# Patient Record
Sex: FEMALE | Race: WHITE | Employment: PART TIME | URBAN - METROPOLITAN AREA
[De-identification: names, ages, dates, MRNs, and addresses within clinical notes are randomized per-mention and may not be internally consistent; named-entity substitution may affect disease eponyms.]

---

## 2017-05-24 ENCOUNTER — HOSPITAL ENCOUNTER (OUTPATIENT)
Dept: MAMMOGRAPHY | Age: 72
Discharge: HOME | End: 2017-05-24
Attending: FAMILY MEDICINE
Payer: MEDICARE

## 2017-05-24 DIAGNOSIS — Z12.31 VISIT FOR SCREENING MAMMOGRAM: ICD-10-CM

## 2017-05-24 PROCEDURE — 77067 SCR MAMMO BI INCL CAD: CPT

## 2017-07-12 ENCOUNTER — HOSPITAL ENCOUNTER (INPATIENT)
Age: 72
LOS: 19 days | Discharge: NURSING, REHAB OR LONG-TERM CARE | DRG: 492 | End: 2017-08-01
Attending: EMERGENCY MEDICINE | Admitting: INTERNAL MEDICINE
Payer: MEDICARE

## 2017-07-12 DIAGNOSIS — S82.891A ANKLE FRACTURE, RIGHT, CLOSED, INITIAL ENCOUNTER: Primary | ICD-10-CM

## 2017-07-12 DIAGNOSIS — J44.1 COPD EXACERBATION (HCC): ICD-10-CM

## 2017-07-12 DIAGNOSIS — J96.01 ACUTE RESPIRATORY FAILURE WITH HYPOXIA (HCC): ICD-10-CM

## 2017-07-12 LAB
ALBUMIN SERPL BCP-MCNC: 3 G/DL (ref 3.5–5)
ALBUMIN/GLOB SERPL: 0.7 {RATIO} (ref 1.1–2.2)
ALP SERPL-CCNC: 96 U/L (ref 45–117)
ALT SERPL-CCNC: 32 U/L (ref 12–78)
ANION GAP BLD CALC-SCNC: 11 MMOL/L (ref 5–15)
AST SERPL W P-5'-P-CCNC: 41 U/L (ref 15–37)
BILIRUB SERPL-MCNC: 0.5 MG/DL (ref 0.2–1)
BUN SERPL-MCNC: 23 MG/DL (ref 6–20)
BUN/CREAT SERPL: 26 (ref 12–20)
CALCIUM SERPL-MCNC: 8 MG/DL (ref 8.5–10.1)
CHLORIDE SERPL-SCNC: 93 MMOL/L (ref 97–108)
CO2 SERPL-SCNC: 22 MMOL/L (ref 21–32)
CREAT SERPL-MCNC: 0.87 MG/DL (ref 0.55–1.02)
GLOBULIN SER CALC-MCNC: 4.2 G/DL (ref 2–4)
GLUCOSE SERPL-MCNC: 168 MG/DL (ref 65–100)
LACTATE SERPL-SCNC: 1.5 MMOL/L (ref 0.4–2)
POTASSIUM SERPL-SCNC: 4.2 MMOL/L (ref 3.5–5.1)
PROT SERPL-MCNC: 7.2 G/DL (ref 6.4–8.2)
SODIUM SERPL-SCNC: 126 MMOL/L (ref 136–145)

## 2017-07-12 PROCEDURE — 94640 AIRWAY INHALATION TREATMENT: CPT

## 2017-07-12 PROCEDURE — 74011000250 HC RX REV CODE- 250: Performed by: EMERGENCY MEDICINE

## 2017-07-12 PROCEDURE — 96374 THER/PROPH/DIAG INJ IV PUSH: CPT

## 2017-07-12 PROCEDURE — 83605 ASSAY OF LACTIC ACID: CPT | Performed by: EMERGENCY MEDICINE

## 2017-07-12 PROCEDURE — 96361 HYDRATE IV INFUSION ADD-ON: CPT

## 2017-07-12 PROCEDURE — 83880 ASSAY OF NATRIURETIC PEPTIDE: CPT | Performed by: INTERNAL MEDICINE

## 2017-07-12 PROCEDURE — 87040 BLOOD CULTURE FOR BACTERIA: CPT | Performed by: EMERGENCY MEDICINE

## 2017-07-12 PROCEDURE — 74011250636 HC RX REV CODE- 250/636: Performed by: EMERGENCY MEDICINE

## 2017-07-12 PROCEDURE — 77030029684 HC NEB SM VOL KT MONA -A

## 2017-07-12 PROCEDURE — 75810000301 HC ER LEVEL 1 CLOSED TREATMNT FRACTURE/DISLOCATION

## 2017-07-12 PROCEDURE — 80053 COMPREHEN METABOLIC PANEL: CPT | Performed by: EMERGENCY MEDICINE

## 2017-07-12 PROCEDURE — 99152 MOD SED SAME PHYS/QHP 5/>YRS: CPT

## 2017-07-12 PROCEDURE — 85025 COMPLETE CBC W/AUTO DIFF WBC: CPT | Performed by: EMERGENCY MEDICINE

## 2017-07-12 PROCEDURE — 99285 EMERGENCY DEPT VISIT HI MDM: CPT

## 2017-07-12 PROCEDURE — 36415 COLL VENOUS BLD VENIPUNCTURE: CPT | Performed by: EMERGENCY MEDICINE

## 2017-07-12 RX ORDER — MORPHINE SULFATE 4 MG/ML
4 INJECTION, SOLUTION INTRAMUSCULAR; INTRAVENOUS
Status: COMPLETED | OUTPATIENT
Start: 2017-07-12 | End: 2017-07-12

## 2017-07-12 RX ORDER — LOVASTATIN 10 MG/1
10 TABLET ORAL
COMMUNITY
End: 2017-08-01

## 2017-07-12 RX ORDER — IPRATROPIUM BROMIDE AND ALBUTEROL SULFATE 2.5; .5 MG/3ML; MG/3ML
3 SOLUTION RESPIRATORY (INHALATION) ONCE
Status: COMPLETED | OUTPATIENT
Start: 2017-07-12 | End: 2017-07-12

## 2017-07-12 RX ADMIN — SODIUM CHLORIDE 1000 ML: 900 INJECTION, SOLUTION INTRAVENOUS at 23:02

## 2017-07-12 RX ADMIN — MORPHINE SULFATE 4 MG: 4 INJECTION, SOLUTION INTRAMUSCULAR; INTRAVENOUS at 23:02

## 2017-07-12 RX ADMIN — IPRATROPIUM BROMIDE AND ALBUTEROL SULFATE 3 ML: .5; 3 SOLUTION RESPIRATORY (INHALATION) at 23:02

## 2017-07-12 NOTE — IP AVS SNAPSHOT
[address] [facility] 
[phone] Patient: Marisa Trejo MRN: [id number] :1945 You are allergic to the following Allergen Reactions Codeine Rash Other (comments)  
 hyper Ibuprofen (Ibuprofen) Other (comments) Stomach pains Simvastatin Itching Recent Documentation Height Weight Breastfeeding? BMI OB Status Smoking Status 1.6 m 90.4 kg No 35.3 kg/m2 Hysterectomy Former Smoker Emergency Contacts Name Discharge Info Relation Home Work Mobile Renee Greenwood  Other Relative [6] [phone] Mikala Zambrano  Child [2]  [phone] About your hospitalization You were admitted on:  2017 You last received care in the:  hospitals 3 ORTHOPEDICS You were discharged on:  2017 Unit phone number:  [phone] Why you were hospitalized Your primary diagnosis was:  Not on File Your diagnoses also included:  Respiratory Failure (Hcc) Providers Seen During Your Hospitalizations Provider Role Specialty Primary office phone Saqib Jacob MD Attending Provider Emergency Medicine [phone] Cora Mukherjee MD Attending Provider Internal Medicine [phone] Magda Hedrick MD Attending Provider Internal Medicine [phone] Xin Yi MD Attending Provider Internal Medicine [phone] Paulo Anderson MD Attending Provider Internal Medicine [phone] Your Primary Care Physician (PCP) Primary Care Physician Office Phone Office Fax [facility] [phone] Follow-up Information Follow up With Details Comments Contact Info Demarcus Freire MD In 2 weeks  [address] [facility] [address] [facility] 
[phone] Sharon De Santiago MD In 1 week follow-up , check CBC, BMP in 1 week [address] [facility] [address] [address] [phone] Tico Barbosa MD In 2 weeks brandon/hyponatremia [address] 
[address] [facility] 
[phone] Cesar Hannon MD In 2 weeks folow-up hopsital discharge, [address] [facility] 
[phone] Pepito May MD In 3 weeks s/p tibia and fibula fracture surgery [address] [facility] [address] [facility] 
[phone] Cecillia Bumpers, MD In 2 weeks  [address] [facility] 
[phone] [facility] In 3 weeks urinary retention [address] [facility]  
[place] 5[address] 6364 [facility] [address] [facility]   [address] [place] [address] 
[phone] Current Discharge Medication List  
  
START taking these medications Dose & Instructions Dispensing Information Comments Morning Noon Evening Bedtime  
 fluticasone-vilanterol 200-25 mcg/dose inhaler Commonly known as:  BREO ELLIPTA Your last dose was: Your next dose is:    
   
   
 Dose:  1 Puff Take 1 Puff by inhalation daily. Quantity:  1 Inhaler Refills:  0  
     
   
   
   
  
 furosemide 40 mg tablet Commonly known as:  LASIX Your last dose was: Your next dose is:    
   
   
 take 1 tab daily Quantity:  30 Tab Refills:  0  
     
   
   
   
  
 ipratropium 0.02 % nebulizer solution Commonly known as:  ATROVENT Your last dose was: Your next dose is:    
   
   
 Dose:  0.5 mg  
2.5 mL by Nebulization route every six (6) hours as needed. Quantity:  10 mL Refills:  0  
     
   
   
   
  
 levalbuterol 0.63 mg/3 mL Nebu Commonly known as:  Prabhjot Early Your last dose was: Your next dose is:    
   
   
 Dose:  0.63 mg  
3 mL by Nebulization route every six (6) hours as needed. Quantity:  10 Package Refills:  1 oxyCODONE IR 5 mg immediate release tablet Commonly known as:  Caren Toledo Your last dose was: Your next dose is:    
   
   
 Dose:  2.5-5 mg Take 0.5-1 Tabs by mouth every four (4) hours as needed. Max Daily Amount: 30 mg.  
 Quantity:  30 Tab Refills:  0  
     
   
   
   
  
 pantoprazole 40 mg tablet Commonly known as:  PROTONIX Your last dose was: Your next dose is:    
   
   
 Dose:  40 mg Take 1 Tab by mouth Before breakfast and dinner. Quantity:  60 Tab Refills:  1  
     
   
   
   
  
 potassium chloride SR 10 mEq tablet Commonly known as:  KLOR-CON 10 Your last dose was: Your next dose is:    
   
   
 Dose:  10 mEq Take 1 Tab by mouth two (2) times a day. Quantity:  30 Tab Refills:  0  
     
   
   
   
  
 senna-docusate 8.6-50 mg per tablet Commonly known as:  Teodora Mendoza Your last dose was: Your next dose is:    
   
   
 Dose:  1 Tab Take 1 Tab by mouth daily. Quantity:  30 Tab Refills:  0  
     
   
   
   
  
 sucralfate 100 mg/mL suspension Commonly known as:  Lamar Acevedo Your last dose was: Your next dose is:    
   
   
 Dose:  1 g Take 10 mL by mouth Before breakfast, lunch, dinner and at bedtime. Quantity:  100 mL Refills:  0 CONTINUE these medications which have CHANGED Dose & Instructions Dispensing Information Comments Morning Noon Evening Bedtime  
 amLODIPine 5 mg tablet Commonly known as:  Oracio Urban What changed:  how much to take Your last dose was: Your next dose is:    
   
   
 Dose:  5 mg Take 1 Tab by mouth daily. Quantity:  30 Tab Refills:  0  
     
   
   
   
  
 irbesartan 150 mg tablet Commonly known as:  AVAPRO What changed:   
- medication strength 
- how much to take - when to take this Your last dose was: Your next dose is:    
   
   
 Dose:  150 mg Take 1 Tab by mouth daily. Quantity:  30 Tab Refills:  0  
     
   
   
   
  
 metoprolol succinate 50 mg XL tablet Commonly known as:  TOPROL-XL What changed:   
- medication strength 
- how much to take Your last dose was: Your next dose is:    
   
   
 Dose:  50 mg Take 1 Tab by mouth daily. Quantity:  30 Tab Refills:  0 CONTINUE these medications which have NOT CHANGED Dose & Instructions Dispensing Information Comments Morning Noon Evening Bedtime  
 nicotinic acid 500 mg tablet Commonly known as:  NIACIN Your last dose was: Your next dose is:    
   
   
 Dose:  500 mg Take 500 mg by mouth Daily (before breakfast). Refills:  0 STOP taking these medications CITRUCEL (SUCROSE) Powd Generic drug:  methylcellulose (laxative) lovastatin 10 mg tablet Commonly known as:  MEVACOR  
   
  
 PLAVIX 75 mg Tab Generic drug:  clopidogrel TYLENOL EX STR ARTHRITIS PAIN 500 mg tablet Generic drug:  acetaminophen ASK your doctor about these medications Dose & Instructions Dispensing Information Comments Morning Noon Evening Bedtime  
 acetaminophen 325 mg tablet Commonly known as:  TYLENOL Ask about: Should I take this medication? Your last dose was: Your next dose is:    
   
   
 Dose:  650 mg Take 2 Tabs by mouth every six (6) hours for 14 days. Quantity:  112 Tab Refills:  0  
     
   
   
   
  
 polyethylene glycol 17 gram packet Commonly known as:  Domenicbev Burns Ask about: Should I take this medication? Your last dose was: Your next dose is:    
   
   
 Dose:  17 g Take 1 Packet by mouth daily as needed (constipation) for up to 15 days. Quantity:  15 Packet Refills:  0 Saccharomyces boulardii 250 mg capsule Commonly known as:  Jesus Alberto Blanca Ask about: Should I take this medication? Your last dose was: Your next dose is:    
   
   
 Dose:  250 mg Take 1 Cap by mouth two (2) times a day for 7 days. Quantity:  14 Cap Refills:  0 Where to Get Your Medications These medications were sent to 7 [facility], [address]  [address] [place] Phone:  [phone] Saccharomyces boulardii 250 mg capsule Information on where to get these meds will be given to you by the nurse or doctor. ! Ask your nurse or doctor about these medications  
  acetaminophen 325 mg tablet  
 amLODIPine 5 mg tablet  
 fluticasone-vilanterol 200-25 mcg/dose inhaler  
 furosemide 40 mg tablet  
 ipratropium 0.02 % nebulizer solution  
 irbesartan 150 mg tablet  
 levalbuterol 0.63 mg/3 mL Nebu  
 metoprolol succinate 50 mg XL tablet  
 oxyCODONE IR 5 mg immediate release tablet  
 pantoprazole 40 mg tablet  
 polyethylene glycol 17 gram packet  
 potassium chloride SR 10 mEq tablet  
 senna-docusate 8.6-50 mg per tablet  
 sucralfate 100 mg/mL suspension Discharge Instructions [facility] Discharge Instruction Sheet: Ankle Fracture Repair DR. Montserrat Anderson Blood Clot Prevention ON HOLD DUE TO GI BLEEDING. Follow up with GI doctor before restarting your home medication Plavix Pain control: ? Typically, we will prescribe a narcotic usually 1-2 tabs every three to four hours as needed for your pain. ? Most patients need this only for the first few weeks. You should wean off of this as the pain decreases. ? Some of these medications contain a large dose of Tylenol (acetaminophen). Therefore, you should consult your pharmacist before taking any additional Tylenol at the same time. You should not drive while taking any narcotic pain medications. Take your pain medications with food to prevent nausea! Your last dose of pain medication in the hospital was given @ :__________ Ice Therapy ? You will be discharged home with ice/gel packs. ? Continue using these regularly to minimize pain and swelling, especially after physical activity. Constipation ? Pain medication and anesthesia can be constipating-this can be prevented by gentle physical activity and drinking plenty of fluid. ? It should be treated with over-the-counter medications such as Dulcolax, Miralax, Magnesium Citrate and/or Fleets enema. ? You should have a bowel movement at least every other day following surgery. Wound / Cast / Splint Care Elevation:  Keep the operated extremity elevated above heart level as needed for pain and swelling. WOUND:        Place opsite dressing over incisions. May wrap entire ex-fix with ace wrap. Pin Care May applpy Bacitracin to incisions ICE:   Use ice packs given to you directly over the area of your surgery. Use a towel if necessary to prevent the ice bag from directly contacting your skin and alternate ice on and off the area every 30 minutes. External Fixator: You will be discharged with an external fixator in place. Do Not allow your incisions or pin sites to get wet. SHOWER: You may shower, but you must have the leg covered with plastic bag. You can use a heavy duty trash bag with a rubber band, or purchase a cast cover a your local pharmacy. It will be helpful to have a seat in the shower so that you can sit. To increase and promote healing ? Stop Smoking (or at least cut back on smoking). ? Eat a well-balanced diet (high in protein and vitamin C) ?  If your appetite is poor, consider nutritional supplements like Ensure, Glucerna, or Center Instant Breakfast. 
? If you are diabetic, controlling you blood sugars is very important to prevent infection and promote wound healing. Nutrition ? If you were on a supplement such as Ensure or Glucerna) while in the hospital, please continue using them with each meal for the next 30 days. ? Eat a well-balanced diet - High in protein, high in vitamins and minerals, especially vitamin C and zinc.  
 
Physical Activity ? You cannot weight bear on your right leg. ? Light activity. ? Bed-rest may slow your recovery. ? Use your walker/crutches and take short walks about every 2 hours. o Increase your distance each day. ? NO DRIVING until told to do so. Warning signs: Please call your physician immediately at [phone] if you have ? Bleeding from incision that is constant. ? Change in mental status (unusual behavior or confusion) ? If your incision develops redness or swelling 
? Change in wound drainage (increase in amount, color, or foul odor) ? Temperature over 101.5 degrees Fahrenheit ? Pain in the calf of your leg ? Tenderness or redness in the calf of your leg 
? Increased swelling of the thigh, ankle, calf, or foot. Emergency: CALL 911 if you have ? Shortness of breath ? Chest pain ? Localized chest pain when coughing or taking a deep breath Follow-up ? Call the surgeons office today or tomorrow and ask to make an appointment for follow-up in 2 weeks. ? You can return to work when cleared by a physician. Please call [phone] if you have concerns or questions. HOSPITALIST DISCHARGE INSTRUCTIONS 
 
NAME: Zheng Herzog :  1945 MRN:  [id number] Date/Time:  2017 8:22 AM 
 
ADMIT DATE: 2017 DISCHARGE DATE: 2017 Attending Physician: Camilla Davis MD 
 
DISCHARGE DIAGNOSIS: 
Acute GIB with ABL anemia secondary to duodenal ulcers and esophagitis 
  
Atrial Fibrillation with RVR on early morning of  SVT  
 
  
Acute hypoxic respiratory failure Acute COPD exacerbation 
  
ARF  
 Hyponatremia: Sodium Woody of 121 on 7/15; increased to 133;  ? SIADH 
  
CHF ruled out (LVEF 55-60%); Distal tibia and fibula fracture s/p mechanical fall 
-s/p ORIF on 7/13 
 
   
Hx of CAD 
HTN;  
  
  
Hx of PVD 
-s/p right renal artery stent, left fem-pop by Dr. Arleth Sweet 3/2014 
   
Impaired Glucose Management (Diabetes ruled out) 
 
   
Tobacco use 
 
  
Obesity 
   
Code Status: Full Surrogate Decision Maker: daughter Yvonne Melo [phone] 
   
DVT Prophylaxis: SCDs for now (lovenox if no plan procedure in the AM) GI Prophylaxis: not indicated 
   
Baseline: independent 
  
Dispo: ? SNF, await placement 
  
  
Body mass index is 35.3 kg/(m^2). Medications: Per above medication reconciliation. Pain Management: per above medications Recommended diet: Cardiac Diet, Diabetic Diet, Low fat, Low cholesterol and Renal Diet Recommended activity: Activity as tolerated Wound care: See surgical/procedure care instructions Indwelling devices: Ness catheter care and voiding trial 
 
Supplemental Oxygen: None Required Lab work: Weekly BMP and Weekly CBC Glucose management:  Accucheck ACHS with sliding scale per SNF protocol Code status: Full Outside physician follow up: Follow-up Information Follow up With Details Comments Contact Info Caryle Kaiser, MD In 2 weeks  [facility] [address] [facility] [address] [phone] [facility])   [address] [facility] [address] [phone] Kanika Becerril MD In 1 week follow-up , check CBC, BMP in 1 week [address] [address] [phone] Ivis Cassidy MD In 2 weeks brandon/hyponatremia [address] 
[address] [facility] [address] [phone] Jeramy Spann MD In 2 weeks folow-up hopsital discharge, [address] [facility] [address] [phone] Pepito May MD In 3 weeks s/p tibia and fibula fracture surgery [address] [facility] [address] [facility] 
[phone] Cecillia Bumpers, MD In 2 weeks  [address] [facility] 
[phone] [facility] In 3 weeks urinary retention [address] [facility]  
[place] [address] [id number] [place] [phone] Skilled nursing facility/ SNF MD responsible for above on discharge. Information obtained by : 
I understand that if any problems occur once I am at home I am to contact my physician. I understand and acknowledge receipt of the instructions indicated above. Physician's or R.N.'s Signature                                                                  Date/Time Patient or Repres Discharge Instructions Attachments/References COPD: BREATHING TECHNIQUES (ENGLISH) HEART FAILURE ZONES: GENERAL INFO (ENGLISH) SMOKING CESSATION: HEALTH BENEFITS: GENERAL INFO (ENGLISH) Discharge Orders None [facility] Announcement We are excited to announce that we are making your provider's discharge notes available to you in [employer]. You will see these notes when they are completed and signed by the physician that discharged you from your recent hospital stay. If you have any questions or concerns about any information you see in [employer], please call the Health Information Department where you were seen or reach out to your Primary Care Provider for more information about your plan of care. Introducing [facility] HE[facility]!    
 [employer] introduces [employer] patient portal. Now you can access parts of your medical record, email your doctor's office, and request medication refills online. 1. In your internet browser, go to [email or web address] 2. Click on the First Time User? Click Here link in the Sign In box. You will see the New Member Sign Up page. 3. Enter your Fnbox Access Code exactly as it appears below. You will not need to use this code after youve completed the sign-up process. If you do not sign up before the expiration date, you must request a new code. · Fnbox Access Code: [email or web address] Expires: 8/22/2017  8:36 AM 
 
4. Enter the last four digits of your Social Security Number (xxxx) and Date of Birth (mm/dd/yyyy) as indicated and click Submit. You will be taken to the next sign-up page. 5. Create a Fnbox ID. This will be your Fnbox login ID and cannot be changed, so think of one that is secure and easy to remember. 6. Create a Fnbox password. You can change your password at any time. 7. Enter your Password Reset Question and Answer. This can be used at a later time if you forget your password. 8. Enter your e-mail address. You will receive e-mail notification when new information is available in [address] 9. Click Sign Up. You can now view and download portions of your medical record. 10. Click the Download Summary menu link to download a portable copy of your medical information. If you have questions, please visit the Frequently Asked Questions section of the Fnbox website. Remember, Fnbox is NOT to be used for urgent needs. For medical emergencies, dial 911. Now available from your iPhone and Android! General Information Please provide this summary of care documentation to your next provider. Patient Signature:  ____________________________________________________________ Date:  ____________________________________________________________  
  
Rashida Piety  Provider Signature: ____________________________________________________________ Date:  ____________________________________________________________ More Information Breathing Techniques for COPD: Care Instructions Your Care Instructions Breathing is hard when you have chronic obstructive pulmonary disease (COPD). You may take quick, short breaths. Breathing this way makes it harder to get air into your lungs. Learning new ways to control your breathing may help. You may feel better and be able to do more. You can try three basic ways to control your breathing. They are pursed-lip breathing, diaphragmatic breathing, and breathing while bending. Use these methods when you are more short of breath than normal. Practice them often so you can do them well. Follow-up care is a key part of your treatment and safety. Be sure to make and go to all appointments, and call your doctor if you are having problems. It's also a good idea to know your test results and keep a list of the medicines you take. How can you care for yourself at home? · Pursed-lip breathing helps you breathe more air out so that your next breath can be deeper. For this type of breathing, you breathe in through your nose and out through your mouth while almost closing your lips. Breathe in for about 2 seconds, and breathe out for 4 to 6 seconds. Pursed-lip breathing decreases shortness of breath and improves your ability to exercise. · Diaphragmatic breathing helps your lungs expand so that they take in more air. ¨ Lie on your back, or prop yourself up on several pillows. ¨ Put one hand on your belly and the other on your chest. When you breathe in, push your belly out as far as possible. You should feel the hand on your belly move out, while the hand on your chest does not move. ¨ When you breathe out, you should feel the hand on your belly move in.  When you can do this type of breathing well while lying down, learn to do it while sitting or standing. Many people with COPD find this breathing method helpful. ¨ Practice diaphragmatic breathing for 20 minutes, 2 or 3 times a day. · Breathing while bending forward at the waist may make breathing easier. It can reduce shortness of breath while you exercise or rest. It helps the diaphragm move more easily. The diaphragm is a large muscle that separates your lungs from your belly. It helps draw air into your lungs as you breathe. · Do not smoke. Smoking makes COPD worse. If you need help quitting, talk to your doctor about stop-smoking programs and medicines. These can increase your chances of quitting for good. When should you call for help? Call your doctor now or seek immediate medical care if: 
· Your breathing methods do not help. · Your shortness of breath gets worse. · You cough up blood. · You have swelling in your belly and legs. · You have severe chest pain. Watch closely for changes in your health, and be sure to contact your doctor if you have any problems. Where can you learn more? Go to http://arturo-stephen.info/. Enter A915 in the search box to learn more about \"Breathing Techniques for COPD: Care Instructions. \" Current as of: March 25, 2017 Content Version: 11.3 © 1586-4421 [employer]. Care instructions adapted under license by [employer] (which disclaims liability or warranty for this information). If you have questions about a medical condition or this instruction, always ask your healthcare professional. [place] [address] any warranty or liability for your use of this information. Learning About Heart Failure Zones What are heart failure zones? Heart failure zones give you an easy way to see changes in your heart failure symptoms. They also tell you when you need to get help. Check every day to see which zone you are in. Green zone. You are doing well. This is where you want to be. · Your weight is stable. This means it is not going up or down. · You breathe easily. · You are sleeping well. You are able to lie flat without shortness of breath. · You can do your usual activities. Yellow zone. Be careful. Your symptoms are changing. Call your doctor. · You have new or increased shortness of breath. · You are dizzy or lightheaded, or you feel like you may faint. · You have sudden weight gain, such as more than 2 to 3 pounds in a day or 5 pounds in a week. (Your doctor may suggest a different range of weight gain.) · You have increased swelling in your legs, ankles, or feet. · You are so tired or weak that you cannot do your usual activities. · You are not sleeping well. Shortness of breath wakes you up at night. You need extra pillows. Your doctor's name: ____________________________________________________________ Your doctor's contact information: _________________________________________________ Red zone. This is an emergency. Call 911. You have symptoms of sudden heart failure, such as: 
· You have severe trouble breathing. · You cough up pink, foamy mucus. · You have a new irregular or fast heartbeat. You have symptoms of a heart attack. These may include: · Chest pain or pressure, or a strange feeling in the chest. 
· Sweating. · Shortness of breath. · Nausea or vomiting. · Pain, pressure, or a strange feeling in the back, neck, jaw, or upper belly or in one or both shoulders or arms. · Lightheadedness or sudden weakness. · A fast or irregular heartbeat. If you have symptoms of a heart attack: After you call 911, the  may tell you to chew 1 adult-strength or 2 to 4 low-dose aspirin. Wait for an ambulance. Do not try to drive yourself. Follow-up care is a key part of your treatment and safety.  Be sure to make and go to all appointments, and call your doctor if you are having problems. It's also a good idea to know your test results and keep a list of the medicines you take. Where can you learn more? Go to http://arturo-stpehen.info/. Enter T174 in the search box to learn more about \"Learning About Heart Failure Zones. \" Current as of: February 23, 2017 Content Version: 11.3 © 5573-3767 Ink[employer]. Care instructions adapted under license by [employer] (which disclaims liability or warranty for this information). If you have questions about a medical condition or this instruction, always ask your healthcare professional. [address] any warranty or liability for your use of this information. Learning About Benefits From Quitting Smoking How does quitting smoking make you healthier? If you're thinking about quitting smoking, you may have a few reasons to be smoke-free. Your health may be one of them. · When you quit smoking, you lower your risks for cancer, lung disease, heart attack, stroke, blood vessel disease, and blindness from macular degeneration. · When you're smoke-free, you get sick less often, and you heal faster. You are less likely to get colds, flu, bronchitis, and pneumonia. · As a nonsmoker, you may find that your mood is better and you are less stressed. When and how will you feel healthier? Quitting has real health benefits that start from day 1 of being smoke-free. And the longer you stay smoke-free, the healthier you get and the better you feel. The first hours · After just 20 minutes, your blood pressure and heart rate go down. That means there's less stress on your heart and blood vessels. · Within 12 hours, the level of carbon monoxide in your blood drops back to normal. That makes room for more oxygen. With more oxygen in your body, you may notice that you have more energy than when you smoked. After 2 weeks · Your lungs start to work better. · Your risk of heart attack starts to drop. After 1 month · When your lungs are clear, you cough less and breathe deeper, so it's easier to be active. · Your sense of taste and smell return. That means you can enjoy food more than you have since you started smoking. Over the years · After 1 year, your risk of heart disease is half what it would be if you kept smoking. · After 5 years, your risk of stroke starts to shrink. Within a few years after that, it's about the same as if you'd never smoked. · After 10 years, your risk of dying from lung cancer is cut by about half. And your risk for many other types of cancer is lower too. How would quitting help others in your life? When you quit smoking, you improve the health of everyone who now breathes in your smoke. · Their heart, lung, and cancer risks drop, much like yours. · They are sick less. For babies and small children, living smoke-free means they're less likely to have ear infections, pneumonia, and bronchitis. · If you're a woman who is or will be pregnant someday, quitting smoking means a healthier . · Children who are close to you are less likely to become adult smokers. Where can you learn more? Go to http://arturo-stephen.info/. Enter 052 [id number] [id number] 11 in the search box to learn more about \"Learning About Benefits From Quitting Smoking. \" Current as of: 2017 Content Version: 11.3 © 4128-8169 [employer]. Care instructions adapted under license by [employer] (which disclaims liability or warranty for this information). If you have questions about a medical condition or this instruction, always ask your healthcare professional. [place] [address] any warranty or liability for your use of this information.

## 2017-07-13 ENCOUNTER — APPOINTMENT (OUTPATIENT)
Dept: GENERAL RADIOLOGY | Age: 72
DRG: 492 | End: 2017-07-13
Attending: ORTHOPAEDIC SURGERY
Payer: MEDICARE

## 2017-07-13 ENCOUNTER — APPOINTMENT (OUTPATIENT)
Dept: GENERAL RADIOLOGY | Age: 72
DRG: 492 | End: 2017-07-13
Attending: PHYSICIAN ASSISTANT
Payer: MEDICARE

## 2017-07-13 ENCOUNTER — APPOINTMENT (OUTPATIENT)
Dept: GENERAL RADIOLOGY | Age: 72
DRG: 492 | End: 2017-07-13
Attending: EMERGENCY MEDICINE
Payer: MEDICARE

## 2017-07-13 ENCOUNTER — APPOINTMENT (OUTPATIENT)
Dept: CT IMAGING | Age: 72
DRG: 492 | End: 2017-07-13
Attending: PHYSICIAN ASSISTANT
Payer: MEDICARE

## 2017-07-13 ENCOUNTER — ANESTHESIA EVENT (OUTPATIENT)
Dept: SURGERY | Age: 72
DRG: 492 | End: 2017-07-13
Payer: MEDICARE

## 2017-07-13 ENCOUNTER — ANESTHESIA (OUTPATIENT)
Dept: SURGERY | Age: 72
DRG: 492 | End: 2017-07-13
Payer: MEDICARE

## 2017-07-13 PROBLEM — J96.90 RESPIRATORY FAILURE (HCC): Status: ACTIVE | Noted: 2017-07-13

## 2017-07-13 LAB
BASOPHILS # BLD AUTO: 0 K/UL (ref 0–0.1)
BASOPHILS # BLD: 0 % (ref 0–1)
BNP SERPL-MCNC: 1751 PG/ML (ref 0–125)
DIFFERENTIAL METHOD BLD: ABNORMAL
EOSINOPHIL # BLD: 0 K/UL (ref 0–0.4)
EOSINOPHIL NFR BLD: 0 % (ref 0–7)
ERYTHROCYTE [DISTWIDTH] IN BLOOD BY AUTOMATED COUNT: 12.2 % (ref 11.5–14.5)
GLUCOSE BLD STRIP.AUTO-MCNC: 157 MG/DL (ref 65–100)
GLUCOSE BLD STRIP.AUTO-MCNC: 157 MG/DL (ref 65–100)
GLUCOSE BLD STRIP.AUTO-MCNC: 176 MG/DL (ref 65–100)
GLUCOSE BLD STRIP.AUTO-MCNC: 209 MG/DL (ref 65–100)
HCT VFR BLD AUTO: 31.7 % (ref 35–47)
HGB BLD-MCNC: 11.3 G/DL (ref 11.5–16)
LYMPHOCYTES # BLD AUTO: 3 % (ref 12–49)
LYMPHOCYTES # BLD: 0.5 K/UL (ref 0.8–3.5)
MCH RBC QN AUTO: 31 PG (ref 26–34)
MCHC RBC AUTO-ENTMCNC: 35.6 G/DL (ref 30–36.5)
MCV RBC AUTO: 86.8 FL (ref 80–99)
MONOCYTES # BLD: 0.2 K/UL (ref 0–1)
MONOCYTES NFR BLD AUTO: 1 % (ref 5–13)
NEUTS SEG # BLD: 15.8 K/UL (ref 1.8–8)
NEUTS SEG NFR BLD AUTO: 96 % (ref 32–75)
PLATELET # BLD AUTO: 318 K/UL (ref 150–400)
RBC # BLD AUTO: 3.65 M/UL (ref 3.8–5.2)
RBC MORPH BLD: ABNORMAL
SERVICE CMNT-IMP: ABNORMAL
WBC # BLD AUTO: 16.5 K/UL (ref 3.6–11)

## 2017-07-13 PROCEDURE — 74011250636 HC RX REV CODE- 250/636: Performed by: ORTHOPAEDIC SURGERY

## 2017-07-13 PROCEDURE — 74011636637 HC RX REV CODE- 636/637: Performed by: INTERNAL MEDICINE

## 2017-07-13 PROCEDURE — 77030000032 HC CUF TRNQT ZIMM -B: Performed by: ORTHOPAEDIC SURGERY

## 2017-07-13 PROCEDURE — 77030011640 HC PAD GRND REM COVD -A: Performed by: ORTHOPAEDIC SURGERY

## 2017-07-13 PROCEDURE — 0QSJ35Z REPOSITION RIGHT FIBULA WITH EXTERNAL FIXATION DEVICE, PERCUTANEOUS APPROACH: ICD-10-PCS | Performed by: ORTHOPAEDIC SURGERY

## 2017-07-13 PROCEDURE — 77030025102 HC BIT DRL QC SS 2 SYNT -B: Performed by: ORTHOPAEDIC SURGERY

## 2017-07-13 PROCEDURE — 74011000250 HC RX REV CODE- 250: Performed by: ORTHOPAEDIC SURGERY

## 2017-07-13 PROCEDURE — C1713 ANCHOR/SCREW BN/BN,TIS/BN: HCPCS | Performed by: ORTHOPAEDIC SURGERY

## 2017-07-13 PROCEDURE — 74011250636 HC RX REV CODE- 250/636

## 2017-07-13 PROCEDURE — 77030013079 HC BLNKT BAIR HGGR 3M -A: Performed by: NURSE ANESTHETIST, CERTIFIED REGISTERED

## 2017-07-13 PROCEDURE — 77030000031 HC BIT DRL QC SYNT -C: Performed by: ORTHOPAEDIC SURGERY

## 2017-07-13 PROCEDURE — 77030018836 HC SOL IRR NACL ICUM -A: Performed by: ORTHOPAEDIC SURGERY

## 2017-07-13 PROCEDURE — 0QSJ04Z REPOSITION RIGHT FIBULA WITH INTERNAL FIXATION DEVICE, OPEN APPROACH: ICD-10-PCS | Performed by: ORTHOPAEDIC SURGERY

## 2017-07-13 PROCEDURE — 82962 GLUCOSE BLOOD TEST: CPT

## 2017-07-13 PROCEDURE — 76060000037 HC ANESTHESIA 3 TO 3.5 HR: Performed by: ORTHOPAEDIC SURGERY

## 2017-07-13 PROCEDURE — 74011250637 HC RX REV CODE- 250/637: Performed by: PHYSICIAN ASSISTANT

## 2017-07-13 PROCEDURE — 73700 CT LOWER EXTREMITY W/O DYE: CPT

## 2017-07-13 PROCEDURE — 74011250637 HC RX REV CODE- 250/637: Performed by: INTERNAL MEDICINE

## 2017-07-13 PROCEDURE — 77030010509 HC AIRWY LMA MSK TELE -A: Performed by: NURSE ANESTHETIST, CERTIFIED REGISTERED

## 2017-07-13 PROCEDURE — 77030025148 HC CLMP EXT FIX 1 SYNT -G: Performed by: ORTHOPAEDIC SURGERY

## 2017-07-13 PROCEDURE — 93005 ELECTROCARDIOGRAM TRACING: CPT

## 2017-07-13 PROCEDURE — 94640 AIRWAY INHALATION TREATMENT: CPT

## 2017-07-13 PROCEDURE — 74011250636 HC RX REV CODE- 250/636: Performed by: PHYSICIAN ASSISTANT

## 2017-07-13 PROCEDURE — 65660000000 HC RM CCU STEPDOWN

## 2017-07-13 PROCEDURE — 71010 XR CHEST PORT: CPT

## 2017-07-13 PROCEDURE — 73600 X-RAY EXAM OF ANKLE: CPT

## 2017-07-13 PROCEDURE — 74011000250 HC RX REV CODE- 250

## 2017-07-13 PROCEDURE — 77030008467 HC STPLR SKN COVD -B: Performed by: ORTHOPAEDIC SURGERY

## 2017-07-13 PROCEDURE — 74011250636 HC RX REV CODE- 250/636: Performed by: EMERGENCY MEDICINE

## 2017-07-13 PROCEDURE — 77030016920 HC CLMP EXT FIX4 SYNT -F: Performed by: ORTHOPAEDIC SURGERY

## 2017-07-13 PROCEDURE — 77030020371 HC SUT POLYGLY VLOC COVD -B: Performed by: ORTHOPAEDIC SURGERY

## 2017-07-13 PROCEDURE — 74011000250 HC RX REV CODE- 250: Performed by: INTERNAL MEDICINE

## 2017-07-13 PROCEDURE — 76210000017 HC OR PH I REC 1.5 TO 2 HR: Performed by: ORTHOPAEDIC SURGERY

## 2017-07-13 PROCEDURE — 77030020782 HC GWN BAIR PAWS FLX 3M -B

## 2017-07-13 PROCEDURE — 73610 X-RAY EXAM OF ANKLE: CPT

## 2017-07-13 PROCEDURE — 74011250637 HC RX REV CODE- 250/637

## 2017-07-13 PROCEDURE — 77030029684 HC NEB SM VOL KT MONA -A

## 2017-07-13 PROCEDURE — 77030003862 HC BIT DRL SYNT -B: Performed by: ORTHOPAEDIC SURGERY

## 2017-07-13 PROCEDURE — 76001 XR FLUOROSCOPY OVER 60 MINUTES: CPT

## 2017-07-13 PROCEDURE — 76010000133 HC OR TIME 3 TO 3.5 HR: Performed by: ORTHOPAEDIC SURGERY

## 2017-07-13 PROCEDURE — 74011250636 HC RX REV CODE- 250/636: Performed by: INTERNAL MEDICINE

## 2017-07-13 PROCEDURE — 77030000024 HC CLMP EXT FIX SYNT -F: Performed by: ORTHOPAEDIC SURGERY

## 2017-07-13 PROCEDURE — 77030019908 HC STETH ESOPH SIMS -A: Performed by: NURSE ANESTHETIST, CERTIFIED REGISTERED

## 2017-07-13 DEVICE — SCREW BNE L20MM DIA2.7MM ANK S STL ST VAR ANG LOK FULL THRD: Type: IMPLANTABLE DEVICE | Site: ANKLE | Status: FUNCTIONAL

## 2017-07-13 DEVICE — SCREW EXT FIX L125MM DIA4MM THRD L40MM CORT S STL SELF DRL: Type: IMPLANTABLE DEVICE | Site: ANKLE | Status: FUNCTIONAL

## 2017-07-13 DEVICE — SCREW BNE L16MM DIA3.5MM CORT S STL ST LOK FULL THRD T15: Type: IMPLANTABLE DEVICE | Site: ANKLE | Status: FUNCTIONAL

## 2017-07-13 DEVICE — SCREW EXT FIX L175MM DIA5MM THRD L60MM S STL SELF DRL SCHNZ: Type: IMPLANTABLE DEVICE | Site: ANKLE | Status: FUNCTIONAL

## 2017-07-13 DEVICE — PIN FIX L225MM DIA6MM S STL FOR L EXT FIX SET: Type: IMPLANTABLE DEVICE | Site: ANKLE | Status: FUNCTIONAL

## 2017-07-13 DEVICE — 2.7MM VA LCKNG SCREW SLF-TPNG WITH T8 STARDRIVE RECESS 18MM: Type: IMPLANTABLE DEVICE | Site: ANKLE | Status: FUNCTIONAL

## 2017-07-13 DEVICE — SCREW BNE L14MM DIA2.7MM ANK S STL ST VAR ANG LOK FULL THRD: Type: IMPLANTABLE DEVICE | Site: ANKLE | Status: FUNCTIONAL

## 2017-07-13 DEVICE — SCREW BNE L40MM DIA4MM S STL CANN SHT 1/3 THRD SM HEX SOCK: Type: IMPLANTABLE DEVICE | Site: ANKLE | Status: FUNCTIONAL

## 2017-07-13 RX ORDER — FACIAL-BODY WIPES
10 EACH TOPICAL DAILY PRN
Status: DISCONTINUED | OUTPATIENT
Start: 2017-07-15 | End: 2017-07-13 | Stop reason: SDUPTHER

## 2017-07-13 RX ORDER — POLYETHYLENE GLYCOL 3350 17 G/17G
17 POWDER, FOR SOLUTION ORAL DAILY
Status: DISCONTINUED | OUTPATIENT
Start: 2017-07-14 | End: 2017-07-13 | Stop reason: SDUPTHER

## 2017-07-13 RX ORDER — ALBUTEROL SULFATE 0.83 MG/ML
2.5 SOLUTION RESPIRATORY (INHALATION)
Status: DISCONTINUED | OUTPATIENT
Start: 2017-07-13 | End: 2017-07-14

## 2017-07-13 RX ORDER — DEXTROSE MONOHYDRATE 100 MG/ML
125-250 INJECTION, SOLUTION INTRAVENOUS AS NEEDED
Status: DISCONTINUED | OUTPATIENT
Start: 2017-07-13 | End: 2017-07-25

## 2017-07-13 RX ORDER — CEFAZOLIN SODIUM IN 0.9 % NACL 2 G/100 ML
2 PLASTIC BAG, INJECTION (ML) INTRAVENOUS EVERY 8 HOURS
Status: COMPLETED | OUTPATIENT
Start: 2017-07-13 | End: 2017-07-14

## 2017-07-13 RX ORDER — MORPHINE SULFATE 4 MG/ML
2 INJECTION, SOLUTION INTRAMUSCULAR; INTRAVENOUS
Status: DISCONTINUED | OUTPATIENT
Start: 2017-07-13 | End: 2017-07-13 | Stop reason: SDUPTHER

## 2017-07-13 RX ORDER — ONDANSETRON 2 MG/ML
4 INJECTION INTRAMUSCULAR; INTRAVENOUS AS NEEDED
Status: DISCONTINUED | OUTPATIENT
Start: 2017-07-13 | End: 2017-07-13 | Stop reason: HOSPADM

## 2017-07-13 RX ORDER — IPRATROPIUM BROMIDE AND ALBUTEROL SULFATE 2.5; .5 MG/3ML; MG/3ML
3 SOLUTION RESPIRATORY (INHALATION)
Status: COMPLETED | OUTPATIENT
Start: 2017-07-13 | End: 2017-07-13

## 2017-07-13 RX ORDER — FACIAL-BODY WIPES
10 EACH TOPICAL DAILY PRN
Status: DISCONTINUED | OUTPATIENT
Start: 2017-07-13 | End: 2017-08-01 | Stop reason: HOSPADM

## 2017-07-13 RX ORDER — SODIUM CHLORIDE 0.9 % (FLUSH) 0.9 %
5-10 SYRINGE (ML) INJECTION EVERY 8 HOURS
Status: DISCONTINUED | OUTPATIENT
Start: 2017-07-13 | End: 2017-07-13 | Stop reason: SDUPTHER

## 2017-07-13 RX ORDER — MAGNESIUM SULFATE 100 %
4 CRYSTALS MISCELLANEOUS AS NEEDED
Status: DISCONTINUED | OUTPATIENT
Start: 2017-07-13 | End: 2017-07-25

## 2017-07-13 RX ORDER — DIPHENHYDRAMINE HYDROCHLORIDE 50 MG/ML
12.5 INJECTION, SOLUTION INTRAMUSCULAR; INTRAVENOUS AS NEEDED
Status: DISCONTINUED | OUTPATIENT
Start: 2017-07-13 | End: 2017-07-13 | Stop reason: HOSPADM

## 2017-07-13 RX ORDER — ACETAMINOPHEN 325 MG/1
650 TABLET ORAL EVERY 6 HOURS
Status: DISCONTINUED | OUTPATIENT
Start: 2017-07-13 | End: 2017-07-19

## 2017-07-13 RX ORDER — ALBUTEROL SULFATE 90 UG/1
AEROSOL, METERED RESPIRATORY (INHALATION) AS NEEDED
Status: DISCONTINUED | OUTPATIENT
Start: 2017-07-13 | End: 2017-07-13 | Stop reason: HOSPADM

## 2017-07-13 RX ORDER — MORPHINE SULFATE 10 MG/ML
INJECTION, SOLUTION INTRAMUSCULAR; INTRAVENOUS AS NEEDED
Status: DISCONTINUED | OUTPATIENT
Start: 2017-07-13 | End: 2017-07-13 | Stop reason: HOSPADM

## 2017-07-13 RX ORDER — CEFAZOLIN SODIUM 1 G/3ML
INJECTION, POWDER, FOR SOLUTION INTRAMUSCULAR; INTRAVENOUS AS NEEDED
Status: DISCONTINUED | OUTPATIENT
Start: 2017-07-13 | End: 2017-07-13 | Stop reason: HOSPADM

## 2017-07-13 RX ORDER — SODIUM CHLORIDE 9 MG/ML
125 INJECTION, SOLUTION INTRAVENOUS CONTINUOUS
Status: DISCONTINUED | OUTPATIENT
Start: 2017-07-13 | End: 2017-07-13

## 2017-07-13 RX ORDER — ONDANSETRON 2 MG/ML
INJECTION INTRAMUSCULAR; INTRAVENOUS AS NEEDED
Status: DISCONTINUED | OUTPATIENT
Start: 2017-07-13 | End: 2017-07-13 | Stop reason: HOSPADM

## 2017-07-13 RX ORDER — PROPOFOL 10 MG/ML
INJECTION, EMULSION INTRAVENOUS AS NEEDED
Status: DISCONTINUED | OUTPATIENT
Start: 2017-07-13 | End: 2017-07-13 | Stop reason: HOSPADM

## 2017-07-13 RX ORDER — HYDROCODONE BITARTRATE AND ACETAMINOPHEN 5; 325 MG/1; MG/1
1 TABLET ORAL
Status: DISCONTINUED | OUTPATIENT
Start: 2017-07-13 | End: 2017-07-13

## 2017-07-13 RX ORDER — LIDOCAINE HYDROCHLORIDE 10 MG/ML
0.1 INJECTION, SOLUTION EPIDURAL; INFILTRATION; INTRACAUDAL; PERINEURAL AS NEEDED
Status: DISCONTINUED | OUTPATIENT
Start: 2017-07-13 | End: 2017-07-13 | Stop reason: ALTCHOICE

## 2017-07-13 RX ORDER — MIDAZOLAM HYDROCHLORIDE 1 MG/ML
1 INJECTION, SOLUTION INTRAMUSCULAR; INTRAVENOUS AS NEEDED
Status: DISCONTINUED | OUTPATIENT
Start: 2017-07-13 | End: 2017-07-13 | Stop reason: ALTCHOICE

## 2017-07-13 RX ORDER — MORPHINE SULFATE 4 MG/ML
INJECTION, SOLUTION INTRAMUSCULAR; INTRAVENOUS
Status: DISPENSED
Start: 2017-07-13 | End: 2017-07-13

## 2017-07-13 RX ORDER — ONDANSETRON 2 MG/ML
4 INJECTION INTRAMUSCULAR; INTRAVENOUS
Status: DISCONTINUED | OUTPATIENT
Start: 2017-07-13 | End: 2017-07-13 | Stop reason: SDUPTHER

## 2017-07-13 RX ORDER — FERROUS SULFATE, DRIED 160(50) MG
1 TABLET, EXTENDED RELEASE ORAL
Status: DISCONTINUED | OUTPATIENT
Start: 2017-07-14 | End: 2017-07-17

## 2017-07-13 RX ORDER — PRAVASTATIN SODIUM 10 MG/1
10 TABLET ORAL
Status: DISCONTINUED | OUTPATIENT
Start: 2017-07-13 | End: 2017-07-17

## 2017-07-13 RX ORDER — ACETAMINOPHEN 325 MG/1
650 TABLET ORAL
Status: DISCONTINUED | OUTPATIENT
Start: 2017-07-13 | End: 2017-07-13

## 2017-07-13 RX ORDER — OXYCODONE HYDROCHLORIDE 5 MG/1
5 TABLET ORAL
Status: DISCONTINUED | OUTPATIENT
Start: 2017-07-13 | End: 2017-08-01 | Stop reason: HOSPADM

## 2017-07-13 RX ORDER — LIDOCAINE HYDROCHLORIDE 20 MG/ML
INJECTION, SOLUTION EPIDURAL; INFILTRATION; INTRACAUDAL; PERINEURAL AS NEEDED
Status: DISCONTINUED | OUTPATIENT
Start: 2017-07-13 | End: 2017-07-13 | Stop reason: HOSPADM

## 2017-07-13 RX ORDER — FENTANYL CITRATE 50 UG/ML
50 INJECTION, SOLUTION INTRAMUSCULAR; INTRAVENOUS AS NEEDED
Status: DISCONTINUED | OUTPATIENT
Start: 2017-07-13 | End: 2017-07-13 | Stop reason: ALTCHOICE

## 2017-07-13 RX ORDER — FUROSEMIDE 10 MG/ML
40 INJECTION INTRAMUSCULAR; INTRAVENOUS ONCE
Status: DISCONTINUED | OUTPATIENT
Start: 2017-07-13 | End: 2017-07-13

## 2017-07-13 RX ORDER — METOPROLOL SUCCINATE 50 MG/1
100 TABLET, EXTENDED RELEASE ORAL DAILY
Status: DISCONTINUED | OUTPATIENT
Start: 2017-07-13 | End: 2017-07-15

## 2017-07-13 RX ORDER — ROPIVACAINE HYDROCHLORIDE 5 MG/ML
INJECTION, SOLUTION EPIDURAL; INFILTRATION; PERINEURAL AS NEEDED
Status: DISCONTINUED | OUTPATIENT
Start: 2017-07-13 | End: 2017-07-13 | Stop reason: HOSPADM

## 2017-07-13 RX ORDER — AMLODIPINE BESYLATE 5 MG/1
10 TABLET ORAL DAILY
Status: DISCONTINUED | OUTPATIENT
Start: 2017-07-13 | End: 2017-07-17

## 2017-07-13 RX ORDER — MORPHINE SULFATE 2 MG/ML
4 INJECTION, SOLUTION INTRAMUSCULAR; INTRAVENOUS
Status: COMPLETED | OUTPATIENT
Start: 2017-07-13 | End: 2017-07-13

## 2017-07-13 RX ORDER — CLOPIDOGREL BISULFATE 75 MG/1
75 TABLET ORAL DAILY
Status: DISCONTINUED | OUTPATIENT
Start: 2017-07-13 | End: 2017-07-17

## 2017-07-13 RX ORDER — PROPOFOL 10 MG/ML
0.5 INJECTION, EMULSION INTRAVENOUS
Status: DISCONTINUED | OUTPATIENT
Start: 2017-07-13 | End: 2017-07-13

## 2017-07-13 RX ORDER — FENTANYL CITRATE 50 UG/ML
INJECTION, SOLUTION INTRAMUSCULAR; INTRAVENOUS AS NEEDED
Status: DISCONTINUED | OUTPATIENT
Start: 2017-07-13 | End: 2017-07-13 | Stop reason: HOSPADM

## 2017-07-13 RX ORDER — BACITRACIN ZINC 500 UNIT/G
OINTMENT (GRAM) TOPICAL AS NEEDED
Status: DISCONTINUED | OUTPATIENT
Start: 2017-07-13 | End: 2017-07-13 | Stop reason: HOSPADM

## 2017-07-13 RX ORDER — SODIUM CHLORIDE 0.9 % (FLUSH) 0.9 %
5-10 SYRINGE (ML) INJECTION AS NEEDED
Status: DISCONTINUED | OUTPATIENT
Start: 2017-07-13 | End: 2017-07-13 | Stop reason: SDUPTHER

## 2017-07-13 RX ORDER — ONDANSETRON 2 MG/ML
4 INJECTION INTRAMUSCULAR; INTRAVENOUS
Status: ACTIVE | OUTPATIENT
Start: 2017-07-13 | End: 2017-07-14

## 2017-07-13 RX ORDER — SODIUM CHLORIDE, SODIUM LACTATE, POTASSIUM CHLORIDE, CALCIUM CHLORIDE 600; 310; 30; 20 MG/100ML; MG/100ML; MG/100ML; MG/100ML
INJECTION, SOLUTION INTRAVENOUS
Status: DISCONTINUED | OUTPATIENT
Start: 2017-07-13 | End: 2017-07-13 | Stop reason: HOSPADM

## 2017-07-13 RX ORDER — SODIUM CHLORIDE, SODIUM LACTATE, POTASSIUM CHLORIDE, CALCIUM CHLORIDE 600; 310; 30; 20 MG/100ML; MG/100ML; MG/100ML; MG/100ML
25 INJECTION, SOLUTION INTRAVENOUS CONTINUOUS
Status: DISCONTINUED | OUTPATIENT
Start: 2017-07-13 | End: 2017-07-13 | Stop reason: ALTCHOICE

## 2017-07-13 RX ORDER — HYDRALAZINE HYDROCHLORIDE 20 MG/ML
10 INJECTION INTRAMUSCULAR; INTRAVENOUS
Status: DISCONTINUED | OUTPATIENT
Start: 2017-07-13 | End: 2017-08-01 | Stop reason: HOSPADM

## 2017-07-13 RX ORDER — DEXTROSE 50 % IN WATER (D50W) INTRAVENOUS SYRINGE
12.5-25 AS NEEDED
Status: DISCONTINUED | OUTPATIENT
Start: 2017-07-13 | End: 2017-07-13 | Stop reason: SDUPTHER

## 2017-07-13 RX ORDER — FUROSEMIDE 10 MG/ML
INJECTION INTRAMUSCULAR; INTRAVENOUS AS NEEDED
Status: DISCONTINUED | OUTPATIENT
Start: 2017-07-13 | End: 2017-07-13 | Stop reason: HOSPADM

## 2017-07-13 RX ORDER — IPRATROPIUM BROMIDE AND ALBUTEROL SULFATE 2.5; .5 MG/3ML; MG/3ML
3 SOLUTION RESPIRATORY (INHALATION)
Status: DISCONTINUED | OUTPATIENT
Start: 2017-07-13 | End: 2017-07-14

## 2017-07-13 RX ORDER — IBUPROFEN 200 MG
1 TABLET ORAL DAILY
Status: DISCONTINUED | OUTPATIENT
Start: 2017-07-13 | End: 2017-07-17

## 2017-07-13 RX ORDER — LEVOFLOXACIN 5 MG/ML
750 INJECTION, SOLUTION INTRAVENOUS EVERY 24 HOURS
Status: DISCONTINUED | OUTPATIENT
Start: 2017-07-13 | End: 2017-07-14

## 2017-07-13 RX ORDER — ONDANSETRON 4 MG/1
4 TABLET, ORALLY DISINTEGRATING ORAL
Status: DISCONTINUED | OUTPATIENT
Start: 2017-07-13 | End: 2017-08-01 | Stop reason: HOSPADM

## 2017-07-13 RX ORDER — IPRATROPIUM BROMIDE AND ALBUTEROL SULFATE 2.5; .5 MG/3ML; MG/3ML
SOLUTION RESPIRATORY (INHALATION)
Status: COMPLETED
Start: 2017-07-13 | End: 2017-07-13

## 2017-07-13 RX ORDER — SODIUM CHLORIDE 0.9 % (FLUSH) 0.9 %
5-10 SYRINGE (ML) INJECTION EVERY 8 HOURS
Status: DISCONTINUED | OUTPATIENT
Start: 2017-07-14 | End: 2017-08-01 | Stop reason: HOSPADM

## 2017-07-13 RX ORDER — SODIUM CHLORIDE, SODIUM LACTATE, POTASSIUM CHLORIDE, CALCIUM CHLORIDE 600; 310; 30; 20 MG/100ML; MG/100ML; MG/100ML; MG/100ML
25 INJECTION, SOLUTION INTRAVENOUS CONTINUOUS
Status: DISCONTINUED | OUTPATIENT
Start: 2017-07-13 | End: 2017-07-13 | Stop reason: HOSPADM

## 2017-07-13 RX ORDER — HYDROMORPHONE HYDROCHLORIDE 1 MG/ML
.2-.5 INJECTION, SOLUTION INTRAMUSCULAR; INTRAVENOUS; SUBCUTANEOUS
Status: DISCONTINUED | OUTPATIENT
Start: 2017-07-13 | End: 2017-07-13 | Stop reason: HOSPADM

## 2017-07-13 RX ORDER — AMOXICILLIN 250 MG
1 CAPSULE ORAL 2 TIMES DAILY
Status: DISCONTINUED | OUTPATIENT
Start: 2017-07-13 | End: 2017-07-17

## 2017-07-13 RX ORDER — ETOMIDATE 2 MG/ML
INJECTION INTRAVENOUS AS NEEDED
Status: DISCONTINUED | OUTPATIENT
Start: 2017-07-13 | End: 2017-07-13 | Stop reason: HOSPADM

## 2017-07-13 RX ORDER — POLYETHYLENE GLYCOL 3350 17 G/17G
17 POWDER, FOR SOLUTION ORAL DAILY
Status: DISCONTINUED | OUTPATIENT
Start: 2017-07-13 | End: 2017-07-17

## 2017-07-13 RX ORDER — ENOXAPARIN SODIUM 100 MG/ML
40 INJECTION SUBCUTANEOUS EVERY 24 HOURS
Status: DISCONTINUED | OUTPATIENT
Start: 2017-07-13 | End: 2017-07-13

## 2017-07-13 RX ORDER — MIDAZOLAM HYDROCHLORIDE 1 MG/ML
INJECTION, SOLUTION INTRAMUSCULAR; INTRAVENOUS AS NEEDED
Status: DISCONTINUED | OUTPATIENT
Start: 2017-07-13 | End: 2017-07-13 | Stop reason: HOSPADM

## 2017-07-13 RX ORDER — FUROSEMIDE 10 MG/ML
40 INJECTION INTRAMUSCULAR; INTRAVENOUS 2 TIMES DAILY
Status: DISCONTINUED | OUTPATIENT
Start: 2017-07-13 | End: 2017-07-14

## 2017-07-13 RX ORDER — INSULIN LISPRO 100 [IU]/ML
INJECTION, SOLUTION INTRAVENOUS; SUBCUTANEOUS
Status: DISCONTINUED | OUTPATIENT
Start: 2017-07-13 | End: 2017-07-17

## 2017-07-13 RX ORDER — SODIUM CHLORIDE 0.9 % (FLUSH) 0.9 %
5-10 SYRINGE (ML) INJECTION AS NEEDED
Status: DISCONTINUED | OUTPATIENT
Start: 2017-07-13 | End: 2017-08-01 | Stop reason: HOSPADM

## 2017-07-13 RX ORDER — FENTANYL CITRATE 50 UG/ML
25 INJECTION, SOLUTION INTRAMUSCULAR; INTRAVENOUS
Status: DISCONTINUED | OUTPATIENT
Start: 2017-07-13 | End: 2017-07-13 | Stop reason: HOSPADM

## 2017-07-13 RX ORDER — NALOXONE HYDROCHLORIDE 0.4 MG/ML
0.4 INJECTION, SOLUTION INTRAMUSCULAR; INTRAVENOUS; SUBCUTANEOUS AS NEEDED
Status: DISCONTINUED | OUTPATIENT
Start: 2017-07-13 | End: 2017-08-01 | Stop reason: HOSPADM

## 2017-07-13 RX ORDER — IRBESARTAN 150 MG/1
300 TABLET ORAL
Status: DISCONTINUED | OUTPATIENT
Start: 2017-07-13 | End: 2017-07-15

## 2017-07-13 RX ORDER — NIACIN 500 MG/1
500 TABLET, EXTENDED RELEASE ORAL
Status: DISCONTINUED | OUTPATIENT
Start: 2017-07-13 | End: 2017-07-17

## 2017-07-13 RX ORDER — OXYCODONE HYDROCHLORIDE 5 MG/1
2.5 TABLET ORAL
Status: DISCONTINUED | OUTPATIENT
Start: 2017-07-13 | End: 2017-08-01 | Stop reason: HOSPADM

## 2017-07-13 RX ADMIN — CEFAZOLIN 2 G: 10 INJECTION, POWDER, FOR SOLUTION INTRAVENOUS; PARENTERAL at 19:43

## 2017-07-13 RX ADMIN — Medication 10 ML: at 03:58

## 2017-07-13 RX ADMIN — CEFAZOLIN SODIUM 2 G: 1 INJECTION, POWDER, FOR SOLUTION INTRAMUSCULAR; INTRAVENOUS at 12:00

## 2017-07-13 RX ADMIN — PROPOFOL 110 MG: 10 INJECTION, EMULSION INTRAVENOUS at 01:44

## 2017-07-13 RX ADMIN — FENTANYL CITRATE 25 MCG: 50 INJECTION, SOLUTION INTRAMUSCULAR; INTRAVENOUS at 12:15

## 2017-07-13 RX ADMIN — INSULIN LISPRO 2 UNITS: 100 INJECTION, SOLUTION INTRAVENOUS; SUBCUTANEOUS at 22:14

## 2017-07-13 RX ADMIN — ALBUTEROL SULFATE 2 PUFF: 90 AEROSOL, METERED RESPIRATORY (INHALATION) at 13:00

## 2017-07-13 RX ADMIN — ACETAMINOPHEN 650 MG: 325 TABLET, FILM COATED ORAL at 19:00

## 2017-07-13 RX ADMIN — Medication 10 ML: at 17:06

## 2017-07-13 RX ADMIN — METHYLPREDNISOLONE SODIUM SUCCINATE 40 MG: 40 INJECTION, POWDER, FOR SOLUTION INTRAMUSCULAR; INTRAVENOUS at 21:29

## 2017-07-13 RX ADMIN — MORPHINE SULFATE 2 MG: 4 INJECTION, SOLUTION INTRAMUSCULAR; INTRAVENOUS at 08:20

## 2017-07-13 RX ADMIN — ETOMIDATE 12 MG: 2 INJECTION INTRAVENOUS at 11:38

## 2017-07-13 RX ADMIN — MIDAZOLAM HYDROCHLORIDE 2 MG: 1 INJECTION, SOLUTION INTRAMUSCULAR; INTRAVENOUS at 11:14

## 2017-07-13 RX ADMIN — DOCUSATE SODIUM AND SENNOSIDES 1 TABLET: 8.6; 5 TABLET, FILM COATED ORAL at 19:00

## 2017-07-13 RX ADMIN — LEVOFLOXACIN 750 MG: 5 INJECTION, SOLUTION INTRAVENOUS at 03:57

## 2017-07-13 RX ADMIN — NIACIN 500 MG: 500 TABLET, EXTENDED RELEASE ORAL at 08:19

## 2017-07-13 RX ADMIN — PRAVASTATIN SODIUM 10 MG: 10 TABLET ORAL at 21:28

## 2017-07-13 RX ADMIN — METHYLPREDNISOLONE SODIUM SUCCINATE 40 MG: 40 INJECTION, POWDER, FOR SOLUTION INTRAMUSCULAR; INTRAVENOUS at 05:44

## 2017-07-13 RX ADMIN — OXYCODONE HYDROCHLORIDE 5 MG: 5 TABLET ORAL at 19:00

## 2017-07-13 RX ADMIN — PROPOFOL 100 MG: 10 INJECTION, EMULSION INTRAVENOUS at 11:38

## 2017-07-13 RX ADMIN — LIDOCAINE HYDROCHLORIDE 40 MG: 20 INJECTION, SOLUTION EPIDURAL; INFILTRATION; INTRACAUDAL; PERINEURAL at 11:38

## 2017-07-13 RX ADMIN — METOPROLOL SUCCINATE 100 MG: 50 TABLET, EXTENDED RELEASE ORAL at 08:18

## 2017-07-13 RX ADMIN — HYDROCODONE BITARTRATE AND ACETAMINOPHEN 1 TABLET: 5; 325 TABLET ORAL at 05:43

## 2017-07-13 RX ADMIN — MORPHINE SULFATE 2 MG: 4 INJECTION, SOLUTION INTRAMUSCULAR; INTRAVENOUS at 03:57

## 2017-07-13 RX ADMIN — ONDANSETRON 4 MG: 2 INJECTION INTRAMUSCULAR; INTRAVENOUS at 11:55

## 2017-07-13 RX ADMIN — SODIUM CHLORIDE, SODIUM LACTATE, POTASSIUM CHLORIDE, CALCIUM CHLORIDE: 600; 310; 30; 20 INJECTION, SOLUTION INTRAVENOUS at 14:18

## 2017-07-13 RX ADMIN — SODIUM CHLORIDE, SODIUM LACTATE, POTASSIUM CHLORIDE, CALCIUM CHLORIDE: 600; 310; 30; 20 INJECTION, SOLUTION INTRAVENOUS at 10:30

## 2017-07-13 RX ADMIN — FUROSEMIDE 40 MG: 10 INJECTION, SOLUTION INTRAMUSCULAR; INTRAVENOUS at 17:07

## 2017-07-13 RX ADMIN — POLYETHYLENE GLYCOL 3350 17 G: 17 POWDER, FOR SOLUTION ORAL at 08:18

## 2017-07-13 RX ADMIN — FENTANYL CITRATE 50 MCG: 50 INJECTION, SOLUTION INTRAMUSCULAR; INTRAVENOUS at 11:38

## 2017-07-13 RX ADMIN — IPRATROPIUM BROMIDE AND ALBUTEROL SULFATE 3 ML: .5; 3 SOLUTION RESPIRATORY (INHALATION) at 07:50

## 2017-07-13 RX ADMIN — ALBUTEROL SULFATE 2 PUFF: 90 AEROSOL, METERED RESPIRATORY (INHALATION) at 11:41

## 2017-07-13 RX ADMIN — METHYLPREDNISOLONE SODIUM SUCCINATE 40 MG: 40 INJECTION, POWDER, FOR SOLUTION INTRAMUSCULAR; INTRAVENOUS at 17:07

## 2017-07-13 RX ADMIN — FUROSEMIDE 20 MG: 10 INJECTION INTRAMUSCULAR; INTRAVENOUS at 11:48

## 2017-07-13 RX ADMIN — IPRATROPIUM BROMIDE AND ALBUTEROL SULFATE 3 ML: .5; 3 SOLUTION RESPIRATORY (INHALATION) at 11:08

## 2017-07-13 RX ADMIN — INSULIN LISPRO 2 UNITS: 100 INJECTION, SOLUTION INTRAVENOUS; SUBCUTANEOUS at 08:29

## 2017-07-13 RX ADMIN — FENTANYL CITRATE 25 MCG: 50 INJECTION, SOLUTION INTRAMUSCULAR; INTRAVENOUS at 12:12

## 2017-07-13 RX ADMIN — IPRATROPIUM BROMIDE AND ALBUTEROL SULFATE 3 ML: .5; 3 SOLUTION RESPIRATORY (INHALATION) at 01:42

## 2017-07-13 RX ADMIN — FUROSEMIDE 40 MG: 10 INJECTION, SOLUTION INTRAMUSCULAR; INTRAVENOUS at 08:20

## 2017-07-13 RX ADMIN — IPRATROPIUM BROMIDE AND ALBUTEROL SULFATE 3 ML: 2.5; .5 SOLUTION RESPIRATORY (INHALATION) at 01:42

## 2017-07-13 RX ADMIN — SODIUM CHLORIDE 125 ML/HR: 900 INJECTION, SOLUTION INTRAVENOUS at 17:01

## 2017-07-13 RX ADMIN — MORPHINE SULFATE 2 MG: 10 INJECTION, SOLUTION INTRAMUSCULAR; INTRAVENOUS at 14:47

## 2017-07-13 RX ADMIN — MORPHINE SULFATE 4 MG: 2 INJECTION, SOLUTION INTRAMUSCULAR; INTRAVENOUS at 01:09

## 2017-07-13 RX ADMIN — IRBESARTAN 300 MG: 150 TABLET ORAL at 21:28

## 2017-07-13 RX ADMIN — AMLODIPINE BESYLATE 10 MG: 5 TABLET ORAL at 08:19

## 2017-07-13 RX ADMIN — IPRATROPIUM BROMIDE AND ALBUTEROL SULFATE 3 ML: .5; 3 SOLUTION RESPIRATORY (INHALATION) at 19:33

## 2017-07-13 RX ADMIN — HYDRALAZINE HYDROCHLORIDE 10 MG: 20 INJECTION INTRAMUSCULAR; INTRAVENOUS at 03:57

## 2017-07-13 NOTE — ED NOTES
Pt on monitor x 3, as well as end title CO2. BVM, working suction at bedside. Code cart at bedside. Pt on oxygen at 4 L via NC; d/t hypoxia present upon arrival to ED. Consents signed and obtained for sedation and reduction of pt's right ankle.

## 2017-07-13 NOTE — ED NOTES
Assumed care of pt from EMS at this time, report received. Pt arrives R ankle fracture, dislocation s/p fall getting out of pool today. Pt states she slipped on step getting out of pool. Pt states hitting head \"slightly\" however denies any LOC or lac to the head. Pt with notable R ankle fracture, swelling, bruising. Ice pack applied, extremity elevated at this time. Limb was not splinted PTA. Pt with 6/10 R ankle pain at this time. [address] [place] [address] Drive noted pt's O2 sat's mid-upper 80's on RA. Pt denies any use of O2 at home. Pt with O2 sat 88% upon arrival.  Pt placed on O2 4L via NC. Pt denies any SOb, CP throughout the day. Pt resting comfortably on the stretcher in a position of comfort.  Pt in no acute distress at this time. Pt ANOx4.  Call bell within reach.  Side rails x 2.  Cardiac monitor x 3.  Stretcher locked in the lowest position.  Pt aware of plan to await for MD/PA-C/NP assessment, and pt/family verbalizes understanding.  Will continue to monitor O2 sat's.

## 2017-07-13 NOTE — PERIOP NOTES
TRANSFER - IN REPORT:    Verbal report received from Lilian RN(name) on Bailey Thomason  being received from Ortho Room [facility]221(unit) for ordered procedure      Report consisted of patients Situation, Background, Assessment and   Recommendations(SBAR). Information from the following report(s) SBAR and MAR was reviewed with the receiving nurse. Opportunity for questions and clarification was provided. Assessment completed upon patients arrival to unit and care assumed.

## 2017-07-13 NOTE — PROGRESS NOTES
TRANSFER - IN REPORT:    Verbal report received from FAY Martínez(name) on Ramses Been  being received from ED(unit) for routine progression of care      Report consisted of patients Situation, Background, Assessment and   Recommendations(SBAR). Information from the following report(s) SBAR, Kardex, ED Summary, Procedure Summary, Intake/Output, MAR, Recent Results and Med Rec Status was reviewed with the receiving nurse. Opportunity for questions and clarification was provided. Assessment completed upon patients arrival to unit and care assumed.

## 2017-07-13 NOTE — PROGRESS NOTES
Patient's BP was 187/79, patient given PRN hydralazine. Will recheck BP in an hour. 4:46 AM Patient BP rechecked, 174/64.

## 2017-07-13 NOTE — H&P
Hospitalist Admission Note    NAME: Herbert Gilbert   :  1945   MRN:  [id number]     Date/Time:  2017 3:01 AM    Patient PCP: Yonatan Wilson MD  ________________________________________________________________________    My assessment of this patient's clinical condition and my plan of care is as follows. Assessment / Plan:  Acute hypoxic respiratory failure  Acute COPD exacerbation  Acute diastolic heart failure  -CXR showed interstitial edema with mild crackles at lung base and diffuse wheezing on physical exam  -CTA at OSH neg for PE but showed ground-glass type opacities in the lower lung files with bronchial wall thickening. 8.5mm nodule in the L retro hilar region  -pt likely have underlining COPD given her hx of 47 yrs tobacco use  -check proBNP   -f/u with BCx  -echo  -scheduled and prn nebs, IV steroids, levaquin  -IV lasix BID, strict I&O, fluid restriction, daily wt  -will need outpt PFT, outpt CT at 3, 9, 24 months    Hyponatremia  -likely SIADH given respiratory failure. Pt with hx of SIADH, was told to fluid restrict  -obtain urine/plasma osmo  -will fluid restrict and IV lasix as above  -monitor bmp, if does not improve or worsen, consider nephro consult    Distal tibia and fibula fracture s/p mechanical fall  -head CT at OSH neg   -xr showed comminuted fractures of distal tibia and fibula  -s/p reduction with minimum improvement by ortho  -possible ORIF   Pre-op cardiac risk assessment:  Pt evaluated using revised cardiac risk index and is felt to be moderate cardiovascular risk for intermediate risk surgery with a 2.4% risk for major complications based on these criteria. This risk has been discussed with the patient.   Further risk reduction will involve medical management of other comorbid conditions in the perioperative period.  -morphine/norco prn for pain  -add miralax, dulcolax  -ortho consulted    Hx of CAD  HTN  -cont' amlodipine, plavix, statin    Hx of PVD  -s/p right renal artery stent, left fem-pop by Dr. Ramirez Diver 3/2014    T2DM  -check A1C  -diet control  -SSI while on IV steroids    Tobacco use  -nicotine patch. Smoking cessation counseled. Code Status: Full  Surrogate Decision Maker: daughter Tone Robles [phone]    DVT Prophylaxis: SCDs for now (lovenox if no plan procedure in the AM)  GI Prophylaxis: not indicated    Baseline: independent        Subjective:   CHIEF COMPLAINT: sob    HISTORY OF PRESENT ILLNESS:     Pierre Syed is a 81 y.o.  female w pmhx significant for HTN, CAD, PVD, T2DM, diet controlled was a transferred from [facility] for hypoxic respiratory failure and R ankle pain after a mechanical fall. In the ED, patient found to have R distal fibula and tibia fracture. She was also found hypoxic (88%) requiring 4LNC. Pt does not use O2 at baseline. She was hypoxic at OSH and was given Levaquin for ? CAP. Other than R leg pain, pt denies any cp, palpitations, n/v/d. In the ED, vitals: 98.3, P 70, /61. Labs include: WBC 16.5, Hbg 11.3, Na 126, AST 41. CXR showed interstitial edema. We were asked to admit for work up and evaluation of the above problems.      Past Medical History:   Diagnosis Date    Arthritis     CAD (coronary artery disease)     renal stent; Dr Gerarda Holstein     Diabetes [facility])     oral    History of bronchitis     Hyperlipemia     Hypertension         Past Surgical History:   Procedure Laterality Date    HX CATARACT REMOVAL Bilateral     HX CHOLECYSTECTOMY      HX HYSTERECTOMY      HX ORTHOPAEDIC      R hand middle finger bone spur    VASCULAR SURGERY PROCEDURE UNLIST      renal stent    VASCULAR SURGERY PROCEDURE UNLIST  3/5/14    LEFT FEMORAL-POPLITEAL BYPASS WITH PTFE GORTEX GRAFT        Social History   Substance Use Topics    Smoking status: Former Smoker     Packs/day: 1.50     Years: 40.00     Quit date: 12/20/2009    Smokeless tobacco: Never Used    Alcohol use No        Family History Problem Relation Age of Onset    Heart Disease Mother     Seizures Mother     Diabetes Father     Heart Disease Father     Diabetes Sister     Cancer Sister     Breast Cancer Sister 61    MS Brother      Allergies   Allergen Reactions    Codeine Rash and Other (comments)     hyper    Ibuprofen [Ibuprofen] Other (comments)     Stomach pains    Simvastatin Itching        Prior to Admission medications    Medication Sig Start Date End Date Taking? Authorizing Provider   lovastatin (MEVACOR) 10 mg tablet Take 10 mg by mouth nightly. Yes Phys Other, MD   irbesartan (AVAPRO) 300 mg tablet Take 300 mg by mouth nightly. Yes Historical Provider   metoprolol succinate (TOPROL-XL) 100 mg XL tablet Take 100 mg by mouth daily. Yes Historical Provider   clopidogrel (PLAVIX) 75 mg tablet Take 75 mg by mouth daily. Yes Historical Provider   amlodipine (NORVASC) 5 mg tablet Take 10 mg by mouth daily. Yes Historical Provider   nicotinic acid (NIACIN) 500 mg tablet Take 500 mg by mouth Daily (before breakfast). Historical Provider   methylcellulose, laxative, (CITRUCEL) Powd Take 1 Dose by mouth as needed. Historical Provider   acetaminophen (TYLENOL EX STR ARTHRITIS PAIN) 500 mg tablet Take 325 mg by mouth every six (6) hours as needed. Historical Provider       REVIEW OF SYSTEMS:     I am not able to complete the review of systems because:    The patient is intubated and sedated    The patient has altered mental status due to his acute medical problems    The patient has baseline aphasia from prior stroke(s)    The patient has baseline dementia and is not reliable historian    The patient is in acute medical distress and unable to provide information           Total of 12 systems reviewed as follows:       POSITIVE= BOLD text  Negative = text not underlined  General:  fever, chills, sweats, generalized weakness, weight loss/gain,      loss of appetite   Eyes:    blurred vision, eye pain, loss of vision, double vision  ENT:    rhinorrhea, pharyngitis   Respiratory:   cough, sputum production, SOB, ODOM, wheezing, pleuritic pain   Cardiology:   chest pain, palpitations, orthopnea, PND, edema, syncope   Gastrointestinal:  abdominal pain , N/V, diarrhea, dysphagia, constipation, bleeding   Genitourinary:  frequency, urgency, dysuria, hematuria, incontinence   Muskuloskeletal :  arthralgia, myalgia, back pain  Hematology:  easy bruising, nose or gum bleeding, lymphadenopathy   Dermatological: rash, ulceration, pruritis, color change / jaundice  Endocrine:   hot flashes or polydipsia   Neurological:  headache, dizziness, confusion, focal weakness, paresthesia,     Speech difficulties, memory loss, gait difficulty  Psychological: Feelings of anxiety, depression, agitation    Objective:   VITALS:    Visit Vitals    BP (!) 169/123    Pulse 81    Temp 98.3 °F (36.8 °C)    Resp 16    Ht 5' 3\" (1.6 m)    Wt 78 kg (172 lb)    SpO2 93%    BMI 30.47 kg/m2       PHYSICAL EXAM:    General:    Alert, cooperative, no distress, appears stated age. HEENT: Atraumatic, anicteric sclerae, pink conjunctivae     No oral ulcers, mucosa moist, throat clear, dentition fair  Neck:  Supple, symmetrical,  thyroid: non tender  Lungs:   Diffuse wheezing with crackles at lung base. Chest wall:  No tenderness  No Accessory muscle use. Heart:   Regular  rhythm,  No  murmur   No edema  Abdomen:   Soft, non-tender. Not distended. Bowel sounds normal  Extremities: No cyanosis. No clubbing,      Skin turgor normal, Capillary refill normal, Radial dial pulse 2+  Skin:     Not pale. Not Jaundiced  No rashes   Psych:  Good insight. Not depressed. Not anxious or agitated. Neurologic: EOMs intact. No facial asymmetry. No aphasia or slurred speech. Symmetrical strength, Sensation grossly intact.  Alert and oriented X 4.     _______________________________________________________________________  Care Plan discussed with:    Comments Patient x    Family      RN x    Care Manager                    Consultant:      _______________________________________________________________________  Expected  Disposition:   Home with Family    HH/PT/OT/RN x   SNF/LTC    LIANET    ________________________________________________________________________  TOTAL TIME:  72 Minutes    Critical Care Provided     Minutes non procedure based      Comments    x Reviewed previous records   >50% of visit spent in counseling and coordination of care  Discussion with patient and/or family and questions answered       ________________________________________________________________________  Signed: Zach Interiano MD    Procedures: see electronic medical records for all procedures/Xrays and details which were not copied into this note but were reviewed prior to creation of Plan. LAB DATA REVIEWED:    Recent Results (from the past 24 hour(s))   CBC WITH AUTOMATED DIFF    Collection Time: 07/12/17 11:16 PM   Result Value Ref Range    WBC 16.5 (H) 3.6 - 11.0 K/uL    RBC 3.65 (L) 3.80 - 5.20 M/uL    HGB 11.3 (L) 11.5 - 16.0 g/dL    HCT 31.7 (L) 35.0 - 47.0 %    MCV 86.8 80.0 - 99.0 FL    MCH 31.0 26.0 - 34.0 PG    MCHC 35.6 30.0 - 36.5 g/dL    RDW 12.2 11.5 - 14.5 %    PLATELET 272 502 - 312 K/uL    NEUTROPHILS 96 (H) 32 - 75 %    LYMPHOCYTES 3 (L) 12 - 49 %    MONOCYTES 1 (L) 5 - 13 %    EOSINOPHILS 0 0 - 7 %    BASOPHILS 0 0 - 1 %    ABS. NEUTROPHILS 15.8 (H) 1.8 - 8.0 K/UL    ABS. LYMPHOCYTES 0.5 (L) 0.8 - 3.5 K/UL    ABS. MONOCYTES 0.2 0.0 - 1.0 K/UL    ABS. EOSINOPHILS 0.0 0.0 - 0.4 K/UL    ABS.  BASOPHILS 0.0 0.0 - 0.1 K/UL    RBC COMMENTS NORMOCYTIC, NORMOCHROMIC      DF SMEAR SCANNED     METABOLIC PANEL, COMPREHENSIVE    Collection Time: 07/12/17 11:16 PM   Result Value Ref Range    Sodium 126 (L) 136 - 145 mmol/L    Potassium 4.2 3.5 - 5.1 mmol/L    Chloride 93 (L) 97 - 108 mmol/L    CO2 22 21 - 32 mmol/L    Anion gap 11 5 - 15 mmol/L    Glucose 168 (H) 65 - 100 mg/dL BUN 23 (H) 6 - 20 MG/DL    Creatinine 0.87 0.55 - 1.02 MG/DL    BUN/Creatinine ratio 26 (H) 12 - 20      GFR est AA >60 >60 ml/min/1.73m2    GFR est non-AA >60 >60 ml/min/1.73m2    Calcium 8.0 (L) 8.5 - 10.1 MG/DL    Bilirubin, total 0.5 0.2 - 1.0 MG/DL    ALT (SGPT) 32 12 - 78 U/L    AST (SGOT) 41 (H) 15 - 37 U/L    Alk.  phosphatase 96 45 - 117 U/L    Protein, total 7.2 6.4 - 8.2 g/dL    Albumin 3.0 (L) 3.5 - 5.0 g/dL    Globulin 4.2 (H) 2.0 - 4.0 g/dL    A-G Ratio 0.7 (L) 1.1 - 2.2     LACTIC ACID    Collection Time: 07/12/17 11:16 PM   Result Value Ref Range    Lactic acid 1.5 0.4 - 2.0 MMOL/L

## 2017-07-13 NOTE — ANESTHESIA PREPROCEDURE EVALUATION
Anesthetic History   No history of anesthetic complications            Review of Systems / Medical History  Patient summary reviewed, nursing notes reviewed and pertinent labs reviewed    Pulmonary    COPD: mild               Neuro/Psych   Within defined limits           Cardiovascular    Hypertension      CHF    CAD and PAD    Exercise tolerance: <4 METS  Comments: Ejection fraction was estimated in the range of 50 % to 55 %.      GI/Hepatic/Renal  Within defined limits              Endo/Other    Diabetes    Obesity and arthritis     Other Findings              Physical Exam    Airway  Mallampati: II  TM Distance: 4 - 6 cm  Neck ROM: normal range of motion   Mouth opening: Normal     Cardiovascular  Regular rate and rhythm,  S1 and S2 normal,  no murmur, click, rub, or gallop             Dental  No notable dental hx       Pulmonary  Breath sounds clear to auscultation               Abdominal  GI exam deferred       Other Findings            Anesthetic Plan    ASA: 3  Anesthesia type: general    Monitoring Plan: BIS  Post-op pain plan if not by surgeon: peripheral nerve block single    Induction: Intravenous  Anesthetic plan and risks discussed with: Patient

## 2017-07-13 NOTE — PROGRESS NOTES
Pt is scheduled for surgical repair of ankle fracture. Will defer OT at this time. OT will follow up tomorrow per guidelines.

## 2017-07-13 NOTE — PERIOP NOTES
TRANSFER - OUT REPORT:    Verbal report given to Mili Later on John Choudhury  being transferred to [facility], [address] [facility] for routine post - op       Report consisted of patients Situation, Background, Assessment and   Recommendations(SBAR). Information from the following report(s) SBAR, Kardex, OR Summary, Intake/Output, MAR and Cardiac Rhythm NSR was reviewed with the receiving nurse. Opportunity for questions and clarification was provided.       Patient transported with:   O2 @ 2 liters  Tech     Patient's family not in waiting room

## 2017-07-13 NOTE — CONSULTS
Orthopedic CONSULT NOTE    Subjective:     Date of Consultation:  July 13, 2017      Morenita Pride is a 81 y.o. female who is being seen for right ankle pain and deformityn. Injury occurred  This morning , had glf while Pt. was walking at home. Workup has revealed ankle fraccture. Patient's ambulatory status includes Cane, Type: Single [employer] and their living environment is home. Pt. Reports  head trauma no LOC during injury. pmh significant for dm and lung disease  She is admitted to medicine for hypoxia    Patient Active Problem List    Diagnosis Date Noted    Respiratory failure, acute ([facility] [address] 11/13/2014    Abnormal ECG 09/21/2011     Family History   Problem Relation Age of Onset    Heart Disease Mother     Seizures Mother     Diabetes Father     Heart Disease Father     Diabetes Sister    Rosio Cesarh Sister     Breast Cancer Sister 61    MS Brother       Social History   Substance Use Topics    Smoking status: Former Smoker     Packs/day: 1.50     Years: 40.00     Quit date: 12/20/2009    Smokeless tobacco: Never Used    Alcohol use No     Past Medical History:   Diagnosis Date    Arthritis     CAD (coronary artery disease)     renal stent; Dr Kelley Lane     Diabetes ([facility] [address]     oral    History of bronchitis     Hyperlipemia     Hypertension       Past Surgical History:   Procedure Laterality Date    HX CATARACT REMOVAL Bilateral     HX CHOLECYSTECTOMY      HX HYSTERECTOMY      HX ORTHOPAEDIC      R hand middle finger bone spur    VASCULAR SURGERY PROCEDURE UNLIST      renal stent    VASCULAR SURGERY PROCEDURE UNLIST  3/5/14    LEFT FEMORAL-POPLITEAL BYPASS WITH PTFE GORTEX GRAFT       Prior to Admission medications    Medication Sig Start Date End Date Taking? Authorizing Provider   lovastatin (MEVACOR) 10 mg tablet Take 10 mg by mouth nightly. Yes Phys Other, MD   irbesartan (AVAPRO) 300 mg tablet Take 300 mg by mouth nightly.    Yes Historical Provider   metoprolol succinate (TOPROL-XL) 100 mg XL tablet Take 100 mg by mouth daily. Yes Historical Provider   clopidogrel (PLAVIX) 75 mg tablet Take 75 mg by mouth daily. Yes Historical Provider   amlodipine (NORVASC) 5 mg tablet Take 10 mg by mouth daily. Yes Historical Provider   nicotinic acid (NIACIN) 500 mg tablet Take 500 mg by mouth Daily (before breakfast). Historical Provider   methylcellulose, laxative, (CITRUCEL) Powd Take 1 Dose by mouth as needed. Historical Provider   acetaminophen (TYLENOL EX STR ARTHRITIS PAIN) 500 mg tablet Take 325 mg by mouth every six (6) hours as needed. Historical Provider     Current Facility-Administered Medications   Medication Dose Route Frequency    propofol (DIPRIVAN) 10 mg/mL injection 39 mg  0.5 mg/kg IntraVENous TITRATE     Current Outpatient Prescriptions   Medication Sig    lovastatin (MEVACOR) 10 mg tablet Take 10 mg by mouth nightly.  irbesartan (AVAPRO) 300 mg tablet Take 300 mg by mouth nightly.  metoprolol succinate (TOPROL-XL) 100 mg XL tablet Take 100 mg by mouth daily.  clopidogrel (PLAVIX) 75 mg tablet Take 75 mg by mouth daily.  amlodipine (NORVASC) 5 mg tablet Take 10 mg by mouth daily.  nicotinic acid (NIACIN) 500 mg tablet Take 500 mg by mouth Daily (before breakfast).  methylcellulose, laxative, (CITRUCEL) Powd Take 1 Dose by mouth as needed.  acetaminophen (TYLENOL EX STR ARTHRITIS PAIN) 500 mg tablet Take 325 mg by mouth every six (6) hours as needed. Allergies   Allergen Reactions    Codeine Rash and Other (comments)     hyper    Ibuprofen [Ibuprofen] Other (comments)     Stomach pains    Simvastatin Itching        Review of Systems:  A comprehensive review of systems was negative except for that written in the HPI.     Mental Status: no dementia    Objective:     Patient Vitals for the past 8 hrs:   BP Temp Pulse Resp SpO2 Height Weight   07/13/17 0015 183/60 - 75 15 91 % - -   07/13/17 0000 - - 78 13 93 % - -   07/12/17 2330 - - 70 14 93 % - -   17 171/52 - 74 13 93 % - -   17 - - - - 93 % - -   17 178/61 98.3 °F (36.8 °C) 70 15 (!) 88 % 5' 3\" (1.6 m) 78 kg (172 lb)     Temp (24hrs), Av.3 °F (36.8 °C), Min:98.3 °F (36.8 °C), Max:98.3 °F (36.8 °C)      EXAM: alert, cooperative, no distress, oriented, normal, normal mood,  Right ankle with moderate swelling and deformity. nvi distal to injury    Imaging Review: XRdistal tib/fib with intraarticular invlovement        Labs:   Recent Results (from the past 24 hour(s))   CBC WITH AUTOMATED DIFF    Collection Time: 17 11:16 PM   Result Value Ref Range    WBC 16.5 (H) 3.6 - 11.0 K/uL    RBC 3.65 (L) 3.80 - 5.20 M/uL    HGB 11.3 (L) 11.5 - 16.0 g/dL    HCT 31.7 (L) 35.0 - 47.0 %    MCV 86.8 80.0 - 99.0 FL    MCH 31.0 26.0 - 34.0 PG    MCHC 35.6 30.0 - 36.5 g/dL    RDW 12.2 11.5 - 14.5 %    PLATELET 924 243 - 109 K/uL    NEUTROPHILS 96 (H) 32 - 75 %    LYMPHOCYTES 3 (L) 12 - 49 %    MONOCYTES 1 (L) 5 - 13 %    EOSINOPHILS 0 0 - 7 %    BASOPHILS 0 0 - 1 %    ABS. NEUTROPHILS 15.8 (H) 1.8 - 8.0 K/UL    ABS. LYMPHOCYTES 0.5 (L) 0.8 - 3.5 K/UL    ABS. MONOCYTES 0.2 0.0 - 1.0 K/UL    ABS. EOSINOPHILS 0.0 0.0 - 0.4 K/UL    ABS. BASOPHILS 0.0 0.0 - 0.1 K/UL    RBC COMMENTS NORMOCYTIC, NORMOCHROMIC      DF SMEAR SCANNED     METABOLIC PANEL, COMPREHENSIVE    Collection Time: 17 11:16 PM   Result Value Ref Range    Sodium 126 (L) 136 - 145 mmol/L    Potassium 4.2 3.5 - 5.1 mmol/L    Chloride 93 (L) 97 - 108 mmol/L    CO2 22 21 - 32 mmol/L    Anion gap 11 5 - 15 mmol/L    Glucose 168 (H) 65 - 100 mg/dL    BUN 23 (H) 6 - 20 MG/DL    Creatinine 0.87 0.55 - 1.02 MG/DL    BUN/Creatinine ratio 26 (H) 12 - 20      GFR est AA >60 >60 ml/min/1.73m2    GFR est non-AA >60 >60 ml/min/1.73m2    Calcium 8.0 (L) 8.5 - 10.1 MG/DL    Bilirubin, total 0.5 0.2 - 1.0 MG/DL    ALT (SGPT) 32 12 - 78 U/L    AST (SGOT) 41 (H) 15 - 37 U/L    Alk.  phosphatase 96 45 - 117 U/L    Protein, total 7.2 6.4 - 8.2 g/dL    Albumin 3.0 (L) 3.5 - 5.0 g/dL    Globulin 4.2 (H) 2.0 - 4.0 g/dL    A-G Ratio 0.7 (L) 1.1 - 2.2     LACTIC ACID    Collection Time: 07/12/17 11:16 PM   Result Value Ref Range    Lactic acid 1.5 0.4 - 2.0 MMOL/L         Impression:     Patient Active Problem List    Diagnosis Date Noted    Respiratory failure, acute ([facility] [address] 11/13/2014    Abnormal ECG 09/21/2011     Active Problems:    * No active hospital problems. *      Plan:   Admitted to medicine  attempted closed reduction with minimal improvement in alignment . Was able to relieve  the medial skin tenting  Npo after midnight  May need orif tomorrow if cleared by medicine   Dr. Alejandrina Longo aware and agree with above plan.       David Rodriguez [place]

## 2017-07-13 NOTE — PERIOP NOTES
07:22= informed Lilian APONTE that surgery is scheduled for 11:44, and will be picked up for surgery at 10:30.

## 2017-07-13 NOTE — ED NOTES
Bedside and Verbal shift change report given to [facility] (oncoming nurse) by Kyle Gambino (offgoing nurse). Report included the following information SBAR, ED Summary, Intake/Output, MAR and Recent Results. Pt on monitor x 3. Call bell in reach. Pt updated on plan of care; pt to be admitted and pt's right foot to be splinted.

## 2017-07-13 NOTE — PROGRESS NOTES
Patient admitted early this am. Chart reviewed and patient briefly examined while in PACU. S/p ankle fx repair. States that breathing is better. Orders reviewed. Will Continue with exception that post-op fluids have been discontinued.

## 2017-07-13 NOTE — ED NOTES
TRANSFER - OUT REPORT:    Verbal report given to FAY Quintana on Ingrid Score  being transferred to [facility] for routine progression of care       Report consisted of patients Situation, Background, Assessment and   Recommendations(SBAR). Information from the following report(s) SBAR, Kardex, ED Summary, [facility] and Recent Results was reviewed with the receiving nurse. Lines:   Peripheral IV 07/12/17 Left Forearm (Active)   Site Assessment Clean, dry, & intact 7/12/2017 10:50 PM   Phlebitis Assessment 0 7/12/2017 10:50 PM   Infiltration Assessment 0 7/12/2017 10:50 PM   Dressing Status Clean, dry, & intact 7/12/2017 10:50 PM   Dressing Type Transparent 7/12/2017 10:50 PM   Hub Color/Line Status Blue 7/12/2017 10:50 PM   Action Taken Dressing reinforced 7/12/2017 10:50 PM   Alcohol Cap Used Yes 7/12/2017 10:50 PM        Opportunity for questions and clarification was provided.

## 2017-07-13 NOTE — PROGRESS NOTES
Ortho: POD#0 s/p ankle ORIF     Doing well. Pain controlled.  No N/V    AA  VSS  Ex fix with bulky wrap intact  NVID    Cont Plavix for DVT ppx  Oxycodone prn  PT/OT NWB on RLE  Likely will need rehab

## 2017-07-13 NOTE — ANESTHESIA POSTPROCEDURE EVALUATION
Post-Anesthesia Evaluation and Assessment    Patient: Ramses Aguillon MRN: [id number]  SSN: [id number]    YOB: 1945  Age: 81 y.o. Sex: female       Cardiovascular Function/Vital Signs  Visit Vitals    /48 (BP 1 Location: Right arm, BP Patient Position: At rest)    Pulse 77    Temp 36.7 °C (98 °F)    Resp 13    Ht 5' 3\" (1.6 m)    Wt 78 kg (172 lb)    SpO2 92%    BMI 30.47 kg/m2       Patient is status post general anesthesia for Procedure(s):  RIGHT ANKLE OPEN REDUCTION INTERNAL FIXATION, application of external fixator. Nausea/Vomiting: None    Postoperative hydration reviewed and adequate. Pain:  Pain Scale 1: Numeric (0 - 10) (07/13/17 1530)  Pain Intensity 1: 0 (07/13/17 1530)   Managed    Neurological Status:   Neuro (WDL): Exceptions to WDL (07/13/17 1500)  Neuro  Neurologic State: Drowsy; Confused; Eyes open to stimulus (07/13/17 1500)  Orientation Level: Oriented to person;Oriented to place (07/13/17 1500)  Cognition: Follows commands (07/13/17 1500)  Speech: Slurred (07/13/17 1500)  LUE Motor Response: Purposeful (07/13/17 1500)  LLE Motor Response: Purposeful (07/13/17 1500)  RUE Motor Response: Purposeful (07/13/17 1500)  RLE Motor Response: Purposeful;Weak (07/13/17 1500)   At baseline    Mental Status and Level of Consciousness: Arousable    Pulmonary Status:   O2 Device: Nasal cannula (07/13/17 1530)   Adequate oxygenation and airway patent    Complications related to anesthesia: None    Post-anesthesia assessment completed.  No concerns    Signed By: Norman Askew MD     July 13, 2017

## 2017-07-13 NOTE — PERIOP NOTES
Handoff Report from Operating Room to PACU    Report received from Lefty Blair RN and Ginny Becerra CRNA regarding Radha Yelena. Surgeon(s):  Master Lozano MD  And Procedure(s) (LRB):  RIGHT ANKLE OPEN REDUCTION INTERNAL FIXATION, application of external fixator (Right)  confirmed   with allergies, drains and dressings discussed. Anesthesia type, drugs, patient history, complications, estimated blood loss, vital signs, intake and output, and last pain medication, lines, reversal medications and temperature were reviewed.

## 2017-07-13 NOTE — PROGRESS NOTES
Pt is scheduled for surgical repair of ankle fracture. Will defer PT at this time. PT will follow up tomorrow per guidelines.     Thank you  Landy Marie, PT, DPT

## 2017-07-13 NOTE — ED PROVIDER NOTES
HPI Comments: Zheng Herzog is a 81 y.o. female with significant PMHx of hypertension, CAD, and diabetes, presents via EMS to the ED with c/o sudden onset of right ankle pain s/p mechanical fall x this morning. She notes associated symptom of right ankle swelling and pain is exacerbated with movement. Pt reports she was getting out of a pool when she slipped and landed \"sideways\". She notes she \"slightly\" hit her head, however did not lose consciousness. Pt is a transfer from [place] and states pt had O2 sat of upper 80s. Pt was given morphine while she was at [place] and had imaging done of her right ankle and head. Pt notes she had smoked for 47 years. Pt denies hx of COPD, SOB, cough, syncopal episode, headache, chest pain, use of oxygen at home or any other complaints. PCP: China Pollard MD    Social Hx: - tobacco (former smoker for 50 years), - EtOH use    There are no other complaints, changes or physical findings at this time. Written by ILDEFONSO Hernandez, as dictated by John Gatica MD      The history is provided by the patient. No  was used.         Past Medical History:   Diagnosis Date    Arthritis     CAD (coronary artery disease)     renal stent; Dr Jessica Black     Diabetes [facility])     oral    History of bronchitis     Hyperlipemia     Hypertension        Past Surgical History:   Procedure Laterality Date    HX CATARACT REMOVAL Bilateral     HX CHOLECYSTECTOMY      HX HYSTERECTOMY      HX ORTHOPAEDIC      R hand middle finger bone spur    VASCULAR SURGERY PROCEDURE UNLIST      renal stent    VASCULAR SURGERY PROCEDURE UNLIST  3/5/14    LEFT FEMORAL-POPLITEAL BYPASS WITH PTFE GORTEX GRAFT          Family History:   Problem Relation Age of Onset    Heart Disease Mother     Seizures Mother     Diabetes Father     Heart Disease Father     Diabetes Sister     Cancer Sister     Breast Cancer Sister 61    MS Brother        Social History     Social History  Marital status:      Spouse name: N/A    Number of children: N/A    Years of education: N/A     Occupational History    Not on file. Social History Main Topics    Smoking status: Former Smoker     Packs/day: 1.50     Years: 40.00     Quit date: 12/20/2009    Smokeless tobacco: Never Used    Alcohol use No    Drug use: No    Sexual activity: Not on file     Other Topics Concern    Not on file     Social History Narrative         ALLERGIES: Codeine; Ibuprofen [ibuprofen]; and Simvastatin    Review of Systems   Constitutional: Negative for activity change, appetite change, fatigue and fever. HENT: Negative. Negative for congestion, rhinorrhea and sore throat. Respiratory: Negative. Negative for cough, shortness of breath and wheezing. Cardiovascular: Negative. Negative for chest pain and leg swelling. Gastrointestinal: Negative. Negative for abdominal distention, abdominal pain, constipation, diarrhea, nausea and vomiting. Endocrine: Negative. Genitourinary: Negative for difficulty urinating, dysuria, menstrual problem, vaginal bleeding and vaginal discharge. Musculoskeletal: Positive for arthralgias (right ankle) and joint swelling (right ankle). Negative for myalgias. Skin: Negative. Negative for rash. Neurological: Negative. Negative for dizziness, syncope, weakness, light-headedness and headaches. Psychiatric/Behavioral: Negative. Vitals:    07/12/17 2241 07/12/17 2243   BP: 178/61    Pulse: 70    Resp: 15    Temp: 98.3 °F (36.8 °C)    SpO2: (!) 88% 93%   Weight: 78 kg (172 lb)    Height: 5' 3\" (1.6 m)             Physical Exam   Constitutional: She is oriented to person, place, and time. She appears well-developed and well-nourished. Nasal cannula in place. HENT:   Head: Atraumatic. Eyes: EOM are normal.   Cardiovascular: Normal rate, regular rhythm, normal heart sounds and intact distal pulses. Exam reveals no gallop and no friction rub.     No murmur heard. Pulmonary/Chest: Effort normal. No respiratory distress. She has wheezes. She has no rales. She exhibits no tenderness. Hypoxic ~ 80s on room air. Needs 4 L to maintain above 94%   Abdominal: Soft. Bowel sounds are normal. She exhibits no distension and no mass. There is no tenderness. There is no rebound and no guarding. Musculoskeletal: Normal range of motion. She exhibits edema, tenderness and deformity. Right ankle: She exhibits swelling, ecchymosis and deformity. Tenderness. Right ankle, un-splinted, with significant swelling, ecchymosis, and deformity. No tenderness over proximal tibia or fibula. 2+ pedal pulse   Neurological: She is oriented to person, place, and time. Skin: Skin is warm. Psychiatric: She has a normal mood and affect. Nursing note and vitals reviewed. MDM  Number of Diagnoses or Management Options  Diagnosis management comments:     Transfer from [facility], for hyponatremia, sodium is 123, pneumonia with hypoxia, and comminuted right ankle fracture. Will repeat labs and review imaging, consult ortho, and will control pain. Amount and/or Complexity of Data Reviewed  Clinical lab tests: ordered and reviewed  Review and summarize past medical records: yes  Discuss the patient with other providers: yes (Orthopedics, hospitalist  )    Patient Progress  Patient progress: stable    ED Course       Procedures    CONSULT NOTE:   10:54 PM  Gavin Freed MD spoke with CHARANJIT Guallpa,   Specialty: Orthopedics  Discussed pt's hx, disposition, and available diagnostic and imaging results. Reviewed care plans. Consultant states to place a long leg splint regardless, image the knee and repeat imaging of the ankle.     Written by Jaquelin Almanza ED Scribe, as dictated by Gavin Freed MD.     CONSULT NOTE:   11:20 PM  Gavin Freed MD spoke with Dr. Cuba Fields,   Specialty: Hospitalist  Discussed pt's hx, disposition, and available diagnostic and imaging results. Reviewed care plans. Consultant will admit pt and recommended to obtain a chest XR. Written by ILDEFONSO Umana, as dictated by Reinaldo Rolle MD.      Procedure Note - Procedural Sedation:   1:27 AM  Procedure performed by: Reinaldo Rolle MD  Procedural sedation has been advised for this patient associated with right ankle reduction. PLAN FOR SEDATION:  The patients sedation plan includes a total of 110 mg propofol/DIPRIVAN and 4 mg of morphine. Reversal agents and resuscitation equipment will be at the bedside should they be needed. The risks, benefits, and alternatives have been explained to the patient and She consents to the sedation. The ASA score is ASA 2 - Mild systemic disease. The patient is having all vital signs monitored by an attending RN as well as pulse oximetry. Mallampati Score Reference: The patient has a patent airway with a Mallampati Classification Score of II (soft palate, uvula, fauces visible). IMMEDIATE REASSESSMENT PRIOR TO PROCEDURE:  TIME: 0136   Immediately prior to the procedure, the patient was re-evaluated and found suitable for the planned procedure and any planned medications. ____________________________________________________________________________________________________________________________________________    Post Procedure Anaesthesia/Sedation Assessment #1  TIME: 0140  Post Procedure assessment performed by: Reinaldo Rolle MD  The patient was sedated with a total of 110 mg propofol/DIPRIVAN and 4 mg of morphine. Reversal agents and resuscitation equipment were at the bedside. The right ankle splint was done and the patient tolerated the procedure well with no complications. Reversal agents were not used.   HR:74      Rhythm:normal sinus  RR: 16                 SpO2:94  Airway patent?: Yes  BP:164/50  Hydration Status:adequate   Mental Status:baseline  Pain Level:3/10    Intraprocedure/postprocedure Complications: none  EBL: none  The procedure took 16-30 minutes, and pt tolerated well. During this post sedation period, in addition to the recovering RN, there has been at least one other staff member in the unit. This additional staff member was able to directly hear a call for assistance in the case of an emergency. Written by ILDEFONSO Hernandez, as dictated by John Gatica MD.   ---------------------------------------------------------------------------------------------------------------------    Post Procedure Assessment #1  Time: 1651  Post Procedure assessment performed by: John Gatica MD  Post Procedure Diagnosis:Right ankle fracture  Description of Procedure Performed: Right ankle reduction  EBL: none  Writtenby ILDEFONSO Hernandez, as dictated by John Gatica MD.     ADMIT ASSESSMENT  0210  I have reassessed the patient and determined that they have returned to their preprocedure neurologic and cardiologic baseline and are medically stable and appropriate for discharge. The patient has been observed in the ED until She returns to baseline and is able to tolerate clear liquids. Writtenby ILDEFONSO Hernandez, as dictated by John Gatica MD.    Post-Discharge Instructions provided to the patient    Written post-discharge instructions are being provided to the patient and the responsible adult accompanying the patient. Information included was:  1. Relevant dietary and medication instructions;    2. Post-discharge activity instructions;   3. Requirement that a responsible adult accompany the patient home post-discharge;   4. Steps to follow in the event of a complication, including a phone number to call; and 5. What to do in event of an emergency.      ILDEFONSO Catherine, as dictated by John Gatica MD.        LABORATORY TESTS:  Recent Results (from the past 12 hour(s))   CBC WITH AUTOMATED DIFF Collection Time: 07/12/17 11:16 PM   Result Value Ref Range    WBC 16.5 (H) 3.6 - 11.0 K/uL    RBC 3.65 (L) 3.80 - 5.20 M/uL    HGB 11.3 (L) 11.5 - 16.0 g/dL    HCT 31.7 (L) 35.0 - 47.0 %    MCV 86.8 80.0 - 99.0 FL    MCH 31.0 26.0 - 34.0 PG    MCHC 35.6 30.0 - 36.5 g/dL    RDW 12.2 11.5 - 14.5 %    PLATELET 062 571 - 732 K/uL    NEUTROPHILS 96 (H) 32 - 75 %    LYMPHOCYTES 3 (L) 12 - 49 %    MONOCYTES 1 (L) 5 - 13 %    EOSINOPHILS 0 0 - 7 %    BASOPHILS 0 0 - 1 %    ABS. NEUTROPHILS 15.8 (H) 1.8 - 8.0 K/UL    ABS. LYMPHOCYTES 0.5 (L) 0.8 - 3.5 K/UL    ABS. MONOCYTES 0.2 0.0 - 1.0 K/UL    ABS. EOSINOPHILS 0.0 0.0 - 0.4 K/UL    ABS. BASOPHILS 0.0 0.0 - 0.1 K/UL    RBC COMMENTS NORMOCYTIC, NORMOCHROMIC      DF SMEAR SCANNED     METABOLIC PANEL, COMPREHENSIVE    Collection Time: 07/12/17 11:16 PM   Result Value Ref Range    Sodium 126 (L) 136 - 145 mmol/L    Potassium 4.2 3.5 - 5.1 mmol/L    Chloride 93 (L) 97 - 108 mmol/L    CO2 22 21 - 32 mmol/L    Anion gap 11 5 - 15 mmol/L    Glucose 168 (H) 65 - 100 mg/dL    BUN 23 (H) 6 - 20 MG/DL    Creatinine 0.87 0.55 - 1.02 MG/DL    BUN/Creatinine ratio 26 (H) 12 - 20      GFR est AA >60 >60 ml/min/1.73m2    GFR est non-AA >60 >60 ml/min/1.73m2    Calcium 8.0 (L) 8.5 - 10.1 MG/DL    Bilirubin, total 0.5 0.2 - 1.0 MG/DL    ALT (SGPT) 32 12 - 78 U/L    AST (SGOT) 41 (H) 15 - 37 U/L    Alk.  phosphatase 96 45 - 117 U/L    Protein, total 7.2 6.4 - 8.2 g/dL    Albumin 3.0 (L) 3.5 - 5.0 g/dL    Globulin 4.2 (H) 2.0 - 4.0 g/dL    A-G Ratio 0.7 (L) 1.1 - 2.2     LACTIC ACID    Collection Time: 07/12/17 11:16 PM   Result Value Ref Range    Lactic acid 1.5 0.4 - 2.0 MMOL/L   NT-PRO BNP    Collection Time: 07/12/17 11:16 PM   Result Value Ref Range    NT pro-BNP 1751 (H) 0 - 125 PG/ML       IMAGING RESULTS:  EXAM:  XR ANKLE RT MIN 3 V     INDICATION:  fracture reduction.     COMPARISON: None.     FINDINGS: Three views of the right ankle demonstrate comminuted, displaced  fracture of the distal fibular shaft and comminuted, displaced fractures of the  medial and posterior malleoli of the tibia. There is tibiotalar subluxation. Evaluation of fine bony detail is suboptimal secondary to overlying splint  material.     IMPRESSION  IMPRESSION: Comminuted fractures of the distal tibia and fibula. INDICATION: Hypoxia     COMPARISON: November 14, 2014     FINDINGS: AP portable imaging of the chest performed at 1:33 AM demonstrates a  stable cardiomediastinal silhouette. There is mild pulmonary interstitial edema. No focal consolidation or pleural effusion is evident.  Degenerative changes are  present in the thoracic spine.     IMPRESSION  IMPRESSION: Mild pulmonary interstitial edema.                  MEDICATIONS GIVEN:  Medications   sodium chloride (NS) flush 5-10 mL (10 mL IntraVENous Given 7/13/17 0358)   sodium chloride (NS) flush 5-10 mL (not administered)   ondansetron (ZOFRAN) injection 4 mg (not administered)   acetaminophen (TYLENOL) tablet 650 mg (not administered)   albuterol-ipratropium (DUO-NEB) 2.5 MG-0.5 MG/3 ML (not administered)   albuterol (PROVENTIL VENTOLIN) nebulizer solution 2.5 mg (not administered)   methylPREDNISolone (PF) (SOLU-MEDROL) injection 40 mg (not administered)   amLODIPine (NORVASC) tablet 10 mg (not administered)   clopidogrel (PLAVIX) tablet 75 mg (not administered)   irbesartan (AVAPRO) tablet 300 mg (not administered)   pravastatin (PRAVACHOL) tablet 10 mg (10 mg Oral Refused 7/13/17 0358)   metoprolol succinate (TOPROL-XL) XL tablet 100 mg (not administered)   niacin ER (NIASPAN) tablet 500 mg (not administered)   hydrALAZINE (APRESOLINE) 20 mg/mL injection 10 mg (10 mg IntraVENous Given 7/13/17 0357)   insulin lispro (HUMALOG) injection (not administered)   glucose chewable tablet 16 g (not administered)   dextrose (D50W) injection syrg 12.5-25 g (not administered)   glucagon (GLUCAGEN) injection 1 mg (not administered)   levoFLOXacin (LEVAQUIN) 750 mg in D5W IVPB (750 mg IntraVENous New Bag 7/13/17 0357)   furosemide (LASIX) injection 40 mg (not administered)   morphine injection 2 mg (2 mg IntraVENous Given 7/13/17 0357)   HYDROcodone-acetaminophen (NORCO) 5-325 mg per tablet 1 Tab (not administered)   nicotine (NICODERM CQ) 21 mg/24 hr patch 1 Patch (not administered)   polyethylene glycol (MIRALAX) packet 17 g (not administered)   bisacodyl (DULCOLAX) suppository 10 mg (not administered)   albuterol-ipratropium (DUO-NEB) 2.5 MG-0.5 MG/3 ML (3 mL Nebulization Given 7/12/17 2302)   morphine injection 4 mg (4 mg IntraVENous Given 7/12/17 2302)   sodium chloride 0.9 % bolus infusion 1,000 mL (0 mL IntraVENous IV Completed 7/13/17 0002)   morphine injection 4 mg (4 mg IntraVENous Given 7/13/17 0109)   albuterol-ipratropium (DUO-NEB) 2.5 MG-0.5 MG/3 ML (3 mL Nebulization Given 7/13/17 0142)       IMPRESSION:  1. Ankle fracture, right, closed, initial encounter    2. COPD exacerbation ([facility] [address]    3. Acute respiratory failure with hypoxia (HCC)        PLAN:  1. Admit to hospitalist     12 :38 AM  Patient is being admitted to the hospital by Dr. Poppy Rasheed. The results of their tests and reasons for their admission have been discussed with them and/or available family. They convey agreement and understanding for the need to be admitted and for their admission diagnosis. Consultation has been made with the inpatient physician specialist for hospitalization. Written by [employer], ED Scribe, as dictated by Dano Rangel MD.      This note is prepared by [employer], acting as Scribe for Dano Rangel MD.    Dano Rangel MD : The scribe's documentation has been prepared under my direction and personally reviewed by me in its entirety. I confirm that the note above accurately reflects all work, treatment, procedures, and medical decision making performed by me.

## 2017-07-13 NOTE — PROGRESS NOTES
Bedside and Verbal shift change report given to FAY Juarez (oncoming nurse) by Mindi Taveras (offgoing nurse). Report included the following information SBAR, Kardex, ED Summary, Procedure Summary, Intake/Output, MAR, Recent Results and Med Rec Status.

## 2017-07-13 NOTE — PROGRESS NOTES
Primary Nurse Angela Navas RN and Zoe Sloan RN performed a dual skin assessment on this patient No impairment noted  Soto score is 18

## 2017-07-14 LAB
ANION GAP BLD CALC-SCNC: 10 MMOL/L (ref 5–15)
ANION GAP BLD CALC-SCNC: 9 MMOL/L (ref 5–15)
ANION GAP BLD CALC-SCNC: 9 MMOL/L (ref 5–15)
BASOPHILS # BLD AUTO: 0 K/UL (ref 0–0.1)
BASOPHILS # BLD: 0 % (ref 0–1)
BUN SERPL-MCNC: 33 MG/DL (ref 6–20)
BUN SERPL-MCNC: 34 MG/DL (ref 6–20)
BUN SERPL-MCNC: 46 MG/DL (ref 6–20)
BUN/CREAT SERPL: 22 (ref 12–20)
BUN/CREAT SERPL: 23 (ref 12–20)
BUN/CREAT SERPL: 29 (ref 12–20)
CALCIUM SERPL-MCNC: 8.2 MG/DL (ref 8.5–10.1)
CALCIUM SERPL-MCNC: 8.3 MG/DL (ref 8.5–10.1)
CALCIUM SERPL-MCNC: 8.5 MG/DL (ref 8.5–10.1)
CHLORIDE SERPL-SCNC: 89 MMOL/L (ref 97–108)
CHLORIDE SERPL-SCNC: 91 MMOL/L (ref 97–108)
CHLORIDE SERPL-SCNC: 92 MMOL/L (ref 97–108)
CO2 SERPL-SCNC: 22 MMOL/L (ref 21–32)
CO2 SERPL-SCNC: 22 MMOL/L (ref 21–32)
CO2 SERPL-SCNC: 25 MMOL/L (ref 21–32)
CREAT SERPL-MCNC: 1.49 MG/DL (ref 0.55–1.02)
CREAT SERPL-MCNC: 1.5 MG/DL (ref 0.55–1.02)
CREAT SERPL-MCNC: 1.57 MG/DL (ref 0.55–1.02)
EOSINOPHIL # BLD: 0 K/UL (ref 0–0.4)
EOSINOPHIL NFR BLD: 0 % (ref 0–7)
ERYTHROCYTE [DISTWIDTH] IN BLOOD BY AUTOMATED COUNT: 12.7 % (ref 11.5–14.5)
EST. AVERAGE GLUCOSE BLD GHB EST-MCNC: 117 MG/DL
GLUCOSE BLD STRIP.AUTO-MCNC: 131 MG/DL (ref 65–100)
GLUCOSE BLD STRIP.AUTO-MCNC: 171 MG/DL (ref 65–100)
GLUCOSE BLD STRIP.AUTO-MCNC: 193 MG/DL (ref 65–100)
GLUCOSE BLD STRIP.AUTO-MCNC: 223 MG/DL (ref 65–100)
GLUCOSE SERPL-MCNC: 145 MG/DL (ref 65–100)
GLUCOSE SERPL-MCNC: 150 MG/DL (ref 65–100)
GLUCOSE SERPL-MCNC: 150 MG/DL (ref 65–100)
HBA1C MFR BLD: 5.7 % (ref 4.2–6.3)
HCT VFR BLD AUTO: 26.3 % (ref 35–47)
HGB BLD-MCNC: 9.1 G/DL (ref 11.5–16)
LYMPHOCYTES # BLD AUTO: 4 % (ref 12–49)
LYMPHOCYTES # BLD: 0.7 K/UL (ref 0.8–3.5)
MCH RBC QN AUTO: 30.8 PG (ref 26–34)
MCHC RBC AUTO-ENTMCNC: 34.6 G/DL (ref 30–36.5)
MCV RBC AUTO: 89.2 FL (ref 80–99)
MONOCYTES # BLD: 0.5 K/UL (ref 0–1)
MONOCYTES NFR BLD AUTO: 3 % (ref 5–13)
NEUTS SEG # BLD: 16.7 K/UL (ref 1.8–8)
NEUTS SEG NFR BLD AUTO: 93 % (ref 32–75)
OSMOLALITY UR: 355 MOSM/KG H2O
PLATELET # BLD AUTO: 282 K/UL (ref 150–400)
POTASSIUM SERPL-SCNC: 4.4 MMOL/L (ref 3.5–5.1)
POTASSIUM SERPL-SCNC: 4.9 MMOL/L (ref 3.5–5.1)
POTASSIUM SERPL-SCNC: 4.9 MMOL/L (ref 3.5–5.1)
RBC # BLD AUTO: 2.95 M/UL (ref 3.8–5.2)
SERVICE CMNT-IMP: ABNORMAL
SODIUM SERPL-SCNC: 123 MMOL/L (ref 136–145)
SODIUM UR-SCNC: 10 MMOL/L
WBC # BLD AUTO: 17.9 K/UL (ref 3.6–11)

## 2017-07-14 PROCEDURE — 97161 PT EVAL LOW COMPLEX 20 MIN: CPT

## 2017-07-14 PROCEDURE — 82962 GLUCOSE BLOOD TEST: CPT

## 2017-07-14 PROCEDURE — 74011250637 HC RX REV CODE- 250/637: Performed by: INTERNAL MEDICINE

## 2017-07-14 PROCEDURE — 85025 COMPLETE CBC W/AUTO DIFF WBC: CPT | Performed by: INTERNAL MEDICINE

## 2017-07-14 PROCEDURE — 93306 TTE W/DOPPLER COMPLETE: CPT

## 2017-07-14 PROCEDURE — 97166 OT EVAL MOD COMPLEX 45 MIN: CPT | Performed by: OCCUPATIONAL THERAPIST

## 2017-07-14 PROCEDURE — 84300 ASSAY OF URINE SODIUM: CPT

## 2017-07-14 PROCEDURE — 65660000000 HC RM CCU STEPDOWN

## 2017-07-14 PROCEDURE — 97535 SELF CARE MNGMENT TRAINING: CPT | Performed by: OCCUPATIONAL THERAPIST

## 2017-07-14 PROCEDURE — 97530 THERAPEUTIC ACTIVITIES: CPT | Performed by: OCCUPATIONAL THERAPIST

## 2017-07-14 PROCEDURE — 94640 AIRWAY INHALATION TREATMENT: CPT

## 2017-07-14 PROCEDURE — G8988 SELF CARE GOAL STATUS: HCPCS | Performed by: OCCUPATIONAL THERAPIST

## 2017-07-14 PROCEDURE — G8987 SELF CARE CURRENT STATUS: HCPCS | Performed by: OCCUPATIONAL THERAPIST

## 2017-07-14 PROCEDURE — 36415 COLL VENOUS BLD VENIPUNCTURE: CPT | Performed by: INTERNAL MEDICINE

## 2017-07-14 PROCEDURE — 74011250636 HC RX REV CODE- 250/636: Performed by: INTERNAL MEDICINE

## 2017-07-14 PROCEDURE — 80048 BASIC METABOLIC PNL TOTAL CA: CPT

## 2017-07-14 PROCEDURE — 83036 HEMOGLOBIN GLYCOSYLATED A1C: CPT | Performed by: INTERNAL MEDICINE

## 2017-07-14 PROCEDURE — 97530 THERAPEUTIC ACTIVITIES: CPT

## 2017-07-14 PROCEDURE — 74011000250 HC RX REV CODE- 250: Performed by: INTERNAL MEDICINE

## 2017-07-14 PROCEDURE — 83935 ASSAY OF URINE OSMOLALITY: CPT | Performed by: INTERNAL MEDICINE

## 2017-07-14 PROCEDURE — 74011250636 HC RX REV CODE- 250/636: Performed by: PHYSICIAN ASSISTANT

## 2017-07-14 PROCEDURE — 74011250637 HC RX REV CODE- 250/637: Performed by: PHYSICIAN ASSISTANT

## 2017-07-14 PROCEDURE — 74011636637 HC RX REV CODE- 636/637: Performed by: INTERNAL MEDICINE

## 2017-07-14 PROCEDURE — 80048 BASIC METABOLIC PNL TOTAL CA: CPT | Performed by: INTERNAL MEDICINE

## 2017-07-14 RX ORDER — LEVOFLOXACIN 5 MG/ML
750 INJECTION, SOLUTION INTRAVENOUS
Status: DISCONTINUED | OUTPATIENT
Start: 2017-07-16 | End: 2017-07-15

## 2017-07-14 RX ORDER — ACETAMINOPHEN 325 MG/1
650 TABLET ORAL EVERY 6 HOURS
Qty: 112 TAB | Refills: 0 | Status: SHIPPED
Start: 2017-07-14 | End: 2017-07-28

## 2017-07-14 RX ORDER — AMOXICILLIN 250 MG
1 CAPSULE ORAL DAILY
Qty: 30 TAB | Refills: 0 | Status: SHIPPED | OUTPATIENT
Start: 2017-07-14 | End: 2017-09-18

## 2017-07-14 RX ORDER — OXYCODONE HYDROCHLORIDE 5 MG/1
2.5-5 TABLET ORAL
Qty: 30 TAB | Refills: 0 | Status: SHIPPED | OUTPATIENT
Start: 2017-07-14 | End: 2017-07-24

## 2017-07-14 RX ORDER — POLYETHYLENE GLYCOL 3350 17 G/17G
17 POWDER, FOR SOLUTION ORAL
Qty: 15 PACKET | Refills: 0 | Status: SHIPPED | OUTPATIENT
Start: 2017-07-14 | End: 2017-07-29

## 2017-07-14 RX ORDER — IPRATROPIUM BROMIDE AND ALBUTEROL SULFATE 2.5; .5 MG/3ML; MG/3ML
3 SOLUTION RESPIRATORY (INHALATION)
Status: DISCONTINUED | OUTPATIENT
Start: 2017-07-14 | End: 2017-07-17

## 2017-07-14 RX ADMIN — INSULIN LISPRO 2 UNITS: 100 INJECTION, SOLUTION INTRAVENOUS; SUBCUTANEOUS at 23:01

## 2017-07-14 RX ADMIN — ACETAMINOPHEN 650 MG: 325 TABLET, FILM COATED ORAL at 18:48

## 2017-07-14 RX ADMIN — METHYLPREDNISOLONE SODIUM SUCCINATE 40 MG: 40 INJECTION, POWDER, FOR SOLUTION INTRAMUSCULAR; INTRAVENOUS at 16:09

## 2017-07-14 RX ADMIN — IRBESARTAN 300 MG: 150 TABLET ORAL at 23:13

## 2017-07-14 RX ADMIN — CEFAZOLIN 2 G: 10 INJECTION, POWDER, FOR SOLUTION INTRAVENOUS; PARENTERAL at 04:41

## 2017-07-14 RX ADMIN — AMLODIPINE BESYLATE 10 MG: 5 TABLET ORAL at 08:39

## 2017-07-14 RX ADMIN — PRAVASTATIN SODIUM 10 MG: 10 TABLET ORAL at 23:02

## 2017-07-14 RX ADMIN — FUROSEMIDE 40 MG: 10 INJECTION, SOLUTION INTRAMUSCULAR; INTRAVENOUS at 08:39

## 2017-07-14 RX ADMIN — OXYCODONE HYDROCHLORIDE 5 MG: 5 TABLET ORAL at 01:42

## 2017-07-14 RX ADMIN — OXYCODONE HYDROCHLORIDE 5 MG: 5 TABLET ORAL at 16:09

## 2017-07-14 RX ADMIN — DOCUSATE SODIUM AND SENNOSIDES 1 TABLET: 8.6; 5 TABLET, FILM COATED ORAL at 18:48

## 2017-07-14 RX ADMIN — CALCIUM CARBONATE 500 MG (1,250 MG)-VITAMIN D3 200 UNIT TABLET 1 TABLET: at 11:56

## 2017-07-14 RX ADMIN — CALCIUM CARBONATE 500 MG (1,250 MG)-VITAMIN D3 200 UNIT TABLET 1 TABLET: at 16:09

## 2017-07-14 RX ADMIN — METHYLPREDNISOLONE SODIUM SUCCINATE 40 MG: 40 INJECTION, POWDER, FOR SOLUTION INTRAMUSCULAR; INTRAVENOUS at 04:41

## 2017-07-14 RX ADMIN — ACETAMINOPHEN 650 MG: 325 TABLET, FILM COATED ORAL at 11:56

## 2017-07-14 RX ADMIN — ACETAMINOPHEN 650 MG: 325 TABLET, FILM COATED ORAL at 05:56

## 2017-07-14 RX ADMIN — LEVOFLOXACIN 750 MG: 5 INJECTION, SOLUTION INTRAVENOUS at 04:41

## 2017-07-14 RX ADMIN — Medication 10 ML: at 16:10

## 2017-07-14 RX ADMIN — ACETAMINOPHEN 650 MG: 325 TABLET, FILM COATED ORAL at 00:20

## 2017-07-14 RX ADMIN — POLYETHYLENE GLYCOL 3350 17 G: 17 POWDER, FOR SOLUTION ORAL at 08:39

## 2017-07-14 RX ADMIN — OXYCODONE HYDROCHLORIDE 5 MG: 5 TABLET ORAL at 07:47

## 2017-07-14 RX ADMIN — DOCUSATE SODIUM AND SENNOSIDES 1 TABLET: 8.6; 5 TABLET, FILM COATED ORAL at 08:39

## 2017-07-14 RX ADMIN — METHYLPREDNISOLONE SODIUM SUCCINATE 40 MG: 40 INJECTION, POWDER, FOR SOLUTION INTRAMUSCULAR; INTRAVENOUS at 23:02

## 2017-07-14 RX ADMIN — OXYCODONE HYDROCHLORIDE 5 MG: 5 TABLET ORAL at 11:56

## 2017-07-14 RX ADMIN — IPRATROPIUM BROMIDE AND ALBUTEROL SULFATE 3 ML: .5; 3 SOLUTION RESPIRATORY (INHALATION) at 01:27

## 2017-07-14 RX ADMIN — Medication 10 ML: at 23:02

## 2017-07-14 RX ADMIN — ACETAMINOPHEN 650 MG: 325 TABLET, FILM COATED ORAL at 23:14

## 2017-07-14 RX ADMIN — METOPROLOL SUCCINATE 100 MG: 50 TABLET, EXTENDED RELEASE ORAL at 08:39

## 2017-07-14 RX ADMIN — NIACIN 500 MG: 500 TABLET, EXTENDED RELEASE ORAL at 07:47

## 2017-07-14 RX ADMIN — IPRATROPIUM BROMIDE AND ALBUTEROL SULFATE 3 ML: .5; 3 SOLUTION RESPIRATORY (INHALATION) at 08:27

## 2017-07-14 RX ADMIN — CALCIUM CARBONATE 500 MG (1,250 MG)-VITAMIN D3 200 UNIT TABLET 1 TABLET: at 07:47

## 2017-07-14 RX ADMIN — INSULIN LISPRO 2 UNITS: 100 INJECTION, SOLUTION INTRAVENOUS; SUBCUTANEOUS at 16:09

## 2017-07-14 RX ADMIN — Medication 10 ML: at 05:56

## 2017-07-14 RX ADMIN — INSULIN LISPRO 2 UNITS: 100 INJECTION, SOLUTION INTRAVENOUS; SUBCUTANEOUS at 08:37

## 2017-07-14 RX ADMIN — CLOPIDOGREL BISULFATE 75 MG: 75 TABLET, FILM COATED ORAL at 08:39

## 2017-07-14 RX ADMIN — OXYCODONE HYDROCHLORIDE 5 MG: 5 TABLET ORAL at 23:14

## 2017-07-14 NOTE — PROGRESS NOTES
Pharmacy Automatic Renal Dosing Protocol - Antimicrobials    Indication for Antimicrobials: COPD exacerbation  Current Regimen of Each Antimicrobial (Start Day & Day of Therapy):  Levofloxacin 750mg IV q24h x 5 days (start  Day 2)    Significant Cultures:    Blood NG x 2 days pending    CAPD, Hemodialysis or Renal Replacement Therapy: none   Paralysis, amputations, malnutrition: none  Recent Labs      17   0322  17   2316   CREA  1.49*  1.50*  0.87   BUN  34*  33*  23*   WBC  17.9*  16.5*     Temp (24hrs), Av.2 °F (36.8 °C), Min:97.7 °F (36.5 °C), Max:98.8 °F (37.1 °C)    Creatinine Clearance (Creatinine Clearance (ml/min)): 29     Impression/Plan: Adjust Levofloxacin to 750mg IV q48h for renal fxn       Pharmacy will follow daily and adjust medications as appropriate for renal function and/or serum levels.     Thank you,  Deepika Brooks, PHARMD     Renal Dosing Tables on [employer]

## 2017-07-14 NOTE — PROGRESS NOTES
Bedside and Verbal shift change report given to Christy Nava (oncoming nurse) by Kayla Liu RN (offgoing nurse). Report included the following information SBAR, Kardex, OR Summary, Procedure Summary, Intake/Output, MAR and Cardiac Rhythm NSR.

## 2017-07-14 NOTE — PROGRESS NOTES
Problem: Mobility Impaired (Adult and Pediatric)  Goal: *Acute Goals and Plan of Care (Insert Text)  Physical Therapy Goals  Initiated 7/14/2017  1. Patient will move from supine to sit and sit to supine in bed with supervision/set-up within 7 day(s). 2. Patient will transfer from bed to chair and chair to bed with minimal assistance/contact guard assist using the least restrictive device within 7 day(s). 3. Patient will perform sit to stand with contact guard assist within 7 day(s). 4. Patient will ambulate with minimal assistance/contact guard assist for 50 feet with the least restrictive device within 7 day(s). 5. Patient will ascend/descend 1 stairs with bilateral handrail(s) with minimal assistance/contact guard assist within 7 day(s). PHYSICAL THERAPY EVALUATION  Patient: Wilber Bean (38 y.o. female)  Date: 7/14/2017  Primary Diagnosis: right ankle fracture  Respiratory failure (HCC)  Procedure(s) (LRB):  RIGHT ANKLE OPEN REDUCTION INTERNAL FIXATION, application of external fixator (Right) 1 Day Post-Op   Precautions: FALL, NWB         ASSESSMENT :  Based on the objective data described below, the patient presents with decreased strength, mobility, balance, and endurance secondary to s/p ORIF with application of external fixator on R ankle. Pt was received supine in bed, on RA, and motivated and agreeable to therapy. Pt was seen with PT/OT in order to maximize functional outcomes and gains. Pt subjectively reported living alone in a one story home with 1 step to get into the house and no DME, as pt did not utilize any type of AD during PLOF. Additionally, prior to hospital admin, pt worked full time at [employer] and was independent for all ADL's and self-care needs. Objectively, pt's vital were taken prior to treatment and found to be WNL.  Pt was able perform bed mobility, rolling and supine<>sit with Franc and VC for proper body mechanics and assist with getting R ankle with external fixator off bed, however this activity left pt observationally SOB and when SpO2 was checked pt was assessed at 88% on RA. Pt was only able to recover to 90% post pursed lip breathing and scapular retraction with breathing activities therefore pt was placed on 2L of O2, which allowed SpO2 to remain >90% for remainder of therapy session. Pt was able to complete stand pivot transfer to bed side chair with modAx2 and poor walker and body mechanics and she had difficulty with NWB precautions and pivoting on L leg. Pt also demonstrated increased WB through UE's, forward hip flexion over anterior aspect of walker, and increased hip squatting position as she was unable to accomplish proper standing balance posture. Pt required minAx2 for stand<>sit transfer to bed side chair followed by continued SOB, in which breathing exercises were ensued again. Once pt was placed in bed side chair, nursing tech informed PT/OT that pt was due for an echo and therefore needed to return to bed. During sit<>stand transfer from bed side chair to RW and stand pivot transfer to bed pt required modAx2 as she was noticeably fatigued and exhibiting decreased posturing secondary to decreased strength and motor control. Pt required minAx2 for sit<>supine transfer back into bed but was then able to demonstrate CGA for bed scooting to [facility]. Pt was left supine in bed with all needs met and air pump on L leg. Additionally, pt subjectively stated pain levels post activity were 6/10. Pt would benefit from continued acute care PT in order to address functional deficits listed above and for improved mobility and balance. PT recommends IPR vs. SNF-fast track pending insurance authorization and completion of acute care PT.       Patients rehabilitation potential is considered to be Good  Factors which may influence rehabilitation potential include:   [ ]         None noted  [ ]         Mental ability/status  [X]         Medical condition  [X]         Home/family situation and support systems  [X]         Safety awareness  [ ]         Pain tolerance/management  [ ]         Other:        PLAN :  Recommendations and Planned Interventions:  [X]           Bed Mobility Training             [X]    Neuromuscular Re-Education  [X]           Transfer Training                   [ ]    Orthotic/Prosthetic Training  [X]           Gait Training                         [ ]    Modalities  [X]           Therapeutic Exercises           [ ]    Edema Management/Control  [X]           Therapeutic Activities            [X]    Patient and Family Training/Education  [ ]           Other (comment):     Frequency/Duration: Patient will be followed by physical therapy  daily to address goals. Discharge Recommendations: IPR vs. SNF-fast track   Further Equipment Recommendations for Discharge: None       SUBJECTIVE:   Patient stated I quit smoking in 2008.       OBJECTIVE DATA SUMMARY:   HISTORY:    Past Medical History:   Diagnosis Date    Arthritis      CAD (coronary artery disease)       renal stent; Dr Viviana Bird     Diabetes [facility])       oral    History of bronchitis      Hyperlipemia      Hypertension       Past Surgical History:   Procedure Laterality Date    HX CATARACT REMOVAL Bilateral      HX CHOLECYSTECTOMY        HX HYSTERECTOMY        HX ORTHOPAEDIC         R hand middle finger bone spur    VASCULAR SURGERY PROCEDURE UNLIST         renal stent    VASCULAR SURGERY PROCEDURE UNLIST   3/5/14     LEFT FEMORAL-POPLITEAL BYPASS WITH PTFE GORTEX GRAFT      Prior Level of Function/Home Situation: Independent with all ADL's and self-care needs   Personal factors and/or comorbidities impacting plan of care:      Home Situation  Home Environment: Private residence  # Steps to Enter: 1  One/Two Story Residence: One story  Living Alone: Yes  Support Systems: Child(jose eduardo), Family member(s), Friends \ neighbors  Patient Expects to be Discharged to[de-identified] Private residence  Current DME Used/Available at Home: None  Tub or Shower Type: Tub/Shower combination     EXAMINATION/PRESENTATION/DECISION MAKING:   Critical Behavior:  Neurologic State: Alert, Appropriate for age  Orientation Level: Oriented X4  Cognition: Appropriate decision making     Hearing: Auditory  Auditory Impairment: None  Range Of Motion:  AROM: Generally decreased, functional           PROM: Generally decreased, functional           Strength:    Strength: Generally decreased, functional                    Tone & Sensation:   Tone: Normal              Sensation: Intact               Coordination:  Coordination: Generally decreased, functional  Vision:      Functional Mobility:  Bed Mobility:  Rolling: Minimum assistance  Supine to Sit: Minimum assistance  Sit to Supine: Minimum assistance  Scooting: Contact guard assistance  Transfers:  Sit to Stand: Minimum assistance; Moderate assistance;Assist x2  Stand to Sit: Minimum assistance;Assist x2  Stand Pivot Transfers: Moderate assistance                    Balance:   Sitting: Intact  Standing: Impaired  Standing - Static: Constant support  Standing - Dynamic : Fair                       Therapeutic Exercises:   Breathing exercises: PLB, scapular retraction with inspiration      Functional Measure:  Barthel Index:      Bathin  Bladder: 10  Bowels: 10  Groomin  Dressin  Feeding: 10  Mobility: 0  Stairs: 0  Toilet Use: 0  Transfer (Bed to Chair and Back): 5  Total: 40         Barthel and G-code impairment scale:  Percentage of impairment CH  0% CI  1-19% CJ  20-39% CK  40-59% CL  60-79% CM  80-99% CN  100%   Barthel Score 0-100 100 99-80 79-60 59-40 20-39 1-19    0   Barthel Score 0-20 20 17-19 13-16 9-12 5-8 1-4 0      The Barthel ADL Index: Guidelines  1. The index should be used as a record of what a patient does, not as a record of what a patient could do. 2. The main aim is to establish degree of independence from any help, physical or verbal, however minor and for whatever reason.   3. The need for supervision renders the patient not independent. 4. A patient's performance should be established using the best available evidence. Asking the patient, friends/relatives and nurses are the usual sources, but direct observation and common sense are also important. However direct testing is not needed. 5. Usually the patient's performance over the preceding 24-48 hours is important, but occasionally longer periods will be relevant. 6. Middle categories imply that the patient supplies over 50 per cent of the effort. 7. Use of aids to be independent is allowed. Jennifer Styles., Barthel, D.W. ([id number]). Functional evaluation: the Barthel Index. [address] [facility] (14)2. Luis Farley kimberlee DENISE Hernandez, Carmen French., Thomas Floyd., [place], [address] (1999). Measuring the change indisability after inpatient rehabilitation; comparison of the responsiveness of the Barthel Index and Functional [place] Measure. Journal of Neurology, Neurosurgery, and Psychiatry, 66(4), 231-384. Ko Gandhi, N.J.A, JEANNETTE Mendez, & Bill Mornoe MCHEIKH. (2004.) Assessment of post-stroke quality of life in cost-effectiveness studies: The usefulness of the Barthel Index and the EuroQoL-5D. Quality of Life Research, 13, 345-09            G codes: In compliance with CMSs Claims Based Outcome Reporting, the following G-code set was chosen for this patient based on their primary functional limitation being treated: The outcome measure chosen to determine the severity of the functional limitation was the Barthel Index with a score of 40/100 which was correlated with the impairment scale.       · Mobility - Walking and Moving Around:               - CURRENT STATUS:    CK - 40%-59% impaired, limited or restricted               - GOAL STATUS:           CJ - 20%-39% impaired, limited or restricted               - D/C STATUS:                       ---------------To be determined---------------      Based on the above components, the patient evaluation is determined to be of the following complexity level: LOW      Pain:  Pain Scale 1: Visual  Pain Intensity 1: 0              Activity Tolerance:   Fair/Good, VSS once placed on 2L O2 during activity (SpO2 dropped approx 88% during activity on RA)  Please refer to the flowsheet for vital signs taken during this treatment. After treatment:   [ ]         Patient left in no apparent distress sitting up in chair  [X]         Patient left in no apparent distress in bed  [X]         Call bell left within reach  [X]         Nursing notified  [ ]         Caregiver present  [ ]         Bed alarm activated      COMMUNICATION/EDUCATION:   The patients plan of care was discussed with: Occupational Therapist and Registered Nurse.  [X]         Fall prevention education was provided and the patient/caregiver indicated understanding. [X]         Patient/family have participated as able in goal setting and plan of care. [X]         Patient/family agree to work toward stated goals and plan of care. [ ]         Patient understands intent and goals of therapy, but is neutral about his/her participation. [ ]         Patient is unable to participate in goal setting and plan of care. Thank you for this referral.  Yun Dietz, SPT    Time Calculation: 43 mins              Regarding student involvement in patient care:  A student participated in this treatment session. Per CMS Medicare statements and APTA guidelines I certify that the following was true:  1. I was present and directly observed the entire session. 2. I made all skilled judgments and clinical decisions regarding care. 3. I am the practitioner responsible for assessment, treatment, and documentation.

## 2017-07-14 NOTE — PROGRESS NOTES
Occupational Therapy Goals  Initiated 7/14/2017  1. Patient will perform sponge bathing with supervision/set-up within 7 day(s). 2.  Patient will perform lower body dressing with supervision/set-up within 7 day(s). 3.  Patient will perform BSC transfers, via stand pivot with RW, NWB on RLE with minimal assistance/contact guard assist within 7 day(s). 4.  Patient will perform all aspects of toileting with supervision/set-up within 7 day(s). Occupational Therapy EVALUATION  Patient: Nela Brewer (76 y.o. female)  Date: 7/14/2017  Primary Diagnosis: right ankle fracture  Respiratory failure (HCC)  Procedure(s) (LRB):  RIGHT ANKLE OPEN REDUCTION INTERNAL FIXATION, application of external fixator (Right) 1 Day Post-Op   Precautions: NWB on RLE       ASSESSMENT :  Based on the objective data described below, the patient presents with RLE NWB precautions, GW, decreased activity tolerance, decreased safety awareness and decreased balance which is impairing her functional independence. Patient having difficulty maintaining RLE NWB during transfers, 2/2 to her limited activity tolerance and GW. She is not on oxygen at baseline, but is now requiring 2L NCto maintain saturation in the 90s. Patient is well below her independent baseline, now performing ADLs at a supervision to max A level, is min A for bed mobility and is mod A of 2 for transfers. Patient will benefit from acute skilled intervention to address the above impairments, and will need inpatient rehab or fast track SNF after discharge.    Patients rehabilitation potential is considered to be Good  Factors which may influence rehabilitation potential include:   []             None noted  []             Mental ability/status  [x]             Medical condition  [x]             Home/family situation and support systems  []             Safety awareness  []             Pain tolerance/management  []             Other:      PLAN :  Recommendations and Planned Interventions:  [x]               Self Care Training                  []        Therapeutic Activities  [x]               Functional Mobility Training    []        Cognitive Retraining  [x]               Therapeutic Exercises           [x]        Endurance Activities  [x]               Balance Training                   []        Neuromuscular Re-Education  []               Visual/Perceptual Training     [x]   Home Safety Training  [x]               Patient Education                 [x]        Family Training/Education  []               Other (comment):    Frequency/Duration: Patient will be followed by occupational therapy 5 times per week to address goals.   Discharge Recommendations: Inpatient Rehab or fast track SNF  Further Equipment Recommendations for Discharge: TBD     OBJECTIVE DATA SUMMARY:     Past Medical History:   Diagnosis Date    Arthritis     CAD (coronary artery disease)     renal stent; Dr Harman Shall     Diabetes [facility])     oral    History of bronchitis     Hyperlipemia     Hypertension      Past Surgical History:   Procedure Laterality Date    HX CATARACT REMOVAL Bilateral     HX CHOLECYSTECTOMY      HX HYSTERECTOMY      HX ORTHOPAEDIC      R hand middle finger bone spur    VASCULAR SURGERY PROCEDURE UNLIST      renal stent    VASCULAR SURGERY PROCEDURE UNLIST  3/5/14    LEFT FEMORAL-POPLITEAL BYPASS WITH PTFE GORTEX GRAFT      Prior Level of Function/Home Situation: Independent and working  Expanded or extensive additional review of patient history:     Home Situation  Home Environment: Private residence  # Steps to Enter: 1  One/Two Story Residence: One story  Living Alone: Yes  Support Systems: Child(jose eduardo), Family member(s), Friends \ neighbors  Patient Expects to be Discharged to[de-identified] Private residence  Current DME Used/Available at Home: None  Tub or Shower Type: Tub/Shower combination  [x]  Right hand dominant   []  Left hand dominant  Cognitive/Behavioral Status:  Neurologic State: Alert; Appropriate for age  Orientation Level: Oriented X4  Cognition: Appropriate decision making             Vision/Perceptual:     WFL, wears reading glasses                              Range of Motion:  AROM: Generally decreased, functional. R ankle in ex-fix  PROM: Generally decreased, functional  Strength:  Strength: Generally decreased, functional R ankle in ex-fix  Coordination:  Coordination: Generally decreased, functional          Tone & Sensation:  Tone: Normal  Balance:  Sitting: Intact  Standing: Impaired  Standing - Static: Constant support  Standing - Dynamic : Fair  Functional Mobility and Transfers for ADLs:  Bed Mobility:  Rolling: Minimum assistance  Supine to Sit: Minimum assistance  Sit to Supine: Minimum assistance  Scooting: Contact guard assistance  Transfers:  Sit to Stand: Minimum assistance; Moderate assistance;Assist x2  Stand Pivot Transfers: Moderate assistance of 2 with support of RW. Patient initially maintaining NWB on RLE, but became full WBing as she fatigued. She was putting forth her full effort at being compliant with NWB precaution. ADL Assessment:  Feeding: Independent    Oral Facial Hygiene/Grooming: Supervision;Setup (seated)    Bathing: Moderate assistance    Upper Body Dressing: Supervision;Setup    Lower Body Dressing: Maximum assistance    Toileting: Maximum assistance                Functional Measure:  Barthel Index:    Bathin  Bladder: 10  Bowels: 10  Groomin  Dressin  Feeding: 10  Mobility: 0  Stairs: 0  Toilet Use: 0  Transfer (Bed to Chair and Back): 5  Total: 40       Barthel and G-code impairment scale:  Percentage of impairment CH  0% CI  1-19% CJ  20-39% CK  40-59% CL  60-79% CM  80-99% CN  100%   Barthel Score 0-100 100 99-80 79-60 59-40 20-39 1-19   0   Barthel Score 0-20 20 17-19 13-16 9-12 5-8 1-4 0      The Barthel ADL Index: Guidelines  1.  The index should be used as a record of what a patient does, not as a record of what a patient could do. 2. The main aim is to establish degree of independence from any help, physical or verbal, however minor and for whatever reason. 3. The need for supervision renders the patient not independent. 4. A patient's performance should be established using the best available evidence. Asking the patient, friends/relatives and nurses are the usual sources, but direct observation and common sense are also important. However direct testing is not needed. 5. Usually the patient's performance over the preceding 24-48 hours is important, but occasionally longer periods will be relevant. 6. Middle categories imply that the patient supplies over 50 per cent of the effort. 7. Use of aids to be independent is allowed. Jennifer Styles., Barthel, D.W. ([id number]). Functional evaluation: the Barthel Index. [address] [facility] (14)2. Luis Farley kimberlee DENISE Hernandez, Carmen French., Thomas Floyd., [place], [address] [place] [address] (1999). Measuring the change indisability after inpatient rehabilitation; comparison of the responsiveness of the Barthel Index and Functional [place] Measure. Journal of Neurology, Neurosurgery, and Psychiatry, 66(4), 224-869. Ko Gandhi, N.J.A, JEANNETTE Mendez, & Bill Monreo, MAugustoA. (2004.) Assessment of post-stroke quality of life in cost-effectiveness studies: The usefulness of the Barthel Index and the EuroQoL-5D. Quality of Life Research, 13, 427-97       In compliance with CMSs Claims Based Outcome Reporting, the following G-code set was chosen for this patient based on their primary functional limitation being treated: The outcome measure chosen to determine the severity of the functional limitation was the Barthel Index with a score of 40/100 which was correlated with the impairment scale. ?  Self Care:     - CURRENT STATUS: CK - 40%-59% impaired, limited or restricted    - GOAL STATUS:  CJ - 20%-39% impaired, limited or restricted    - D/C STATUS:  ---------------To be determined---------------         ADL Intervention and task modifications:    Initiated bed mobility, transfer, dressing ADL and safety training     Pain:  Pain Scale 1: Visual  Pain Intensity 1: 0              Activity Tolerance:   SpO2 88 on RA, recovering to 90% with pursed lip breathing. Please refer to the flowsheet for vital signs taken during this treatment. After treatment:   [] Patient left in no apparent distress sitting up in chair  [x] Patient left in no apparent distress in bed  [x] Call bell left within reach  [x] Nursing notified  [] Caregiver present  [] Bed alarm activated    COMMUNICATION/EDUCATION:   The patients plan of care was discussed with: Physical Therapist and Registered Nurse. [x] Home safety education was provided and the patient/caregiver indicated understanding. [x] Patient/family have participated as able in goal setting and plan of care. [x] Patient/family agree to work toward stated goals and plan of care. [] Patient understands intent and goals of therapy, but is neutral about his/her participation. [] Patient is unable to participate in goal setting and plan of care. This patients plan of care is appropriate for delegation to [facility].     Thank you for this referral.  Poncho Maldonado, OTR/L  Time Calculation: 44 mins

## 2017-07-14 NOTE — PROGRESS NOTES
Hospitalist Progress Note    NAME: Carly Frye   :  1945   MRN:  [id number]       Assessment / Plan:  Acute hypoxic respiratory failure  Acute COPD exacerbation  -continue scheduled and prn nebs, IV steroids, levaquin    Acute diastolic heart failure (LVEF 55-60%)  Acute Pulmonary Edema  -hold further diuresis and repeat CXR     ARF from aggressive diuresis  Hyponatremia: urine sodium low, SIADH less likely  -hold further diuresis (stopped early this am)  -monitor sodium (checked again this evening and is 123, stable from this am)  -continue neil and monitor renal function     Distal tibia and fibula fracture s/p mechanical fall  -s/p ORIF on   -appreciate ortho assistance     Hx of CAD  HTN  -continue amlodipine, plavix, statin     Hx of PVD  -s/p right renal artery stent, left fem-pop by Dr. Giovani Bowser 3/2014     Impaired Glucose Management (Diabetes ruled out)  -A1c 5.7  -diet control  -SSI while on IV steroids     Tobacco use  -nicotine patch     Code Status: Full  Surrogate Decision Maker: daughter [facility] [phone]     DVT Prophylaxis: SCDs for now (lovenox if no plan procedure in the AM)  GI Prophylaxis: not indicated     Baseline: independent      Body mass index is 30.47 kg/(m^2). Subjective:     Chief Complaint / Reason for Physician Visit: follow-up respiratory failure  Patient seen for follow-up  Breathing improving  Pain controlled    Review of Systems:  Symptom Y/N Comments  Symptom Y/N Comments   Fever/Chills n   Chest Pain n    Poor Appetite    Edema     Cough    Abdominal Pain n    Sputum    Joint Pain     SOB/ODOM    Pruritis/Rash     Nausea/vomit    Tolerating PT/OT     Diarrhea    Tolerating Diet y    Constipation    Other n dysphagia     Could NOT obtain due to:      Objective:     VITALS:   Last 24hrs VS reviewed since prior progress note.  Most recent are:  Patient Vitals for the past 24 hrs:   Temp Pulse Resp BP SpO2   17 1539 98.8 °F (37.1 °C) 77 20 152/69 91 % 07/14/17 1115 98.4 °F (36.9 °C) 75 18 142/71 94 %   07/14/17 0954 - (!) 113 - 144/63 92 %   07/14/17 0948 - - - - (!) 88 %   07/14/17 0942 - 89 - 145/60 94 %   07/14/17 0829 - - - - 95 %   07/14/17 0808 98.6 °F (37 °C) 74 18 137/66 94 %   07/14/17 0329 98.2 °F (36.8 °C) 69 18 143/69 95 %   07/13/17 2352 98.8 °F (37.1 °C) 72 18 156/61 95 %   07/13/17 2000 98.2 °F (36.8 °C) 73 18 158/75 94 %       Intake/Output Summary (Last 24 hours) at 07/14/17 1910  Last data filed at 07/14/17 1552   Gross per 24 hour   Intake              960 ml   Output              700 ml   Net              260 ml        PHYSICAL EXAM:  General: WD, WN. Alert, cooperative, no acute distress    EENT:  EOMI. Anicteric sclerae. MMM  Resp:  Diffuse wheezing throughtout. No accessory muscle use  CV:  Regular  rhythm,  No edema (left lower leg in external fixator and bandaged  GI:  Soft, Non distended, Non tender.  +Bowel sounds  Neurologic:  Alert and oriented X 3, normal speech,   Psych:   Good insight. Not anxious nor agitated  Skin:  No rashes. No jaundice    Reviewed most current lab test results and cultures  YES  Reviewed most current radiology test results   YES  Review and summation of old records today    NO  Reviewed patient's current orders and MAR    YES  PMH/SH reviewed - no change compared to H&P  ________________________________________________________________________  Care Plan discussed with:    Comments   Patient x    Family      RN x    Care Manager     Consultant                        Multidiciplinary team rounds were held today with , nursing, pharmacist and clinical coordinator. Patient's plan of care was discussed; medications were reviewed and discharge planning was addressed.      ________________________________________________________________________  Total NON critical care TIME:  25   Minutes    Total CRITICAL CARE TIME Spent:   Minutes non procedure based      Comments   >50% of visit spent in counseling and coordination of care     ________________________________________________________________________  Angelica Colon MD     Procedures: see electronic medical records for all procedures/Xrays and details which were not copied into this note but were reviewed prior to creation of Plan. LABS:  I reviewed today's most current labs and imaging studies.   Pertinent labs include:  Recent Labs      07/14/17 0322 07/12/17   2316   WBC  17.9*  16.5*   HGB  9.1*  11.3*   HCT  26.3*  31.7*   PLT  282  318     Recent Labs      07/14/17   1719 07/14/17 0322 07/12/17 2316   NA  123*  123*  123*  126*   K  4.4  4.9  4.9  4.2   CL  89*  92*  91*  93*   CO2  25  22  22  22   GLU  150*  150*  145*  168*   BUN  46*  34*  33*  23*   CREA  1.57*  1.49*  1.50*  0.87   CA  8.5  8.3*  8.2*  8.0*   ALB   --    --   3.0*   TBILI   --    --   0.5   SGOT   --    --   41*   ALT   --    --   32       Signed: Angelica Colon MD

## 2017-07-14 NOTE — PROGRESS NOTES
Ortho / Neurosurgery NP Note    POD# 1  s/p RIGHT ANKLE OPEN REDUCTION INTERNAL FIXATION, application of external fixator     Pt resting in bed. No complaints. Aware to ask for PRN pain medications    VSS Afebrile. Voiding status: Neil in place - need to remove today    Labs  Lab Results   Component Value Date/Time    HGB 9.1 07/14/2017 03:22 AM      Lab Results   Component Value Date/Time    INR 1.0 02/21/2014 09:50 AM        Body mass index is 30.47 kg/(m^2). : A BMI >30 is classified as Obesity. Ace wrap dressing c.d.i over top of external fixator. Cryotherapy in place over incision  Calves soft and supple; No pain with passive stretch  Sensation and motor intact  Cap refill to toes are adequate  Foot Pumps for mechanical DVT proph while in bed     PLAN:  1) PT BID, NWB on RLE  2) Continuing home Plavix  3) Hypoxia - desat with ambulation to 88%. Hx smoking. Now wearing NC at 2 LPM.  WEan as tolerated. 4) Remove neil today  5) Will need SNF placement.   [place] Nelson Sunday if medically stable  Emma Ferris, RODY

## 2017-07-14 NOTE — PROGRESS NOTES
Initial Nutrition Assessment:    INTERVENTIONS/RECOMMENDATIONS:   · Continue current diet  · Glucerna BID (vanilla)    ASSESSMENT:   Chart reviewed, medially noted for s/p ankle ORIF and PMH provided below. We discussed the role that nutrition plays in healing after a fracture/surgery, specifically focusing on protein sources at each meal as well as nutrition supplements to help meet pt protein needs. She agreed to receive glucerna shakes. Pt has good appetite currently and PTA. Will monitor PO intake. Past Medical History:   Diagnosis Date    Arthritis     CAD (coronary artery disease)     renal stent; Dr Paulina Garzon     Diabetes [facility])     oral    History of bronchitis     Hyperlipemia     Hypertension        Diet Order: Consistent carb  % Eaten:  No data found. Pertinent Medications: [x]Reviewed []Other (os-gwen, lasix, insulin, solumedrol, miralax, senna-docusate)  Pertinent Labs: [x]Reviewed []Other (low Na)  Food Allergies: [x]NKFA  []Other   Last BM:      Skin:    [] Intact   [x] Incision  [] Breakdown  [] Other: Wt Readings from Last 30 Encounters:   07/12/17 78 kg (172 lb)   01/26/15 73.1 kg (161 lb 4 oz)   11/15/14 72.1 kg (159 lb)   11/03/14 73.2 kg (161 lb 6.4 oz)   03/05/14 75.3 kg (166 lb)   02/21/14 73.5 kg (162 lb 0.6 oz)   06/03/13 78.5 kg (173 lb)   10/17/11 77.1 kg (170 lb)   09/23/11 80.3 kg (177 lb)       Anthropometrics:   Height: 5' 3\" (160 cm) Weight: 78 kg (172 lb)   IBW (%IBW):   ( ) UBW (%UBW):   (  %)   Last Weight Metrics:  Weight Loss Metrics 7/12/2017 1/26/2015 11/15/2014 11/3/2014 3/5/2014 2/21/2014 6/3/2013   Today's Wt 172 lb 161 lb 4 oz 159 lb 161 lb 6.4 oz 166 lb 162 lb 0.6 oz 173 lb   BMI 30.47 kg/m2 28.57 kg/m2 28.17 kg/m2 27.93 kg/m2 29.41 kg/m2 28.71 kg/m2 30.65 kg/m2       BMI: Body mass index is 30.47 kg/(m^2).     This BMI is indicative of:   []Underweight    []Normal    []Overweight    [x] Obesity   [] Extreme Obesity (BMI>40)     Estimated Nutrition Needs (Based on):   1620 Kcals/day (MSJ: 1275 x 1.3) , 100 g (1.3 g/kg) Protein  Carbohydrate: At Least 130 g/day  Fluids: 1620 mL/day (1ml/kcal) or per primary team    NUTRITION DIAGNOSES:   Problem:  Increased protein needs      Etiology: related to fracture and surgery healing      Signs/Symptoms: as evidenced by s/p ankle ORIF       NUTRITION INTERVENTIONS:  Meals/Snacks: General/healthful diet   Supplements: Commercial supplement              GOAL:   consume >75% of meals and ONS in 3-5 days    LEARNING NEEDS (Diet, Food/Nutrient-Drug Interaction):    [] None Identified   [x] Identified and Education Provided/Documented   [] Identified and Pt declined/was not appropriate     Cultureal, Catholic, OR Ethnic Dietary Needs:    [x] None Identified   [] Identified and Addressed     [x] Interdisciplinary Care Plan Reviewed/Documented    [x] Discharge Planning:   Consistent carb diet with adequate protein     MONITORING /EVALUATION:      Food/Nutrient Intake Outcomes:  Total energy intake  Physical Signs/Symptoms Outcomes: Weight/weight change, Electrolyte and renal profile, GI profile, Glucose profile, GI    NUTRITION RISK:    [] High              [x] Moderate    to       [x]  Low  []  Minimal/Uncompromised    PT SEEN FOR:    [x]  MD Consult: []Calorie Count      []Diabetic Diet Education        []Diet Education     []Electrolyte Management     [x]General Nutrition Management and Supplements     []Management of Tube Feeding     []TPN Recommendations    []  RN Referral:  []MST score >=2     []Enteral/Parenteral Nutrition PTA     []Pregnant: Gestational DM or Multigestation     []Pressure Ulcer/Wound Care needs        []  Low BMI  []  DTR Referral       Debbi Russell RDN  Pager [phone]  Weekend Pager [phone]

## 2017-07-14 NOTE — DISCHARGE INSTRUCTIONS
[facility]    Discharge Instruction Sheet: Ankle Fracture Repair    DR. OCONNOR    Blood Clot Prevention  ON HOLD DUE TO GI BLEEDING. Follow up with GI doctor before restarting your home medication Plavix    Pain control:   Typically, we will prescribe a narcotic usually 1-2 tabs every three to four hours as needed for your pain.  Most patients need this only for the first few weeks. You should wean off of this as the pain decreases.  Some of these medications contain a large dose of Tylenol (acetaminophen). Therefore, you should consult your pharmacist before taking any additional Tylenol at the same time. You should not drive while taking any narcotic pain medications. Take your pain medications with food to prevent nausea! Your last dose of pain medication in the hospital was given @ :__________    [address] will be discharged home with ice/gel packs.  Continue using these regularly to minimize pain and swelling, especially after physical activity. Constipation   Pain medication and anesthesia can be constipating-this can be prevented by gentle physical activity and drinking plenty of fluid.  It should be treated with over-the-counter medications such as Dulcolax, Miralax, Magnesium Citrate and/or Fleets enema.  You should have a bowel movement at least every other day following surgery. Wound / Cast / Splint Care    Elevation:  Keep the operated extremity elevated above heart level as needed for pain and swelling. WOUND:        Place opsite dressing over incisions. May wrap entire ex-fix with ace wrap. Pin Care                         May applpy Bacitracin to incisions    ICE:   Use ice packs given to you directly over the area of your surgery.    Use a towel if necessary to prevent the ice bag from directly contacting your skin and alternate ice on and off the area every 30 minutes. External Fixator: You will be discharged with an external fixator in place. Do Not allow your incisions or pin sites to get wet. SHOWER: You may shower, but you must have the leg covered with plastic bag. You can use a heavy duty trash bag with a rubber band, or purchase a cast cover a your local pharmacy. It will be helpful to have a seat in the shower so that you can sit. To increase and promote healing   Stop Smoking (or at least cut back on smoking).  Eat a well-balanced diet (high in protein and vitamin C)   If your appetite is poor, consider nutritional supplements like Ensure, Glucerna, or [place] Instant Breakfast.   If you are diabetic, controlling you blood sugars is very important to prevent infection and promote wound healing. Nutrition   If you were on a supplement such as Ensure or Glucerna) while in the hospital, please continue using them with each meal for the next 30 days.  Eat a well-balanced diet - High in protein, high in vitamins and minerals, especially vitamin C and zinc.     Physical Activity   You cannot weight bear on your right leg.  Light activity.  Bed-rest may slow your recovery.  Use your walker/crutches and take short walks about every 2 hours. o Increase your distance each day.  NO DRIVING until told to do so. Warning signs: Please call your physician immediately at [phone] if you have   Bleeding from incision that is constant.  Change in mental status (unusual behavior or confusion)   If your incision develops redness or swelling   Change in wound drainage (increase in amount, color, or foul odor)   Temperature over 101.5 degrees Fahrenheit    Pain in the calf of your leg   Tenderness or redness in the calf of your leg   Increased swelling of the thigh, ankle, calf, or foot.     Emergency: CALL 911 if you have   Shortness of breath   Chest pain   Localized chest pain when coughing or taking a deep breath    Follow-up   Call the surgeons office today or tomorrow and ask to make an appointment for follow-up in 2 weeks.  You can return to work when cleared by a physician. Please call [phone] if you have concerns or questions. HOSPITALIST DISCHARGE INSTRUCTIONS    NAME: Zheng Herzog   :  1945   MRN:  [id number]     Date/Time:  2017 8:22 AM    ADMIT DATE: 2017   DISCHARGE DATE: 2017     Attending Physician: Camilla Davis MD    DISCHARGE DIAGNOSIS:  Acute GIB with ABL anemia secondary to duodenal ulcers and esophagitis     Atrial Fibrillation with RVR on early morning of   SVT        Acute hypoxic respiratory failure  Acute COPD exacerbation     ARF   Hyponatremia: Sodium Woody of 121 on 7/15; increased to 133;  ? SIADH     CHF ruled out (LVEF 55-60%); Distal tibia and fibula fracture s/p mechanical fall  -s/p ORIF on         Hx of CAD  HTN;         Hx of PVD  -s/p right renal artery stent, left fem-pop by Dr. Bishop De La Fuente 3/2014      Impaired Glucose Management (Diabetes ruled out)        Tobacco use       Obesity      Code Status: Full  Surrogate Decision Maker: daughter Jennifer Pagan [phone]      DVT Prophylaxis: SCDs for now (lovenox if no plan procedure in the AM)  GI Prophylaxis: not indicated      Baseline: independent     Dispo: ? SNF, await placement        Body mass index is 35.3 kg/(m^2). Medications: Per above medication reconciliation. Pain Management: per above medications    Recommended diet: Cardiac Diet, Diabetic Diet, Low fat, Low cholesterol and Renal Diet    Recommended activity: Activity as tolerated    Wound care: See surgical/procedure care instructions    Indwelling devices:   Ness catheter care and voiding trial    Supplemental Oxygen: None    Required Lab work: Weekly BMP and Weekly CBC    Glucose management:  Accucheck ACHS with sliding scale per SNF protocol    Code status: Full        Outside physician follow up: Follow-up Information     Follow up With Details Comments Contact Info    Davie Wolfe MD In 2 weeks  [address] [facility] [address] [facility] [address] [facility]   [address] [facility] [address] [place] [address]      Mis Hogan MD In 1 week follow-up , check CBC, BMP in 1 week [address] [facility] [address]  [address] [facility]      Maverick Cardenas MD In 2 weeks brandon/hyponatremia [address]   [address]  [facility]  [phone]      Kyle Ramos MD In 2 weeks folow-up hopsital discharge, [address] [facility] [phone]      Davie Wolfe MD In 3 weeks s/p tibia and fibula fracture surgery [address]  [address]  [address]      Gabby Diego MD In 2 weeks  [address]  [id number]  [address] [phone]      [facility] In 3 weeks urinary retention [address] [facility] facility/ [facility] MD responsible for above on discharge. Information obtained by :  I understand that if any problems occur once I am at home I am to contact my physician. I understand and acknowledge receipt of the instructions indicated above.                                                                                                                                            Physician's or R.N.'s Signature                                                                  Date/Time                                                                                                                                              Patient or Repres

## 2017-07-14 NOTE — PROGRESS NOTES
CM met pt after PT recommended  SNF for transitional care. Pt given a SNF list.Pt selected [facility] from the list.Refferal send to [facility] through [address] link. FOC given copy attached to pt bedside chart. Pt has [facility] which required pre authorization which can take upto 24-48 hours. Pt requested BLS transportation . [facility] Care Management Interventions  PCP Verified by CM:  Yes  Mode of Transport at Discharge: BLS  Transition of Care Consult (CM Consult): SNF (Pt never had SNF placements.)  Partner SNF: Yes  Discharge Durable Medical Equipment: No  Health Maintenance Reviewed: Yes  Physical Therapy Consult: Yes  Occupational Therapy Consult: Yes  Speech Therapy Consult: No  Current Support Network: Lives Alone  Confirm Follow Up Transport: Family  Plan discussed with Pt/Family/Caregiver: Yes  Freedom of Choice Offered: Yes  Discharge Location  Discharge Placement: Skilled nursing facility    [place]  Ext 1009

## 2017-07-14 NOTE — PROGRESS NOTES
Dr. Carmela Ramos notified with the repeat chem 7 level and Na 123. BUN and Cr increased from previous reading. Telephone order noted to leave neil cath in place for now and allow patient to drink plenty of juices. MD said he will order a repeat chest x-ray. Patient informed to drink plenty of juices.

## 2017-07-14 NOTE — PROGRESS NOTES
Dr. Effie Fernández notified that sodium level 123. Telephone orders noted for a stat chem 7   MD stated, \"I really need the urine chemistry result\"  Informed MD that lab said that test has to be sent to [facility] and it will be a while for those results.

## 2017-07-15 ENCOUNTER — APPOINTMENT (OUTPATIENT)
Dept: GENERAL RADIOLOGY | Age: 72
DRG: 492 | End: 2017-07-15
Attending: INTERNAL MEDICINE
Payer: MEDICARE

## 2017-07-15 LAB
ANION GAP BLD CALC-SCNC: 7 MMOL/L (ref 5–15)
ANION GAP BLD CALC-SCNC: 8 MMOL/L (ref 5–15)
ANION GAP BLD CALC-SCNC: 8 MMOL/L (ref 5–15)
ATRIAL RATE: 82 BPM
BUN SERPL-MCNC: 48 MG/DL (ref 6–20)
BUN SERPL-MCNC: 50 MG/DL (ref 6–20)
BUN SERPL-MCNC: 51 MG/DL (ref 6–20)
BUN/CREAT SERPL: 33 (ref 12–20)
BUN/CREAT SERPL: 35 (ref 12–20)
BUN/CREAT SERPL: 38 (ref 12–20)
CALCIUM SERPL-MCNC: 8.2 MG/DL (ref 8.5–10.1)
CALCIUM SERPL-MCNC: 8.4 MG/DL (ref 8.5–10.1)
CALCIUM SERPL-MCNC: 8.7 MG/DL (ref 8.5–10.1)
CALCULATED P AXIS, ECG09: 101 DEGREES
CALCULATED R AXIS, ECG10: 19 DEGREES
CALCULATED T AXIS, ECG11: 64 DEGREES
CHLORIDE SERPL-SCNC: 88 MMOL/L (ref 97–108)
CHLORIDE SERPL-SCNC: 88 MMOL/L (ref 97–108)
CHLORIDE SERPL-SCNC: 89 MMOL/L (ref 97–108)
CK MB CFR SERPL CALC: 1.1 % (ref 0–2.5)
CK MB CFR SERPL CALC: 1.1 % (ref 0–2.5)
CK MB CFR SERPL CALC: 1.2 % (ref 0–2.5)
CK MB SERPL-MCNC: 13 NG/ML (ref 5–25)
CK MB SERPL-MCNC: 16.4 NG/ML (ref 5–25)
CK MB SERPL-MCNC: 21.3 NG/ML (ref 5–25)
CK SERPL-CCNC: 1135 U/L (ref 26–192)
CK SERPL-CCNC: 1512 U/L (ref 26–192)
CK SERPL-CCNC: 1707 U/L (ref 26–192)
CO2 SERPL-SCNC: 25 MMOL/L (ref 21–32)
CO2 SERPL-SCNC: 26 MMOL/L (ref 21–32)
CO2 SERPL-SCNC: 27 MMOL/L (ref 21–32)
CREAT SERPL-MCNC: 1.35 MG/DL (ref 0.55–1.02)
CREAT SERPL-MCNC: 1.42 MG/DL (ref 0.55–1.02)
CREAT SERPL-MCNC: 1.45 MG/DL (ref 0.55–1.02)
DIAGNOSIS, 93000: NORMAL
ERYTHROCYTE [DISTWIDTH] IN BLOOD BY AUTOMATED COUNT: 12.4 % (ref 11.5–14.5)
GLUCOSE BLD STRIP.AUTO-MCNC: 187 MG/DL (ref 65–100)
GLUCOSE BLD STRIP.AUTO-MCNC: 196 MG/DL (ref 65–100)
GLUCOSE BLD STRIP.AUTO-MCNC: 197 MG/DL (ref 65–100)
GLUCOSE BLD STRIP.AUTO-MCNC: 204 MG/DL (ref 65–100)
GLUCOSE SERPL-MCNC: 136 MG/DL (ref 65–100)
GLUCOSE SERPL-MCNC: 163 MG/DL (ref 65–100)
GLUCOSE SERPL-MCNC: 190 MG/DL (ref 65–100)
HCT VFR BLD AUTO: 28.5 % (ref 35–47)
HGB BLD-MCNC: 10.2 G/DL (ref 11.5–16)
MAGNESIUM SERPL-MCNC: 2 MG/DL (ref 1.6–2.4)
MCH RBC QN AUTO: 31.9 PG (ref 26–34)
MCHC RBC AUTO-ENTMCNC: 35.8 G/DL (ref 30–36.5)
MCV RBC AUTO: 89.1 FL (ref 80–99)
P-R INTERVAL, ECG05: 194 MS
PLATELET # BLD AUTO: 292 K/UL (ref 150–400)
POTASSIUM SERPL-SCNC: 5 MMOL/L (ref 3.5–5.1)
POTASSIUM SERPL-SCNC: 5 MMOL/L (ref 3.5–5.1)
POTASSIUM SERPL-SCNC: 5.4 MMOL/L (ref 3.5–5.1)
Q-T INTERVAL, ECG07: 392 MS
QRS DURATION, ECG06: 82 MS
QTC CALCULATION (BEZET), ECG08: 457 MS
RBC # BLD AUTO: 3.2 M/UL (ref 3.8–5.2)
SERVICE CMNT-IMP: ABNORMAL
SODIUM SERPL-SCNC: 121 MMOL/L (ref 136–145)
SODIUM SERPL-SCNC: 122 MMOL/L (ref 136–145)
SODIUM SERPL-SCNC: 123 MMOL/L (ref 136–145)
TROPONIN I SERPL-MCNC: 0.89 NG/ML
TROPONIN I SERPL-MCNC: 0.9 NG/ML
TROPONIN I SERPL-MCNC: 0.96 NG/ML
VENTRICULAR RATE, ECG03: 82 BPM
WBC # BLD AUTO: 19.7 K/UL (ref 3.6–11)

## 2017-07-15 PROCEDURE — 83735 ASSAY OF MAGNESIUM: CPT

## 2017-07-15 PROCEDURE — 80048 BASIC METABOLIC PNL TOTAL CA: CPT | Performed by: INTERNAL MEDICINE

## 2017-07-15 PROCEDURE — 82550 ASSAY OF CK (CPK): CPT

## 2017-07-15 PROCEDURE — 36415 COLL VENOUS BLD VENIPUNCTURE: CPT

## 2017-07-15 PROCEDURE — 74011250637 HC RX REV CODE- 250/637: Performed by: INTERNAL MEDICINE

## 2017-07-15 PROCEDURE — 93005 ELECTROCARDIOGRAM TRACING: CPT

## 2017-07-15 PROCEDURE — 94640 AIRWAY INHALATION TREATMENT: CPT

## 2017-07-15 PROCEDURE — 83735 ASSAY OF MAGNESIUM: CPT | Performed by: INTERNAL MEDICINE

## 2017-07-15 PROCEDURE — 74011000250 HC RX REV CODE- 250: Performed by: INTERNAL MEDICINE

## 2017-07-15 PROCEDURE — 74011250636 HC RX REV CODE- 250/636: Performed by: INTERNAL MEDICINE

## 2017-07-15 PROCEDURE — 84484 ASSAY OF TROPONIN QUANT: CPT | Performed by: INTERNAL MEDICINE

## 2017-07-15 PROCEDURE — 82962 GLUCOSE BLOOD TEST: CPT

## 2017-07-15 PROCEDURE — 74011636637 HC RX REV CODE- 636/637: Performed by: INTERNAL MEDICINE

## 2017-07-15 PROCEDURE — 97530 THERAPEUTIC ACTIVITIES: CPT

## 2017-07-15 PROCEDURE — 77010033678 HC OXYGEN DAILY

## 2017-07-15 PROCEDURE — 97110 THERAPEUTIC EXERCISES: CPT

## 2017-07-15 PROCEDURE — 74011250637 HC RX REV CODE- 250/637: Performed by: PHYSICIAN ASSISTANT

## 2017-07-15 PROCEDURE — 36415 COLL VENOUS BLD VENIPUNCTURE: CPT | Performed by: INTERNAL MEDICINE

## 2017-07-15 PROCEDURE — 80053 COMPREHEN METABOLIC PANEL: CPT

## 2017-07-15 PROCEDURE — 85027 COMPLETE CBC AUTOMATED: CPT | Performed by: INTERNAL MEDICINE

## 2017-07-15 PROCEDURE — 71010 XR CHEST PORT: CPT

## 2017-07-15 PROCEDURE — 65660000000 HC RM CCU STEPDOWN

## 2017-07-15 PROCEDURE — 82550 ASSAY OF CK (CPK): CPT | Performed by: INTERNAL MEDICINE

## 2017-07-15 RX ORDER — HYDRALAZINE HYDROCHLORIDE 50 MG/1
50 TABLET, FILM COATED ORAL 3 TIMES DAILY
Status: DISCONTINUED | OUTPATIENT
Start: 2017-07-15 | End: 2017-07-16

## 2017-07-15 RX ORDER — SODIUM CHLORIDE 9 MG/ML
75 INJECTION, SOLUTION INTRAVENOUS CONTINUOUS
Status: DISCONTINUED | OUTPATIENT
Start: 2017-07-15 | End: 2017-07-17

## 2017-07-15 RX ORDER — METOPROLOL SUCCINATE 50 MG/1
150 TABLET, EXTENDED RELEASE ORAL DAILY
Status: DISCONTINUED | OUTPATIENT
Start: 2017-07-15 | End: 2017-07-17

## 2017-07-15 RX ORDER — SODIUM CHLORIDE 9 MG/ML
100 INJECTION, SOLUTION INTRAVENOUS CONTINUOUS
Status: DISPENSED | OUTPATIENT
Start: 2017-07-15 | End: 2017-07-15

## 2017-07-15 RX ADMIN — CLOPIDOGREL BISULFATE 75 MG: 75 TABLET, FILM COATED ORAL at 09:45

## 2017-07-15 RX ADMIN — SODIUM CHLORIDE 100 ML/HR: 900 INJECTION, SOLUTION INTRAVENOUS at 19:12

## 2017-07-15 RX ADMIN — HYDRALAZINE HYDROCHLORIDE 50 MG: 50 TABLET, FILM COATED ORAL at 22:30

## 2017-07-15 RX ADMIN — SODIUM CHLORIDE 75 ML/HR: 900 INJECTION, SOLUTION INTRAVENOUS at 09:46

## 2017-07-15 RX ADMIN — AMLODIPINE BESYLATE 10 MG: 5 TABLET ORAL at 09:45

## 2017-07-15 RX ADMIN — IPRATROPIUM BROMIDE AND ALBUTEROL SULFATE 3 ML: .5; 3 SOLUTION RESPIRATORY (INHALATION) at 14:32

## 2017-07-15 RX ADMIN — INSULIN LISPRO 2 UNITS: 100 INJECTION, SOLUTION INTRAVENOUS; SUBCUTANEOUS at 13:13

## 2017-07-15 RX ADMIN — OXYCODONE HYDROCHLORIDE 5 MG: 5 TABLET ORAL at 03:32

## 2017-07-15 RX ADMIN — PRAVASTATIN SODIUM 10 MG: 10 TABLET ORAL at 22:30

## 2017-07-15 RX ADMIN — INSULIN LISPRO 2 UNITS: 100 INJECTION, SOLUTION INTRAVENOUS; SUBCUTANEOUS at 09:43

## 2017-07-15 RX ADMIN — HYDRALAZINE HYDROCHLORIDE 50 MG: 50 TABLET, FILM COATED ORAL at 20:05

## 2017-07-15 RX ADMIN — ACETAMINOPHEN 650 MG: 325 TABLET, FILM COATED ORAL at 19:12

## 2017-07-15 RX ADMIN — INSULIN LISPRO 2 UNITS: 100 INJECTION, SOLUTION INTRAVENOUS; SUBCUTANEOUS at 16:30

## 2017-07-15 RX ADMIN — POLYETHYLENE GLYCOL 3350 17 G: 17 POWDER, FOR SOLUTION ORAL at 09:43

## 2017-07-15 RX ADMIN — CALCIUM CARBONATE 500 MG (1,250 MG)-VITAMIN D3 200 UNIT TABLET 1 TABLET: at 09:45

## 2017-07-15 RX ADMIN — Medication 10 ML: at 13:15

## 2017-07-15 RX ADMIN — OXYCODONE HYDROCHLORIDE 5 MG: 5 TABLET ORAL at 09:45

## 2017-07-15 RX ADMIN — Medication 10 ML: at 06:39

## 2017-07-15 RX ADMIN — CALCIUM CARBONATE 500 MG (1,250 MG)-VITAMIN D3 200 UNIT TABLET 1 TABLET: at 13:14

## 2017-07-15 RX ADMIN — METOPROLOL SUCCINATE 150 MG: 50 TABLET, EXTENDED RELEASE ORAL at 09:45

## 2017-07-15 RX ADMIN — ACETAMINOPHEN 650 MG: 325 TABLET, FILM COATED ORAL at 06:42

## 2017-07-15 RX ADMIN — METHYLPREDNISOLONE SODIUM SUCCINATE 40 MG: 40 INJECTION, POWDER, FOR SOLUTION INTRAMUSCULAR; INTRAVENOUS at 22:29

## 2017-07-15 RX ADMIN — ONDANSETRON 4 MG: 4 TABLET, ORALLY DISINTEGRATING ORAL at 02:47

## 2017-07-15 RX ADMIN — METHYLPREDNISOLONE SODIUM SUCCINATE 40 MG: 40 INJECTION, POWDER, FOR SOLUTION INTRAMUSCULAR; INTRAVENOUS at 13:14

## 2017-07-15 RX ADMIN — ONDANSETRON 4 MG: 4 TABLET, ORALLY DISINTEGRATING ORAL at 07:46

## 2017-07-15 RX ADMIN — OXYCODONE HYDROCHLORIDE 5 MG: 5 TABLET ORAL at 19:11

## 2017-07-15 RX ADMIN — OXYCODONE HYDROCHLORIDE 5 MG: 5 TABLET ORAL at 13:14

## 2017-07-15 RX ADMIN — INSULIN LISPRO 2 UNITS: 100 INJECTION, SOLUTION INTRAVENOUS; SUBCUTANEOUS at 22:28

## 2017-07-15 RX ADMIN — DOCUSATE SODIUM AND SENNOSIDES 1 TABLET: 8.6; 5 TABLET, FILM COATED ORAL at 19:11

## 2017-07-15 RX ADMIN — ACETAMINOPHEN 650 MG: 325 TABLET, FILM COATED ORAL at 13:14

## 2017-07-15 RX ADMIN — METHYLPREDNISOLONE SODIUM SUCCINATE 40 MG: 40 INJECTION, POWDER, FOR SOLUTION INTRAMUSCULAR; INTRAVENOUS at 06:39

## 2017-07-15 RX ADMIN — NIACIN 500 MG: 500 TABLET, EXTENDED RELEASE ORAL at 09:44

## 2017-07-15 RX ADMIN — CALCIUM CARBONATE 500 MG (1,250 MG)-VITAMIN D3 200 UNIT TABLET 1 TABLET: at 19:12

## 2017-07-15 RX ADMIN — DOCUSATE SODIUM AND SENNOSIDES 1 TABLET: 8.6; 5 TABLET, FILM COATED ORAL at 09:46

## 2017-07-15 NOTE — PROGRESS NOTES
Hospitalist Progress Note    NAME: Yulissa Morris   :  1945   MRN:  [id number]       Assessment / Plan:  22 beat run of Wide Complex Tachycardia (VT) last pm:  -EKG with QTc 410, no acute ischemic changes (EKG from 3:20 am on 7/15 self reviewed)  -Initial Tn 0.9  -patient without pain  -follow-up serial cardiac biomarkers, pending trends will consult Cardiology  -discontinue Levaquin  -Toprol increased from 100 mg daily to 150 mg daily    Acute hypoxic respiratory failure  Acute COPD exacerbation  -continue scheduled and prn nebs, IV steroids  -suspect worsening Leukocutosis (19K on 7/15 related to Corticosteroids)    CHF ruled out (LVEF 55-60%); Patient has interstitial lung disease that was suspected to be pulmonary edema on initial CXR and clinically with diuresis patient has went into ARF and become increasingly hyponatremic (lasix was stopped on  with worsening hyponatremia) and CXR without significant change. Acute Pulmonary Edema Ruled out  -CXR  From this am: \"A portable AP radiograph of the chest was obtained at 0725 hours. The positioning is lordotic. Lines and tubes: The patient is on a cardiac monitor and nasal oxygen. Lungs: There is mild interstitial disease throughout the lungs which is  unchanged. Pleura: There is no pneumothorax or pleural effusion. Mediastinum: The cardiac and mediastinal contours and pulmonary vascularity are normal. The aorta is atherosclerotic. Bones and soft tissues: The bones and soft tissues are grossly within normal limits. \"     ARF from aggressive diuresis  Hyponatremia: urine sodium low, SIADH ruled out  -sodium 121 this am thus will start on IVF's and recheck BMP along with 3 pm troponin  -NS at 75 mL/her ordered (faster rate not ordered due to HTN)  -hold ARB    Distal tibia and fibula fracture s/p mechanical fall  -s/p ORIF on   -appreciate ortho assistance  -elevated     Hx of CAD  HTN  -continue amlodipine, plavix, statin     Hx of PVD  -s/p right renal artery stent, left fem-pop by Dr. Fish Jerez 3/2014     Impaired Glucose Management (Diabetes ruled out)  -A1c 5.7  -diet control  -SSI while on IV steroids     Tobacco use  -nicotine patch     Code Status: Full  Surrogate Decision Maker: daughter Jm Lieberman [phone]     DVT Prophylaxis: SCDs for now (lovenox if no plan procedure in the AM)  GI Prophylaxis: not indicated     Baseline: independent      Body mass index is 30.47 kg/(m^2). Subjective:     Chief Complaint / Reason for Physician Visit: follow-up respiratory failure  Patient with 22 beats of VT last pm and asymptomatic  Reports that she did sleep well and that breathing is improving  Complains of malaise and nausea    Review of Systems:  Symptom Y/N Comments  Symptom Y/N Comments   Fever/Chills n   Chest Pain n    Poor Appetite    Edema     Cough    Abdominal Pain n    Sputum    Joint Pain     SOB/ODOM n   Pruritis/Rash     Nausea/vomit y   Tolerating PT/OT     Diarrhea    Tolerating Diet     Constipation    Other n dysphagia     Could NOT obtain due to:      Objective:     VITALS:   Last 24hrs VS reviewed since prior progress note. Most recent are:  Patient Vitals for the past 24 hrs:   Temp Pulse Resp BP SpO2   07/15/17 0800 98.5 °F (36.9 °C) 70 18 177/68 94 %   07/15/17 0332 98.4 °F (36.9 °C) 78 20 184/53 (!) 89 %   07/14/17 2336 97.3 °F (36.3 °C) 67 16 172/70 90 %   07/14/17 1949 97.6 °F (36.4 °C) 75 18 142/66 90 %   07/14/17 1539 98.8 °F (37.1 °C) 77 20 152/69 91 %   07/14/17 1115 98.4 °F (36.9 °C) 75 18 142/71 94 %   07/14/17 0954 - (!) 113 - 144/63 92 %   07/14/17 0948 - - - - (!) 88 %   07/14/17 0942 - 89 - 145/60 94 %       Intake/Output Summary (Last 24 hours) at 07/15/17 0857  Last data filed at 07/15/17 6521   Gross per 24 hour   Intake              720 ml   Output             1450 ml   Net             -730 ml        PHYSICAL EXAM:  General: WD, WN. Alert, cooperative, no acute distress    EENT:  EOMI. Anicteric sclerae. MMM  Resp:  Diffuse wheezing throughout with decreased intensity from days prior. No accessory muscle use  CV:  Regular  rhythm,  No edema (left lower leg in external fixator and bandaged  GI:  Soft, Non distended, Non tender.  +Bowel sounds  Neurologic:  Alert and oriented X 3, normal speech,   Psych:   Good insight. Not anxious nor agitated  Skin:  No rashes. No jaundice    Reviewed most current lab test results and cultures  YES  Reviewed most current radiology test results   YES  Review and summation of old records today    NO  Reviewed patient's current orders and MAR    YES  PMH/SH reviewed - no change compared to H&P  ________________________________________________________________________  Care Plan discussed with:    Comments   Patient x    Family      RN x    Care Manager     Consultant                        Multidiciplinary team rounds were held today with , nursing, pharmacist and clinical coordinator. Patient's plan of care was discussed; medications were reviewed and discharge planning was addressed. ________________________________________________________________________  Total NON critical care TIME:  35   Minutes    Total CRITICAL CARE TIME Spent:   Minutes non procedure based      Comments   >50% of visit spent in counseling and coordination of care     ________________________________________________________________________  Yuli Baugh MD     Procedures: see electronic medical records for all procedures/Xrays and details which were not copied into this note but were reviewed prior to creation of Plan. LABS:  I reviewed today's most current labs and imaging studies.   Pertinent labs include:  Recent Labs      07/15/17   0328  07/14/17   0322  07/12/17   2316   WBC  19.7*  17.9*  16.5*   HGB  10.2*  9.1*  11.3*   HCT  28.5*  26.3*  31.7*   PLT  292  282  318     Recent Labs      07/15/17   0328  07/15/17   0116  07/14/17   1719   07/12/17   2316   NA  121*  122*  123* < >  126*   K  5.0  5.0  4.4   < >  4.2   CL  88*  88*  89*   < >  93*   CO2  25  26  25   < >  22   GLU  163*  136*  150*   < >  168*   BUN  50*  48*  46*   < >  23*   CREA  1.42*  1.45*  1.57*   < >  0.87   CA  8.7  8.2*  8.5   < >  8.0*   MG   --   2.0   --    --    --    ALB   --    --    --    --   3.0*   TBILI   --    --    --    --   0.5   SGOT   --    --    --    --   41*   ALT   --    --    --    --   32    < > = values in this interval not displayed.        Signed: Cary Sherman MD

## 2017-07-15 NOTE — PROGRESS NOTES
Bedside and Verbal shift change report given to Fatou Unger RN (oncoming nurse) by Liane Garcia RN (offgoing nurse). Report included the following information SBAR, Kardex, Procedure Summary, Intake/Output, MAR, Recent Results and Cardiac Rhythm SR/V-tach/PVCs.

## 2017-07-15 NOTE — PROGRESS NOTES
Bedside and Verbal shift change report given to Marciano Ramos (oncoming nurse) by Zully Davis (offgoing nurse). Report included the following information SBAR, Kardex, Intake/Output, MAR and Recent Results.

## 2017-07-15 NOTE — PROGRESS NOTES
Orthopedic NP Progress Note  Post Op day: 2 Days Post-Op    July 15, 2017 9:16 AM     America Moody    Attending Physician: Treatment Team: Attending Provider: Cherylene Founds, MD; Consulting Provider: CHARANJIT Damon; Consulting Provider: Cami Rose MD; Consulting Provider: Bessie Cavazos MD; Utilization Review: Brittany Gomez RN; Care Manager: Darynneno Soto     Vital Signs:    Patient Vitals for the past 8 hrs:   BP Temp Pulse Resp SpO2   07/15/17 0800 177/68 98.5 °F (36.9 °C) 70 18 94 %   07/15/17 0332 184/53 98.4 °F (36.9 °C) 78 20 (!) 89 %     BMI (calculated): 30.5 (07/14/17 1847)    Intake/Output:     07/13 1901 - 07/15 0700  In: 960 [P.O.:960]  Out: 1450 [Urine:1450]    Pain Control:   Pain Assessment  Pain Scale 1: Numeric (0 - 10)  Pain Intensity 1: 2  Pain Onset 1: x this afternoon  Pain Location 1: Ankle  Pain Orientation 1: Right  Pain Description 1: Aching  Pain Intervention(s) 1: Medication (see MAR)    LAB:    Recent Labs      07/15/17   0328   HCT  28.5*   HGB  10.2*     Lab Results   Component Value Date/Time    Sodium 121 07/15/2017 03:28 AM    Potassium 5.0 07/15/2017 03:28 AM    Chloride 88 07/15/2017 03:28 AM    CO2 25 07/15/2017 03:28 AM    Glucose 163 07/15/2017 03:28 AM    BUN 50 07/15/2017 03:28 AM    Creatinine 1.42 07/15/2017 03:28 AM    Calcium 8.7 07/15/2017 03:28 AM       Subjective:  America Moody is a 81 y.o. female s/p a  Procedure(s) with comments:  RIGHT ANKLE OPEN REDUCTION INTERNAL FIXATION, application of external fixator - open reduction internal fixation right ankle ( c-arm)   Procedure(s):  RIGHT ANKLE OPEN REDUCTION INTERNAL FIXATION, application of external fixator. Tolerating diet. Pt states pain is well managed      Objective: General: alert, cooperative, no distress. Neuro/Vascular: CNS Intact. Sensation stable. Brisk cap refill, 2+ pulses UE/LE  Musculoskeletal:  FROM UE/LE with external fixator to RLE. Anibal's sign negative in bilateral lower extremities.   Calves soft, supple, non-tender upon palpation or with passive stretch. Skin: Incision - clean, dry and intact. No significant erythema or swelling. Dressing: clean, dry, and intact    Ness - n  Drain - n       PT/OT:   Gait:                      Assessment:    s/p Procedure(s):  RIGHT ANKLE OPEN REDUCTION INTERNAL FIXATION, application of external fixator    Active Problems:    Respiratory failure ([address] (7/13/2017)         Plan:     -  Continue PT/OT - NWB RLE  -  Continue established methods of pain control  -  VTE Prophylaxes - TEDS &/or SCDs   -  Dressing changed, nursing to start pin care        Discharge To:   Likely SNF    Pt seen with Dr. Leslie Mandel By: Abdulaziz Birmingham NP    Orthopedic Nurse Practitioner

## 2017-07-15 NOTE — PROGRESS NOTES
Visited by Vanessa Dai Partner on 7/15/17.     DOE Holt, [facility], [address] [facility] [address]     Paging Service  [phone]

## 2017-07-15 NOTE — PROGRESS NOTES
Expiratory wheezing noted, notified Dr. Kemar Lay, PRN respiratory TX given,via respiratory therapy.

## 2017-07-15 NOTE — PROGRESS NOTES
At 2031 on 7/14/2017 telemetry monitor recorded a 22 count run of V-Tach. No notification from the telemetry room. Alarms are remote. RN noted the run of V-tach at 0100, called the on-call hospitalist, Dr. Yulissa Montilla, who was highly upset. STAT orders given for lab work, as well as reporting findings to Dr. Yulissa Montilla. Call placed to Nurse Supervisor, Judy, to report findings. Will continue to monitor patient. 2:07 AM  Called answering service for Dr. Yulissa Montilla re:patient's STAT lab work. Waiting for call back. 2:11 AM  Spoke with Dr. Yulissa Montilla. Relayed lab results, no new orders given. Will continue to monitor patient. 2:59 AM  Call placed to Dr. Yulissa Montilla re:patient's c/o nausea. Requesting cardiac enzymes & EKG from doctor. Waiting for call back.

## 2017-07-15 NOTE — PROGRESS NOTES
Problem: Mobility Impaired (Adult and Pediatric)  Goal: *Acute Goals and Plan of Care (Insert Text)  Physical Therapy Goals  Initiated 7/14/2017  1. Patient will move from supine to sit and sit to supine in bed with supervision/set-up within 7 day(s). 2. Patient will transfer from bed to chair and chair to bed with minimal assistance/contact guard assist using the least restrictive device within 7 day(s). 3. Patient will perform sit to stand with contact guard assist within 7 day(s). 4. Patient will ambulate with minimal assistance/contact guard assist for 50 feet with the least restrictive device within 7 day(s). 5. Patient will ascend/descend 1 stairs with bilateral handrail(s) with minimal assistance/contact guard assist within 7 day(s). PHYSICAL THERAPY TREATMENT  Patient: Radha Kirk (73 y.o. female)  Date: 7/15/2017  Diagnosis: right ankle fracture, Respiratory failure (HCC)      Procedure(s) (LRB):  RIGHT ANKLE OPEN REDUCTION INTERNAL FIXATION, application of external fixator (Right) 2 Days Post-Op      Precautions:   falls, NWB RLE  Chart, physical therapy assessment, plan of care and goals were reviewed. ASSESSMENT: pt did fair with keeping her foot off the floor but her UE's fatigue quickly and she starts to put foot on floor, has difficulty getting LLE to hop and has to move her foot heel<->toe, good motivation max vc's for safety and proper RW use. Progression toward goals:  [ ]      Improving appropriately and progressing toward goals  [X]      Improving slowly and progressing toward goals  [ ]      Not making progress toward goals and plan of care will be adjusted       PLAN:  Patient continues to benefit from skilled intervention to address the above impairments. Continue treatment per established plan of care.   Discharge Recommendations:  IPR vs. SNF-fast track   Further Equipment Recommendations for Discharge:  rolling walker       OBJECTIVE DATA SUMMARY:      Critical Behavior:  Neurologic State: Alert, Appropriate for age  Orientation Level: Appropriate for age, Oriented X4  Cognition: Appropriate decision making, Follows commands     Functional Mobility Training:  Bed Mobility:  Rolling: Stand-by asssistance  Supine to Sit: Stand-by asssistance  Scooting: Stand-by asssistance  Level of Assistance: Stand-by asssistance              Interventions: Verbal cues      Transfers:  Sit to Stand: Minimum assistance;Assist x2; Additional time  Stand to Sit: Contact guard assistance  Stand Pivot Transfers: Minimal assistance  Bed to Chair: Minimum assistance;Assist x2; Additional time  Interventions: Tactile cues; Verbal cues     Balance:  Sitting: Intact; Without support  Standing: Impaired; With support  Standing - Static: Fair;Constant support  Standing - Dynamic : Fair      Ambulation/Gait Training: not strong enough to do much more than transfer to chair at this time      Therapeutic Exercises:   supine  EXERCISE   Sets   Reps   Active Active Assist   Passive   Comments   Abd & Add 1 10 [X] [ ] [ ] bilat   SLR 1 10 [X] [ ] [ ] \"   Hip flex 1 10 [X] [ ] [ ] \"      Pain:  Pain Scale 1: Numeric (0 - 10)  Pain Intensity 1: 3  Pain Location 1: Ankle  Pain Orientation 1: Right  Pain Description 1: Aching  Pain Intervention(s) 1: Medication (see MAR)      Activity Tolerance:      After treatment:   [X] Patient left in no apparent distress sitting up in chair  [ ] Patient left in no apparent distress in bed  [X] Call bell left within reach  [X] Nursing notified  [ ] Caregiver present  [ ] Bed alarm activated      COMMUNICATION/COLLABORATION:   The patients plan of care was discussed with: Registered Nurse     Geryl Dancer, PTA   Time Calculation: 25 mins

## 2017-07-16 ENCOUNTER — APPOINTMENT (OUTPATIENT)
Dept: GENERAL RADIOLOGY | Age: 72
DRG: 492 | End: 2017-07-16
Attending: INTERNAL MEDICINE
Payer: MEDICARE

## 2017-07-16 LAB
ALBUMIN SERPL BCP-MCNC: 2.5 G/DL (ref 3.5–5)
ALBUMIN/GLOB SERPL: 0.7 {RATIO} (ref 1.1–2.2)
ALP SERPL-CCNC: 59 U/L (ref 45–117)
ALT SERPL-CCNC: 22 U/L (ref 12–78)
ANION GAP BLD CALC-SCNC: 5 MMOL/L (ref 5–15)
AST SERPL W P-5'-P-CCNC: 48 U/L (ref 15–37)
ATRIAL RATE: 68 BPM
BASOPHILS # BLD AUTO: 0 K/UL (ref 0–0.1)
BASOPHILS # BLD: 0 % (ref 0–1)
BILIRUB SERPL-MCNC: 0.3 MG/DL (ref 0.2–1)
BUN SERPL-MCNC: 57 MG/DL (ref 6–20)
BUN/CREAT SERPL: 44 (ref 12–20)
CALCIUM SERPL-MCNC: 8.8 MG/DL (ref 8.5–10.1)
CALCULATED P AXIS, ECG09: 48 DEGREES
CALCULATED R AXIS, ECG10: 64 DEGREES
CALCULATED T AXIS, ECG11: 46 DEGREES
CHLORIDE SERPL-SCNC: 89 MMOL/L (ref 97–108)
CK SERPL-CCNC: 820 U/L (ref 26–192)
CO2 SERPL-SCNC: 29 MMOL/L (ref 21–32)
CREAT SERPL-MCNC: 1.31 MG/DL (ref 0.55–1.02)
DIAGNOSIS, 93000: NORMAL
DIFFERENTIAL METHOD BLD: ABNORMAL
EOSINOPHIL # BLD: 0 K/UL (ref 0–0.4)
EOSINOPHIL NFR BLD: 0 % (ref 0–7)
ERYTHROCYTE [DISTWIDTH] IN BLOOD BY AUTOMATED COUNT: 12.2 % (ref 11.5–14.5)
GLOBULIN SER CALC-MCNC: 3.7 G/DL (ref 2–4)
GLUCOSE BLD STRIP.AUTO-MCNC: 191 MG/DL (ref 65–100)
GLUCOSE BLD STRIP.AUTO-MCNC: 192 MG/DL (ref 65–100)
GLUCOSE BLD STRIP.AUTO-MCNC: 212 MG/DL (ref 65–100)
GLUCOSE BLD STRIP.AUTO-MCNC: 213 MG/DL (ref 65–100)
GLUCOSE SERPL-MCNC: 155 MG/DL (ref 65–100)
HCT VFR BLD AUTO: 28.7 % (ref 35–47)
HGB BLD-MCNC: 10.1 G/DL (ref 11.5–16)
LYMPHOCYTES # BLD AUTO: 4 % (ref 12–49)
LYMPHOCYTES # BLD: 0.7 K/UL (ref 0.8–3.5)
MAGNESIUM SERPL-MCNC: 2.4 MG/DL (ref 1.6–2.4)
MCH RBC QN AUTO: 31.3 PG (ref 26–34)
MCHC RBC AUTO-ENTMCNC: 35.2 G/DL (ref 30–36.5)
MCV RBC AUTO: 88.9 FL (ref 80–99)
MONOCYTES # BLD: 0.9 K/UL (ref 0–1)
MONOCYTES NFR BLD AUTO: 5 % (ref 5–13)
NEUTS SEG # BLD: 16.2 K/UL (ref 1.8–8)
NEUTS SEG NFR BLD AUTO: 91 % (ref 32–75)
P-R INTERVAL, ECG05: 182 MS
PLATELET # BLD AUTO: 329 K/UL (ref 150–400)
POTASSIUM SERPL-SCNC: 5.2 MMOL/L (ref 3.5–5.1)
PROT SERPL-MCNC: 6.2 G/DL (ref 6.4–8.2)
Q-T INTERVAL, ECG07: 386 MS
QRS DURATION, ECG06: 98 MS
QTC CALCULATION (BEZET), ECG08: 410 MS
RBC # BLD AUTO: 3.23 M/UL (ref 3.8–5.2)
RBC MORPH BLD: ABNORMAL
SERVICE CMNT-IMP: ABNORMAL
SODIUM SERPL-SCNC: 123 MMOL/L (ref 136–145)
SODIUM UR-SCNC: 38 MMOL/L
VENTRICULAR RATE, ECG03: 68 BPM
WBC # BLD AUTO: 17.8 K/UL (ref 3.6–11)
WBC MORPH BLD: ABNORMAL

## 2017-07-16 PROCEDURE — 74011250637 HC RX REV CODE- 250/637: Performed by: INTERNAL MEDICINE

## 2017-07-16 PROCEDURE — 85025 COMPLETE CBC W/AUTO DIFF WBC: CPT

## 2017-07-16 PROCEDURE — 74011636637 HC RX REV CODE- 636/637: Performed by: INTERNAL MEDICINE

## 2017-07-16 PROCEDURE — 74011250636 HC RX REV CODE- 250/636: Performed by: INTERNAL MEDICINE

## 2017-07-16 PROCEDURE — 65660000000 HC RM CCU STEPDOWN

## 2017-07-16 PROCEDURE — 77010033678 HC OXYGEN DAILY

## 2017-07-16 PROCEDURE — 74020 XR ABD FLAT/ ERECT: CPT

## 2017-07-16 PROCEDURE — 82962 GLUCOSE BLOOD TEST: CPT

## 2017-07-16 PROCEDURE — 74011250637 HC RX REV CODE- 250/637: Performed by: PHYSICIAN ASSISTANT

## 2017-07-16 PROCEDURE — 97530 THERAPEUTIC ACTIVITIES: CPT

## 2017-07-16 PROCEDURE — 84300 ASSAY OF URINE SODIUM: CPT

## 2017-07-16 RX ORDER — HYDRALAZINE HYDROCHLORIDE 50 MG/1
100 TABLET, FILM COATED ORAL 3 TIMES DAILY
Status: DISCONTINUED | OUTPATIENT
Start: 2017-07-16 | End: 2017-07-17

## 2017-07-16 RX ADMIN — ONDANSETRON 4 MG: 4 TABLET, ORALLY DISINTEGRATING ORAL at 04:31

## 2017-07-16 RX ADMIN — INSULIN LISPRO 3 UNITS: 100 INJECTION, SOLUTION INTRAVENOUS; SUBCUTANEOUS at 12:57

## 2017-07-16 RX ADMIN — METHYLPREDNISOLONE SODIUM SUCCINATE 40 MG: 40 INJECTION, POWDER, FOR SOLUTION INTRAMUSCULAR; INTRAVENOUS at 22:11

## 2017-07-16 RX ADMIN — CALCIUM CARBONATE 500 MG (1,250 MG)-VITAMIN D3 200 UNIT TABLET 1 TABLET: at 11:27

## 2017-07-16 RX ADMIN — HYDRALAZINE HYDROCHLORIDE 100 MG: 50 TABLET, FILM COATED ORAL at 16:21

## 2017-07-16 RX ADMIN — METHYLPREDNISOLONE SODIUM SUCCINATE 40 MG: 40 INJECTION, POWDER, FOR SOLUTION INTRAMUSCULAR; INTRAVENOUS at 16:21

## 2017-07-16 RX ADMIN — HYDRALAZINE HYDROCHLORIDE 10 MG: 20 INJECTION INTRAMUSCULAR; INTRAVENOUS at 20:58

## 2017-07-16 RX ADMIN — ONDANSETRON 4 MG: 4 TABLET, ORALLY DISINTEGRATING ORAL at 09:20

## 2017-07-16 RX ADMIN — Medication 10 ML: at 16:22

## 2017-07-16 RX ADMIN — SODIUM CHLORIDE 100 ML/HR: 900 INJECTION, SOLUTION INTRAVENOUS at 20:49

## 2017-07-16 RX ADMIN — HYDRALAZINE HYDROCHLORIDE 100 MG: 50 TABLET, FILM COATED ORAL at 22:12

## 2017-07-16 RX ADMIN — OXYCODONE HYDROCHLORIDE 5 MG: 5 TABLET ORAL at 16:40

## 2017-07-16 RX ADMIN — HYDRALAZINE HYDROCHLORIDE 100 MG: 50 TABLET, FILM COATED ORAL at 11:27

## 2017-07-16 RX ADMIN — HYDRALAZINE HYDROCHLORIDE 10 MG: 20 INJECTION INTRAMUSCULAR; INTRAVENOUS at 04:04

## 2017-07-16 RX ADMIN — CLOPIDOGREL BISULFATE 75 MG: 75 TABLET, FILM COATED ORAL at 11:27

## 2017-07-16 RX ADMIN — CALCIUM CARBONATE 500 MG (1,250 MG)-VITAMIN D3 200 UNIT TABLET 1 TABLET: at 16:40

## 2017-07-16 RX ADMIN — AMLODIPINE BESYLATE 10 MG: 5 TABLET ORAL at 11:27

## 2017-07-16 RX ADMIN — INSULIN LISPRO 2 UNITS: 100 INJECTION, SOLUTION INTRAVENOUS; SUBCUTANEOUS at 16:21

## 2017-07-16 RX ADMIN — ACETAMINOPHEN 650 MG: 325 TABLET, FILM COATED ORAL at 12:57

## 2017-07-16 RX ADMIN — PRAVASTATIN SODIUM 10 MG: 10 TABLET ORAL at 22:11

## 2017-07-16 RX ADMIN — METOPROLOL SUCCINATE 150 MG: 50 TABLET, EXTENDED RELEASE ORAL at 11:27

## 2017-07-16 RX ADMIN — NIACIN 500 MG: 500 TABLET, EXTENDED RELEASE ORAL at 11:26

## 2017-07-16 RX ADMIN — ACETAMINOPHEN 650 MG: 325 TABLET, FILM COATED ORAL at 16:39

## 2017-07-16 RX ADMIN — INSULIN LISPRO 2 UNITS: 100 INJECTION, SOLUTION INTRAVENOUS; SUBCUTANEOUS at 22:11

## 2017-07-16 RX ADMIN — METHYLPREDNISOLONE SODIUM SUCCINATE 40 MG: 40 INJECTION, POWDER, FOR SOLUTION INTRAMUSCULAR; INTRAVENOUS at 06:07

## 2017-07-16 NOTE — PROGRESS NOTES
Provided son and daughter in law with Dr. Jada Cheung answering service number. They did not want to have him paged from the hospital tomorrow upon their arrival and insisted upon having a number. Denied wanting him to be paged at this time either. Provided them with the number [phone].

## 2017-07-16 NOTE — PROGRESS NOTES
Bedside and Verbal shift change report given to Markel De León RN (oncoming nurse) by Kodi Borden RN (offgoing nurse). Report included the following information SBAR, Kardex, Intake/Output, MAR and Cardiac Rhythm SR w/PVC's.

## 2017-07-16 NOTE — PROGRESS NOTES
Orthopedic NP Progress Note  Post Op day: 3 Days Post-Op    July 16, 2017 7:53 AM     Cassandra Cuellar    Attending Physician: Treatment Team: Attending Provider: Shashi Hawk MD; Consulting Provider: CHARANJIT Moss; Consulting Provider: Jeny Foote MD; Consulting Provider: Pepito May MD; Utilization Review: Vladimir Verduzco RN; Care Manager: Prateek Balderrama     Vital Signs:    Patient Vitals for the past 8 hrs:   BP Temp Pulse Resp SpO2   07/16/17 0400 182/63 97.9 °F (36.6 °C) 73 20 91 %     BMI (calculated): 30.5 (07/14/17 1847)    Intake/Output:     07/14 1901 - 07/16 0700  In: 6339 [P.O.:1070; I.V.:300]  Out: 2750 [Urine:2750]    Pain Control:   Pain Assessment  Pain Scale 1: Numeric (0 - 10)  Pain Intensity 1: 0  Pain Onset 1: x this afternoon  Pain Location 1: Ankle  Pain Orientation 1: Right  Pain Description 1: Aching  Pain Intervention(s) 1: Medication (see MAR)    LAB:    Recent Labs      07/16/17   0445   HCT  28.7*   HGB  10.1*     Lab Results   Component Value Date/Time    Sodium 123 07/15/2017 11:30 PM    Potassium 5.2 07/15/2017 11:30 PM    Chloride 89 07/15/2017 11:30 PM    CO2 29 07/15/2017 11:30 PM    Glucose 155 07/15/2017 11:30 PM    BUN 57 07/15/2017 11:30 PM    Creatinine 1.31 07/15/2017 11:30 PM    Calcium 8.8 07/15/2017 11:30 PM       Subjective:  Cassandra Cuellar is a 81 y.o. female s/p a  Procedure(s) with comments:  RIGHT ANKLE OPEN REDUCTION INTERNAL FIXATION, application of external fixator - open reduction internal fixation right ankle ( c-arm)   Procedure(s):  RIGHT ANKLE OPEN REDUCTION INTERNAL FIXATION, application of external fixator. Tolerating diet. States pain is well managed, has been up with PT     Objective: General: alert, cooperative, no distress. Neuro/Vascular: CNS Intact. Sensation stable. Brisk cap refill, 2+ pulses UE/LE  Musculoskeletal:  FROM UE/LE with ex fix to R ankle. Anibal's sign negative in bilateral lower extremities.   Calves soft, supple, non-tender upon palpation or with passive stretch. Skin: Incision - clean,  intact. No significant erythema or swelling. Dressing: + serosanguinous drainage     Ness - y - per medicine   Drain - n       PT/OT:   Gait:                      Assessment:    s/p Procedure(s):  RIGHT ANKLE OPEN REDUCTION INTERNAL FIXATION, application of external fixator    Active Problems:    Respiratory failure ([address] (7/13/2017)         Plan:     -  Continue PT/OT - NWB RLE  -  Continue established methods of pain control  -  VTE Prophylaxes - TEDS &/or SCDs   -  Continue Pin care        Discharge To:  SNF likely     Dr. Groves aware and agree with plan.      Signed By: Bela Balderas NP    Orthopedic Nurse Practitioner

## 2017-07-16 NOTE — PROGRESS NOTES
Bedside and Verbal shift change report given to Gus Caba (oncoming nurse) by Janett Benoit (offgoing nurse). Report included the following information SBAR, Kardex, ED Summary, OR Summary, Procedure Summary, Intake/Output, MAR, Accordion, Recent Results and Med Rec Status.

## 2017-07-16 NOTE — PROGRESS NOTES
Problem: Mobility Impaired (Adult and Pediatric)  Goal: *Acute Goals and Plan of Care (Insert Text)  Physical Therapy Goals  Initiated 7/14/2017  1. Patient will move from supine to sit and sit to supine in bed with supervision/set-up within 7 day(s). 2. Patient will transfer from bed to chair and chair to bed with minimal assistance/contact guard assist using the least restrictive device within 7 day(s). 3. Patient will perform sit to stand with contact guard assist within 7 day(s). 4. Patient will ambulate with minimal assistance/contact guard assist for 50 feet with the least restrictive device within 7 day(s). 5. Patient will ascend/descend 1 stairs with bilateral handrail(s) with minimal assistance/contact guard assist within 7 day(s). PHYSICAL THERAPY TREATMENT  Patient: Tony Guillaume (89 y.o. female)  Date: 7/16/2017  Diagnosis: right ankle fracture  Respiratory failure (HCC) <principal problem not specified>  Procedure(s) (LRB):  RIGHT ANKLE OPEN REDUCTION INTERNAL FIXATION, application of external fixator (Right) 3 Days Post-Op  Precautions:  NWB on the RLE      ASSESSMENT: Pt cleared for session by RN. Pt received in bed, O2 at 3L. Pt requiring CGA for bed mobility from elevated HOB, improved moving RLE off of the bed w/o assist.  Sits at EOB Pt maintaining NWB during stand. Pt unable to hop to maintain NWB, scoots L foot on floor for turn to bedside chair. Pt requires mod cues for sit to stand with use of hand placement. She is very unsafe with turn and stand to sit in bedside chair, not reaching back and sitting prior to attaining good proximity to chair. Min A of 2 to stand, min A of 1 +mod A of 1 for sitting due to lack of safety. Unable to amb at this time. Sats on coming to EOB with some SOB were 88% and returned to lows 90s with breathing cues and rest.  HR in 80s. After transfer, sats at 91% and increasing to 94% with breathing cues.   Pt cued through AROM BLE: L ankle pumps, B knee extension, and B knee flexion 2X10. Tolerated ex well. Pt left in chair, call bell in reach, breakfast tray in front of pt. Pt would likely be more appropriate for SNF rehab at this time due to her tolerance issues. Progression toward goals:  [ ]    Improving appropriately and progressing toward goals  [X]    Improving slowly and progressing toward goals  [ ]    Not making progress toward goals and plan of care will be adjusted       PLAN:  Patient continues to benefit from skilled intervention to address the above impairments. Continue treatment per established plan of care. Discharge Recommendations:  Skilled Nursing Facility  Further Equipment Recommendations for Discharge:  TBD       SUBJECTIVE:   Patient stated I have no pain right now.       OBJECTIVE DATA SUMMARY:   Critical Behavior:  Neurologic State: Alert  Orientation Level: Oriented X4  Cognition: Appropriate decision making, Appropriate for age attention/concentration, Appropriate safety awareness, Follows commands     Functional Mobility Training:  Bed Mobility:  Rolling: Supervision  Supine to Sit: Contact guard assistance              Transfers:  Sit to Stand: Minimum assistance;Assist x2  Stand to Sit: Moderate assistance;Minimum assistance;Assist x2; Other (comment) (unsafe, sits quickly w/o reaching back)                             Balance:  Sitting: Impaired  Sitting - Static: Good (unsupported)  Sitting - Dynamic: Fair (occasional)  Standing - Static: Poor  Standing - Dynamic : Poor  Ambulation/Gait Training:   Unable to tolerate and maintain NWB to amb                                                  Therapeutic Exercises:   See above narrative  Pain:  Pain Scale 1: Numeric (0 - 10)  Pain Intensity 1: 0              Activity Tolerance:   See above narrative  Please refer to the flowsheet for vital signs taken during this treatment.   After treatment:   [X]    Patient left in no apparent distress sitting up in chair  [ ]    Patient left in no apparent distress in bed  [X]    Call bell left within reach  [X]    Nursing notified  [ ]    Caregiver present  [ ]    Bed alarm activated      COMMUNICATION/COLLABORATION:   The patients plan of care was discussed with: Registered Nurse     Dylan Gonzalez, PT   Time Calculation: 16 mins

## 2017-07-16 NOTE — PROGRESS NOTES
1258 Patient vomits shortly after eating solid foods (regular). Swallows pills without difficulty. Patient tolerates jello without vomiting. 1533 Received a call the patient was approved by [facility].

## 2017-07-16 NOTE — PROGRESS NOTES
Hospitalist Progress Note    NAME: Tony Guillaume   :  1945   MRN:  [id number]       Assessment / Plan:  Asymptomatic 22 beat run of Wide Complex Tachycardia (VT) last pm: question if related to Levaquin use in setting of renal failure  -EKG with QTc 410, no acute ischemic changes   -Tn trends 0.9 to 0.96 to 0.89  -discontinue Levaquin  -continue increased dose Toprol    Acute hypoxic respiratory failure  Acute COPD exacerbation  -continue scheduled and prn nebs, IV steroids  -suspect worsening Leukocutosis (17K on  related to Corticosteroids); Leukocytosis down trending despite discontinuation of Levaquin on 7/15    ARF from aggressive diuresis; improving with hydration  Mild Hyperkalemia; improving with hydration  Hyponatremia: Sodium Woody of 121 on 7/15; increased to 123 today  -Continue NS at 100 mL/hr  -hold ARB  -recheck urine sodium today as NA not increasing as expected    CHF ruled out (LVEF 55-60%); Patient has interstitial lung disease that was suspected to be pulmonary edema on initial CXR and clinically with diuresis patient went into ARF and become increasingly hyponatremic (lasix was stopped on  with worsening hyponatremia) and CXR without significant change. Acute Pulmonary Edema Ruled out    Abdominal pain on am of  with continued nausea:  ? Antibiotic associated Diarrhea  -get KUB to rule out ileus  -will check C.  Diff if diarrhea continues    Distal tibia and fibula fracture s/p mechanical fall  -s/p ORIF on   -appreciate ortho assistance  -elevated CK improving     Hx of CAD  HTN; uncontrolled  -continue amlodipine, plavix, statin  -Hydralazine 50 mg added on evening of 7/15, will increase to 100 mg TID today     Hx of PVD  -s/p right renal artery stent, left fem-pop by Dr. Rosa Chowdhury 3/2014     Impaired Glucose Management (Diabetes ruled out)  -A1c 5.7  -diet control  -SSI while on IV steroids     Tobacco use  -nicotine patch    Obesity     Code Status: Full  Surrogate Decision Maker: daughter Laura Ely [phone]     DVT Prophylaxis: SCDs for now (lovenox if no plan procedure in the AM)  GI Prophylaxis: not indicated     Baseline: independent      Body mass index is 30.47 kg/(m^2). Subjective:     Chief Complaint / Reason for Physician Visit: follow-up respiratory failure  Breathing     Review of Systems:  Symptom Y/N Comments  Symptom Y/N Comments   Fever/Chills    Chest Pain n    Poor Appetite y   Edema     Cough    Abdominal Pain y generalized   Sputum    Joint Pain     SOB/ODOM n   Pruritis/Rash     Nausea/vomit y Nausea without vomiting  Tolerating PT/OT     Diarrhea y x3 last pm  Tolerating Diet     Constipation    Other       Could NOT obtain due to:      Objective:     VITALS:   Last 24hrs VS reviewed since prior progress note. Most recent are:  Patient Vitals for the past 24 hrs:   Temp Pulse Resp BP SpO2   07/16/17 0837 98.3 °F (36.8 °C) 72 20 161/42 92 %   07/16/17 0400 97.9 °F (36.6 °C) 73 20 182/63 91 %   07/15/17 1957 98.2 °F (36.8 °C) 89 18 163/87 -   07/15/17 1518 97.6 °F (36.4 °C) 65 18 178/76 93 %   07/15/17 1432 - - - - 91 %   07/15/17 1200 98.4 °F (36.9 °C) 78 18 141/60 95 %   07/15/17 0945 - 70 - 177/68 -       Intake/Output Summary (Last 24 hours) at 07/16/17 0851  Last data filed at 07/16/17 9333   Gross per 24 hour   Intake             1370 ml   Output             2000 ml   Net             -630 ml        PHYSICAL EXAM:  General: WD, WN. Alert, cooperative, no acute distress    EENT:  EOMI. Anicteric sclerae. MMM  Resp:  Diffuse wheezing throughout with similar intensity to yesterday. No accessory muscle use  CV:  Regular  rhythm,  No edema (left lower leg in external fixator and bandaged  GI:  Soft, Non distended, Non tender.  +Bowel sounds  Neurologic:  Alert and oriented X 3, normal speech,   Psych:   Good insight. Not anxious nor agitated  Skin:  No rashes.   No jaundice    Reviewed most current lab test results and cultures  YES  Reviewed most current radiology test results   YES  Review and summation of old records today    NO  Reviewed patient's current orders and MAR    YES  PMH/SH reviewed - no change compared to H&P  ________________________________________________________________________  Care Plan discussed with:    Comments   Patient x    Family  x At bedside   RN x    Care Manager     Consultant                        Multidiciplinary team rounds were held today with , nursing, pharmacist and clinical coordinator. Patient's plan of care was discussed; medications were reviewed and discharge planning was addressed. ________________________________________________________________________  Total NON critical care TIME:  30   Minutes    Total CRITICAL CARE TIME Spent:   Minutes non procedure based      Comments   >50% of visit spent in counseling and coordination of care     ________________________________________________________________________  Addison Mercado MD     Procedures: see electronic medical records for all procedures/Xrays and details which were not copied into this note but were reviewed prior to creation of Plan. LABS:  I reviewed today's most current labs and imaging studies.   Pertinent labs include:  Recent Labs      07/16/17   0445  07/15/17   0328  07/14/17   0322   WBC  17.8*  19.7*  17.9*   HGB  10.1*  10.2*  9.1*   HCT  28.7*  28.5*  26.3*   PLT  329  292  282     Recent Labs      07/15/17   2330  07/15/17   1529  07/15/17   0328  07/15/17   0116   NA  123*  123*  121*  122*   K  5.2*  5.4*  5.0  5.0   CL  89*  89*  88*  88*   CO2  29  27  25  26   GLU  155*  190*  163*  136*   BUN  57*  51*  50*  48*   CREA  1.31*  1.35*  1.42*  1.45*   CA  8.8  8.4*  8.7  8.2*   MG  2.4   --    --   2.0   ALB  2.5*   --    --    --    TBILI  0.3   --    --    --    SGOT  48*   --    --    --    ALT  22   --    --    --        Signed: Addison Mercado MD

## 2017-07-17 ENCOUNTER — APPOINTMENT (OUTPATIENT)
Dept: CT IMAGING | Age: 72
DRG: 492 | End: 2017-07-17
Attending: INTERNAL MEDICINE
Payer: MEDICARE

## 2017-07-17 ENCOUNTER — APPOINTMENT (OUTPATIENT)
Dept: GENERAL RADIOLOGY | Age: 72
DRG: 492 | End: 2017-07-17
Attending: INTERNAL MEDICINE
Payer: MEDICARE

## 2017-07-17 LAB
ALBUMIN SERPL BCP-MCNC: 2.4 G/DL (ref 3.5–5)
ALBUMIN/GLOB SERPL: 0.7 {RATIO} (ref 1.1–2.2)
ALP SERPL-CCNC: 55 U/L (ref 45–117)
ALT SERPL-CCNC: 19 U/L (ref 12–78)
ANION GAP BLD CALC-SCNC: 8 MMOL/L (ref 5–15)
AST SERPL W P-5'-P-CCNC: 29 U/L (ref 15–37)
ATRIAL RATE: 76 BPM
BACTERIA SPEC CULT: NORMAL
BASOPHILS # BLD AUTO: 0 K/UL (ref 0–0.1)
BASOPHILS # BLD: 0 % (ref 0–1)
BILIRUB SERPL-MCNC: 0.3 MG/DL (ref 0.2–1)
BUN SERPL-MCNC: 51 MG/DL (ref 6–20)
BUN/CREAT SERPL: 57 (ref 12–20)
CALCIUM SERPL-MCNC: 9.2 MG/DL (ref 8.5–10.1)
CALCULATED R AXIS, ECG10: 49 DEGREES
CALCULATED T AXIS, ECG11: 136 DEGREES
CHLORIDE SERPL-SCNC: 88 MMOL/L (ref 97–108)
CK MB CFR SERPL CALC: 1.1 % (ref 0–2.5)
CK MB SERPL-MCNC: 4.4 NG/ML (ref 5–25)
CK SERPL-CCNC: 386 U/L (ref 26–192)
CO2 SERPL-SCNC: 28 MMOL/L (ref 21–32)
CREAT SERPL-MCNC: 0.89 MG/DL (ref 0.55–1.02)
DIAGNOSIS, 93000: NORMAL
EOSINOPHIL # BLD: 0 K/UL (ref 0–0.4)
EOSINOPHIL NFR BLD: 0 % (ref 0–7)
ERYTHROCYTE [DISTWIDTH] IN BLOOD BY AUTOMATED COUNT: 12.3 % (ref 11.5–14.5)
GLOBULIN SER CALC-MCNC: 3.6 G/DL (ref 2–4)
GLUCOSE BLD STRIP.AUTO-MCNC: 214 MG/DL (ref 65–100)
GLUCOSE BLD STRIP.AUTO-MCNC: 239 MG/DL (ref 65–100)
GLUCOSE BLD STRIP.AUTO-MCNC: 246 MG/DL (ref 65–100)
GLUCOSE SERPL-MCNC: 177 MG/DL (ref 65–100)
HCT VFR BLD AUTO: 28.5 % (ref 35–47)
HEMOCCULT STL QL: POSITIVE
HGB BLD-MCNC: 10.1 G/DL (ref 11.5–16)
HGB BLD-MCNC: 5.4 G/DL (ref 11.5–16)
LACTATE SERPL-SCNC: 5.2 MMOL/L (ref 0.4–2)
LYMPHOCYTES # BLD AUTO: 4 % (ref 12–49)
LYMPHOCYTES # BLD: 0.7 K/UL (ref 0.8–3.5)
MCH RBC QN AUTO: 31.6 PG (ref 26–34)
MCHC RBC AUTO-ENTMCNC: 35.4 G/DL (ref 30–36.5)
MCV RBC AUTO: 89.1 FL (ref 80–99)
MONOCYTES # BLD: 0.7 K/UL (ref 0–1)
MONOCYTES NFR BLD AUTO: 4 % (ref 5–13)
NEUTS SEG # BLD: 17 K/UL (ref 1.8–8)
NEUTS SEG NFR BLD AUTO: 92 % (ref 32–75)
PLATELET # BLD AUTO: 364 K/UL (ref 150–400)
POTASSIUM SERPL-SCNC: 4.9 MMOL/L (ref 3.5–5.1)
PROT SERPL-MCNC: 6 G/DL (ref 6.4–8.2)
Q-T INTERVAL, ECG07: 268 MS
QRS DURATION, ECG06: 94 MS
QTC CALCULATION (BEZET), ECG08: 423 MS
RBC # BLD AUTO: 3.2 M/UL (ref 3.8–5.2)
SERVICE CMNT-IMP: ABNORMAL
SERVICE CMNT-IMP: NORMAL
SODIUM SERPL-SCNC: 124 MMOL/L (ref 136–145)
TROPONIN I SERPL-MCNC: 0.45 NG/ML
VENTRICULAR RATE, ECG03: 150 BPM
WBC # BLD AUTO: 18.3 K/UL (ref 3.6–11)

## 2017-07-17 PROCEDURE — 74011250637 HC RX REV CODE- 250/637: Performed by: INTERNAL MEDICINE

## 2017-07-17 PROCEDURE — 97530 THERAPEUTIC ACTIVITIES: CPT

## 2017-07-17 PROCEDURE — 85018 HEMOGLOBIN: CPT | Performed by: EMERGENCY MEDICINE

## 2017-07-17 PROCEDURE — 36415 COLL VENOUS BLD VENIPUNCTURE: CPT | Performed by: INTERNAL MEDICINE

## 2017-07-17 PROCEDURE — 74011636637 HC RX REV CODE- 636/637: Performed by: INTERNAL MEDICINE

## 2017-07-17 PROCEDURE — 77010033678 HC OXYGEN DAILY

## 2017-07-17 PROCEDURE — 30243N1 TRANSFUSION OF NONAUTOLOGOUS RED BLOOD CELLS INTO CENTRAL VEIN, PERCUTANEOUS APPROACH: ICD-10-PCS | Performed by: INTERNAL MEDICINE

## 2017-07-17 PROCEDURE — 74011250636 HC RX REV CODE- 250/636: Performed by: INTERNAL MEDICINE

## 2017-07-17 PROCEDURE — 86900 BLOOD TYPING SEROLOGIC ABO: CPT

## 2017-07-17 PROCEDURE — 82550 ASSAY OF CK (CPK): CPT | Performed by: INTERNAL MEDICINE

## 2017-07-17 PROCEDURE — 74011250637 HC RX REV CODE- 250/637: Performed by: PHYSICIAN ASSISTANT

## 2017-07-17 PROCEDURE — C1751 CATH, INF, PER/CENT/MIDLINE: HCPCS

## 2017-07-17 PROCEDURE — 77030012879 HC MSK CPAP FLL FAC PHIL -B

## 2017-07-17 PROCEDURE — 82962 GLUCOSE BLOOD TEST: CPT

## 2017-07-17 PROCEDURE — 82272 OCCULT BLD FECES 1-3 TESTS: CPT | Performed by: NURSE PRACTITIONER

## 2017-07-17 PROCEDURE — 02HV33Z INSERTION OF INFUSION DEVICE INTO SUPERIOR VENA CAVA, PERCUTANEOUS APPROACH: ICD-10-PCS | Performed by: INTERNAL MEDICINE

## 2017-07-17 PROCEDURE — 74011636320 HC RX REV CODE- 636/320: Performed by: INTERNAL MEDICINE

## 2017-07-17 PROCEDURE — 74011000250 HC RX REV CODE- 250: Performed by: INTERNAL MEDICINE

## 2017-07-17 PROCEDURE — 71010 XR CHEST PORT: CPT

## 2017-07-17 PROCEDURE — 93005 ELECTROCARDIOGRAM TRACING: CPT

## 2017-07-17 PROCEDURE — 71275 CT ANGIOGRAPHY CHEST: CPT

## 2017-07-17 PROCEDURE — 83605 ASSAY OF LACTIC ACID: CPT

## 2017-07-17 PROCEDURE — 84484 ASSAY OF TROPONIN QUANT: CPT | Performed by: INTERNAL MEDICINE

## 2017-07-17 PROCEDURE — 94640 AIRWAY INHALATION TREATMENT: CPT

## 2017-07-17 PROCEDURE — 74011250637 HC RX REV CODE- 250/637: Performed by: ORTHOPAEDIC SURGERY

## 2017-07-17 PROCEDURE — 80053 COMPREHEN METABOLIC PANEL: CPT | Performed by: INTERNAL MEDICINE

## 2017-07-17 PROCEDURE — 65610000006 HC RM INTENSIVE CARE

## 2017-07-17 PROCEDURE — 36430 TRANSFUSION BLD/BLD COMPNT: CPT

## 2017-07-17 PROCEDURE — 77030013140 HC MSK NEB VYRM -A

## 2017-07-17 PROCEDURE — C9113 INJ PANTOPRAZOLE SODIUM, VIA: HCPCS | Performed by: INTERNAL MEDICINE

## 2017-07-17 PROCEDURE — P9016 RBC LEUKOCYTES REDUCED: HCPCS

## 2017-07-17 PROCEDURE — 74011000258 HC RX REV CODE- 258: Performed by: INTERNAL MEDICINE

## 2017-07-17 PROCEDURE — 85025 COMPLETE CBC W/AUTO DIFF WBC: CPT | Performed by: INTERNAL MEDICINE

## 2017-07-17 PROCEDURE — 77030029131 HC ADMN ST IV BLD N DEHP ICUM -B

## 2017-07-17 PROCEDURE — 86920 COMPATIBILITY TEST SPIN: CPT

## 2017-07-17 PROCEDURE — 74011000250 HC RX REV CODE- 250: Performed by: ORTHOPAEDIC SURGERY

## 2017-07-17 RX ORDER — DILTIAZEM HYDROCHLORIDE 30 MG/1
30 TABLET, FILM COATED ORAL
Status: DISCONTINUED | OUTPATIENT
Start: 2017-07-17 | End: 2017-07-17

## 2017-07-17 RX ORDER — INSULIN LISPRO 100 [IU]/ML
INJECTION, SOLUTION INTRAVENOUS; SUBCUTANEOUS EVERY 6 HOURS
Status: DISCONTINUED | OUTPATIENT
Start: 2017-07-17 | End: 2017-07-19

## 2017-07-17 RX ORDER — INSULIN LISPRO 100 [IU]/ML
INJECTION, SOLUTION INTRAVENOUS; SUBCUTANEOUS EVERY 6 HOURS
Status: DISCONTINUED | OUTPATIENT
Start: 2017-07-18 | End: 2017-07-17

## 2017-07-17 RX ORDER — SODIUM BICARBONATE 1 MEQ/ML
100 SYRINGE (ML) INTRAVENOUS ONCE
Status: COMPLETED | OUTPATIENT
Start: 2017-07-17 | End: 2017-07-17

## 2017-07-17 RX ORDER — SODIUM CHLORIDE 9 MG/ML
50 INJECTION, SOLUTION INTRAVENOUS CONTINUOUS
Status: DISCONTINUED | OUTPATIENT
Start: 2017-07-17 | End: 2017-07-19

## 2017-07-17 RX ORDER — SODIUM CHLORIDE 9 MG/ML
50 INJECTION, SOLUTION INTRAVENOUS
Status: COMPLETED | OUTPATIENT
Start: 2017-07-17 | End: 2017-07-17

## 2017-07-17 RX ORDER — SODIUM CHLORIDE 9 MG/ML
250 INJECTION, SOLUTION INTRAVENOUS AS NEEDED
Status: DISCONTINUED | OUTPATIENT
Start: 2017-07-17 | End: 2017-07-20 | Stop reason: ALTCHOICE

## 2017-07-17 RX ORDER — METOPROLOL SUCCINATE 50 MG/1
100 TABLET, EXTENDED RELEASE ORAL 2 TIMES DAILY
Status: DISCONTINUED | OUTPATIENT
Start: 2017-07-17 | End: 2017-07-17

## 2017-07-17 RX ORDER — SODIUM CHLORIDE 0.9 % (FLUSH) 0.9 %
10 SYRINGE (ML) INJECTION
Status: COMPLETED | OUTPATIENT
Start: 2017-07-17 | End: 2017-07-17

## 2017-07-17 RX ORDER — METOPROLOL TARTRATE 5 MG/5ML
INJECTION INTRAVENOUS
Status: DISCONTINUED
Start: 2017-07-17 | End: 2017-07-18

## 2017-07-17 RX ORDER — METOPROLOL TARTRATE 5 MG/5ML
5 INJECTION INTRAVENOUS ONCE
Status: COMPLETED | OUTPATIENT
Start: 2017-07-17 | End: 2017-07-17

## 2017-07-17 RX ORDER — PREDNISONE 20 MG/1
40 TABLET ORAL
Status: DISCONTINUED | OUTPATIENT
Start: 2017-07-17 | End: 2017-07-17

## 2017-07-17 RX ORDER — SODIUM BICARBONATE 1 MEQ/ML
SYRINGE (ML) INTRAVENOUS
Status: DISPENSED
Start: 2017-07-17 | End: 2017-07-18

## 2017-07-17 RX ORDER — LEVALBUTEROL INHALATION SOLUTION 0.63 MG/3ML
0.63 SOLUTION RESPIRATORY (INHALATION)
Status: DISCONTINUED | OUTPATIENT
Start: 2017-07-17 | End: 2017-07-19

## 2017-07-17 RX ORDER — MUPIROCIN 20 MG/G
OINTMENT TOPICAL EVERY 12 HOURS
Status: DISPENSED | OUTPATIENT
Start: 2017-07-17 | End: 2017-07-22

## 2017-07-17 RX ORDER — IPRATROPIUM BROMIDE 0.5 MG/2.5ML
0.5 SOLUTION RESPIRATORY (INHALATION)
Status: DISCONTINUED | OUTPATIENT
Start: 2017-07-17 | End: 2017-07-19

## 2017-07-17 RX ORDER — METOPROLOL TARTRATE 25 MG/1
12.5 TABLET, FILM COATED ORAL EVERY 6 HOURS
Status: DISCONTINUED | OUTPATIENT
Start: 2017-07-17 | End: 2017-07-18

## 2017-07-17 RX ORDER — MAGNESIUM SULFATE 1 G/100ML
1 INJECTION INTRAVENOUS ONCE
Status: DISCONTINUED | OUTPATIENT
Start: 2017-07-17 | End: 2017-07-17

## 2017-07-17 RX ADMIN — ACETAMINOPHEN 650 MG: 325 TABLET, FILM COATED ORAL at 10:27

## 2017-07-17 RX ADMIN — SODIUM CHLORIDE 100 ML/HR: 900 INJECTION, SOLUTION INTRAVENOUS at 18:15

## 2017-07-17 RX ADMIN — LEVALBUTEROL HYDROCHLORIDE 0.63 MG: 0.63 SOLUTION RESPIRATORY (INHALATION) at 20:05

## 2017-07-17 RX ADMIN — METOPROLOL TARTRATE 5 MG: 5 INJECTION INTRAVENOUS at 02:35

## 2017-07-17 RX ADMIN — NITROGLYCERIN 0.5 INCH: 20 OINTMENT TOPICAL at 04:07

## 2017-07-17 RX ADMIN — IPRATROPIUM BROMIDE 0.5 MG: 0.5 SOLUTION RESPIRATORY (INHALATION) at 20:05

## 2017-07-17 RX ADMIN — METOPROLOL TARTRATE 5 MG: 5 INJECTION INTRAVENOUS at 02:23

## 2017-07-17 RX ADMIN — CALCIUM CARBONATE 500 MG (1,250 MG)-VITAMIN D3 200 UNIT TABLET 1 TABLET: at 09:13

## 2017-07-17 RX ADMIN — CLOPIDOGREL BISULFATE 75 MG: 75 TABLET, FILM COATED ORAL at 09:13

## 2017-07-17 RX ADMIN — SODIUM CHLORIDE 50 ML/HR: 900 INJECTION, SOLUTION INTRAVENOUS at 16:19

## 2017-07-17 RX ADMIN — ACETAMINOPHEN 650 MG: 325 TABLET, FILM COATED ORAL at 12:58

## 2017-07-17 RX ADMIN — INSULIN LISPRO 3 UNITS: 100 INJECTION, SOLUTION INTRAVENOUS; SUBCUTANEOUS at 18:53

## 2017-07-17 RX ADMIN — INSULIN LISPRO 3 UNITS: 100 INJECTION, SOLUTION INTRAVENOUS; SUBCUTANEOUS at 12:57

## 2017-07-17 RX ADMIN — MUPIROCIN: 20 OINTMENT TOPICAL at 21:40

## 2017-07-17 RX ADMIN — CALCIUM CARBONATE 500 MG (1,250 MG)-VITAMIN D3 200 UNIT TABLET 1 TABLET: at 12:58

## 2017-07-17 RX ADMIN — SODIUM CHLORIDE 8 MG/HR: 900 INJECTION, SOLUTION INTRAVENOUS at 22:10

## 2017-07-17 RX ADMIN — ONDANSETRON 4 MG: 4 TABLET, ORALLY DISINTEGRATING ORAL at 09:21

## 2017-07-17 RX ADMIN — Medication 10 ML: at 09:12

## 2017-07-17 RX ADMIN — HYDRALAZINE HYDROCHLORIDE 100 MG: 50 TABLET, FILM COATED ORAL at 09:13

## 2017-07-17 RX ADMIN — Medication 10 ML: at 13:01

## 2017-07-17 RX ADMIN — SODIUM BICARBONATE 100 MEQ: 84 INJECTION, SOLUTION INTRAVENOUS at 20:21

## 2017-07-17 RX ADMIN — INSULIN LISPRO 3 UNITS: 100 INJECTION, SOLUTION INTRAVENOUS; SUBCUTANEOUS at 09:12

## 2017-07-17 RX ADMIN — Medication 10 ML: at 21:41

## 2017-07-17 RX ADMIN — OXYCODONE HYDROCHLORIDE 5 MG: 5 TABLET ORAL at 14:26

## 2017-07-17 RX ADMIN — SODIUM CHLORIDE 75 ML/HR: 900 INJECTION, SOLUTION INTRAVENOUS at 09:21

## 2017-07-17 RX ADMIN — AMLODIPINE BESYLATE 10 MG: 5 TABLET ORAL at 09:12

## 2017-07-17 RX ADMIN — DEXTROSE MONOHYDRATE 2.5 MG/HR: 50 INJECTION, SOLUTION INTRAVENOUS at 03:26

## 2017-07-17 RX ADMIN — SODIUM CHLORIDE 8 MG/HR: 900 INJECTION, SOLUTION INTRAVENOUS at 18:33

## 2017-07-17 RX ADMIN — PREDNISONE 40 MG: 20 TABLET ORAL at 09:13

## 2017-07-17 RX ADMIN — IOPAMIDOL 75 ML: 755 INJECTION, SOLUTION INTRAVENOUS at 16:18

## 2017-07-17 RX ADMIN — SODIUM CHLORIDE 80 MG: 9 INJECTION INTRAMUSCULAR; INTRAVENOUS; SUBCUTANEOUS at 18:27

## 2017-07-17 RX ADMIN — Medication 10 ML: at 16:19

## 2017-07-17 NOTE — PROGRESS NOTES
Call for consult received - tachycardia     review of chart and discussed with nurse. There is no EKG in the chart available for review. Nurse reports HR now in 60s after IV Metoprolol. Pt appears stable. IV Cardizem drip at 10 mg. Ordered by Dr Edna Reeves. Full consult to follow.

## 2017-07-17 NOTE — PROGRESS NOTES
There is still no EKG in the chart from her episode of tachycardia earlier this morning. Hard to manage a reported rhythm disturbance without a tracing of that rhythm to review.

## 2017-07-17 NOTE — PROGRESS NOTES
Call placed to hospitalist re:patient tachycardia & hypertension. Waiting for call back. At ~0201 Rapid Response called. At 0150 patient monitor shows heart rate sustaining in 160/150's. Patient told to bear down as if attempting to have bowel movement, x2. No change. Vital signs taken, pt hypertensive, tachycardic. Rapid response called. Orders given for ECG, blood work, motoprolol 5 mg x2; then cardizem gtt starting at 2.5, then advance to 5mg. Cardiology consult called in to Dr. La Wahl office. All orders followed and completed. Will continue to monitor patient.

## 2017-07-17 NOTE — PROGRESS NOTES
TRANSFER - IN REPORT:    Verbal report received from aDija(name) on Bailey Thomason  being received from Ortho(unit) for urgent transfer      Report consisted of patients Situation, Background, Assessment and   Recommendations(SBAR). Information from the following report(s) SBAR, Kardex, Procedure Summary, Intake/Output, Recent Results and Cardiac Rhythm nsr was reviewed with the receiving nurse. Opportunity for questions and clarification was provided. Assessment completed upon patients arrival to unit and care assumed. Received patient from CT via bed, assisted to bed. Alert, answers questions but very weak. See VS.  PIV in left forearm intact, no fluids infusing at present. Blood ready and sent for, arrived and checked in at 1710.  1718  Dr. Paulina Franklin at bedside, discussed low blood and need for immediate transfusion. Unable to sign consent due to weakness. Now emergently placing a central line. [address] [place] [address] central line inserted by Dr. Paulina Franklin without difficulty. Patient labored, respirations 30, with moderate amount of labor. Remains very pale. Blood started via port. (Had attempted to start via PIV, but line leaked with saline flush. BIPAP started by RT to decrease work of breathing. PCXR completed and seen by Dr. Paulina Franklin, to use central line. Blood infusing without difficulty. Primary Nurse Bethanie Monroe, FAY and Yee Chatterjee RN performed a dual skin assessment on this patient No impairment noted  Soto score is 13  Chlorhex bath completed and patient turned onto right side. 2nd unit of blood hung, started at 100/hr for first 15 minutes, will titrate up to 300/hour. Dr. Aristeo Allen returned page, made aware of lactic acid, currently patient on BIPAP and transfusion of 1 of the three units ordered. Orders for 100 meq sodium bicarb received. 1910  Report now given to PALMIRA Espinosa, [facility]  Blood increased to 300cc/hr, will continue to monitor patient.

## 2017-07-17 NOTE — PROGRESS NOTES
Critical Care Note:    Responded to patient's room as patient felt ill after trying to work with therapy and was more SOB and pale. Vitals stable (besides decrease in diastolic BP). Patient sent for stat CT PE study and Hgb checked. Hgb came back at 5.4 mg/dL. Paitent previously with Hgb stable at 10 mg/dL. Patient transferred to ICU, case discussed with ICU RN  Called blood bank and asked for 3 units Emergency pRBC's, 3 units ordered  Plavix discontinued  All antihypertensives stopped (except for metoprolol which was changed to 12.5 mg every 6 hours with BP and HR restrictions)  Will give Protonix Bolus and Protonix gtt ordered  NS bolus and gtt ordered  Lactate ordered  Central line being placed right now (pt only has 20 ga)  Patient made NPO  GI consult placed, case discussed with Dr. Ray Fernandez updated on decompensation  Family updated (son)      Diagnoses: Acute Blood Loss Anemia and Melena    Ms Michelle Winslow is critically ill. A total of 40 minutes of non-procedure based critical care was provided during this encounter.

## 2017-07-17 NOTE — PROGRESS NOTES
JET AEROSOL PROTOCAL - ASSESS    Patient: Marisa Trejo, 7/17/2017  11:36 AM    Breath Sounds:  RUL: Clear  RML: Clear  RLL: Expiratory wheeze  STEPHON: Clear  LLL: Expiratory wheeze    Breathing Pattern:  Regular    Respiratory Rate: 20    Heart Rate:  57    Repiratory Rate:  20    Oxygen:  3lmp nasal cannula     Oxygen changed:  Yes, changed to: 2lpm nasal cannula    SPO2:  98%    Nebulizer Therapy:    Current:  Albuterol  Every 6 Hours PRN and Atrovent  Every 6 Hours PRN    Changed:  No    Smoking history: Previous smoker, quit 12/20/2009    *Patient currently has no home O2 or breathing treatments    Problem List:    Patient Active Problem List   Diagnosis Code    Abnormal ECG R94.31    Respiratory failure, acute ([facility]) J96.00    Respiratory failure ([address] J96.90       Respiratory Therapist: Savanah Longo, RT

## 2017-07-17 NOTE — PROGRESS NOTES
Dr. Morgan Buenrostro returned call and notified with consult.   MD stated, \"Dr. Juan Garcia will be seeing the patient today\"

## 2017-07-17 NOTE — CONSULTS
Consult    NAME: Morenita Pride   :  1945   MRN:  [id number]     Date/Time:  2017 10:17 AM    Patient PCP: Patito Mayorga MD  ________________________________________________________________________     Assessment:     1. S/p mechanical fall requiring orthopedic surgery of right leg, found to be hypoxic, likely COPD exacerbation, PSVT noted on telemetry, mild rhabo with increased troponin being managed medically. 2. In  found to have hyponatremia and TWI on ECG with cath with moderate CAD  3. Echo 2017 EF 55%, mild to mod MR, mild AI  4. Sinus with PSVT  ? AVNRT on telemetry  5. HTN with right renal artery stent  6. DM  7. Dyslipidemia difficulty with daily statin  8. Hx of GI bleed found to have PUD Dr. David Arora  9. PVD with left fem-pop placed on plavix for this  10. , mild functional capacity, works at [employer]:     Mechanical fall  Mild rhabdo with increased troponin, agree with medical management of CAD  Last night PSVT with HR of 150, looks like AVNRT does not appear to be afib, self terminated, 20 beats of tachycardia on 7/15 not recorded on tele monitor or printed, but likely same. Increased cr after diuresis, doubt significant CHF. Low sodium improving. 1. Cont plavix  2. Increase metoprolol Er to 100mg bid, stop cardizem since already on amlodipine  3. Cont amlodipine  4. Avapro currently on hold, can likely resume, currently on hydralazine in its place  5. ?on Doxazosin 1mg per my records  6. Would stop fluids, was on lasix 40mg daily as outpt, follow bmp, bnp  7. Cont statin, would stop niacin          [x]        High complexity decision making was performed        Subjective:   CHIEF COMPLAINT: SVT    HISTORY OF PRESENT ILLNESS:     Odalis Rose is a 81 y.o.  female who slipped on a wet pool edge and found to have right leg fracture. In er found to be hypoxic, treated for CHF and COPD.   Increased cr and decrease in sodium with diuresis, felt not to have CHF, now actually on fluids. On 15th asymptomatic WCT on tele, not available for review. Levaquin stopped. Metoprolol increased. Last night SVT, awoke patient with palpitations. Spontaneously resolved. Cardizem added. Today no chest pain, dyspnea improved, no edema, no orthopnea, no presyncope. No other complaints. We were asked to consult for work up and evaluation of the above problems. Past Medical History:   Diagnosis Date    Arthritis     CAD (coronary artery disease)     renal stent; Dr Sal Neal     Diabetes [facility])     oral    History of bronchitis     Hyperlipemia     Hypertension       Past Surgical History:   Procedure Laterality Date    HX CATARACT REMOVAL Bilateral     HX CHOLECYSTECTOMY      HX HYSTERECTOMY      HX ORTHOPAEDIC      R hand middle finger bone spur    VASCULAR SURGERY PROCEDURE UNLIST      renal stent    VASCULAR SURGERY PROCEDURE UNLIST  3/5/14    LEFT FEMORAL-POPLITEAL BYPASS WITH PTFE GORTEX GRAFT      Allergies   Allergen Reactions    Codeine Rash and Other (comments)     hyper    Ibuprofen [Ibuprofen] Other (comments)     Stomach pains    Simvastatin Itching      Meds:  See below  Social History   Substance Use Topics    Smoking status: Former Smoker     Packs/day: 1.50     Years: 40.00     Quit date: 12/20/2009    Smokeless tobacco: Never Used    Alcohol use No      Family History   Problem Relation Age of Onset    Heart Disease Mother     Seizures Mother     Diabetes Father     Heart Disease Father     Diabetes Sister     Cancer Sister     Breast Cancer Sister 61    MS Brother        REVIEW OF SYSTEMS:     []         Unable to obtain  ROS due to ---   [x]         Total of 12 systems reviewed as follows:     Total of 12 systems reviewed as follows:       POSITIVE= Bold text  Negative = normal text  General:  fever, chills, sweats, generalized weakness, weight loss/gain,      loss of appetite   Eyes:    blurred vision, eye pain, loss of vision, double vision  ENT:    rhinorrhea, pharyngitis   Respiratory:   cough, sputum production, SOB, ODOM, wheezing, pleuritic pain   Cardiology:   chest pain, palpitations, orthopnea, PND, edema, syncope   Gastrointestinal:  abdominal pain , N/V, diarrhea, dysphagia, constipation, bleeding   Genitourinary:  frequency, urgency, dysuria, hematuria, incontinence   Muskuloskeletal :  arthralgia, myalgia, back pain  Hematology:  easy bruising, nose or gum bleeding, lymphadenopathy   Dermatological: rash, ulceration, pruritis, color change / jaundice  Endocrine:   hot flashes or polydipsia   Neurological:  headache, dizziness, confusion, focal weakness, paresthesia,     Speech difficulties, memory loss, gait difficulty  Psychological: Feelings of anxiety, depression, agitation    Objective:      Physical Exam:    Last 24hrs VS reviewed since prior progress note. Most recent are:    Visit Vitals    BP (!) 137/36    Pulse (!) 58    Temp 98.5 °F (36.9 °C)    Resp 15    Ht 5' 3\" (1.6 m)    Wt 78 kg (172 lb)    SpO2 98%    BMI 30.47 kg/m2       Intake/Output Summary (Last 24 hours) at 07/17/17 1017  Last data filed at 07/17/17 8982   Gross per 24 hour   Intake                0 ml   Output             2825 ml   Net            -2825 ml        General Appearance: Well developed, elderly, alert & oriented x 3,    no acute distress. Ears/Nose/Mouth/Throat: Pupils equal and round, Hearing grossly normal.  Neck: Supple. JVP within normal limits. Carotids good upstrokes, with no bruit. Chest: Lungs clear to auscultation bilaterally. Cardiovascular: Regular rate and rhythm, S1S2 normal, no murmur, rubs, gallops. Abdomen: Soft, non-tender, bowel sounds are active. No organomegaly. Extremities: No edema bilaterally. Femoral pulses +1, Distal Pulses +1. Right leg in fixator. Skin: Warm and dry. Neuro: CN II-XII grossly intact, Strength and sensation grossly intact.     Data:      Prior to Admission medications Medication Sig Start Date End Date Taking? Authorizing Provider   acetaminophen (TYLENOL) 325 mg tablet Take 2 Tabs by mouth every six (6) hours for 14 days. 7/14/17 7/28/17 Yes Ale Hoffman NP   oxyCODONE IR (ROXICODONE) 5 mg immediate release tablet Take 0.5-1 Tabs by mouth every four (4) hours as needed. Max Daily Amount: 30 mg. 7/14/17  Yes Ale Hoffman NP   polyethylene glycol (MIRALAX) 17 gram packet Take 1 Packet by mouth daily as needed (constipation) for up to 15 days. 7/14/17 7/29/17 Yes Ale Hoffman NP   senna-docusate (PERICOLACE) 8.6-50 mg per tablet Take 1 Tab by mouth daily. 7/14/17  Yes Ale Hoffman NP   lovastatin (MEVACOR) 10 mg tablet Take 10 mg by mouth nightly. Yes Phys Eligio, MD   irbesartan (AVAPRO) 300 mg tablet Take 300 mg by mouth nightly. Yes Historical Provider   metoprolol succinate (TOPROL-XL) 100 mg XL tablet Take 100 mg by mouth daily. Yes Historical Provider   clopidogrel (PLAVIX) 75 mg tablet Take 75 mg by mouth daily. Yes Historical Provider   amlodipine (NORVASC) 5 mg tablet Take 10 mg by mouth daily. Yes Historical Provider   nicotinic acid (NIACIN) 500 mg tablet Take 500 mg by mouth Daily (before breakfast). Historical Provider   methylcellulose, laxative, (CITRUCEL) Powd Take 1 Dose by mouth as needed. Historical Provider   acetaminophen (TYLENOL EX STR ARTHRITIS PAIN) 500 mg tablet Take 325 mg by mouth every six (6) hours as needed.     Historical Provider       Recent Results (from the past 24 hour(s))   GLUCOSE, POC    Collection Time: 07/16/17 12:10 PM   Result Value Ref Range    Glucose (POC) 213 (H) 65 - 100 mg/dL    Performed by Italo Murdock, POC    Collection Time: 07/16/17  3:38 PM   Result Value Ref Range    Glucose (POC) 191 (H) 65 - 100 mg/dL    Performed by Italo Murdock, POC    Collection Time: 07/16/17  9:03 PM   Result Value Ref Range    Glucose (POC) 212 (H) 65 - 100 mg/dL    Performed by Mary Gracia Orquidea    CBC WITH AUTOMATED DIFF    Collection Time: 07/17/17  2:51 AM   Result Value Ref Range    WBC 18.3 (H) 3.6 - 11.0 K/uL    RBC 3.20 (L) 3.80 - 5.20 M/uL    HGB 10.1 (L) 11.5 - 16.0 g/dL    HCT 28.5 (L) 35.0 - 47.0 %    MCV 89.1 80.0 - 99.0 FL    MCH 31.6 26.0 - 34.0 PG    MCHC 35.4 30.0 - 36.5 g/dL    RDW 12.3 11.5 - 14.5 %    PLATELET 685 034 - 228 K/uL    NEUTROPHILS 92 (H) 32 - 75 %    LYMPHOCYTES 4 (L) 12 - 49 %    MONOCYTES 4 (L) 5 - 13 %    EOSINOPHILS 0 0 - 7 %    BASOPHILS 0 0 - 1 %    ABS. NEUTROPHILS 17.0 (H) 1.8 - 8.0 K/UL    ABS. LYMPHOCYTES 0.7 (L) 0.8 - 3.5 K/UL    ABS. MONOCYTES 0.7 0.0 - 1.0 K/UL    ABS. EOSINOPHILS 0.0 0.0 - 0.4 K/UL    ABS. BASOPHILS 0.0 0.0 - 0.1 K/UL   METABOLIC PANEL, COMPREHENSIVE    Collection Time: 07/17/17  2:51 AM   Result Value Ref Range    Sodium 124 (L) 136 - 145 mmol/L    Potassium 4.9 3.5 - 5.1 mmol/L    Chloride 88 (L) 97 - 108 mmol/L    CO2 28 21 - 32 mmol/L    Anion gap 8 5 - 15 mmol/L    Glucose 177 (H) 65 - 100 mg/dL    BUN 51 (H) 6 - 20 MG/DL    Creatinine 0.89 0.55 - 1.02 MG/DL    BUN/Creatinine ratio 57 (H) 12 - 20      GFR est AA >60 >60 ml/min/1.73m2    GFR est non-AA >60 >60 ml/min/1.73m2    Calcium 9.2 8.5 - 10.1 MG/DL    Bilirubin, total 0.3 0.2 - 1.0 MG/DL    ALT (SGPT) 19 12 - 78 U/L    AST (SGOT) 29 15 - 37 U/L    Alk.  phosphatase 55 45 - 117 U/L    Protein, total 6.0 (L) 6.4 - 8.2 g/dL    Albumin 2.4 (L) 3.5 - 5.0 g/dL    Globulin 3.6 2.0 - 4.0 g/dL    A-G Ratio 0.7 (L) 1.1 - 2.2     TROPONIN I    Collection Time: 07/17/17  2:51 AM   Result Value Ref Range    Troponin-I, Qt. 0.45 (H) <0.05 ng/mL   CK W/ CKMB & INDEX    Collection Time: 07/17/17  2:51 AM   Result Value Ref Range     (H) 26 - 192 U/L    CK - MB 4.4 (H) <3.6 NG/ML    CK-MB Index 1.1 0 - 2.5     GLUCOSE, POC    Collection Time: 07/17/17  7:58 AM   Result Value Ref Range    Glucose (POC) 239 (H) 65 - 100 mg/dL    Performed by Earl Lee

## 2017-07-17 NOTE — PROGRESS NOTES
Patient now c/o chest pain. Monitor reads HR 64, NSR. Called for in-hospital doctor. Spoke with Dr. Rell Garland. New orders given.

## 2017-07-17 NOTE — PROGRESS NOTES
Rapid Response Note    Called in by RR RN with report that patient has sustained tachycardia in 150/mins  On review of Chart - pt had similar tachyarrhythmia last night too- was taken off Levaquin, Metoprolol dose increased to 150 mg daily    SBP high, sats stable  7/15 PM abs noted- Mag ok, K+ stable ~ 5.2    Ordered IV Lopressor 5 mg x 2 , followed by IV cardizem drip.   Will place cardiology consult for further recommendations, previously seen by Dr Brenda Shabazz as per [facility]

## 2017-07-17 NOTE — PROGRESS NOTES
Ortho / Neurosurgery NP Note    POD# 4  s/p RIGHT ANKLE OPEN REDUCTION INTERNAL FIXATION, application of external fixator     Pt resting in bed. No complaints. Denies SOB, CP.    VSS Afebrile. Noted Cardiac events over weekend with Wide complex tachycardia and now Afib  Voiding status:+ void    Labs  Lab Results   Component Value Date/Time    HGB 10.1 2017 02:51 AM      Lab Results   Component Value Date/Time    INR 1.0 2014 09:50 AM        Body mass index is 30.47 kg/(m^2). : A BMI >30 is classified as Obesity. Ace wrap dressing c.d.i over top of external fixator. Cryotherapy in place over incision  Calves soft and supple; No pain with passive stretch  Sensation and motor intact  Cap refill to toes are adequate  Foot Pumps for mechanical DVT proph while in bed     PLAN:  1) PT BID, NWB on RLE  2) Continuing home Plavix  3) Hypoxia - Hx smoking. Stillwearing NC at 2 LPM.  Wean as tolerated. 4) Tachycardia - Afib with RVR - Dr. Alberta Flores managing  5) Will need SNF placement. Received [employer] auth on  and will  in 72 hours.   [id number] Kristan Ospina for d/c from Ortho perspecitve - D/c when medically stable    Casey Tay NP

## 2017-07-17 NOTE — PROCEDURES
[facility] Procedure Note    Indication: Inadequate venous access    Risks, benefits, alternatives explained and consent obtained. Patient positioned in Trendelenburg. Full sterile barrier precautions used. 7-Step Sterility Protocol followed. (cap, mask sterile gown, sterile gloves, large sterile sheet, hand hygiene, 2% chlorhexidine for cutaneous antisepsis)  5 mL 1% Lidocaine placed at insertion site. Without using ultrasound guidance, Right internal jugular could not be localized after multiple attempts in trendelenberg. Right subclavian vein cannulated x 1 attempt(s) utilizing the modified Seldinger technique. Overall moderate degree of difficulty. Good blood return. Catheter secured & Biopatch applied. Sterile Tegaderm placed. Pt tolerated procedure well and was awake and interactive throughout procedure. CXR reviewed. Good postion.  No PTX    Margarito Garcia MD

## 2017-07-17 NOTE — PROGRESS NOTES
Spoke with PT, NP and nursing. Per PT patient experiencing SOB, and symptomatic orthostatic hypotension with EOB sitting. Patient now back in bed with NP and nursing assessing patient for a decline in her medical status. Will defer OT this afternoon and follow back tomorrow.

## 2017-07-17 NOTE — CONSULTS
Gastroenterology Consultation Note  Dr. Zeke Lozano    NAME: Carly Frye : 1945 MRN: [id number]   PCP: Steve Salguero MD   Primary GI: Dr. Jessica Cuello  Date/Time:  2017 5:45 PM  Subjective:   REASON FOR CONSULT:      Evelyne Felty is a 81 y.o.  female who I was asked to see for melena and acute drop in H/H. Patient followed previously by Dr. Jessica Cuello and was dx with Large duodenal ulcer and gastric ulcer on EGD 2014 :  Stomach: The gastric mucosa has moderate erythema in the body and antrum. A medium sized non bleeding antral clean-based ulcer is noted. 2 small body biopsies taken to r/o H pylori are the culprit. The fundus was found to be normal with no lesions noted on retroflexion. The angularis is normal as well.     Duodenum: The bulb has a large erosion on the anterior wall. A medium sized clean based ulcer is seen on the posterior wall. Post bulbar mucosa has a narrowing (? inflammatory in appearance) that allows the scope to pass through    She was admitted on 17 with COPD exacerbation and Diastolic CHF and was post recent tib / fib fracture and is on Plavix daily and overnight she started to become tachycardic to the 150-160 range and a rapid response was called. Her labs this am showed Hgb of 10.1 unchanged from day prior. Then she was working with PT and became very lightheaded and almost fell and then around 3 pm was reported to have a large melanotic stool. Earlier in the day reportedly had brown colored stool. She was sent for an emergent CT chest and is now seen at the bedside in the ICU. She has not yet received blood. She is to receive a central line.   She is currently on BIPAP and BP is 144/41 with HR 66.    Past Medical History:   Diagnosis Date    Arthritis     CAD (coronary artery disease)     renal stent; Dr Huitron Lung     Diabetes [facility])     oral    History of bronchitis     Hyperlipemia     Hypertension       Past Surgical History: Procedure Laterality Date    HX CATARACT REMOVAL Bilateral     HX CHOLECYSTECTOMY      HX HYSTERECTOMY      HX ORTHOPAEDIC      R hand middle finger bone spur    VASCULAR SURGERY PROCEDURE UNLIST      renal stent    VASCULAR SURGERY PROCEDURE UNLIST  3/5/14    LEFT FEMORAL-POPLITEAL BYPASS WITH PTFE GORTEX GRAFT      Social History   Substance Use Topics    Smoking status: Former Smoker     Packs/day: 1.50     Years: 40.00     Quit date: 12/20/2009    Smokeless tobacco: Never Used    Alcohol use No      Family History   Problem Relation Age of Onset    Heart Disease Mother     Seizures Mother     Diabetes Father     Heart Disease Father     Diabetes Sister     Cancer Sister     Breast Cancer Sister 61    MS Brother       Allergies   Allergen Reactions    Codeine Rash and Other (comments)     hyper    Ibuprofen [Ibuprofen] Other (comments)     Stomach pains    Simvastatin Itching      Home Medications:  Prior to Admission Medications   Prescriptions Last Dose Informant Patient Reported? Taking?   acetaminophen (TYLENOL EX STR ARTHRITIS PAIN) 500 mg tablet   Yes No   Sig: Take 325 mg by mouth every six (6) hours as needed. amlodipine (NORVASC) 5 mg tablet 7/12/2017 at Unknown time  Yes Yes   Sig: Take 10 mg by mouth daily. clopidogrel (PLAVIX) 75 mg tablet 7/12/2017 at Unknown time  Yes Yes   Sig: Take 75 mg by mouth daily. irbesartan (AVAPRO) 300 mg tablet 7/12/2017 at Unknown time  Yes Yes   Sig: Take 300 mg by mouth nightly. lovastatin (MEVACOR) 10 mg tablet   Yes Yes   Sig: Take 10 mg by mouth nightly. methylcellulose, laxative, (CITRUCEL) Powd   Yes No   Sig: Take 1 Dose by mouth as needed. metoprolol succinate (TOPROL-XL) 100 mg XL tablet 7/12/2017 at Unknown time Other Yes Yes   Sig: Take 100 mg by mouth daily. nicotinic acid (NIACIN) 500 mg tablet   Yes No   Sig: Take 500 mg by mouth Daily (before breakfast).       Facility-Administered Medications: None     [facility] medications:  Current Facility-Administered Medications   Medication Dose Route Frequency    metoprolol (LOPRESSOR) 5 mg/5 mL injection        0.9% sodium chloride infusion 250 mL  250 mL IntraVENous PRN    pantoprazole (PROTONIX) 80 mg in sodium chloride 0.9 % 20 mL injection  80 mg IntraVENous ONCE    pantoprazole (PROTONIX) 40 mg in 0.9% sodium chloride (MBP/ADV) 50 mL  8 mg/hr IntraVENous CONTINUOUS    sodium chloride 0.9 % bolus infusion 1,000 mL  1,000 mL IntraVENous ONCE    metoprolol tartrate (LOPRESSOR) tablet 12.5 mg  12.5 mg Oral Q6H    [START ON 2017] insulin lispro (HUMALOG) injection   SubCUTAneous Q6H    0.9% sodium chloride infusion  100 mL/hr IntraVENous CONTINUOUS    albuterol-ipratropium (DUO-NEB) 2.5 MG-0.5 MG/3 ML  3 mL Nebulization Q6H PRN    hydrALAZINE (APRESOLINE) 20 mg/mL injection 10 mg  10 mg IntraVENous Q6H PRN    glucose chewable tablet 16 g  4 Tab Oral PRN    glucagon (GLUCAGEN) injection 1 mg  1 mg IntraMUSCular PRN    bisacodyl (DULCOLAX) suppository 10 mg  10 mg Rectal DAILY PRN    sodium chloride (NS) flush 5-10 mL  5-10 mL IntraVENous Q8H    sodium chloride (NS) flush 5-10 mL  5-10 mL IntraVENous PRN    naloxone (NARCAN) injection 0.4 mg  0.4 mg IntraVENous PRN    ondansetron (ZOFRAN ODT) tablet 4 mg  4 mg Oral Q4H PRN    oxyCODONE IR (ROXICODONE) tablet 5 mg  5 mg Oral Q4H PRN    acetaminophen (TYLENOL) tablet 650 mg  650 mg Oral Q6H    oxyCODONE IR (ROXICODONE) tablet 2.5 mg  2.5 mg Oral Q4H PRN    dextrose 10% infusion 125-250 mL  125-250 mL IntraVENous PRN     REVIEW OF SYSTEMS:      Review of Systems - History obtained from chart review and unobtainable from patient due to very SOB breathing with BIPAP    Objective:   VITALS:    Visit Vitals    BP (!) 124/31    Pulse (!) 55    Temp 97.7 °F (36.5 °C)    Resp 22    Ht 5' 3\" (1.6 m)    Wt 92.1 kg (203 lb 0.7 oz)    SpO2 97%    BMI 35.97 kg/m2     Temp (24hrs), Av.2 °F (36.8 °C), Min:97.7 °F (36.5 °C), Max:98.6 °F (37 °C)    PHYSICAL EXAM:   General:    Dyspneic, pale WF breathing with BIPAP unable to speak in complete sentences  Head:   Normocephalic, without obvious abnormality, atraumatic. Eyes:   Conjunctivae clear, anicteric sclerae. Pupils are equal  Nose:  Nares normal.   Throat:    Lips, mucosa, and tongue normal.  No Thrush  Neck:  Supple, symmetrical,  no adenopathy, thyroid: non tender  Back:    Symmetric,  No CVA tenderness. Lungs:   Decreased BS b/l. Heart:   Regular rate and rhythm,  no murmur, rub or gallop. Abdomen:   Mild distention, soft, nontender, + BS, no HSM  Rectal:  Melena seen in diaper  Extremities: No cyanosis. No edema. No clubbing  Skin:     Texture, turgor normal. No rashes/lesions/jaundice  Lymph: Cervical, supraclavicular normal.  Psych:  Good insight. Not depressed. Not anxious or agitated. Neurologic: No facial asymmetry. No aphasia or slurred speech. LAB DATA REVIEWED:    Lab Results   Component Value Date/Time    WBC 18.3 07/17/2017 02:51 AM    HGB 5.4 07/17/2017 03:25 PM    HCT 28.5 07/17/2017 02:51 AM    PLATELET 016 86/16/0150 02:51 AM    MCV 89.1 07/17/2017 02:51 AM     Lab Results   Component Value Date/Time    ALT (SGPT) 19 07/17/2017 02:51 AM    AST (SGOT) 29 07/17/2017 02:51 AM    Alk.  phosphatase 55 07/17/2017 02:51 AM    Bilirubin, total 0.3 07/17/2017 02:51 AM     Lab Results   Component Value Date/Time    Sodium 124 07/17/2017 02:51 AM    Potassium 4.9 07/17/2017 02:51 AM    Chloride 88 07/17/2017 02:51 AM    CO2 28 07/17/2017 02:51 AM    Anion gap 8 07/17/2017 02:51 AM    Glucose 177 07/17/2017 02:51 AM    BUN 51 07/17/2017 02:51 AM    Creatinine 0.89 07/17/2017 02:51 AM    BUN/Creatinine ratio 57 07/17/2017 02:51 AM    GFR est AA >60 07/17/2017 02:51 AM    GFR est non-AA >60 07/17/2017 02:51 AM    Calcium 9.2 07/17/2017 02:51 AM     No results found for: LPSE  Lab Results   Component Value Date/Time    INR 1.0 02/21/2014 09:50 AM    INR 1.0 09/20/2011 01:17 PM    Prothrombin time 10.0 02/21/2014 09:50 AM    Prothrombin time 10.0 09/20/2011 01:17 PM       Impression:  Acute GI blood loss anemia  Orthostatic hypotension  Hyponatremia  COPD  CHF  H/O Duodenal ulcer 11/2014     Plan:  Patient is currently hemodynamically stabilizing with a significant acute UGIB with melena and drop in Hgb from 10 to 5 today. She needs to be stabilized with central line access, IV PPI for suspected recurrent ulcer bleeding and to receive PRBCs and respiratory optimization in preparation for an EGD in the next 24 hours once she has been stabilized.   -keep NPO  -transfuse PRBCs STAT as you're doing  -stat CTA declined by radiology due to recent CTA chest  -plan on EGD tomorrow hopefully with anesthesia support (took plavix during this admission and is on hold as of today)  -IV PPI infusion has been started  -plan d/w Dr. Nasir Alvarenga and Dr. Yolanda Larson         __________________________________________________  Care Plan discussed with:    [x]    Patient   []    Family   [x]    Nursing   []    Attending   ___________________________________________________  GI: Mariia Grande MD

## 2017-07-17 NOTE — PROGRESS NOTES
Patient noted with a change in condition. Notified by physical therapy that diastolic blood pressure 29. Jimmie Madrid NP also notified and at bedside. Patient drowsy and skin is pale. Verbal plan from MD noted for HGB, stat CT of the chest, and stool occult. Dr. Mayela Rizzo at bedside and notified that patient has had 4 soft loose stools today and the last stool was black in color.

## 2017-07-17 NOTE — CONSULTS
[facility] INTENSIVIST Consult Service Note  Pulmonary, Critical Care, and Sleep Medicine    Name: Cassandra Cuellar MRN: [id number]   : 1945 Hospital: [address] [facility]   Date: 2017   Hospital Day: 6       Subjective/Interval History:   I have reviewed the flowsheet and previous days notes. Seen earlier today on rounds. Reviewed the evaluation and will assist in implementing plan as outlined by Dr. Miladis Singh and team      IMPRESSION:   1. Acute respiratory failure with hypoxia and increased WOB  2. Acute exacerbation of COPD with perhaps component of high output fialure  3. Acute GI bleed with bloody stools  4. Severe anemia from acute blood loss Hgb 10 to now 5.4  5. Limited IV access  6. POD# 4  s/p RIGHT ANKLE OPEN REDUCTION INTERNAL FIXATION, application of external fixator S/p mechanical fall requiring orthopedic surgery of right leg  7. Hyponatremia  8. moderate CAD   9. Echo 2017 EF 55%, mild to mod MR, mild AI  10. Sinus with PSVT  ? AVNRT on telemetry  11. H/o HTN with right renal artery stent  12. DM  13. Dyslipidemia difficulty with daily statin  14. Hx of GI bleed found to have PUD Dr. Willie Olmstead  15. PVD with left fem-pop placed on plavix for this  16. , mild functional capacity, works at [employer]. Pt is critically ill and at high risk of end organ dysfunction and failure  18. Critical care time with pt exclusive of procedures    35     minutes      RECOMMENDATIONS/PLAN:   1. CCU  2. STAT central line- pt agreed to procedure after risks and benefits explained( done)  3. Agree with stat transfusion  4. D/w Mulu Live and Merlene  5. Serial labs  6. Adjust nebs  7. BIPAP  8. Intubate prn  9. DVT, SUP prophylaxis  10. Patient requiring restraints due to threat of injury to self, interference with medical devices.    11. Will be available to assist in medical management while in the CCU pending disposition     Risk of deterioration: high   [x] High complexity decision making was performed  [x] See my orders for details  Tubes:     ICU disposition :Unchanged. Updated Family. Time 6:10PM    Code: Full Code        Subjective/Initial History:   I have reviewed the flowsheet and previous days notes. I was asked by Carina Meier MD to see Nba Zurita in consultation for a chief complaint of acute respiratory failure, hypoxia and SOB in setting of acute GI bleed and profound blood loss anemia. Zeeshan Her is a 81 y.o.  female admitted 7/14  After she slipped on a wet pool edge and found to have right leg fracture. In er found to be hypoxic, treated for CHF and COPD. Increased cr and decrease in sodium with diuresis, felt not to have CHF, now actually on fluids. On 15th asymptomatic WCT on telemetry. Levaquin stopped. Seen by Cardiology. Metoprolol increased. Last night SVT, awoke patient with palpitations. Spontaneously resolved. Cardizem added. Today no chest pain, dyspnea improved, no edema, no orthopnea, no presyncope. No other complaints. Today the patient transferred to ICU by Dr. Aron Yao. Pt had two bloody BMs. Called blood bank and asked for 3 units Emergency pRBC's, 3 units ordered. Plavix discontinued. All antihypertensives stopped (except for metoprolol which was changed to 12.5 mg every 6 hours with BP and HR restrictions) Protonix Bolus and Protonix gtt ordered. Limited IV access. No chest pain. Very wheezy. Mild SOB. Pt lethargic an d could not describe her BM's. CTa chest ordered and completed. No PE on my review. EVETTE abdominal pain or hematesis. Mild heartburn in the past. REmote PUD history    PMH:  has a past medical history of Arthritis; CAD (coronary artery disease); Diabetes ([address] History of bronchitis; Hyperlipemia; and Hypertension.   PSH:   has a past surgical history that includes cholecystectomy; hysterectomy; orthopaedic; vascular surgery procedure unlist; vascular surgery procedure unlist (3/5/14); and cataract removal (Bilateral). FHX: family history includes Breast Cancer (age of onset: 61) in her sister; Cancer in her sister; Diabetes in her father and sister; Heart Disease in her father and mother; MS in her brother; Seizures in her mother. SHX:  reports that she quit smoking about 7 years ago. She has a 60.00 pack-year smoking history. She has never used smokeless tobacco. She reports that she does not drink alcohol or use illicit drugs. ROS:A comprehensive review of systems was negative except for that written in the HPI.     MAR reviewed and pertinent medications noted or modified as needed    Allergies   Allergen Reactions    Codeine Rash and Other (comments)     hyper    Ibuprofen [Ibuprofen] Other (comments)     Stomach pains    Simvastatin Itching        MEDS:   Current Facility-Administered Medications   Medication    metoprolol (LOPRESSOR) 5 mg/5 mL injection    0.9% sodium chloride infusion 250 mL    pantoprazole (PROTONIX) 80 mg in sodium chloride 0.9 % 20 mL injection    pantoprazole (PROTONIX) 40 mg in 0.9% sodium chloride (MBP/ADV) 50 mL    sodium chloride 0.9 % bolus infusion 1,000 mL    metoprolol tartrate (LOPRESSOR) tablet 12.5 mg    [START ON 7/18/2017] insulin lispro (HUMALOG) injection    0.9% sodium chloride infusion    levalbuterol (XOPENEX) nebulizer soln 0.63 mg/3 mL    ipratropium (ATROVENT) 0.02 % nebulizer solution 0.5 mg    hydrALAZINE (APRESOLINE) 20 mg/mL injection 10 mg    glucose chewable tablet 16 g    glucagon (GLUCAGEN) injection 1 mg    bisacodyl (DULCOLAX) suppository 10 mg    sodium chloride (NS) flush 5-10 mL    sodium chloride (NS) flush 5-10 mL    naloxone (NARCAN) injection 0.4 mg    ondansetron (ZOFRAN ODT) tablet 4 mg    oxyCODONE IR (ROXICODONE) tablet 5 mg    acetaminophen (TYLENOL) tablet 650 mg    oxyCODONE IR (ROXICODONE) tablet 2.5 mg    dextrose 10% infusion 125-250 mL        Telemetry:    normal sinus rhythm    Objective:     Vital Signs: Intake/Output: Intake/Output:   Visit Vitals    BP (!) 124/31    Pulse (!) 55    Temp 97.7 °F (36.5 °C)    Resp 22    Ht 5' 3\" (1.6 m)    Wt 92.1 kg (203 lb 0.7 oz)    SpO2 97%    BMI 35.97 kg/m2         O2 Device: Nasal cannula  O2 Flow Rate (L/min): 2 l/min    Wt Readings from Last 4 Encounters:   17 92.1 kg (203 lb 0.7 oz)   01/26/15 73.1 kg (161 lb 4 oz)   11/15/14 72.1 kg (159 lb)   14 73.2 kg (161 lb 6.4 oz)       Temp (24hrs), Av.2 °F (36.8 °C), Min:97.7 °F (36.5 °C), Max:98.6 °F (37 °C)     Intake/Output Summary (Last 24 hours) at 17 1748  Last data filed at 17 0921   Gross per 24 hour   Intake                0 ml   Output             1825 ml   Net            -1825 ml     Last shift:      701 -  190  In: -   Out: 575 [Urine:575]  Last 3 shifts: 07/15 1901 -  0700  In: 350 [P.O.:350]  Out: 3050 [Urine:3050]     Hemodynamics:    CO:    CI:    CVP:    SVR:   PAP Systolic:    PAP Diastolic:    PVR:    YZ11:        Ventilator Settings:      Mode Rate TV Press PEEP FiO2 PIP Min. Vent               100 %              Physical Exam:    General: severely ill   HEENT: NCAT, pale   Neck, Lymph: No abnormally enlarged lymph nodes. Chest: increased AP diameter   Lungs: scattered wheezes bilaterally   Heart: Regular rate and rhythm   Abdomen: soft, protuberant, distended   :    Extremity: negative, cyanosis, clubbing; RLE immobilized with fixator    Neuro: lethargic   Psych: Alert and Oriented x 2   Skin: Pale;   Pulses: Bilateral, Radial, 2+ Normal upper and lower extremity pulses   Capillary refill: abnormal:  sluggish capillary refill, pale;    Data:   Interval lab and diagnostic data was reviewed. Interval radiology images were independently viewed and available reports were reviewed. All lab results for the last 24 hours reviewed.           Labs:    Recent Labs      17   1525  17   0251  17   0445  07/15/17   0328   WBC   --   18.3* 17.8*  19.7*   HGB  5.4*  10.1*  10.1*  10.2*   PLT   --   364  329  292     Recent Labs      07/17/17   0251  07/15/17   2330  07/15/17   1529   07/15/17   0116   NA  124*  123*  123*   < >  122*   K  4.9  5.2*  5.4*   < >  5.0   CL  88*  89*  89*   < >  88*   CO2  28  29  27   < >  26   GLU  177*  155*  190*   < >  136*   BUN  51*  57*  51*   < >  48*   CREA  0.89  1.31*  1.35*   < >  1.45*   CA  9.2  8.8  8.4*   < >  8.2*   MG   --   2.4   --    --   2.0   ALB  2.4*  2.5*   --    --    --    SGOT  29  48*   --    --    --    ALT  19  22   --    --    --     < > = values in this interval not displayed. No results for input(s): PH, PCO2, PO2, HCO3, FIO2 in the last 72 hours. Recent Labs      07/17/17   0251  07/15/17   2330  07/15/17   1529  07/15/17   0956   CPK  386*  820*  1135*  1512*   CKNDX  1.1   --   1.1  1.1   TROIQ  0.45*   --   0.89*  0.96*     Lab Results   Component Value Date/Time     11/15/2014 04:15 AM      Lab Results   Component Value Date/Time    Culture result: NO GROWTH 5 DAYS 07/12/2017 11:16 PM    Culture result: NO GROWTH 5 DAYS 11/13/2014 05:18 AM     Lab Results   Component Value Date/Time     07/17/2017 02:51 AM     07/15/2017 11:30 PM          Imaging:  I have personally reviewed the patients radiographs and have reviewed the reports:   post central line CXR reviewed: line in good position; [facility]. No PTX       Results from Hospital Encounter encounter on 07/12/17   XR ABD FLAT/ ERECT   Narrative Exam: 2 view abdomen    Indication: Abdominal pain    Supine and upright views of the abdomen demonstrate a normal bowel gas pattern. Lung bases are clear. No free air. Vascular calcification is noted. Degenerative  changes are seen in the lumbar spine and hip joints bilaterally. Impression Impression: Normal bowel gas pattern.                  My assessment/plan was discussed with:  nursing    respiratory therapy Dr. wells      I have provided   35    minutes of critical care time rendering care exclusive of any procedures. During this entire length of time I was immediately available to the patient.      The reason for providing this level of medical care was due to a critical illness that impaired one or more vital organ systems, such that there was a high probability of imminent or life threatening deterioration in the patient's condition. Pt's condition is unstable and unpredictable. This care involved high complexity decision making which includes reviewing the patient's past medical records, current laboratory results, medications that were immediately available to me and actual Xray films in order to assess, support vital system function, and to treat this degree of vital organ system failure, and to prevent further life threatening deterioration of the patients condition. Thank you for allowing us to participate in the care of this patient. We will be happy to follow along with you.     Umesh White MD

## 2017-07-17 NOTE — PROGRESS NOTES
2:59 AM  Call placed to Dr. Jeannie Mena office. Dr. Katlin Chavez on call. Waiting for call back. 3:19 AM  Spoke with Dr. Katlin Chavez. No new orders given. Will see patient in a.m.    3:33 AM  Cardizem drip started. Patient resting. Monitor shows NSR, HR 68. Will continue to monitor.

## 2017-07-17 NOTE — DIABETES MGMT
DTC Progress Note    Recommendations/ Comments: Fasting BG 239mg/dL, noted IV steroids changed to PO today. Will continue to follow as needed. Chart reviewed on Shawna Gupta for hyperglycemia. Patient is a 81 y.o. female with known history of Type 2 Diabetes on none at home. A1c:   Lab Results   Component Value Date/Time    Hemoglobin A1c 5.7 07/14/2017 03:22 AM    Hemoglobin A1c 5.5 11/14/2014 03:45 AM       Recent Glucose Results: Lab Results   Component Value Date/Time     (H) 07/17/2017 02:51 AM    GLUCPOC 246 (H) 07/17/2017 11:20 AM    GLUCPOC 239 (H) 07/17/2017 07:58 AM    GLUCPOC 212 (H) 07/16/2017 09:03 PM        Lab Results   Component Value Date/Time    Creatinine 0.89 07/17/2017 02:51 AM     Estimated Creatinine Clearance: 58.1 mL/min (based on Cr of 0.89). Active Orders   Diet    DIET DIABETIC CONSISTENT CARB Regular        PO intake: Patient Vitals for the past 72 hrs:   % Diet Eaten   07/15/17 1912 75 %   07/15/17 1432 60 %       Current hospital DM medication:   -Humalog normal sensitivity correction  -Prednisone 40mg daily with breakfast     Will continue to follow as needed.     Thank you    Amanda Webber, [facility]  Office: [phone]

## 2017-07-17 NOTE — INTERDISCIPLINARY ROUNDS
Bedside interdisciplinary rounds were held today to discuss patient plan of care and outcomes. The following members were present: Nurse Practitioner, Nurse, Clinical Care Leader, Pharmacy, Physical Therapy, and Case Management. Plan:  Plan for [address] [facility] when medically cleared. Dr. Maciel Foster consulted for tachycardic episodes.

## 2017-07-17 NOTE — PROGRESS NOTES
Problem: Mobility Impaired (Adult and Pediatric)  Goal: *Acute Goals and Plan of Care (Insert Text)  Physical Therapy Goals  Initiated 7/14/2017  1. Patient will move from supine to sit and sit to supine in bed with supervision/set-up within 7 day(s). 2. Patient will transfer from bed to chair and chair to bed with minimal assistance/contact guard assist using the least restrictive device within 7 day(s). 3. Patient will perform sit to stand with contact guard assist within 7 day(s). 4. Patient will ambulate with minimal assistance/contact guard assist for 50 feet with the least restrictive device within 7 day(s). 5. Patient will ascend/descend 1 stairs with bilateral handrail(s) with minimal assistance/contact guard assist within 7 day(s). PHYSICAL THERAPY TREATMENT  Patient: Nacho Sen (67 y.o. female)  Date: 7/17/2017  Diagnosis: right ankle fracture  Respiratory failure (HCC) <principal problem not specified>  Procedure(s) (LRB):  RIGHT ANKLE OPEN REDUCTION INTERNAL FIXATION, application of external fixator (Right) 4 Days Post-Op  Precautions:  NWB RLE, Falls      ASSESSMENT:  Patient received supine in bed and agreeable to therapy. Patient stated she was tired from having a rough night. Patient with heart arrhythmias over night. Patient cleared by RN to participate in therapy. Patient completed supine to sit edge with min assist to manage RLE off edge of bed. Patient able to push with bilateral UEs to assume sitting position. Upon initial sitting edge of bed patient reported \"I need a minute and reported feeling dizzy. \" BP obtained: 126/29, HR 72 bpm, O2:100% on 2L. Patient performed bilateral UE therex to attempt to improve BP. BP retaken: 125/39, HR: 71 bpm. Patient with reported increased fatigue and noted increased pallor in the face. Patient able to perform scooting along the bedside with CGA. Patient returned to supine position with min assist and repositioned in bed.  BP taken once in supine position: 149/44. HR: 74 bpm, spO2: 100% on 2L. Patient with difficulty maintaining eyes open, but able to communicate throughout session, just reported feeling fatigued. Notified RN and then ortho NP of her vitals and poor tolerance to activity. Patient will continue to benefit from PT to progress mobility as tolerated. Progression toward goals:  [ ]    Improving appropriately and progressing toward goals  [X]    Improving slowly and progressing toward goals  [ ]    Not making progress toward goals and plan of care will be adjusted       PLAN:  Patient continues to benefit from skilled intervention to address the above impairments. Continue treatment per established plan of care. Discharge Recommendations:  Skilled Nursing Facility  Further Equipment Recommendations for Discharge:  TBD at rehab       SUBJECTIVE:   Patient stated I am so tired from last night.       OBJECTIVE DATA SUMMARY:   Critical Behavior:  Neurologic State: Alert  Orientation Level: Oriented X4  Cognition: Appropriate decision making, Appropriate for age attention/concentration, Appropriate safety awareness, Follows commands     Functional Mobility Training:  Bed Mobility:     Supine to Sit: Minimum assistance  Sit to Supine: Minimum assistance  Scooting: Contact guard assistance        Transfers:   Not tested--pt with low BP, poor tolerance to activity     Balance:  Sitting: Intact  Sitting - Static: Good (unsupported)  Sitting - Dynamic: Good (unsupported)  Ambulation/Gait Training:                       Neuro Re-Education:     Therapeutic Exercises:   Bilateral elbow flexion, shoulder elevation, shoulder abduction x 10 with rest breaks due to fatigue and SOB  Pain:                    Activity Tolerance:   Fair-poor. Limited by low BP and fatigue  Please refer to the flowsheet for vital signs taken during this treatment.   After treatment:   [ ]    Patient left in no apparent distress sitting up in chair  [X]    Patient left in no apparent distress in bed  [ ]    Call bell left within reach  [X]    Nursing notified  [X]    Caregiver present  [ ]    Bed alarm activated      COMMUNICATION/COLLABORATION:   The patients plan of care was discussed with: Occupational Therapist and Registered Nurse     Leonie Hand, PT, DPT   Time Calculation: 11 mins

## 2017-07-17 NOTE — PROGRESS NOTES
Hospitalist Progress Note    NAME: Charlie Carlisle   :  1945   MRN:  [id number]       Assessment / Plan:  Asymptomatic 22 beat run of Wide Complex Tachycardia (VT) on early am of 7/15: question if related to Levaquin use in setting of renal failure  Atrial Fibrillation with RVR on early morning of   -Tn trends ruled out NSTEMI  -continue increased dose Toprol (increased on 7/15)  -change Cardizem gtt to po Cardizem  -Await formal Cardiology consult  -suspect patient needs outpatient sleep study    Acute hypoxic respiratory failure  Acute COPD exacerbation  -continue scheduled and prn nebs, IV steroids change to Prednisone on   -suspect worsening Leukocutosis (17K on  related to Corticosteroids); Leukocytosis essentially stable, expect down-trend with de-escalation to Prednisone    ARF from aggressive diuresis; resolved with hydration  Mild Hyperkalemia; resolved with hydration  Hyponatremia: Sodium Woody of 121 on 7/15; increased to 123 today; urine sodium measurements not consistent with SIADH   -Continue NS at 75 mL/hr  -hold ARB    CHF ruled out (LVEF 55-60%); Patient has interstitial lung disease that was suspected to be pulmonary edema on initial CXR and clinically with diuresis patient went into ARF and become increasingly hyponatremic (lasix was stopped on  with worsening hyponatremia) and CXR without significant change.   Acute Pulmonary Edema Ruled out    Abdominal pain on am of  with continued nausea: resolved after large BM this am  -KUB on  without ileus    Distal tibia and fibula fracture s/p mechanical fall  -s/p ORIF on   -appreciate ortho assistance  -elevated CK improving     Hx of CAD  HTN; uncontrolled  -continue amlodipine, plavix, statin  -Continue Toprol 150 mg daily, Hydralazine 100 mg TID, Amlodipine and Cardizem 30 mg every 4 hours as above     Hx of PVD  -s/p right renal artery stent, left fem-pop by Dr. Marry Babinski 3/2014     Impaired Glucose Management (Diabetes ruled out)  -A1c 5.7  -diet control  -SSI while on corticosteroids     Tobacco use  -nicotine patch    Obesity     Code Status: Full  Surrogate Decision Maker: daughter Conrad Alva [phone]     DVT Prophylaxis: SCDs for now (lovenox if no plan procedure in the AM)  GI Prophylaxis: not indicated     Baseline: independent      Body mass index is 30.47 kg/(m^2). Subjective:     Chief Complaint / Reason for Physician Visit: follow-up respiratory failure  Patient with atrial fibrillation early this am and is back in NSR after cardizem gtt  Patient SOB while in afib but feels better now  Denies CP    Review of Systems:  Symptom Y/N Comments  Symptom Y/N Comments   Fever/Chills    Chest Pain     Poor Appetite y   Edema     Cough    Abdominal Pain n resolved   Sputum    Joint Pain     SOB/ODOM n   Pruritis/Rash     Nausea/vomit n   Tolerating PT/OT     Diarrhea    Tolerating Diet y    Constipation n   Other       Could NOT obtain due to:      Objective:     VITALS:   Last 24hrs VS reviewed since prior progress note. Most recent are:  Patient Vitals for the past 24 hrs:   Temp Pulse Resp BP SpO2   07/17/17 0404 98.6 °F (37 °C) 66 16 159/56 98 %   07/17/17 0240 - (!) 149 - 152/63 -   07/17/17 0230 - (!) 149 - 152/67 -   07/17/17 0220 - (!) 159 - - -   07/17/17 0154 98.4 °F (36.9 °C) (!) 155 - 170/71 96 %   07/16/17 2332 98.5 °F (36.9 °C) 74 18 178/49 97 %   07/16/17 2211 - 69 - 157/63 -   07/16/17 1924 98.2 °F (36.8 °C) 63 18 183/49 -   07/16/17 1540 97.6 °F (36.4 °C) 73 20 179/44 97 %   07/16/17 0837 98.3 °F (36.8 °C) 72 20 161/42 92 %       Intake/Output Summary (Last 24 hours) at 07/17/17 0737  Last data filed at 07/17/17 0057   Gross per 24 hour   Intake                0 ml   Output             2250 ml   Net            -2250 ml        PHYSICAL EXAM:  General: WD, WN. Alert, cooperative, no acute distress    EENT:  EOMI. Anicteric sclerae.  MMM  Resp:  Diffuse wheezing throughout with similar intensity to yesterday. No accessory muscle use  CV:  Regular  rhythm,  No edema (left lower leg in external fixator and bandaged  GI:  Soft, Non distended, Non tender.  +Bowel sounds  Neurologic:  Alert and oriented X 3, normal speech,   Psych:   Good insight. Not anxious nor agitated  Skin:  No rashes. No jaundice    Reviewed most current lab test results and cultures  YES  Reviewed most current radiology test results   YES  Review and summation of old records today    NO  Reviewed patient's current orders and MAR    YES  PMH/SH reviewed - no change compared to H&P  ________________________________________________________________________  Care Plan discussed with:    Comments   Patient x    Family      RN x    Care Manager     Consultant                        Multidiciplinary team rounds were held today with , nursing, pharmacist and clinical coordinator. Patient's plan of care was discussed; medications were reviewed and discharge planning was addressed. ________________________________________________________________________  Total NON critical care TIME:  25   Minutes    Total CRITICAL CARE TIME Spent:   Minutes non procedure based      Comments   >50% of visit spent in counseling and coordination of care     ________________________________________________________________________  Kp Peguero MD     Procedures: see electronic medical records for all procedures/Xrays and details which were not copied into this note but were reviewed prior to creation of Plan. LABS:  I reviewed today's most current labs and imaging studies.   Pertinent labs include:  Recent Labs      07/17/17   0251  07/16/17   0445  07/15/17   0328   WBC  18.3*  17.8*  19.7*   HGB  10.1*  10.1*  10.2*   HCT  28.5*  28.7*  28.5*   PLT  364  329  292     Recent Labs      07/17/17   0251  07/15/17   2330  07/15/17   1529   07/15/17   0116   NA  124*  123*  123*   < >  122*   K  4.9  5.2*  5.4*   < >  5.0   CL  88*  89*  89*   < > 88*   CO2  28  29  27   < >  26   GLU  177*  155*  190*   < >  136*   BUN  51*  57*  51*   < >  48*   CREA  0.89  1.31*  1.35*   < >  1.45*   CA  9.2  8.8  8.4*   < >  8.2*   MG   --   2.4   --    --   2.0   ALB  2.4*  2.5*   --    --    --    TBILI  0.3  0.3   --    --    --    SGOT  29  48*   --    --    --    ALT  19  22   --    --    --     < > = values in this interval not displayed.        Signed: Yuli Baugh MD

## 2017-07-18 ENCOUNTER — ANESTHESIA (OUTPATIENT)
Dept: ENDOSCOPY | Age: 72
DRG: 492 | End: 2017-07-18
Payer: MEDICARE

## 2017-07-18 ENCOUNTER — ANESTHESIA EVENT (OUTPATIENT)
Dept: ENDOSCOPY | Age: 72
DRG: 492 | End: 2017-07-18
Payer: MEDICARE

## 2017-07-18 VITALS
SYSTOLIC BLOOD PRESSURE: 116 MMHG | RESPIRATION RATE: 18 BRPM | DIASTOLIC BLOOD PRESSURE: 26 MMHG | OXYGEN SATURATION: 96 % | HEART RATE: 75 BPM

## 2017-07-18 LAB
ABO + RH BLD: NORMAL
ANION GAP BLD CALC-SCNC: 5 MMOL/L (ref 5–15)
ANION GAP BLD CALC-SCNC: 6 MMOL/L (ref 5–15)
APPEARANCE UR: CLEAR
APTT PPP: <20 SEC (ref 22.1–32.5)
BASOPHILS # BLD AUTO: 0 K/UL
BASOPHILS # BLD: 0 %
BILIRUB UR QL: NEGATIVE
BLD PROD TYP BPU: NORMAL
BLOOD GROUP ANTIBODIES SERPL: NORMAL
BNP SERPL-MCNC: 218 PG/ML (ref 0–100)
BPU ID: NORMAL
BUN SERPL-MCNC: 100 MG/DL (ref 6–20)
BUN SERPL-MCNC: 103 MG/DL (ref 6–20)
BUN/CREAT SERPL: 79 (ref 12–20)
BUN/CREAT SERPL: 80 (ref 12–20)
CA-I BLD-SCNC: 1.17 MMOL/L (ref 1.13–1.32)
CALCIUM SERPL-MCNC: 8 MG/DL (ref 8.5–10.1)
CALCIUM SERPL-MCNC: 8 MG/DL (ref 8.5–10.1)
CHLORIDE SERPL-SCNC: 97 MMOL/L (ref 97–108)
CHLORIDE SERPL-SCNC: 99 MMOL/L (ref 97–108)
CO2 SERPL-SCNC: 26 MMOL/L (ref 21–32)
CO2 SERPL-SCNC: 26 MMOL/L (ref 21–32)
COLOR UR: NORMAL
CORTIS AM PEAK SERPL-MCNC: 22.9 UG/DL (ref 4.3–22.4)
CREAT SERPL-MCNC: 1.26 MG/DL (ref 0.55–1.02)
CREAT SERPL-MCNC: 1.28 MG/DL (ref 0.55–1.02)
CROSSMATCH RESULT,%XM: NORMAL
DIFFERENTIAL METHOD BLD: ABNORMAL
EOSINOPHIL # BLD: 0 K/UL
EOSINOPHIL NFR BLD: 0 %
ERYTHROCYTE [DISTWIDTH] IN BLOOD BY AUTOMATED COUNT: 14 % (ref 11.5–14.5)
FIBRINOGEN PPP-MCNC: 247 MG/DL (ref 200–475)
GLUCOSE BLD STRIP.AUTO-MCNC: 137 MG/DL (ref 65–100)
GLUCOSE BLD STRIP.AUTO-MCNC: 142 MG/DL (ref 65–100)
GLUCOSE BLD STRIP.AUTO-MCNC: 155 MG/DL (ref 65–100)
GLUCOSE BLD STRIP.AUTO-MCNC: 177 MG/DL (ref 65–100)
GLUCOSE BLD STRIP.AUTO-MCNC: 194 MG/DL (ref 65–100)
GLUCOSE SERPL-MCNC: 125 MG/DL (ref 65–100)
GLUCOSE SERPL-MCNC: 148 MG/DL (ref 65–100)
GLUCOSE UR STRIP.AUTO-MCNC: NEGATIVE MG/DL
HCT VFR BLD AUTO: 20.6 % (ref 35–47)
HCT VFR BLD AUTO: 25.7 % (ref 35–47)
HCT VFR BLD AUTO: 27.1 % (ref 35–47)
HCT VFR BLD AUTO: 27.7 % (ref 35–47)
HGB BLD-MCNC: 10.1 G/DL (ref 11.5–16)
HGB BLD-MCNC: 7.4 G/DL (ref 11.5–16)
HGB BLD-MCNC: 9.1 G/DL (ref 11.5–16)
HGB BLD-MCNC: 9.7 G/DL (ref 11.5–16)
HGB UR QL STRIP: NEGATIVE
INR PPP: 1.1 (ref 0.9–1.1)
KETONES UR QL STRIP.AUTO: NEGATIVE MG/DL
LACTATE SERPL-SCNC: 1.2 MMOL/L (ref 0.4–2)
LEUKOCYTE ESTERASE UR QL STRIP.AUTO: NEGATIVE
LYMPHOCYTES # BLD AUTO: 8 %
LYMPHOCYTES # BLD: 2.2 K/UL
MAGNESIUM SERPL-MCNC: 2 MG/DL (ref 1.6–2.4)
MAGNESIUM SERPL-MCNC: 2.1 MG/DL (ref 1.6–2.4)
MCH RBC QN AUTO: 30 PG (ref 26–34)
MCHC RBC AUTO-ENTMCNC: 35.8 G/DL (ref 30–36.5)
MCV RBC AUTO: 83.9 FL (ref 80–99)
METAMYELOCYTES NFR BLD MANUAL: 1 %
MONOCYTES # BLD: 1.1 K/UL
MONOCYTES NFR BLD AUTO: 4 %
NEUTS BAND NFR BLD MANUAL: 1 %
NEUTS SEG # BLD: 23.6 K/UL
NEUTS SEG NFR BLD AUTO: 86 %
NITRITE UR QL STRIP.AUTO: NEGATIVE
NRBC # BLD: 0.12 K/UL (ref 0–0.01)
NRBC BLD-RTO: 0.4 PER 100 WBC
PATH REV BLD -IMP: NORMAL
PH UR STRIP: 5.5 [PH] (ref 5–8)
PHOSPHATE SERPL-MCNC: 3.2 MG/DL (ref 2.6–4.7)
PLATELET # BLD AUTO: 177 K/UL (ref 150–400)
POTASSIUM SERPL-SCNC: 5.5 MMOL/L (ref 3.5–5.1)
POTASSIUM SERPL-SCNC: 5.5 MMOL/L (ref 3.5–5.1)
POTASSIUM UR-SCNC: 31 MMOL/L
PROT UR STRIP-MCNC: NEGATIVE MG/DL
PROTHROMBIN TIME: 11.5 SEC (ref 9–11.1)
RBC # BLD AUTO: 3.23 M/UL (ref 3.8–5.2)
RBC MORPH BLD: ABNORMAL
SERVICE CMNT-IMP: ABNORMAL
SODIUM SERPL-SCNC: 129 MMOL/L (ref 136–145)
SODIUM SERPL-SCNC: 130 MMOL/L (ref 136–145)
SP GR UR REFRACTOMETRY: 1.02 (ref 1–1.03)
SPECIMEN EXP DATE BLD: NORMAL
STATUS OF UNIT,%ST: NORMAL
THERAPEUTIC RANGE,PTTT: ABNORMAL SECS (ref 58–77)
TROPONIN I SERPL-MCNC: 1.2 NG/ML
UNIT DIVISION, %UDIV: 0
UROBILINOGEN UR QL STRIP.AUTO: 0.2 EU/DL (ref 0.2–1)
WBC # BLD AUTO: 27.1 K/UL (ref 3.6–11)
WBC NRBC COR # BLD: ABNORMAL 10*3/UL

## 2017-07-18 PROCEDURE — 74011250636 HC RX REV CODE- 250/636: Performed by: INTERNAL MEDICINE

## 2017-07-18 PROCEDURE — 94003 VENT MGMT INPAT SUBQ DAY: CPT

## 2017-07-18 PROCEDURE — 84133 ASSAY OF URINE POTASSIUM: CPT | Performed by: INTERNAL MEDICINE

## 2017-07-18 PROCEDURE — 84100 ASSAY OF PHOSPHORUS: CPT | Performed by: INTERNAL MEDICINE

## 2017-07-18 PROCEDURE — 85018 HEMOGLOBIN: CPT | Performed by: INTERNAL MEDICINE

## 2017-07-18 PROCEDURE — 74011000250 HC RX REV CODE- 250

## 2017-07-18 PROCEDURE — 51798 US URINE CAPACITY MEASURE: CPT

## 2017-07-18 PROCEDURE — C9113 INJ PANTOPRAZOLE SODIUM, VIA: HCPCS | Performed by: INTERNAL MEDICINE

## 2017-07-18 PROCEDURE — 86677 HELICOBACTER PYLORI ANTIBODY: CPT | Performed by: SPECIALIST

## 2017-07-18 PROCEDURE — 82962 GLUCOSE BLOOD TEST: CPT

## 2017-07-18 PROCEDURE — 74011250637 HC RX REV CODE- 250/637: Performed by: INTERNAL MEDICINE

## 2017-07-18 PROCEDURE — 82330 ASSAY OF CALCIUM: CPT | Performed by: INTERNAL MEDICINE

## 2017-07-18 PROCEDURE — 36415 COLL VENOUS BLD VENIPUNCTURE: CPT | Performed by: INTERNAL MEDICINE

## 2017-07-18 PROCEDURE — 76060000031 HC ANESTHESIA FIRST 0.5 HR: Performed by: SPECIALIST

## 2017-07-18 PROCEDURE — 77010033678 HC OXYGEN DAILY

## 2017-07-18 PROCEDURE — 74011000250 HC RX REV CODE- 250: Performed by: INTERNAL MEDICINE

## 2017-07-18 PROCEDURE — 84484 ASSAY OF TROPONIN QUANT: CPT | Performed by: INTERNAL MEDICINE

## 2017-07-18 PROCEDURE — 83735 ASSAY OF MAGNESIUM: CPT | Performed by: INTERNAL MEDICINE

## 2017-07-18 PROCEDURE — 74011000258 HC RX REV CODE- 258: Performed by: INTERNAL MEDICINE

## 2017-07-18 PROCEDURE — 0DJ08ZZ INSPECTION OF UPPER INTESTINAL TRACT, VIA NATURAL OR ARTIFICIAL OPENING ENDOSCOPIC: ICD-10-PCS | Performed by: SPECIALIST

## 2017-07-18 PROCEDURE — 85610 PROTHROMBIN TIME: CPT | Performed by: INTERNAL MEDICINE

## 2017-07-18 PROCEDURE — 83605 ASSAY OF LACTIC ACID: CPT

## 2017-07-18 PROCEDURE — 85025 COMPLETE CBC W/AUTO DIFF WBC: CPT | Performed by: INTERNAL MEDICINE

## 2017-07-18 PROCEDURE — 82533 TOTAL CORTISOL: CPT | Performed by: INTERNAL MEDICINE

## 2017-07-18 PROCEDURE — 74011250637 HC RX REV CODE- 250/637: Performed by: SPECIALIST

## 2017-07-18 PROCEDURE — 94640 AIRWAY INHALATION TREATMENT: CPT

## 2017-07-18 PROCEDURE — 74011250637 HC RX REV CODE- 250/637: Performed by: PHYSICIAN ASSISTANT

## 2017-07-18 PROCEDURE — 83880 ASSAY OF NATRIURETIC PEPTIDE: CPT | Performed by: INTERNAL MEDICINE

## 2017-07-18 PROCEDURE — 76040000019: Performed by: SPECIALIST

## 2017-07-18 PROCEDURE — 80048 BASIC METABOLIC PNL TOTAL CA: CPT | Performed by: INTERNAL MEDICINE

## 2017-07-18 PROCEDURE — 81003 URINALYSIS AUTO W/O SCOPE: CPT | Performed by: INTERNAL MEDICINE

## 2017-07-18 PROCEDURE — 74011636637 HC RX REV CODE- 636/637: Performed by: INTERNAL MEDICINE

## 2017-07-18 PROCEDURE — 77030034848

## 2017-07-18 PROCEDURE — 74011250636 HC RX REV CODE- 250/636: Performed by: SPECIALIST

## 2017-07-18 PROCEDURE — 65610000006 HC RM INTENSIVE CARE

## 2017-07-18 RX ORDER — FUROSEMIDE 10 MG/ML
40 INJECTION INTRAMUSCULAR; INTRAVENOUS ONCE
Status: COMPLETED | OUTPATIENT
Start: 2017-07-18 | End: 2017-07-18

## 2017-07-18 RX ORDER — ETOMIDATE 2 MG/ML
INJECTION INTRAVENOUS AS NEEDED
Status: DISCONTINUED | OUTPATIENT
Start: 2017-07-18 | End: 2017-07-18 | Stop reason: HOSPADM

## 2017-07-18 RX ORDER — SUCRALFATE 1 G/10ML
1 SUSPENSION ORAL
Status: DISCONTINUED | OUTPATIENT
Start: 2017-07-18 | End: 2017-08-01 | Stop reason: HOSPADM

## 2017-07-18 RX ORDER — METOPROLOL TARTRATE 25 MG/1
25 TABLET, FILM COATED ORAL EVERY 6 HOURS
Status: DISCONTINUED | OUTPATIENT
Start: 2017-07-18 | End: 2017-07-19

## 2017-07-18 RX ORDER — GLYCOPYRROLATE 0.2 MG/ML
INJECTION INTRAMUSCULAR; INTRAVENOUS AS NEEDED
Status: DISCONTINUED | OUTPATIENT
Start: 2017-07-18 | End: 2017-07-18 | Stop reason: HOSPADM

## 2017-07-18 RX ORDER — MIDAZOLAM HYDROCHLORIDE 1 MG/ML
1-2 INJECTION, SOLUTION INTRAMUSCULAR; INTRAVENOUS
Status: DISCONTINUED | OUTPATIENT
Start: 2017-07-18 | End: 2017-07-18

## 2017-07-18 RX ORDER — SODIUM CHLORIDE 0.9 % (FLUSH) 0.9 %
5-10 SYRINGE (ML) INJECTION EVERY 8 HOURS
Status: COMPLETED | OUTPATIENT
Start: 2017-07-18 | End: 2017-07-18

## 2017-07-18 RX ORDER — SODIUM CHLORIDE 0.9 % (FLUSH) 0.9 %
5-10 SYRINGE (ML) INJECTION AS NEEDED
Status: ACTIVE | OUTPATIENT
Start: 2017-07-18 | End: 2017-07-18

## 2017-07-18 RX ORDER — SODIUM CHLORIDE 9 MG/ML
50 INJECTION, SOLUTION INTRAVENOUS CONTINUOUS
Status: DISCONTINUED | OUTPATIENT
Start: 2017-07-18 | End: 2017-07-19

## 2017-07-18 RX ORDER — DEXTROMETHORPHAN/PSEUDOEPHED 2.5-7.5/.8
1.2 DROPS ORAL
Status: DISCONTINUED | OUTPATIENT
Start: 2017-07-18 | End: 2017-07-19 | Stop reason: ALTCHOICE

## 2017-07-18 RX ORDER — AMLODIPINE BESYLATE 5 MG/1
5 TABLET ORAL DAILY
Status: DISCONTINUED | OUTPATIENT
Start: 2017-07-18 | End: 2017-07-20

## 2017-07-18 RX ORDER — LIDOCAINE HYDROCHLORIDE 20 MG/ML
INJECTION, SOLUTION EPIDURAL; INFILTRATION; INTRACAUDAL; PERINEURAL AS NEEDED
Status: DISCONTINUED | OUTPATIENT
Start: 2017-07-18 | End: 2017-07-18 | Stop reason: HOSPADM

## 2017-07-18 RX ADMIN — GLYCOPYRROLATE 0.2 MG: 0.2 INJECTION INTRAMUSCULAR; INTRAVENOUS at 12:50

## 2017-07-18 RX ADMIN — Medication 10 ML: at 13:21

## 2017-07-18 RX ADMIN — SODIUM CHLORIDE 100 ML/HR: 900 INJECTION, SOLUTION INTRAVENOUS at 04:26

## 2017-07-18 RX ADMIN — SODIUM CHLORIDE 8 MG/HR: 900 INJECTION, SOLUTION INTRAVENOUS at 03:27

## 2017-07-18 RX ADMIN — ACETAMINOPHEN 650 MG: 325 TABLET, FILM COATED ORAL at 12:00

## 2017-07-18 RX ADMIN — LEVALBUTEROL HYDROCHLORIDE 0.63 MG: 0.63 SOLUTION RESPIRATORY (INHALATION) at 07:48

## 2017-07-18 RX ADMIN — Medication 10 ML: at 21:13

## 2017-07-18 RX ADMIN — MUPIROCIN: 20 OINTMENT TOPICAL at 21:13

## 2017-07-18 RX ADMIN — SODIUM CHLORIDE 40 MG: 9 INJECTION INTRAMUSCULAR; INTRAVENOUS; SUBCUTANEOUS at 21:12

## 2017-07-18 RX ADMIN — LEVALBUTEROL HYDROCHLORIDE 0.63 MG: 0.63 SOLUTION RESPIRATORY (INHALATION) at 02:44

## 2017-07-18 RX ADMIN — SUCRALFATE 1 G: 1 SUSPENSION ORAL at 21:13

## 2017-07-18 RX ADMIN — ACETAMINOPHEN 650 MG: 325 TABLET, FILM COATED ORAL at 17:58

## 2017-07-18 RX ADMIN — Medication 10 ML: at 05:48

## 2017-07-18 RX ADMIN — IPRATROPIUM BROMIDE 0.5 MG: 0.5 SOLUTION RESPIRATORY (INHALATION) at 19:59

## 2017-07-18 RX ADMIN — HYDRALAZINE HYDROCHLORIDE 10 MG: 20 INJECTION INTRAMUSCULAR; INTRAVENOUS at 03:03

## 2017-07-18 RX ADMIN — Medication 10 ML: at 13:22

## 2017-07-18 RX ADMIN — LIDOCAINE HYDROCHLORIDE 100 MG: 20 INJECTION, SOLUTION EPIDURAL; INFILTRATION; INTRACAUDAL; PERINEURAL at 12:51

## 2017-07-18 RX ADMIN — IPRATROPIUM BROMIDE 0.5 MG: 0.5 SOLUTION RESPIRATORY (INHALATION) at 16:29

## 2017-07-18 RX ADMIN — ETOMIDATE 6 MG: 2 INJECTION INTRAVENOUS at 12:52

## 2017-07-18 RX ADMIN — METOPROLOL TARTRATE 12.5 MG: 25 TABLET ORAL at 05:43

## 2017-07-18 RX ADMIN — SODIUM CHLORIDE 40 MG: 9 INJECTION INTRAMUSCULAR; INTRAVENOUS; SUBCUTANEOUS at 10:23

## 2017-07-18 RX ADMIN — SODIUM CHLORIDE 50 ML/HR: 900 INJECTION, SOLUTION INTRAVENOUS at 12:43

## 2017-07-18 RX ADMIN — LEVALBUTEROL HYDROCHLORIDE 0.63 MG: 0.63 SOLUTION RESPIRATORY (INHALATION) at 20:00

## 2017-07-18 RX ADMIN — HYDRALAZINE HYDROCHLORIDE 10 MG: 20 INJECTION INTRAMUSCULAR; INTRAVENOUS at 10:10

## 2017-07-18 RX ADMIN — INSULIN LISPRO 2 UNITS: 100 INJECTION, SOLUTION INTRAVENOUS; SUBCUTANEOUS at 05:48

## 2017-07-18 RX ADMIN — IPRATROPIUM BROMIDE 0.5 MG: 0.5 SOLUTION RESPIRATORY (INHALATION) at 02:44

## 2017-07-18 RX ADMIN — INSULIN LISPRO 2 UNITS: 100 INJECTION, SOLUTION INTRAVENOUS; SUBCUTANEOUS at 13:21

## 2017-07-18 RX ADMIN — AMLODIPINE BESYLATE 5 MG: 5 TABLET ORAL at 12:00

## 2017-07-18 RX ADMIN — SODIUM CHLORIDE 8 MG/HR: 900 INJECTION, SOLUTION INTRAVENOUS at 08:50

## 2017-07-18 RX ADMIN — INSULIN LISPRO 2 UNITS: 100 INJECTION, SOLUTION INTRAVENOUS; SUBCUTANEOUS at 00:00

## 2017-07-18 RX ADMIN — MUPIROCIN: 20 OINTMENT TOPICAL at 08:52

## 2017-07-18 RX ADMIN — ETOMIDATE 6 MG: 2 INJECTION INTRAVENOUS at 12:55

## 2017-07-18 RX ADMIN — METOPROLOL TARTRATE 25 MG: 25 TABLET ORAL at 18:05

## 2017-07-18 RX ADMIN — ACETAMINOPHEN 650 MG: 325 TABLET, FILM COATED ORAL at 23:35

## 2017-07-18 RX ADMIN — LEVALBUTEROL HYDROCHLORIDE 0.63 MG: 0.63 SOLUTION RESPIRATORY (INHALATION) at 16:29

## 2017-07-18 RX ADMIN — FUROSEMIDE 40 MG: 10 INJECTION, SOLUTION INTRAMUSCULAR; INTRAVENOUS at 12:00

## 2017-07-18 RX ADMIN — SUCRALFATE 1 G: 1 SUSPENSION ORAL at 17:57

## 2017-07-18 RX ADMIN — IPRATROPIUM BROMIDE 0.5 MG: 0.5 SOLUTION RESPIRATORY (INHALATION) at 07:48

## 2017-07-18 RX ADMIN — METOPROLOL TARTRATE 25 MG: 25 TABLET ORAL at 12:00

## 2017-07-18 RX ADMIN — INSULIN LISPRO 2 UNITS: 100 INJECTION, SOLUTION INTRAVENOUS; SUBCUTANEOUS at 23:36

## 2017-07-18 NOTE — PROGRESS NOTES
Endoscope was pre-cleaned at bedside immediately following procedure by [facility] ET.

## 2017-07-18 NOTE — ANESTHESIA PREPROCEDURE EVALUATION
Anesthetic History   No history of anesthetic complications            Review of Systems / Medical History  Patient summary reviewed, nursing notes reviewed and pertinent labs reviewed    Pulmonary  Within defined limits                 Neuro/Psych             Comments: Drowsy but answers appropriately Cardiovascular    Hypertension          CAD    Exercise tolerance: <4 METS  Comments: 7-17-17 EKG: SVT, 150    7-15-17 EKG: SR with sinus arrhythmia    7-14-17 ECHO: 55-60% EF, no AS   GI/Hepatic/Renal               Comments: GI bleed  hgb 9.1  Denies N, V. Endo/Other    Diabetes    Obesity and arthritis     Other Findings            Physical Exam    Airway  Mallampati: II  TM Distance: 4 - 6 cm  Neck ROM: normal range of motion   Mouth opening: Normal    Comments: Sloping chin Cardiovascular    Rhythm: regular  Rate: normal         Dental      Comments: Some decayed teeth, none loose   Pulmonary      Decreased breath sounds: bibasilar          Comments: Decreased inspiratory effort Abdominal  GI exam deferred       Other Findings            Anesthetic Plan    ASA: 4  Anesthesia type: total IV anesthesia          Induction: Intravenous  Anesthetic plan and risks discussed with: Patient      Discussed case with Pt, Dr. CLARENCE Nunez and Cardiologist.  The Cardiologist, GI Doctor and pt all agree the benefits of finding source of bleeding outweighs risks of proceeding. Pt expresses understanding and agrees with plan.

## 2017-07-18 NOTE — PROGRESS NOTES
Attended Interdisciplinary rounds in Critical Care Unit, where patient care was discussed. Visit by: Radha Mullins. Bert Hernández.  Kaylee Cox MA, [address]

## 2017-07-18 NOTE — PROGRESS NOTES
OT attempted to see pt and she was still groggy from her sedation from EDG. Pt was falling asleep while OT was talking and requested that she be seen tomorrow for tx. Will defer pt today from tx.

## 2017-07-18 NOTE — CONSULTS
[id number]    [facility]       Name:  Raya Quinonez   MR#:  [id number]   :  1945   Account #:  [de-identified]    Date of Consultation:  2017   Date of Adm:  2017       REASON FOR CONSULTATION: For evaluation and management of   hyponatremia and hyperkalemia. REQUESTING PHYSICIAN: Judith Boxer, MD    The patient was seen and examined. History was obtained from patient   and chart review. HISTORY OF PRESENT ILLNESS: The patient is a 62-year-old    female with past medical history significant for   hypertension, CAD, PVD with history of right renal artery stent, type 2   diabetes, hyperlipidemia, DJD, left femoral-popliteal bypass, who was   transferred from [facility] in [address] [place] [address] for acute hypoxic   respiratory failure and right ankle pain after a mechanical fall. She was   found to have right distal fibula and tibia fractures. She has   underlying COPD from chronic smoking and was initially treated with   oxygen, nebulizer, IV steroids, and IV diuresis for a component of   potential fluid overload. She also received antibiotics. She actually had a component of JODY on admission with creatinine of   1.5 and hyponatremia with a sodium of 123. Over a course of a few   days, sodium fluctuated, ranging from 121-124 the last few days, and   creatinine was down to normal of 0.89 yesterday. The patient had a   repeat CT of the chest with PE protocol yesterday with subsequent rise   in creatinine to 1.26 today and rising BUN of 100. This coincided with   the drop in hemoglobin with positive stool occult blood. She received a   couple units of blood transfusion. The patient's sodium in fact improved from 124 yesterday to 129 today,   but she had some associated mild hyperkalemia with a potassium of   5.5.      On review of her med list, she received her last dose of Avapro back   on 07/14/2017. She has not received any NSAIDs. Her blood pressure   was low before but now it is stable. She has not had any excessive   intake of free water. In fact, she is quite thirsty and wanted some water   to drink. She has no known preexisting kidney disease. Has not had any   significant vomiting or diarrhea in the last few days. She denies any   nausea at present. No acute pain in spite of her recent fracture. No   fevers or convulsions. PAST MEDICAL HISTORY: As reviewed above. PAST SURGICAL HISTORY:   1. Status post cataract removal.     2. Cholecystectomy. 3. Hysterectomy. 4. Right hand middle finger bone spur removal.   5. Right renal artery stent. 6. Left femoral-popliteal bypass with [place]-Lyle graft in March of 2014. SOCIAL HISTORY: A 47-pack-year smoking. Quit in 2009. No alcohol   use. FAMILY HISTORY: Negative for any kidney disorders. ALLERGIES:   1. CODEINE. 2. IBUPROFEN. 3. SIMVASTATIN. HOME MEDICATIONS: Included:   1. Mevacor 10 mg daily. 2. Avapro 300 mg daily, which is on hold. 3. Toprol  mg daily. 4. She is receiving metoprolol 25 mg every 6 hours at present. 5. Plavix 75 mg daily. 6. Norvasc 10 mg daily, which has been on hold. 7. Niacin 500 mg p.o. daily. 8. Citrucel powder as needed. 9. Tylenol p.r.n. REVIEW OF SYSTEMS: As noted in HPI. The remainder review of   systems obtained is negative. PHYSICAL EXAMINATION   GENERAL: An elderly female, well built, well nourished, in no acute   distress. RECENT VITAL SIGNS: She is afebrile. Pulse is 74. -984   systolic, 39 diastolic. Respirations of 18. Saturating 99% on 3 L nasal   cannula. HEENT: Head is atraumatic, normocephalic. Conjunctivae pale. No   scleral icterus. Mucous membranes are dry. NECK: Thick circumference. No JVD. LUNGS: Bilateral end-expiratory wheezing noted. No rales. No   respiratory distress. HEART: Regular rate and rhythm.  No tachy- or bradycardia. No   murmur. ABDOMEN: Soft, nontender, active bowel sounds. EXTREMITIES: No peripheral edema. SKIN: Warm and dry. No rashes. CNS: Alert and oriented x3. LABORATORY: Reviewed. White count has increased to 27.1. Hemoglobin of 9.7. INR of 1.1. Most recent BMP shows sodium of 129,   potassium of 5.5, BUN of 100. Creatinine of 1.26 compared to 0.89   yesterday. Troponin I had increased from 0.45 to 1.2 today. Elevated   CK level. Urinalysis none during this admission. Urine sodium was 10   and urine osmolality was 355 on the day of admission. ASSESSMENT:   1. Hyponatremia, which has improved overall. She has history of   syndrome of inappropriate secretion of antidiuretic hormone and has   been recommended to have fluid restriction. At present, her serum   sodium has improved. She is asymptomatic. 2. Hyperkalemia. I suspect it is multifactorial likely resulting   from combination of mild acute kidney injury and potassium load from   recent blood transfusion. Adrenal insufficiency would be diagnosis of   exclusion, although suspicion would be low given her hypertension. Need to exclude any hypoaldosteronism which can occur with use of   heparin or Lovenox. 3. Acute kidney injury. She had JODY which had just resolved as of   yesterday. Now with a mild recurrence. I suspect this is related to   recent use of IV contrast exposure with CT chest yesterday. 4. Hypertension with history of right-sided renal artery stenosis. Some   of her BP medications are on hold. We will need to avoid Avapro for   now, but we can resume amlodipine. 5. Gastrointestinal bleed with positive stool occult blood. RECOMMENDATIONS:   1. Cardiology has increased metoprolol. We will resume amlodipine at   5 mg daily. Continue to hold Avapro. 2. Increase IV fluid rate to 50 mL per hour. 3. Subsequently we will add Lasix along with saline to help correct her   sodium, which has already improved.  No need to rapidly or overly   correct the sodium at present. 4. Avoid ACE inhibitor or ARB. She is currently n.p.o., but   institute renal diet when she resumes oral intake. 5. P.r.n. blood transfusion per primary team.    6. High BUN also likely resulting from upper GI bleed. 7. Check a.m. cortisol level. 8. Repeat UA with micro. 9. Check urine sodium and osmolality in a.m.    10. Check random urine potassium level. Thank you for renal consultation. We will follow patient with you. Discussed with patient and Dr. Ronal Guzman.         Ashwin Singleton MD      BP / RLD   D:  07/18/2017   11:01   T:  07/18/2017   11:59   Job #:  [id number]

## 2017-07-18 NOTE — PROGRESS NOTES
Anesthesia reports 12 mg etomidate, 100mg Lidocaine, 0.2 robinul and 150mL NS given during procedure. Received report from anesthesia staff on vital signs and status of patient.

## 2017-07-18 NOTE — PROCEDURES
Esophagogastroduodenoscopy Procedure Note      Morenita Pride  1945  [id number]    Indication: Acute GI hemorrhage     Endoscopist: Eva Montoya MD    Referring Provider:  Patito Mayorga MD    Sedation:  MAC anesthesia Propofol    Procedure Details:  After infomed consent was obtained for the procedure, with all risks and benefits of procedure explained the patient was taken to the endoscopy suite and placed in the left lateral decubitus position. Following sequential administration of sedation as per above, the endoscope was inserted into the mouth and advanced under direct vision to second portion of the duodenum. A careful inspection was made as the gastroscope was withdrawn, including a retroflexed view of the proximal stomach; findings and interventions are described below. Findings:     Esophagus:   + There was diffuse ulceration with friability the entire lower 1/3 of the esophagus, nearly circumferential c/w Severe LA Grade D erosive esophagitis. No varices seen, no active bleeding. Stomach: The gastric mucosa appeared normal. The fundus was found to be normal with no lesions noted on retroflexion. Duodenum:   ++ There were multiple clean based ulcers (3 in the duodenal bulb) and 3 additional ones in the second portion of the duodenum with black base but no visible vessel, no active bleeding. Appearance was benign. The ulcers in the bulb had a chronic appearance. No biopsies performed since pt on plavix. Therapies:  none    Specimen: none            Complications:   None were encountered during the procedure. EBL:  None.           Recommendations:   -continue PPI BID  -check H pylori serologies and if positive would treat given recurrent DU  -add carafate for esophagitis  -soft diet  -high rebleeding risk if plavix resumed in next 2 weeks  -spoke with son Diane Simple post procedure    Eva Montoya MD  7/18/2017  1:02 PM

## 2017-07-18 NOTE — PROGRESS NOTES
Gastroenterology Daily Progress Note (Dr. Keo Dunham)    Admit Date: 7/12/2017       Subjective:       Patient received 3 U PRBCs overnight. Last night had one dark stool and a smear, none yet this am.    Current Facility-Administered Medications   Medication Dose Route Frequency    metoprolol tartrate (LOPRESSOR) tablet 25 mg  25 mg Oral Q6H    pantoprazole (PROTONIX) 40 mg in sodium chloride 0.9 % 10 mL injection  40 mg IntraVENous Q12H    amLODIPine (NORVASC) tablet 5 mg  5 mg Oral DAILY    0.9% sodium chloride infusion 250 mL  250 mL IntraVENous PRN    0.9% sodium chloride infusion  50 mL/hr IntraVENous CONTINUOUS    levalbuterol (XOPENEX) nebulizer soln 0.63 mg/3 mL  0.63 mg Nebulization Q6H RT    ipratropium (ATROVENT) 0.02 % nebulizer solution 0.5 mg  0.5 mg Nebulization Q6H RT    insulin lispro (HUMALOG) injection   SubCUTAneous Q6H    mupirocin (BACTROBAN) 2 % ointment   Topical Q12H    hydrALAZINE (APRESOLINE) 20 mg/mL injection 10 mg  10 mg IntraVENous Q6H PRN    glucose chewable tablet 16 g  4 Tab Oral PRN    glucagon (GLUCAGEN) injection 1 mg  1 mg IntraMUSCular PRN    bisacodyl (DULCOLAX) suppository 10 mg  10 mg Rectal DAILY PRN    sodium chloride (NS) flush 5-10 mL  5-10 mL IntraVENous Q8H    sodium chloride (NS) flush 5-10 mL  5-10 mL IntraVENous PRN    naloxone (NARCAN) injection 0.4 mg  0.4 mg IntraVENous PRN    ondansetron (ZOFRAN ODT) tablet 4 mg  4 mg Oral Q4H PRN    oxyCODONE IR (ROXICODONE) tablet 5 mg  5 mg Oral Q4H PRN    acetaminophen (TYLENOL) tablet 650 mg  650 mg Oral Q6H    oxyCODONE IR (ROXICODONE) tablet 2.5 mg  2.5 mg Oral Q4H PRN    dextrose 10% infusion 125-250 mL  125-250 mL IntraVENous PRN        Objective:     Visit Vitals    /59    Pulse 66    Temp 98.6 °F (37 °C)    Resp 15    Ht 5' 3\" (1.6 m)    Wt 92.1 kg (203 lb 0.7 oz)    SpO2 97%    BMI 35.97 kg/m2   Blood pressure 149/59, pulse 66, temperature 98.6 °F (37 °C), resp.  rate 15, height 5' 3\" (1.6 m), weight 92.1 kg (203 lb 0.7 oz), SpO2 97 %.    07/18 0701 - 07/18 1900  In: 110 [I.V.:110]  Out: 750 [Urine:750]    07/16 1901 - 07/18 0700  In: 2233.3 [I.V.:1399.5]  Out: 2526 [Urine:3075]      Intake/Output Summary (Last 24 hours) at 07/18/17 1236  Last data filed at 07/18/17 1130   Gross per 24 hour   Intake           2343.3 ml   Output             2000 ml   Net            343.3 ml     Physical Exam:     General: fatigued, pale WF in NAD  Chest:  CTA, No rhonchi, rales or rubs.   Heart: S1, S2, RRR  GI: Soft, NT, ND + bowel sounds  Extremities: No edema or cyanosis    Labs:       Recent Results (from the past 24 hour(s))   HEMOGLOBIN    Collection Time: 07/17/17  3:25 PM   Result Value Ref Range    HGB 5.4 (LL) 11.5 - 16.0 g/dL   OCCULT BLOOD, STOOL    Collection Time: 07/17/17  3:25 PM   Result Value Ref Range    Occult blood, stool POSITIVE (A) NEG     TYPE & SCREEN    Collection Time: 07/17/17  5:32 PM   Result Value Ref Range    Crossmatch Expiration 07/20/2017     ABO/Rh(D) O POSITIVE     Antibody screen NEG     Unit number [id number]     Blood component type RC LR AS3,1     Unit division 00     Status of unit TRANSFUSED     Crossmatch result Compatible     Unit number [id number]     Blood component type RC LR AS1     Unit division 00     Status of unit TRANSFUSED     Crossmatch result Compatible     Unit number [id number]     Blood component type RC LR AS1     Unit division 00     Status of unit TRANSFUSED     Crossmatch result Compatible    LACTIC ACID    Collection Time: 07/17/17  5:34 PM   Result Value Ref Range    Lactic acid 5.2 (HH) 0.4 - 2.0 MMOL/L   GLUCOSE, POC    Collection Time: 07/17/17  5:39 PM   Result Value Ref Range    Glucose (POC) 214 (H) 65 - 100 mg/dL    Performed by Sangeeta Correa, POC    Collection Time: 07/18/17 12:09 AM   Result Value Ref Range    Glucose (POC) 194 (H) 65 - 100 mg/dL    Performed by Jimmy Minus    HGB & HCT    Collection Time: 07/18/17 12:11 AM   Result Value Ref Range    HGB 10.1 (L) 11.5 - 16.0 g/dL    HCT 27.7 (L) 35.0 - 47.0 %   LACTIC ACID    Collection Time: 07/18/17 12:50 AM   Result Value Ref Range    Lactic acid 1.2 0.4 - 2.0 MMOL/L   CBC WITH AUTOMATED DIFF    Collection Time: 07/18/17  4:19 AM   Result Value Ref Range    WBC 27.1 (H) 3.6 - 11.0 K/uL    RBC 3.23 (L) 3.80 - 5.20 M/uL    HGB 9.7 (L) 11.5 - 16.0 g/dL    HCT 27.1 (L) 35.0 - 47.0 %    MCV 83.9 80.0 - 99.0 FL    MCH 30.0 26.0 - 34.0 PG    MCHC 35.8 30.0 - 36.5 g/dL    RDW 14.0 11.5 - 14.5 %    PLATELET 397 073 - 576 K/uL    NEUTROPHILS 86 %    BAND NEUTROPHILS 1 %    LYMPHOCYTES 8 %    MONOCYTES 4 %    EOSINOPHILS 0 %    BASOPHILS 0 %    METAMYELOCYTES 1 %    ABS. NEUTROPHILS 23.6 K/UL    ABS. LYMPHOCYTES 2.2 K/UL    ABS. MONOCYTES 1.1 K/UL    ABS. EOSINOPHILS 0.0 K/UL    ABS.  BASOPHILS 0.0 K/UL    RBC COMMENTS POLYCHROMASIA  PRESENT        DF MANUAL     METABOLIC PANEL, BASIC    Collection Time: 07/18/17  4:19 AM   Result Value Ref Range    Sodium 129 (L) 136 - 145 mmol/L    Potassium 5.5 (H) 3.5 - 5.1 mmol/L    Chloride 97 97 - 108 mmol/L    CO2 26 21 - 32 mmol/L    Anion gap 6 5 - 15 mmol/L    Glucose 148 (H) 65 - 100 mg/dL     (H) 6 - 20 MG/DL    Creatinine 1.26 (H) 0.55 - 1.02 MG/DL    BUN/Creatinine ratio 79 (H) 12 - 20      GFR est AA 51 (L) >60 ml/min/1.73m2    GFR est non-AA 42 (L) >60 ml/min/1.73m2    Calcium 8.0 (L) 8.5 - 10.1 MG/DL   MAGNESIUM    Collection Time: 07/18/17  4:19 AM   Result Value Ref Range    Magnesium 2.0 1.6 - 2.4 mg/dL   BNP    Collection Time: 07/18/17  4:19 AM   Result Value Ref Range     (H) 0 - 100 pg/mL   IONIZED CALCIUM - IN-HOUSE    Collection Time: 07/18/17  4:19 AM   Result Value Ref Range    Calcium, ionized 1.17 1.13 - 1.32 mmol/L   COAGULATION SCREEN    Collection Time: 07/18/17  4:19 AM   Result Value Ref Range    INR 1.1 0.9 - 1.1      Prothrombin time 11.5 (H) 9.0 - 11.1 sec    aPTT <20.0 (L) 22.1 - 32.5 sec aPTT, therapeutic range     58.0 - 77.0 SECS    Fibrinogen 247 200 - 475 mg/dL   TROPONIN I    Collection Time: 07/18/17  4:19 AM   Result Value Ref Range    Troponin-I, Qt. 1.20 (H) <0.05 ng/mL   NUCLEATED RBC    Collection Time: 07/18/17  4:19 AM   Result Value Ref Range    NRBC 0.4 (H) 0  WBC    ABSOLUTE NRBC 0.12 (H) 0.00 - 0.01 K/uL    WBC CORRECTED FOR NR ADJUSTED FOR NUCLEATED RBC'S     HGB & HCT    Collection Time: 07/18/17  5:15 AM   Result Value Ref Range    HGB 9.1 (L) 11.5 - 16.0 g/dL    HCT 25.7 (L) 35.0 - 47.0 %   GLUCOSE, POC    Collection Time: 07/18/17  5:42 AM   Result Value Ref Range    Glucose (POC) 177 (H) 65 - 100 mg/dL    Performed by Virginia Tate    METABOLIC PANEL, BASIC    Collection Time: 07/18/17  9:00 AM   Result Value Ref Range    Sodium 130 (L) 136 - 145 mmol/L    Potassium 5.5 (H) 3.5 - 5.1 mmol/L    Chloride 99 97 - 108 mmol/L    CO2 26 21 - 32 mmol/L    Anion gap 5 5 - 15 mmol/L    Glucose 125 (H) 65 - 100 mg/dL     (H) 6 - 20 MG/DL    Creatinine 1.28 (H) 0.55 - 1.02 MG/DL    BUN/Creatinine ratio 80 (H) 12 - 20      GFR est AA 50 (L) >60 ml/min/1.73m2    GFR est non-AA 41 (L) >60 ml/min/1.73m2    Calcium 8.0 (L) 8.5 - 10.1 MG/DL   MAGNESIUM    Collection Time: 07/18/17  9:00 AM   Result Value Ref Range    Magnesium 2.1 1.6 - 2.4 mg/dL   PHOSPHORUS    Collection Time: 07/18/17  9:00 AM   Result Value Ref Range    Phosphorus 3.2 2.6 - 4.7 MG/DL   URINALYSIS W/ RFLX MICROSCOPIC    Collection Time: 07/18/17 11:21 AM   Result Value Ref Range    Color YELLOW/STRAW      Appearance CLEAR CLEAR      Specific gravity 1.021 1.003 - 1.030      pH (UA) 5.5 5.0 - 8.0      Protein NEGATIVE  NEG mg/dL    Glucose NEGATIVE  NEG mg/dL    Ketone NEGATIVE  NEG mg/dL    Bilirubin NEGATIVE  NEG      Blood NEGATIVE  NEG      Urobilinogen 0.2 0.2 - 1.0 EU/dL    Nitrites NEGATIVE  NEG      Leukocyte Esterase NEGATIVE  NEG     LABRCNT(wbc:2,hgb:2,hct:2,plt:2,)  Recent Labs      07/18/17   0900 07/18/17   0419  07/17/17   0251  07/15/17   2330   NA  130*  129*  124*  123*   K  5.5*  5.5*  4.9  5.2*   CL  99  97  88*  89*   CO2  26  26  28  29   BUN  103*  100*  51*  57*   CREA  1.28*  1.26*  0.89  1.31*   GLU  125*  148*  177*  155*   CA  8.0*  8.0*  9.2  8.8   MG  2.1  2.0   --   2.4   PHOS  3.2   --    --    --      Recent Labs      07/18/17 0419   INR  1.1   PTP  11.5*   APTT  <20.0*     Recent Labs      07/17/17   0251  07/15/17   2330   SGOT  29  48*   AP  55  59   TP  6.0*  6.2*   ALB  2.4*  2.5*   GLOB  3.6  3.7         Impression:    Acute GI blood loss anemia  Hyponatremia  Hyperkalemia  COPD  CHF  H/O Duodenal ulcer 11/2014          Plan:  Patient with Hgb up to 9.1 after 3 U PRBCs transfused last night. Plan is to proceed with EGD at bedside with MAC anesthesia. I have discussed procedure with pt in detail including risks, benefits and alternatives with pt and also called her son Geovanna Ortiz on the phone today as well and he is agreeable as well with plan for EGD.        Cesar Hannon MD    7/18/2017 20000 [facility], [address] [place] [address]  [phone] [id number]  [address] [place] [address] [place]: [phone]

## 2017-07-18 NOTE — PROGRESS NOTES
Critical care interdisciplinary rounds held on 07/18/2017. Following members present, Pharmacy, Diabetes Treatment, Case Management, Occupational Therapy, Physical Therapy, Pastoral Care  and Nutrition. Plan of care discussed. See clinical pathway fo plan of care and interventions and desired outcomes.

## 2017-07-18 NOTE — PROGRESS NOTES
Hospitalist Progress Note    NAME: Nba Zurita   :  1945   MRN:  [id number]       Assessment / Plan:  Acute GIB with ABL anemia   -For EGD  -Appreciate GI consult  -On IV PPI  -S/p 3 units PRBC  -Monitor H&H    Asymptomatic 22 beat run of Wide Complex Tachycardia (VT) on early am of 7/15: question if related to Levaquin use in setting of renal failure  Atrial Fibrillation with RVR on early morning of   -Mild troponin elevation  -metoprolol,  norvasc  -Appreciate Cardiology consult  -suspect patient needs outpatient sleep study    Acute hypoxic respiratory failure  Acute COPD exacerbation  -continue scheduled and prn nebs, IV steroids change to Prednisone on , now stopped  -suspect worsening Leukocytosis secondary to prednisone and reactive. BC  no growth, UA wnl, afebrile    ARF from aggressive diuresis; resolved with hydration, now cr trended up likely secondary to contrast given   Mild Hyperkalemia; resolved with hydration  Hyponatremia: Sodium Woody of 121 on 7/15; increased to 130 today; urine sodium measurements not consistent with SIADH   -hold ARB    CHF ruled out (LVEF 55-60%); Patient has interstitial lung disease that was suspected to be pulmonary edema on initial CXR and clinically with diuresis patient went into ARF and become increasingly hyponatremic (lasix was stopped on  with worsening hyponatremia) and CXR without significant change.   Acute Pulmonary Edema Ruled out    Abdominal pain on am of  with continued nausea: resolved after large BM this am  -KUB on  without ileus    Distal tibia and fibula fracture s/p mechanical fall  -s/p ORIF on   -appreciate ortho assistance  -elevated CK improving     Hx of CAD  HTN; uncontrolled  -continue metoprolol, amlodipine, holding plavix, statin      Hx of PVD  -s/p right renal artery stent, left fem-pop by Dr. Myles Young 3/2014     Impaired Glucose Management (Diabetes ruled out)  -A1c 5.7  -diet control  -SSI    Tobacco use  -nicotine patch    Obesity     Code Status: Full  Surrogate Decision Maker: daughter Ely Hamilton [phone]     DVT Prophylaxis: SCDs for now (lovenox if no plan procedure in the AM)  GI Prophylaxis: not indicated     Baseline: independent      Body mass index is 35.97 kg/(m^2). Subjective:     Chief Complaint / Reason for Physician Visit: follow-up respiratory failure  Pt denied any new c/o    Review of Systems:  Symptom Y/N Comments  Symptom Y/N Comments   Fever/Chills    Chest Pain n    Poor Appetite y   Edema     Cough    Abdominal Pain n    Sputum    Joint Pain     SOB/ODOM n   Pruritis/Rash     Nausea/vomit n   Tolerating PT/OT     Diarrhea    Tolerating Diet     Constipation n   Other       Could NOT obtain due to:      Objective:     VITALS:   Last 24hrs VS reviewed since prior progress note.  Most recent are:  Patient Vitals for the past 24 hrs:   Temp Pulse Resp BP SpO2   07/18/17 1200 98.6 °F (37 °C) 66 15 149/59 97 %   07/18/17 1100 - 75 21 166/43 96 %   07/18/17 1008 - 76 27 - 97 %   07/18/17 1000 - 74 19 (!) 166/39 99 %   07/18/17 0800 97.6 °F (36.4 °C) 70 17 (!) 157/39 99 %   07/18/17 0749 - - - - 96 %   07/18/17 0700 - 69 19 152/43 99 %   07/18/17 0600 - 71 18 (!) 172/38 98 %   07/18/17 0500 - 77 18 163/41 98 %   07/18/17 0400 98.1 °F (36.7 °C) 72 16 161/41 98 %   07/18/17 0328 - 69 18 - 99 %   07/18/17 0300 - 65 18 178/46 100 %   07/18/17 0245 - - - - 99 %   07/18/17 0200 - 68 16 167/50 100 %   07/18/17 0100 - 68 16 156/47 99 %   07/18/17 0000 97.5 °F (36.4 °C) 73 16 (!) 118/94 99 %   07/17/17 2343 - 80 16 - 99 %   07/17/17 2300 97.8 °F (36.6 °C) 67 17 (!) 159/35 100 %   07/17/17 2230 - 68 19 150/45 100 %   07/17/17 2200 - 62 18 (!) 154/34 100 %   07/17/17 2145 - 67 18 157/44 100 %   07/17/17 2130 - 62 26 156/46 100 %   07/17/17 2115 - (!) 59 18 152/47 100 %   07/17/17 2100 96.5 °F (35.8 °C) 60 25 157/40 100 %   07/17/17 2045 96.5 °F (35.8 °C) (!) 58 17 152/41 99 %   07/17/17 2030 96.5 °F (35.8 °C) (!) 57 17 144/55 100 %   07/17/17 2015 - (!) 55 21 145/42 100 %   07/17/17 2007 - (!) 56 16 - 100 %   07/17/17 2005 - - - - 100 %   07/17/17 2000 96.3 °F (35.7 °C) (!) 56 21 (!) 142/28 100 %   07/17/17 1945 - (!) 55 15 138/49 100 %   07/17/17 1930 96.3 °F (35.7 °C) (!) 58 20 133/47 100 %   07/17/17 1915 - 60 19 129/40 99 %   07/17/17 1900 96.3 °F (35.7 °C) 60 17 (!) 125/21 94 %   07/17/17 1845 96.3 °F (35.7 °C) (!) 53 23 (!) 129/17 94 %   07/17/17 1830 96 °F (35.6 °C) (!) 53 20 (!) 125/31 98 %   07/17/17 1815 96 °F (35.6 °C) (!) 56 19 128/42 100 %   07/17/17 1800 96 °F (35.6 °C) (!) 53 25 (!) 131/33 99 %   07/17/17 1751 - - - - 99 %   07/17/17 1745 96.3 °F (35.7 °C) 71 24 144/41 95 %   07/17/17 1708 97.7 °F (36.5 °C) (!) 55 22 (!) 124/31 97 %   07/17/17 1700 - (!) 58 24 (!) 135/39 96 %   07/17/17 1659 97.7 °F (36.5 °C) (!) 58 - 138/48 100 %   07/17/17 1518 97.7 °F (36.5 °C) (!) 53 14 (!) 121/30 100 %   07/17/17 1440 - 71 - 149/44 -   07/17/17 1433 - - - (!) 125/39 -   07/17/17 1430 - 72 - (!) 126/29 -       Intake/Output Summary (Last 24 hours) at 07/18/17 1239  Last data filed at 07/18/17 1130   Gross per 24 hour   Intake           2343.3 ml   Output             2000 ml   Net            343.3 ml        PHYSICAL EXAM:  General: WD, WN. Alert, cooperative, no acute distress    EENT:  EOMI. Anicteric sclerae. MMM  Resp:  Occasional wheezing,  No accessory muscle use  CV:  Regular  rhythm,  No edema (left lower leg in external fixator and bandaged  GI:  Soft, Non distended, Non tender.  +Bowel sounds  Neurologic:  Alert and oriented X 3, normal speech,   Psych:   Good insight. Not anxious nor agitated  Skin:  No rashes.   No jaundice    Reviewed most current lab test results and cultures  YES  Reviewed most current radiology test results   YES  Review and summation of old records today    NO  Reviewed patient's current orders and MAR    YES  PMH/SH reviewed - no change compared to H&P  ________________________________________________________________________  Care Plan discussed with:    Comments   Patient x    Family      RN x    Care Manager     Consultant                        Multidiciplinary team rounds were held today with , nursing, pharmacist and clinical coordinator. Patient's plan of care was discussed; medications were reviewed and discharge planning was addressed. ________________________________________________________________________  Total NON critical care TIME:  25   Minutes    Total CRITICAL CARE TIME Spent:   Minutes non procedure based      Comments   >50% of visit spent in counseling and coordination of care     ________________________________________________________________________  Tiffany Diego MD     Procedures: see electronic medical records for all procedures/Xrays and details which were not copied into this note but were reviewed prior to creation of Plan. LABS:  I reviewed today's most current labs and imaging studies. Pertinent labs include:  Recent Labs      07/18/17   0515  07/18/17   0419  07/18/17   0011   07/17/17   0251  07/16/17   0445   WBC   --   27.1*   --    --   18.3*  17.8*   HGB  9.1*  9.7*  10.1*   < >  10.1*  10.1*   HCT  25.7*  27.1*  27.7*   --   28.5*  28.7*   PLT   --   177   --    --   364  329    < > = values in this interval not displayed.      Recent Labs      07/18/17   0900  07/18/17   0419  07/17/17   0251  07/15/17   2330   NA  130*  129*  124*  123*   K  5.5*  5.5*  4.9  5.2*   CL  99  97  88*  89*   CO2  26  26  28  29   GLU  125*  148*  177*  155*   BUN  103*  100*  51*  57*   CREA  1.28*  1.26*  0.89  1.31*   CA  8.0*  8.0*  9.2  8.8   MG  2.1  2.0   --   2.4   PHOS  3.2   --    --    --    ALB   --    --   2.4*  2.5*   TBILI   --    --   0.3  0.3   SGOT   --    --   29  48*   ALT   --    --   19  22   INR   --   1.1   --    --        Signed: Tiffany Diego MD

## 2017-07-18 NOTE — PROGRESS NOTES
1900:  Bedside and Verbal shift change report given to [facility] (oncoming nurse) by Lorain Barthel RN (offgoing nurse). Report included the following information SBAR, Kardex, OR Summary, Procedure Summary, Intake/Output, MAR, Recent Results, Med Rec Status and Cardiac Rhythm NSR.   2022: Unit 2 of 3 PRBCs completed. 2035: Unit 3 of 3 PRBCs started, patient tolerating well.   2300: Unit 3 of 3 PRBCs completed. 2330:  Patient had small smear of maroon colored stool, patient cleaned. Will continue to monitor. 0000:  Reassessment completed, no changes noted. Will continue to monitor. 0230:  Patient c/o need to void, unsuccessful on attempts to void. Bladder scan revealed 999 ml in bladder. Straight cath performed per protocol, 1250 ml of yellow urine resulted. Will continue to monitor. 0303:  SBP>170, medicated with hydralazine 10 mg IV.   0400:  Reassessment completed, no changes noted. Will continue to monitor. 0500:  Patient with medium amount of loose melena stool, patient cleaned. Will continue to monitor.

## 2017-07-18 NOTE — PROGRESS NOTES
[facility] INTENSIVIST Consult Service Note  Pulmonary, Critical Care, and Sleep Medicine    Name: Stefan Gore MRN: [id number]   : 1945 Hospital: [facility]   Date: 2017   Hospital Day: 7       Subjective/Interval History:   I have reviewed the flowsheet and previous days notes. Seen earlier today on rounds. Reviewed the evaluation and will assist in implementing plan as outlined by Dr. Dk Tavarez and team  Pt seems more comfortable today. No chest pain. No back pain. Had 1200cc of urine with had I/O cath last night. IMPRESSION:   1. Acute respiratory failure with hypoxia, improved today. 2. Acute exacerbation of COPD, improving today. 3. Acute GI bleed with bloody stools, Gi following, Hgb stabilizing. 4. Severe anemia from acute blood loss Hgb 10 to now 5.4  5. Right Subclavian CVC. 6. POD# 4  s/p RIGHT ANKLE OPEN REDUCTION INTERNAL FIXATION, application of external fixator S/p mechanical fall requiring orthopedic surgery of right leg  7. Hyponatremia, seems chronic. May be SIADH,   8. Elevated K, limited options to treat. Will see if we can give Kayexalate, Possibly a diuretic. There are no ekg changes related to elevated K.   9. moderate CAD 2011  10. Echo 2017 EF 55%, mild to mod MR, mild AI  11. Sinus with PSVT  ? AVNRT on telemetry  12. H/o HTN with right renal artery stent  13. DM  14. Dyslipidemia difficulty with daily statin  15. Hx of GI bleed found to have PUD Dr. Alyson Alonso  16. PVD with left fem-pop placed on plavix for this  17. , mild functional capacity, works at [employer]. Pt is critically ill and at high risk of end organ dysfunction and failure      RECOMMENDATIONS/PLAN:   1. Will check CVP  2. Ask Renal to assist.   3. Anesthesia and GI to eval for endo today. 4. May need neil replaced for retention. 5. Adjust nebs  6. BIPAP prn, placed on NC oxygen this am.   7. DVT, SUP prophylaxis  8.  Will be available to assist in medical management while in the CCU pending disposition     Risk of deterioration: high   [x] High complexity decision making was performed  [x] See my orders for details  Tubes:     ICU disposition :Unchanged. Updated Family. Time 6:10PM    Code: Full Code        Subjective/Initial History:   I have reviewed the flowsheet and previous days notes. I was asked by Carina Meier MD to see Nba Zurita in consultation for a chief complaint of acute respiratory failure, hypoxia and SOB in setting of acute GI bleed and profound blood loss anemia. Zeeshan Her is a 81 y.o.  female admitted 7/14  After she slipped on a wet pool edge and found to have right leg fracture. In er found to be hypoxic, treated for CHF and COPD. Increased cr and decrease in sodium with diuresis, felt not to have CHF, now actually on fluids. On 15th asymptomatic WCT on telemetry. Levaquin stopped. Seen by Cardiology. Metoprolol increased. Last night SVT, awoke patient with palpitations. Spontaneously resolved. Cardizem added. Today no chest pain, dyspnea improved, no edema, no orthopnea, no presyncope. No other complaints. Today the patient transferred to ICU by Dr. Aron Yao. Pt had two bloody BMs. Called blood bank and asked for 3 units Emergency pRBC's, 3 units ordered. Plavix discontinued. All antihypertensives stopped (except for metoprolol which was changed to 12.5 mg every 6 hours with BP and HR restrictions) Protonix Bolus and Protonix gtt ordered. Limited IV access. No chest pain. Very wheezy. Mild SOB. Pt lethargic an d could not describe her BM's. CTa chest ordered and completed. No PE on my review. EVETTE abdominal pain or hematesis. Mild heartburn in the past. REmote PUD history    PMH:  has a past medical history of Arthritis; CAD (coronary artery disease); Diabetes ([address] History of bronchitis; Hyperlipemia; and Hypertension.   PSH:   has a past surgical history that includes cholecystectomy; hysterectomy; orthopaedic; vascular surgery procedure unlist; vascular surgery procedure unlist (3/5/14); cataract removal (Bilateral); and central venous line insertion (7/17/2017). FHX: family history includes Breast Cancer (age of onset: 61) in her sister; Cancer in her sister; Diabetes in her father and sister; Heart Disease in her father and mother; MS in her brother; Seizures in her mother. SHX:  reports that she quit smoking about 7 years ago. She has a 60.00 pack-year smoking history. She has never used smokeless tobacco. She reports that she does not drink alcohol or use illicit drugs. ROS:A comprehensive review of systems was negative except for that written in the HPI.     MAR reviewed and pertinent medications noted or modified as needed    Allergies   Allergen Reactions    Codeine Rash and Other (comments)     hyper    Ibuprofen [Ibuprofen] Other (comments)     Stomach pains    Simvastatin Itching        MEDS:   Current Facility-Administered Medications   Medication    metoprolol tartrate (LOPRESSOR) tablet 25 mg    metoprolol (LOPRESSOR) 5 mg/5 mL injection    0.9% sodium chloride infusion 250 mL    pantoprazole (PROTONIX) 40 mg in 0.9% sodium chloride (MBP/ADV) 50 mL    0.9% sodium chloride infusion    levalbuterol (XOPENEX) nebulizer soln 0.63 mg/3 mL    ipratropium (ATROVENT) 0.02 % nebulizer solution 0.5 mg    insulin lispro (HUMALOG) injection    mupirocin (BACTROBAN) 2 % ointment    hydrALAZINE (APRESOLINE) 20 mg/mL injection 10 mg    glucose chewable tablet 16 g    glucagon (GLUCAGEN) injection 1 mg    bisacodyl (DULCOLAX) suppository 10 mg    sodium chloride (NS) flush 5-10 mL    sodium chloride (NS) flush 5-10 mL    naloxone (NARCAN) injection 0.4 mg    ondansetron (ZOFRAN ODT) tablet 4 mg    oxyCODONE IR (ROXICODONE) tablet 5 mg    acetaminophen (TYLENOL) tablet 650 mg    oxyCODONE IR (ROXICODONE) tablet 2.5 mg    dextrose 10% infusion 125-250 mL        Telemetry:    normal sinus rhythm    Objective:     Vital Signs: Intake/Output: Intake/Output:   Visit Vitals    /43    Pulse 69    Temp 98.1 °F (36.7 °C)    Resp 19    Ht 5' 3\" (1.6 m)    Wt 92.1 kg (203 lb 0.7 oz)    SpO2 96%    BMI 35.97 kg/m2         O2 Device: Nasal cannula  O2 Flow Rate (L/min): 3 l/min    Wt Readings from Last 4 Encounters:   17 92.1 kg (203 lb 0.7 oz)   01/26/15 73.1 kg (161 lb 4 oz)   11/15/14 72.1 kg (159 lb)   14 73.2 kg (161 lb 6.4 oz)       Temp (24hrs), Av.9 °F (36.1 °C), Min:96 °F (35.6 °C), Max:98.2 °F (36.8 °C)     Intake/Output Summary (Last 24 hours) at 17 0850  Last data filed at 17 0700   Gross per 24 hour   Intake           2233.3 ml   Output             1825 ml   Net            408.3 ml     Last shift:         Last 3 shifts:  1901 -  0700  In: 2233.3 [I.V.:1399.5]  Out: 3075 [Urine:3075]     Hemodynamics:    CO:    CI:    CVP:    SVR:   PAP Systolic:    PAP Diastolic:    PVR:    AF08:        Ventilator Settings:      Mode Rate TV Press PEEP FiO2 PIP Min. Vent   NIPPV (V60 PCV)         5 cm H20 30 %  14 cm H2O  8.1 l/min        Physical Exam:    General: severely ill   HEENT: NCAT, pale   Neck, Lymph: No abnormally enlarged lymph nodes. Chest: increased AP diameter   Lungs: scattered wheezes bilaterally   Heart: Regular rate and rhythm   Abdomen: soft, protuberant, distended   :    Extremity: negative, cyanosis, clubbing; RLE immobilized with fixator    Neuro: lethargic   Psych: Alert and Oriented x 2   Skin: Pale;   Pulses: Bilateral, Radial, 2+ Normal upper and lower extremity pulses   Capillary refill: abnormal:  sluggish capillary refill, pale;    Data:   Interval lab and diagnostic data was reviewed. Interval radiology images were independently viewed and available reports were reviewed. All lab results for the last 24 hours reviewed.           Labs:    Recent Labs      17   0419  17   0011  17   1525  17   0251 07/16/17   0445   WBC  27.1*   --    --   18.3*  17.8*   HGB  9.7*  10.1*  5.4*  10.1*  10.1*   PLT  177   --    --   364  329   INR  1.1   --    --    --    --    APTT  <20.0*   --    --    --    --      Recent Labs      07/18/17   0419  07/18/17   0050  07/17/17   1734  07/17/17   0251  07/15/17   2330   NA  129*   --    --   124*  123*   K  5.5*   --    --   4.9  5.2*   CL  97   --    --   88*  89*   CO2  26   --    --   28  29   GLU  148*   --    --   177*  155*   BUN  100*   --    --   51*  57*   CREA  1.26*   --    --   0.89  1.31*   CA  8.0*   --    --   9.2  8.8   MG  2.0   --    --    --   2.4   LAC   --   1.2  5.2*   --    --    ALB   --    --    --   2.4*  2.5*   SGOT   --    --    --   29  48*   ALT   --    --    --   19  22     No results for input(s): PH, PCO2, PO2, HCO3, FIO2 in the last 72 hours. Recent Labs      07/18/17 0419 07/17/17   0251  07/15/17   2330  07/15/17   1529  07/15/17   0956   CPK   --   386*  820*  1135*  1512*   CKNDX   --   1.1   --   1.1  1.1   TROIQ  1.20*  0.45*   --   0.89*  0.96*     Lab Results   Component Value Date/Time     07/18/2017 04:19 AM     11/15/2014 04:15 AM      Lab Results   Component Value Date/Time    Culture result: NO GROWTH 5 DAYS 07/12/2017 11:16 PM    Culture result: NO GROWTH 5 DAYS 11/13/2014 05:18 AM     Lab Results   Component Value Date/Time     07/17/2017 02:51 AM     07/15/2017 11:30 PM          Imaging:  I have personally reviewed the patients radiographs and have reviewed the reports:   post central line CXR reviewed: line in good position; [facility] [place]. No PTX       Results from Hospital Encounter encounter on 07/12/17   XR CHEST PORT   Narrative EXAM:  XR CHEST PORT    INDICATION:  Line placement. COMPARISON:  7/15/2017. FINDINGS:    An AP portable view was obtained of the chest.    A right subclavian line with the tip projected at the lower superior vena cava  has been introduced. No pneumothorax is appreciated. The heart is normal in size. The aorta is atherosclerotic. There is slight left basilar atelectasis. Impression IMPRESSION:   New right clavian line tip projected lower superior vena cava. No pneumothorax. My assessment/plan was discussed with:  nursing    respiratory therapy Dr. wells         The reason for providing this level of medical care was due to a critical illness that impaired one or more vital organ systems, such that there was a high probability of imminent or life threatening deterioration in the patient's condition. Pt's condition is unstable and unpredictable. This care involved high complexity decision making which includes reviewing the patient's past medical records, current laboratory results, medications that were immediately available to me and actual Xray films in order to assess, support vital system function, and to treat this degree of vital organ system failure, and to prevent further life threatening deterioration of the patients condition. Thank you for allowing us to participate in the care of this patient. We will be happy to follow along with you.     Lazaro Dean MD

## 2017-07-18 NOTE — PROGRESS NOTES
Spiritual Care Assessment/Progress Notes    Herrera Slider [id number]  [id number]    1945  81 y.o.  female    Patient Telephone Number: [phone] (home)   Mormonism Affiliation: [employer]   Language: English   Extended Emergency Contact Information  Primary Emergency Contact: Janet Gore Phone: [phone]  Mobile Phone: [phone]  Relation: Other Relative  Secondary Emergency Contact: [facility] Phone: [phone]  Mobile Phone: [phone]  Relation: Child   Patient Active Problem List    Diagnosis Date Noted    Respiratory failure ([facility] [address] 07/13/2017    Respiratory failure, acute ([facility] 11/13/2014    Abnormal ECG 09/21/2011        Date: 7/18/2017       Level of Mormonism/Spiritual Activity:  []         Involved in blane tradition/spiritual practice    []         Not involved in blane tradition/spiritual practice  []         Spiritually oriented    []         Claims no spiritual orientation    []         seeking spiritual identity  []         Feels alienated from Uatsdin practice/tradition  []         Feels angry about Uatsdin practice/tradition  []         Spirituality/Uatsdin tradition is a resource for coping at this time.   [x]         Not able to assess due to medical condition    Services Provided Today:  []         crisis intervention    []         reading Scriptures  []         spiritual assessment    []         prayer  []         empathic listening/emotional support  []         rites and rituals (cite in comments)  []         life review     []         Uatsdin support  []         theological development   []         advocacy  []         ethical dialog     []         blessing  []         bereavement support    []         support to family  []         anticipatory grief support   []         help with AMD  []         spiritual guidance    []         meditation      Spiritual Care Needs  []         Emotional Support  [] Spiritual/Evangelical Care  []         Loss/Adjustment  []         Advocacy/Referral                /Ethics  []         No needs expressed at               this time  []         Other: (note in               comments)  [address]  []         Follow up visits with               pt/family  []         Provide materials  []         Schedule sacraments  []         Contact Community               Clergy  [x]         Follow up as needed  []         Other: (note in               comments)     Comments:   Initial visit with Ms. Carole Cohn. I was preparing a card to leave for Ms. Wiley as she woke up and spoke with me. She seemed somewhat confused and kept repeating that she just wanted to get out of here. I offered presence; no family here at this time but spoke with RN and she shared pt's sons have been visiting regularly. Pastoral care is available to continue to follow. Please page as needed/desired. [phone]. Visit by: Mian Walton. Jennifer Longo.  Elizabeth Perkins MA, [facility]    Lead  [employer] Development [employer] Advancement

## 2017-07-18 NOTE — PROGRESS NOTES
TRANSFER - IN REPORT:    Verbal report received from Anna(name) on Laury Goldmann  being received from 2528(unit) for ordered procedure      Report consisted of patients Situation, Background, Assessment and   Recommendations(SBAR). Information from the following report(s) SBAR and MAR was reviewed with the receiving nurse. Opportunity for questions and clarification was provided. Assessment completed upon patients arrival to unit and care assumed.

## 2017-07-18 NOTE — PROGRESS NOTES
Reviewed with Dr. Ray Prasad. Mildly increased troponin, No chest pain, no acute ST changes. Now with GI bleed. Would manage CAD medically for now. No CHF, but certainly at risk. PSVT over weekend, currently in sinus on beta blocker. Feeling better after transfusion. Agree to proceed with EGD with moderate risk. She certainly benefits from having her GI bleed treated and at this point benefits outweigh risks. Please call with questions.

## 2017-07-18 NOTE — PROGRESS NOTES
0700: Received bedside and verbal shift report from Heidi Landin RN.  0730: Dr. Michael Carlson at bedside, updated on patients condition; order to insert neil catheter if pt unable to void in the next 4 hours with over 500ml of urine in bladder  0800: Assessment complete, see summary for details: Pt drowsy but A&Ox's 4, follows commands, moves all extremities, NSR, pulses palpable in all 4 extremities, lungs course and exp. Wheeze noted on L lower, still no urge to urinate at this time, afebrile, no complaints of pain at this time. 1115: Bladder scanned, 560 ml; will insert a neil catheter  1200: Assessment complete, see summary for details; no changes noted; pt remains afebrile with no complaints of pain  1230: Dr. Luis Antonio Grover at bedside performing EGD, see [place] notes for more details. 1300: Updated Dr. Luis Antonio Grover on patients condition; order to modify H/H lab from every 6 hours to every 12 hours.   1550: 6 run of [facility] noted, informed Dr. Mallorie Olmstead; order to repeat a BMP if  there is another run of [facility]

## 2017-07-18 NOTE — PROGRESS NOTES
TRANSFER - OUT REPORT:    Verbal report given to Anna(name) on Shawna Gupta  being transferred to [facility](unit) for routine progression of care       Report consisted of patients Situation, Background, Assessment and   Recommendations(SBAR). Information from the following report(s) SBAR, Procedure Summary and Recent Results was reviewed with the receiving nurse. Lines:   Quad Lumen central line 07/17/17 Right Subclavian (Active)   Central Line Being Utilized Yes 7/18/2017  8:00 AM   Criteria for Appropriate Use Limited/no vessel suitable for conventional peripheral access 7/18/2017  8:00 AM   Site Assessment Clean, dry, & intact 7/18/2017  8:00 AM   Infiltration Assessment 0 7/18/2017  8:00 AM   Affected Extremity/Extremities Color distal to insertion site pink (or appropriate for race) 7/18/2017  8:00 AM   Date of Last Dressing Change 07/17/17 7/18/2017  8:00 AM   Dressing Status Clean, dry, & intact 7/18/2017  8:00 AM   Dressing Type Disk with Chlorhexadine gluconate (CHG); Transparent 7/18/2017  8:00 AM   Action Taken Open ports on tubing capped 7/17/2017  5:51 PM   Proximal Hub Color/Line Status White;Capped 7/18/2017  8:00 AM   Positive Blood Return (Medial Site) Yes 7/18/2017 12:00 AM   Medial 1 Hub Color/Line Status Blue; Infusing 7/18/2017  8:00 AM   Positive Blood Return (Lateral Site) Yes 7/18/2017 12:00 AM   Medial 2 Hub Color/Line Status Gray;Capped 7/18/2017  8:00 AM   Positive Blood Return (Site #3) Yes 7/18/2017 12:00 AM   Distal Hub Color/Line Status Brown;Capped 7/18/2017  8:00 AM   Positive Blood Return (Site #4) Yes 7/18/2017 12:00 AM   Alcohol Cap Used Yes 7/18/2017  4:00 AM       Peripheral IV 07/13/17 Left Arm (Active)   Site Assessment Clean, dry, & intact 7/18/2017 12:00 PM   Phlebitis Assessment 0 7/18/2017 12:00 PM   Infiltration Assessment 0 7/18/2017 12:00 PM   Dressing Status Clean, dry, & intact 7/18/2017 12:00 PM   Dressing Type Transparent;Tape 7/18/2017  8:00 AM   Hub Color/Line Status Pink;Capped 7/18/2017 12:00 PM        Opportunity for questions and clarification was provided.       Patient transported with:   Registered Nurse

## 2017-07-18 NOTE — PROGRESS NOTES
Ortho:    Pt resting in bed. Feels okay today. Lethargic. Going for EGD right now for GIB  Ankle not painful. Had 1200cc of urine with I/O cath last night. Recent Labs      17   0515   HCT  25.7*   HGB  9.1*       Ex-fix in place. Mild-mod edema present  Calves soft and supple  Sensation and motor intact  Cap refill <2 seconds     PLAN:  Being followed by intensivist, cardiology, renal, gastroenterology  Aware that she is off anticoagulation due to current bleed  We will plan to defer decision to resume anticoagulation to medicine given her MMP  Stable from ortho standpoint, needs to maintain NWB on RLE  Will need SNF placement. Received [employer] auth on  and will  in 72 hours.   [facility] for d/c from Ortho perspecitve - D/c when medically stable    Aldo Staton PA-C

## 2017-07-18 NOTE — ANESTHESIA POSTPROCEDURE EVALUATION
Post-Anesthesia Evaluation and Assessment    Patient: July Lin MRN: [id number]  SSN: [id number]    YOB: 1945  Age: 81 y.o. Sex: female       Cardiovascular Function/Vital Signs  Visit Vitals    /41    Pulse 65    Temp 37 °C (98.6 °F)    Resp 16    Ht 5' 3\" (1.6 m)    Wt 92.1 kg (203 lb 0.7 oz)    SpO2 97%    BMI 35.97 kg/m2       Patient is status post general anesthesia for Procedure(s):  ESOPHAGOGASTRODUODENOSCOPY (EGD). Nausea/Vomiting: None    Postoperative hydration reviewed and adequate. Pain:  Pain Scale 1: Visual (07/18/17 1312)  Pain Intensity 1: 0 (07/18/17 1312)   Managed    Neurological Status:   Neuro (WDL): Exceptions to WDL (07/13/17 1615)  Neuro  Neurologic State: Drowsy; Eyes open to voice (07/18/17 0800)  Orientation Level: Oriented X4 (07/18/17 0800)  Cognition: Follows commands; Appropriate safety awareness (07/18/17 0800)  Speech: Clear (07/18/17 0800)  LUE Motor Response: Purposeful;Weak (07/18/17 0800)  LLE Motor Response: Purposeful;Weak (07/18/17 0800)  RUE Motor Response: Purposeful;Weak (07/18/17 0800)  RLE Motor Response: Weak;Other(comment) (limited movement d/tORF) (07/18/17 0800)   At baseline    Mental Status and Level of Consciousness: Arousable    Pulmonary Status:   O2 Device: Nasal cannula (07/18/17 1300)   Adequate oxygenation and airway patent    Complications related to anesthesia: None    Post-anesthesia assessment completed.  No concerns    Signed By: Ky Moore DO     July 18, 2017

## 2017-07-18 NOTE — PROGRESS NOTES
Progress Note      7/18/2017 8:14 AM  NAME: Bailey Thomason   MRN:  [id number]   Admit Diagnosis: right ankle fracture  Respiratory failure (HCC)  GI Bleed                          Assessment:                 1. S/p mechanical fall requiring orthopedic surgery of right leg, found to be hypoxic, likely COPD exacerbation, PSVT noted on telemetry, mild rhabo with increased troponin being managed medically, then with GI bleed s/p 3 units  2. In 2011 found to have hyponatremia and TWI on ECG with cath with moderate CAD  3. Echo 6/2017 EF 55%, mild to mod MR, mild AI  4. Sinus with PSVT  ? AVNRT on telemetry  5. HTN with right renal artery stent  6. DM  7. Dyslipidemia difficulty with daily statin  8. Hx of GI bleed found to have PUD Dr. Charlee Pope  9. PVD with left fem-pop placed on plavix for this  10. , mild functional capacity, works at [employer] 21:                  Mechanical fall  Mild rhabdo with increased troponin, agree with medical management of CAD  PSVT with HR of 150, looks like AVNRT does not appear to be afib, self terminated, 20 beats of tachycardia on 7/15 not recorded on tele monitor or printed, but likely same. Increased cr after diuresis, doubt significant CHF. Low sodium improving. Gi bleed s/p 3 U PRBC      1. Holding plavix, was on this for PVD will need asa vs. plavix on discharge  2. Increase metoprolol 25mg q6h, was on metoprolol sr 100 as outpt  3. Holding amlodipine  4. Holding avapro, hydralazine prn  5. ?on Doxazosin 1mg per my records  6. Careful with fluids, was on lasix 40mg daily as outpt, follow bmp, bnp  7. Cont statin, would stop niacin          [x]        High complexity decision making was performed         Subjective:     Bailey Thomason denies chest pain, dyspnea. Discussed with RN events overnight.      Review of Systems:    Symptom Y/N Comments  Symptom Y/N Comments   Fever/Chills N   Chest Pain N    Poor Appetite N   Edema N    Cough N Abdominal Pain N    Sputum N   Joint Pain N    SOB/ODOM N   Pruritis/Rash N    Nausea/vomit N   Tolerating PT/OT Y    Diarrhea N   Tolerating Diet Y    Constipation N   Other       Could NOT obtain due to:      Objective:      Physical Exam:    Last 24hrs VS reviewed since prior progress note. Most recent are:    Visit Vitals    /43    Pulse 69    Temp 98.1 °F (36.7 °C)    Resp 19    Ht 5' 3\" (1.6 m)    Wt 92.1 kg (203 lb 0.7 oz)    SpO2 96%    BMI 35.97 kg/m2       Intake/Output Summary (Last 24 hours) at 07/18/17 0814  Last data filed at 07/18/17 0700   Gross per 24 hour   Intake           2233.3 ml   Output             1825 ml   Net            408.3 ml        General Appearance: Well developed, well nourished, alert & oriented x 3,    no acute distress. Ears/Nose/Mouth/Throat: Hearing grossly normal.  Neck: Supple. Chest: Lungs clear to auscultation bilaterally. Cardiovascular: Regular rate and rhythm, S1S2 normal, no murmur. Abdomen: Soft, non-tender, bowel sounds are active. Extremities: No edema bilaterally. Skin: Warm and dry. PMH/SH reviewed - no change compared to H&P    Data Review    Telemetry: normal sinus rhythm     Lab Data Personally Reviewed:    Recent Labs      07/18/17 0419 07/18/17   0011   07/17/17 0251   WBC  27.1*   --    --   18.3*   HGB  9.7*  10.1*   < >  10.1*   HCT  27.1*  27.7*   --   28.5*   PLT  177   --    --   364    < > = values in this interval not displayed.      Recent Labs      07/18/17 0419   INR  1.1   PTP  11.5*   APTT  <20.0*      Recent Labs      07/18/17 0419 07/17/17   0251  07/15/17   2330   NA  129*  124*  123*   K  5.5*  4.9  5.2*   CL  97  88*  89*   CO2  26  28  29   BUN  100*  51*  57*   CREA  1.26*  0.89  1.31*   GLU  148*  177*  155*   CA  8.0*  9.2  8.8   MG  2.0   --   2.4     Recent Labs      07/18/17 0419 07/17/17   0251  07/15/17   2330  07/15/17   1529  07/15/17   0956   CPK   --   386*  820*  1135*  1512*   CKNDX   --   1.1 --   1.1  1.1   TROIQ  1.20*  0.45*   --   0.89*  0.96*     Lab Results   Component Value Date/Time    Cholesterol, total 162 09/20/2011 01:17 PM    HDL Cholesterol 55 09/20/2011 01:17 PM    LDL, calculated 78.4 09/20/2011 01:17 PM    Triglyceride 143 09/20/2011 01:17 PM    CHOL/HDL Ratio 2.9 09/20/2011 01:17 PM       Recent Labs      07/17/17   0251  07/15/17   2330   SGOT  29  48*   AP  55  59   TP  6.0*  6.2*   ALB  2.4*  2.5*   GLOB  3.6  3.7     No results for input(s): PH, PCO2, PO2 in the last 72 hours.     Medications Personally Reviewed:    Current Facility-Administered Medications   Medication Dose Route Frequency    metoprolol (LOPRESSOR) 5 mg/5 mL injection        0.9% sodium chloride infusion 250 mL  250 mL IntraVENous PRN    pantoprazole (PROTONIX) 40 mg in 0.9% sodium chloride (MBP/ADV) 50 mL  8 mg/hr IntraVENous CONTINUOUS    metoprolol tartrate (LOPRESSOR) tablet 12.5 mg  12.5 mg Oral Q6H    0.9% sodium chloride infusion  100 mL/hr IntraVENous CONTINUOUS    levalbuterol (XOPENEX) nebulizer soln 0.63 mg/3 mL  0.63 mg Nebulization Q6H RT    ipratropium (ATROVENT) 0.02 % nebulizer solution 0.5 mg  0.5 mg Nebulization Q6H RT    insulin lispro (HUMALOG) injection   SubCUTAneous Q6H    mupirocin (BACTROBAN) 2 % ointment   Topical Q12H    hydrALAZINE (APRESOLINE) 20 mg/mL injection 10 mg  10 mg IntraVENous Q6H PRN    glucose chewable tablet 16 g  4 Tab Oral PRN    glucagon (GLUCAGEN) injection 1 mg  1 mg IntraMUSCular PRN    bisacodyl (DULCOLAX) suppository 10 mg  10 mg Rectal DAILY PRN    sodium chloride (NS) flush 5-10 mL  5-10 mL IntraVENous Q8H    sodium chloride (NS) flush 5-10 mL  5-10 mL IntraVENous PRN    naloxone (NARCAN) injection 0.4 mg  0.4 mg IntraVENous PRN    ondansetron (ZOFRAN ODT) tablet 4 mg  4 mg Oral Q4H PRN    oxyCODONE IR (ROXICODONE) tablet 5 mg  5 mg Oral Q4H PRN    acetaminophen (TYLENOL) tablet 650 mg  650 mg Oral Q6H    oxyCODONE IR (ROXICODONE) tablet 2.5 mg 2.5 mg Oral Q4H PRN    dextrose 10% infusion 125-250 mL  125-250 mL IntraVENous PRN         Rhina Miranda MD

## 2017-07-19 LAB
ALBUMIN SERPL BCP-MCNC: 2 G/DL (ref 3.5–5)
ALBUMIN/GLOB SERPL: 0.8 {RATIO} (ref 1.1–2.2)
ALP SERPL-CCNC: 39 U/L (ref 45–117)
ALT SERPL-CCNC: 350 U/L (ref 12–78)
ANION GAP BLD CALC-SCNC: 5 MMOL/L (ref 5–15)
AST SERPL W P-5'-P-CCNC: 179 U/L (ref 15–37)
BILIRUB SERPL-MCNC: 0.3 MG/DL (ref 0.2–1)
BNP SERPL-MCNC: 93 PG/ML (ref 0–100)
BUN SERPL-MCNC: 88 MG/DL (ref 6–20)
BUN/CREAT SERPL: 76 (ref 12–20)
CALCIUM SERPL-MCNC: 7.7 MG/DL (ref 8.5–10.1)
CHLORIDE SERPL-SCNC: 99 MMOL/L (ref 97–108)
CO2 SERPL-SCNC: 27 MMOL/L (ref 21–32)
CREAT SERPL-MCNC: 1.16 MG/DL (ref 0.55–1.02)
ERYTHROCYTE [DISTWIDTH] IN BLOOD BY AUTOMATED COUNT: 14.4 % (ref 11.5–14.5)
GLOBULIN SER CALC-MCNC: 2.4 G/DL (ref 2–4)
GLUCOSE BLD STRIP.AUTO-MCNC: 122 MG/DL (ref 65–100)
GLUCOSE BLD STRIP.AUTO-MCNC: 195 MG/DL (ref 65–100)
GLUCOSE BLD STRIP.AUTO-MCNC: 216 MG/DL (ref 65–100)
GLUCOSE BLD STRIP.AUTO-MCNC: 251 MG/DL (ref 65–100)
GLUCOSE SERPL-MCNC: 101 MG/DL (ref 65–100)
HCT VFR BLD AUTO: 22.3 % (ref 35–47)
HCT VFR BLD AUTO: 22.9 % (ref 35–47)
HGB BLD-MCNC: 7.8 G/DL (ref 11.5–16)
HGB BLD-MCNC: 7.9 G/DL (ref 11.5–16)
MAGNESIUM SERPL-MCNC: 2 MG/DL (ref 1.6–2.4)
MCH RBC QN AUTO: 30.6 PG (ref 26–34)
MCHC RBC AUTO-ENTMCNC: 35.4 G/DL (ref 30–36.5)
MCV RBC AUTO: 86.4 FL (ref 80–99)
OSMOLALITY UR: 447 MOSM/KG H2O
PHOSPHATE SERPL-MCNC: 3.5 MG/DL (ref 2.6–4.7)
PLATELET # BLD AUTO: 173 K/UL (ref 150–400)
POTASSIUM SERPL-SCNC: 4.5 MMOL/L (ref 3.5–5.1)
PROT SERPL-MCNC: 4.4 G/DL (ref 6.4–8.2)
RBC # BLD AUTO: 2.58 M/UL (ref 3.8–5.2)
SERVICE CMNT-IMP: ABNORMAL
SODIUM SERPL-SCNC: 131 MMOL/L (ref 136–145)
SODIUM UR-SCNC: 62 MMOL/L
TSH SERPL DL<=0.05 MIU/L-ACNC: 0.89 UIU/ML (ref 0.36–3.74)
WBC # BLD AUTO: 27.9 K/UL (ref 3.6–11)

## 2017-07-19 PROCEDURE — 85027 COMPLETE CBC AUTOMATED: CPT | Performed by: INTERNAL MEDICINE

## 2017-07-19 PROCEDURE — 74011636637 HC RX REV CODE- 636/637: Performed by: INTERNAL MEDICINE

## 2017-07-19 PROCEDURE — 77030018836 HC SOL IRR NACL ICUM -A

## 2017-07-19 PROCEDURE — 74011250636 HC RX REV CODE- 250/636: Performed by: INTERNAL MEDICINE

## 2017-07-19 PROCEDURE — 65660000000 HC RM CCU STEPDOWN

## 2017-07-19 PROCEDURE — 77030019607 HC DSG BURN S&N -A

## 2017-07-19 PROCEDURE — 85018 HEMOGLOBIN: CPT | Performed by: SPECIALIST

## 2017-07-19 PROCEDURE — 36415 COLL VENOUS BLD VENIPUNCTURE: CPT | Performed by: INTERNAL MEDICINE

## 2017-07-19 PROCEDURE — 80053 COMPREHEN METABOLIC PANEL: CPT | Performed by: INTERNAL MEDICINE

## 2017-07-19 PROCEDURE — 97535 SELF CARE MNGMENT TRAINING: CPT

## 2017-07-19 PROCEDURE — 74011000250 HC RX REV CODE- 250: Performed by: INTERNAL MEDICINE

## 2017-07-19 PROCEDURE — 74011250637 HC RX REV CODE- 250/637: Performed by: INTERNAL MEDICINE

## 2017-07-19 PROCEDURE — 83735 ASSAY OF MAGNESIUM: CPT | Performed by: INTERNAL MEDICINE

## 2017-07-19 PROCEDURE — 82962 GLUCOSE BLOOD TEST: CPT

## 2017-07-19 PROCEDURE — 97164 PT RE-EVAL EST PLAN CARE: CPT

## 2017-07-19 PROCEDURE — 97530 THERAPEUTIC ACTIVITIES: CPT

## 2017-07-19 PROCEDURE — 74011250637 HC RX REV CODE- 250/637: Performed by: PHYSICIAN ASSISTANT

## 2017-07-19 PROCEDURE — 84300 ASSAY OF URINE SODIUM: CPT | Performed by: INTERNAL MEDICINE

## 2017-07-19 PROCEDURE — 83935 ASSAY OF URINE OSMOLALITY: CPT | Performed by: INTERNAL MEDICINE

## 2017-07-19 PROCEDURE — C9113 INJ PANTOPRAZOLE SODIUM, VIA: HCPCS | Performed by: INTERNAL MEDICINE

## 2017-07-19 PROCEDURE — 74011250637 HC RX REV CODE- 250/637: Performed by: ORTHOPAEDIC SURGERY

## 2017-07-19 PROCEDURE — 84100 ASSAY OF PHOSPHORUS: CPT | Performed by: INTERNAL MEDICINE

## 2017-07-19 PROCEDURE — 84443 ASSAY THYROID STIM HORMONE: CPT | Performed by: INTERNAL MEDICINE

## 2017-07-19 PROCEDURE — 77010033678 HC OXYGEN DAILY

## 2017-07-19 PROCEDURE — 83880 ASSAY OF NATRIURETIC PEPTIDE: CPT | Performed by: INTERNAL MEDICINE

## 2017-07-19 PROCEDURE — 94640 AIRWAY INHALATION TREATMENT: CPT

## 2017-07-19 PROCEDURE — 74011250637 HC RX REV CODE- 250/637: Performed by: SPECIALIST

## 2017-07-19 PROCEDURE — 77030011255 HC DSG AQUACEL AG BMS -A

## 2017-07-19 RX ORDER — IPRATROPIUM BROMIDE 0.5 MG/2.5ML
0.5 SOLUTION RESPIRATORY (INHALATION)
Status: DISCONTINUED | OUTPATIENT
Start: 2017-07-19 | End: 2017-07-20

## 2017-07-19 RX ORDER — METOPROLOL TARTRATE 25 MG/1
25 TABLET, FILM COATED ORAL 2 TIMES DAILY
Status: DISCONTINUED | OUTPATIENT
Start: 2017-07-19 | End: 2017-07-19

## 2017-07-19 RX ORDER — METOPROLOL TARTRATE 25 MG/1
25 TABLET, FILM COATED ORAL
Status: COMPLETED | OUTPATIENT
Start: 2017-07-19 | End: 2017-07-19

## 2017-07-19 RX ORDER — INSULIN LISPRO 100 [IU]/ML
INJECTION, SOLUTION INTRAVENOUS; SUBCUTANEOUS
Status: DISCONTINUED | OUTPATIENT
Start: 2017-07-19 | End: 2017-07-25

## 2017-07-19 RX ORDER — IPRATROPIUM BROMIDE 0.5 MG/2.5ML
SOLUTION RESPIRATORY (INHALATION)
Status: DISCONTINUED
Start: 2017-07-19 | End: 2017-07-20

## 2017-07-19 RX ORDER — METOPROLOL TARTRATE 25 MG/1
25 TABLET, FILM COATED ORAL 2 TIMES DAILY
Status: DISCONTINUED | OUTPATIENT
Start: 2017-07-20 | End: 2017-07-20

## 2017-07-19 RX ORDER — LEVALBUTEROL INHALATION SOLUTION 0.63 MG/3ML
0.63 SOLUTION RESPIRATORY (INHALATION)
Status: DISCONTINUED | OUTPATIENT
Start: 2017-07-19 | End: 2017-07-20

## 2017-07-19 RX ORDER — METOPROLOL TARTRATE 5 MG/5ML
5 INJECTION INTRAVENOUS ONCE
Status: COMPLETED | OUTPATIENT
Start: 2017-07-19 | End: 2017-07-19

## 2017-07-19 RX ORDER — ACETAMINOPHEN 325 MG/1
650 TABLET ORAL
Status: DISCONTINUED | OUTPATIENT
Start: 2017-07-19 | End: 2017-08-01 | Stop reason: HOSPADM

## 2017-07-19 RX ADMIN — LEVALBUTEROL HYDROCHLORIDE 0.63 MG: 0.63 SOLUTION RESPIRATORY (INHALATION) at 01:02

## 2017-07-19 RX ADMIN — OXYCODONE HYDROCHLORIDE 2.5 MG: 5 TABLET ORAL at 08:11

## 2017-07-19 RX ADMIN — SUCRALFATE 1 G: 1 SUSPENSION ORAL at 08:11

## 2017-07-19 RX ADMIN — SODIUM CHLORIDE 40 MG: 9 INJECTION INTRAMUSCULAR; INTRAVENOUS; SUBCUTANEOUS at 08:34

## 2017-07-19 RX ADMIN — INSULIN LISPRO 5 UNITS: 100 INJECTION, SOLUTION INTRAVENOUS; SUBCUTANEOUS at 17:41

## 2017-07-19 RX ADMIN — SUCRALFATE 1 G: 1 SUSPENSION ORAL at 22:50

## 2017-07-19 RX ADMIN — OXYCODONE HYDROCHLORIDE 5 MG: 5 TABLET ORAL at 12:31

## 2017-07-19 RX ADMIN — LEVALBUTEROL HYDROCHLORIDE 0.63 MG: 0.63 SOLUTION RESPIRATORY (INHALATION) at 12:10

## 2017-07-19 RX ADMIN — SODIUM CHLORIDE 40 MG: 9 INJECTION INTRAMUSCULAR; INTRAVENOUS; SUBCUTANEOUS at 20:12

## 2017-07-19 RX ADMIN — LEVALBUTEROL HYDROCHLORIDE 0.63 MG: 0.63 SOLUTION RESPIRATORY (INHALATION) at 19:23

## 2017-07-19 RX ADMIN — OXYCODONE HYDROCHLORIDE 5 MG: 5 TABLET ORAL at 20:43

## 2017-07-19 RX ADMIN — IPRATROPIUM BROMIDE 0.5 MG: 0.5 SOLUTION RESPIRATORY (INHALATION) at 08:55

## 2017-07-19 RX ADMIN — AMLODIPINE BESYLATE 5 MG: 5 TABLET ORAL at 08:35

## 2017-07-19 RX ADMIN — LEVALBUTEROL HYDROCHLORIDE 0.63 MG: 0.63 SOLUTION RESPIRATORY (INHALATION) at 23:00

## 2017-07-19 RX ADMIN — INSULIN LISPRO 3 UNITS: 100 INJECTION, SOLUTION INTRAVENOUS; SUBCUTANEOUS at 22:50

## 2017-07-19 RX ADMIN — IPRATROPIUM BROMIDE 0.5 MG: 0.5 SOLUTION RESPIRATORY (INHALATION) at 15:05

## 2017-07-19 RX ADMIN — ACETAMINOPHEN 650 MG: 325 TABLET, FILM COATED ORAL at 05:52

## 2017-07-19 RX ADMIN — Medication 10 ML: at 05:40

## 2017-07-19 RX ADMIN — SUCRALFATE 1 G: 1 SUSPENSION ORAL at 12:17

## 2017-07-19 RX ADMIN — MUPIROCIN: 20 OINTMENT TOPICAL at 20:14

## 2017-07-19 RX ADMIN — SUCRALFATE 1 G: 1 SUSPENSION ORAL at 16:23

## 2017-07-19 RX ADMIN — IPRATROPIUM BROMIDE 0.5 MG: 0.5 SOLUTION RESPIRATORY (INHALATION) at 01:02

## 2017-07-19 RX ADMIN — METHYLPREDNISOLONE SODIUM SUCCINATE 40 MG: 40 INJECTION, POWDER, FOR SOLUTION INTRAMUSCULAR; INTRAVENOUS at 10:19

## 2017-07-19 RX ADMIN — SODIUM CHLORIDE 50 ML/HR: 900 INJECTION, SOLUTION INTRAVENOUS at 07:43

## 2017-07-19 RX ADMIN — METHYLPREDNISOLONE SODIUM SUCCINATE 40 MG: 40 INJECTION, POWDER, FOR SOLUTION INTRAMUSCULAR; INTRAVENOUS at 16:23

## 2017-07-19 RX ADMIN — METOPROLOL TARTRATE 25 MG: 25 TABLET ORAL at 16:23

## 2017-07-19 RX ADMIN — IPRATROPIUM BROMIDE 0.5 MG: 0.5 SOLUTION RESPIRATORY (INHALATION) at 23:00

## 2017-07-19 RX ADMIN — MUPIROCIN: 20 OINTMENT TOPICAL at 08:35

## 2017-07-19 RX ADMIN — LEVALBUTEROL HYDROCHLORIDE 0.63 MG: 0.63 SOLUTION RESPIRATORY (INHALATION) at 08:55

## 2017-07-19 RX ADMIN — Medication 10 ML: at 16:04

## 2017-07-19 RX ADMIN — OXYCODONE HYDROCHLORIDE 5 MG: 5 TABLET ORAL at 17:41

## 2017-07-19 RX ADMIN — LEVALBUTEROL HYDROCHLORIDE 0.63 MG: 0.63 SOLUTION RESPIRATORY (INHALATION) at 15:05

## 2017-07-19 RX ADMIN — IPRATROPIUM BROMIDE 0.5 MG: 0.5 SOLUTION RESPIRATORY (INHALATION) at 12:10

## 2017-07-19 RX ADMIN — HYDRALAZINE HYDROCHLORIDE 10 MG: 20 INJECTION INTRAMUSCULAR; INTRAVENOUS at 17:40

## 2017-07-19 RX ADMIN — IPRATROPIUM BROMIDE 0.5 MG: 0.5 SOLUTION RESPIRATORY (INHALATION) at 19:23

## 2017-07-19 RX ADMIN — INSULIN LISPRO 2 UNITS: 100 INJECTION, SOLUTION INTRAVENOUS; SUBCUTANEOUS at 12:21

## 2017-07-19 RX ADMIN — METOPROLOL TARTRATE 5 MG: 5 INJECTION INTRAVENOUS at 15:58

## 2017-07-19 NOTE — PROGRESS NOTES
Pressure Ulcer Prevention In basket Alert Received for Soto < 14 (moderate risk).      Suggested Care Plan/Interventions for Nursing  1. Complete [place] Pressure Ulcer Risk Scale and use sub scores to identify appropriate interventions. 2. Perform Assessment: skin, changes in LOC, visual cues for pain, monitor skin under medical devices  3. Respond to Reduced Sensory Perception: changes in LOC, check visual cues for pain, float heels, suspension boots, pressure redistribution bed/mattress/chair cushion, turning and reposition approximately every 2 hours (pillows & wedges), pad between skin to skin, turn & reposition  4. Manage Moisture: absorbent under pads, internal / external urinary device, internal /  external fecal device, minimize layers, contain wound drainage, access need for specialty bed, limit adult briefs, maintain skin hydration (lotion/cream), moisture barrier, offer toileting every hour  5. Promote Activity: increase time out of bed, chair cushion, PT/OT evaluation, trapeze to reposition, pressure redistribution bed/mattress/chair  6. Address Reduced Mobility: float heels / suspension boot, HOB 30 degrees or less, pressure redistribution bed/mattress/cushion, PT / OT evaluation, turn and reposition approximately every 2 hours (pillows & wedges)  7. Promote Nutrition: document food / fluid / supplement intake, encourage/assist with meals as needed  8. Reduce Friction and Shear: transferring/repositioning devices (lift/draw sheet), lift team/ patient mobility team, feet elevated on foot rest, minimize layers, foam dressing / transparent film / skin sealants, protective barrier creams and emollients, transfer aides (board, Maribeth lift, ceiling lift, stand assist), HOB 30 degrees or less, trapeze to reposition.   Wound Care Team

## 2017-07-19 NOTE — PROGRESS NOTES
Gastroenterology Daily Progress Note (Dr. Annabelle Shafer)    Admit Date: 7/12/2017       Subjective:       No BMs overnight.   Patient sleeping this am, denies abdominal pain or n/v.    Current Facility-Administered Medications   Medication Dose Route Frequency    simethicone (MYLICON) 71PD/7.7ZB oral drops 80 mg  1.2 mL Oral Multiple    metoprolol tartrate (LOPRESSOR) tablet 25 mg  25 mg Oral Q6H    pantoprazole (PROTONIX) 40 mg in sodium chloride 0.9 % 10 mL injection  40 mg IntraVENous Q12H    amLODIPine (NORVASC) tablet 5 mg  5 mg Oral DAILY    sucralfate (CARAFATE) 100 mg/mL oral suspension 1 g  1 g Oral AC&HS    0.9% sodium chloride infusion 250 mL  250 mL IntraVENous PRN    0.9% sodium chloride infusion  50 mL/hr IntraVENous CONTINUOUS    levalbuterol (XOPENEX) nebulizer soln 0.63 mg/3 mL  0.63 mg Nebulization Q6H RT    ipratropium (ATROVENT) 0.02 % nebulizer solution 0.5 mg  0.5 mg Nebulization Q6H RT    insulin lispro (HUMALOG) injection   SubCUTAneous Q6H    mupirocin (BACTROBAN) 2 % ointment   Topical Q12H    hydrALAZINE (APRESOLINE) 20 mg/mL injection 10 mg  10 mg IntraVENous Q6H PRN    glucose chewable tablet 16 g  4 Tab Oral PRN    glucagon (GLUCAGEN) injection 1 mg  1 mg IntraMUSCular PRN    bisacodyl (DULCOLAX) suppository 10 mg  10 mg Rectal DAILY PRN    sodium chloride (NS) flush 5-10 mL  5-10 mL IntraVENous Q8H    sodium chloride (NS) flush 5-10 mL  5-10 mL IntraVENous PRN    naloxone (NARCAN) injection 0.4 mg  0.4 mg IntraVENous PRN    ondansetron (ZOFRAN ODT) tablet 4 mg  4 mg Oral Q4H PRN    oxyCODONE IR (ROXICODONE) tablet 5 mg  5 mg Oral Q4H PRN    acetaminophen (TYLENOL) tablet 650 mg  650 mg Oral Q6H    oxyCODONE IR (ROXICODONE) tablet 2.5 mg  2.5 mg Oral Q4H PRN    dextrose 10% infusion 125-250 mL  125-250 mL IntraVENous PRN        Objective:     Visit Vitals    BP (!) 158/35    Pulse 72    Temp 97.6 °F (36.4 °C)    Resp 17    Ht 5' 3\" (1.6 m)    Wt 95.9 kg (211 lb 6.7 oz)    SpO2 97%    BMI 37.45 kg/m2   Blood pressure (!) 158/35, pulse 72, temperature 97.6 °F (36.4 °C), resp. rate 17, height 5' 3\" (1.6 m), weight 95.9 kg (211 lb 6.7 oz), SpO2 97 %.     07/17 1901 - 07/19 0700  In: 3385.8 [I.V.:2663.3]  Out: 4560 [Urine:4560]      Intake/Output Summary (Last 24 hours) at 07/19/17 0717  Last data filed at 07/19/17 0600   Gross per 24 hour   Intake          1343.33 ml   Output             3310 ml   Net         -1966.67 ml     Physical Exam:     General: fatigued, pale WF in NAD  Chest:  CTA, No rhonchi, rales or rubs.   Heart: S1, S2, RRR  GI: Soft, NT, ND + bowel sounds  Extremities: No edema or cyanosis    Labs:       Recent Results (from the past 24 hour(s))   METABOLIC PANEL, BASIC    Collection Time: 07/18/17  9:00 AM   Result Value Ref Range    Sodium 130 (L) 136 - 145 mmol/L    Potassium 5.5 (H) 3.5 - 5.1 mmol/L    Chloride 99 97 - 108 mmol/L    CO2 26 21 - 32 mmol/L    Anion gap 5 5 - 15 mmol/L    Glucose 125 (H) 65 - 100 mg/dL     (H) 6 - 20 MG/DL    Creatinine 1.28 (H) 0.55 - 1.02 MG/DL    BUN/Creatinine ratio 80 (H) 12 - 20      GFR est AA 50 (L) >60 ml/min/1.73m2    GFR est non-AA 41 (L) >60 ml/min/1.73m2    Calcium 8.0 (L) 8.5 - 10.1 MG/DL   MAGNESIUM    Collection Time: 07/18/17  9:00 AM   Result Value Ref Range    Magnesium 2.1 1.6 - 2.4 mg/dL   PHOSPHORUS    Collection Time: 07/18/17  9:00 AM   Result Value Ref Range    Phosphorus 3.2 2.6 - 4.7 MG/DL   URINALYSIS W/ RFLX MICROSCOPIC    Collection Time: 07/18/17 11:21 AM   Result Value Ref Range    Color YELLOW/STRAW      Appearance CLEAR CLEAR      Specific gravity 1.021 1.003 - 1.030      pH (UA) 5.5 5.0 - 8.0      Protein NEGATIVE  NEG mg/dL    Glucose NEGATIVE  NEG mg/dL    Ketone NEGATIVE  NEG mg/dL    Bilirubin NEGATIVE  NEG      Blood NEGATIVE  NEG      Urobilinogen 0.2 0.2 - 1.0 EU/dL    Nitrites NEGATIVE  NEG      Leukocyte Esterase NEGATIVE  NEG     POTASSIUM, UR, RANDOM    Collection Time: 07/18/17 11:21 AM   Result Value Ref Range    Potassium urine, random 31 MMOL/L   GLUCOSE, POC    Collection Time: 07/18/17  1:15 PM   Result Value Ref Range    Glucose (POC) 155 (H) 65 - 100 mg/dL    Performed by TIM TOLENTINO    GLUCOSE, POC    Collection Time: 07/18/17  6:13 PM   Result Value Ref Range    Glucose (POC) 137 (H) 65 - 100 mg/dL    Performed by TIM TOLENTINO    CORTISOL, AM    Collection Time: 07/18/17  6:16 PM   Result Value Ref Range    Cortisol, a.m. 22.9 (H) 4.3 - 22.4 ug/dL   HGB & HCT    Collection Time: 07/18/17  9:20 PM   Result Value Ref Range    HGB 7.4 (L) 11.5 - 16.0 g/dL    HCT 20.6 (L) 35.0 - 47.0 %   GLUCOSE, POC    Collection Time: 07/18/17 11:29 PM   Result Value Ref Range    Glucose (POC) 142 (H) 65 - 100 mg/dL    Performed by Betina Mills    METABOLIC PANEL, COMPREHENSIVE    Collection Time: 07/19/17  3:56 AM   Result Value Ref Range    Sodium 131 (L) 136 - 145 mmol/L    Potassium 4.5 3.5 - 5.1 mmol/L    Chloride 99 97 - 108 mmol/L    CO2 27 21 - 32 mmol/L    Anion gap 5 5 - 15 mmol/L    Glucose 101 (H) 65 - 100 mg/dL    BUN 88 (H) 6 - 20 MG/DL    Creatinine 1.16 (H) 0.55 - 1.02 MG/DL    BUN/Creatinine ratio 76 (H) 12 - 20      GFR est AA 56 (L) >60 ml/min/1.73m2    GFR est non-AA 46 (L) >60 ml/min/1.73m2    Calcium 7.7 (L) 8.5 - 10.1 MG/DL    Bilirubin, total 0.3 0.2 - 1.0 MG/DL    ALT (SGPT) 350 (H) 12 - 78 U/L    AST (SGOT) 179 (H) 15 - 37 U/L    Alk.  phosphatase 39 (L) 45 - 117 U/L    Protein, total 4.4 (L) 6.4 - 8.2 g/dL    Albumin 2.0 (L) 3.5 - 5.0 g/dL    Globulin 2.4 2.0 - 4.0 g/dL    A-G Ratio 0.8 (L) 1.1 - 2.2     MAGNESIUM    Collection Time: 07/19/17  3:56 AM   Result Value Ref Range    Magnesium 2.0 1.6 - 2.4 mg/dL   PHOSPHORUS    Collection Time: 07/19/17  3:56 AM   Result Value Ref Range    Phosphorus 3.5 2.6 - 4.7 MG/DL   CBC W/O DIFF    Collection Time: 07/19/17  3:56 AM   Result Value Ref Range    WBC 27.9 (H) 3.6 - 11.0 K/uL    RBC 2.58 (L) 3.80 - 5.20 M/uL    HGB 7.9 (L) 11.5 - 16.0 g/dL    HCT 22.3 (L) 35.0 - 47.0 %    MCV 86.4 80.0 - 99.0 FL    MCH 30.6 26.0 - 34.0 PG    MCHC 35.4 30.0 - 36.5 g/dL    RDW 14.4 11.5 - 14.5 %    PLATELET 237 141 - 068 K/uL   TSH 3RD GENERATION    Collection Time: 07/19/17  3:56 AM   Result Value Ref Range    TSH 0.89 0.36 - 3.74 uIU/mL   GLUCOSE, POC    Collection Time: 07/19/17  5:38 AM   Result Value Ref Range    Glucose (POC) 122 (H) 65 - 100 mg/dL    Performed by Sepideh GREGORIO(wbc:2,hgb:2,hct:2,plt:2,)  Recent Labs      07/19/17   0356  07/18/17   0900  07/18/17   0419   NA  131*  130*  129*   K  4.5  5.5*  5.5*   CL  99  99  97   CO2  27  26  26   BUN  88*  103*  100*   CREA  1.16*  1.28*  1.26*   GLU  101*  125*  148*   CA  7.7*  8.0*  8.0*   MG  2.0  2.1  2.0   PHOS  3.5  3.2   --      Recent Labs      07/18/17   0419   INR  1.1   PTP  11.5*   APTT  <20.0*     Recent Labs      07/19/17   0356  07/17/17   0251   SGOT  179*  29   AP  39*  55   TP  4.4*  6.0*   ALB  2.0*  2.4*   GLOB  2.4  3.6        Ref. Range 7/18/2017 00:11 7/18/2017 04:19 7/18/2017 05:15 7/18/2017 21:20 7/19/2017 03:56   HGB Latest Ref Range: 11.5 - 16.0 g/dL 10.1 (L) 9.7 (L) 9.1 (L) 7.4 (L) 7.9 (L)   HCT Latest Ref Range: 35.0 - 47.0 % 27.7 (L) 27.1 (L) 25.7 (L) 20.6 (L) 22.3 (L)       Impression:    Acute GI blood loss anemia secondary to Duodenal ulcers and ulcerative esophagitis on EGD 7/18/17    Hyponatremia    Hyperkalemia    COPD    CHF         Plan:  Patient with Hgb that is stable at 7.9 after 3 U transfused for Hgb 5.   Bleeding UGI in origin likely from multiple duodenal ulcers and also severe esophagitis now on BID PPI with no further melena.  -continue on carafate qid and BID protonix  -continue H/H q12h  -f/u H pylori ab sent and treat if positive given recurrent nature of her DU  -plavix on hold given high rebleeding risk       Kristyn Sanchez MD    7/19/2017 [address] [facility], [address] [facility]  [address] [facility]: [phone]

## 2017-07-19 NOTE — PROGRESS NOTES
1900: Bedside and Verbal shift change report given to [address] (oncoming nurse) by Mame Bellamy RN (offgoing nurse). Report included the following information SBAR, Kardex, Procedure Summary, Intake/Output, MAR, Recent Results, Med Rec Status and Cardiac Rhythm NSR.  2000:  Assessment completed, VSS, NSR, 3Ls nasal canula, afebrile. Patient alert, no c/o pain. Family at bedside. Call bell with in reach, will continue to monitor. 0000:  Reassessment completed, no changes noted. Will continue to monitor. 0400:  Reassessment completed, no changes noted. Will continue to monitor. 0600: Ankle dressing with moderate amount of serous drainage. Dressing reinforced per wound care orders. Will continue to monitor.

## 2017-07-19 NOTE — PROGRESS NOTES
Progress Note      7/19/2017 8:14 AM  NAME: Rosalina Hastings   MRN:  [id number]   Admit Diagnosis: right ankle fracture  Respiratory failure (HCC)  GI Bleed                          Assessment:                 1. S/p mechanical fall requiring orthopedic surgery of right leg, found to be hypoxic, likely COPD exacerbation, PSVT noted on telemetry, mild rhabo with increased troponin being managed medically, then with GI bleed s/p 3 units  2. In 2011 found to have hyponatremia and TWI on ECG with cath with moderate CAD  3. Echo 6/2017 EF 55%, mild to mod MR, mild AI  4. Sinus with PSVT  ? AVNRT on telemetry  5. HTN with right renal artery stent  6. DM  7. Dyslipidemia difficulty with daily statin  8. Hx of GI bleed found to have PUD Dr. Tasha Dhillon  9. PVD with left fem-pop placed on plavix for this  10. , mild functional capacity, works at [employer] 21:                  Mechanical fall  Mild rhabdo with increased troponin, agree with medical management of CAD  PSVT with HR of 150, looks like AVNRT does not appear to be afib, self terminated, 20 beats of tachycardia on 7/15 not recorded on tele monitor or printed, but likely same. Mildly increased troponin, would manage CAD medically. Increased cr after diuresis, doubt significant CHF. Low sodium improving. Gi bleed s/p 3 U PRBC      1. Holding plavix, was on this for PVD will need asa vs. plavix on discharge  2. Decrease metoprolol to bid due to low hr, was on metoprolol sr 100 as outpt  3. cont amlodipine  4. Holding avapro, hydralazine prn  5. ?on Doxazosin 1mg per my records  6. Stop fluids, was on lasix 40mg daily as outpt, follow bmp, bnp  7. Cont statin, would stop niacin          [x]        High complexity decision making was performed         Subjective:     Rosalina Hastings denies chest pain, dyspnea. Discussed with RN events overnight.      Review of Systems:    Symptom Y/N Comments  Symptom Y/N Comments   Fever/Chills N Chest Pain N    Poor Appetite N   Edema N    Cough N   Abdominal Pain N    Sputum N   Joint Pain N    SOB/ODOM N   Pruritis/Rash N    Nausea/vomit N   Tolerating PT/OT Y    Diarrhea N   Tolerating Diet Y    Constipation N   Other       Could NOT obtain due to:      Objective:      Physical Exam:    Last 24hrs VS reviewed since prior progress note. Most recent are:    Visit Vitals    BP (!) 158/35    Pulse 72    Temp 97.6 °F (36.4 °C)    Resp 17    Ht 5' 3\" (1.6 m)    Wt 95.9 kg (211 lb 6.7 oz)    SpO2 97%    BMI 37.45 kg/m2       Intake/Output Summary (Last 24 hours) at 07/19/17 5251  Last data filed at 07/19/17 0600   Gross per 24 hour   Intake          1453.33 ml   Output             3310 ml   Net         -1856.67 ml        General Appearance: Well developed, well nourished, alert & oriented x 3,    no acute distress. Ears/Nose/Mouth/Throat: Hearing grossly normal.  Neck: Supple. Chest: Lungs clear to auscultation bilaterally. Cardiovascular: Regular rate and rhythm, S1S2 normal, no murmur. Abdomen: Soft, non-tender, bowel sounds are active. Extremities: No edema bilaterally. Skin: Warm and dry. PMH/SH reviewed - no change compared to H&P    Data Review    Telemetry: normal sinus rhythm     Lab Data Personally Reviewed:    Recent Labs      07/19/17 0356 07/18/17   2120   07/18/17 0419   WBC  27.9*   --    --   27.1*   HGB  7.9*  7.4*   < >  9.7*   HCT  22.3*  20.6*   < >  27.1*   PLT  173   --    --   177    < > = values in this interval not displayed.      Recent Labs      07/18/17 0419   INR  1.1   PTP  11.5*   APTT  <20.0*      Recent Labs      07/19/17   0356  07/18/17   0900  07/18/17   0419   NA  131*  130*  129*   K  4.5  5.5*  5.5*   CL  99  99  97   CO2  27  26  26   BUN  88*  103*  100*   CREA  1.16*  1.28*  1.26*   GLU  101*  125*  148*   CA  7.7*  8.0*  8.0*   MG  2.0  2.1  2.0     Recent Labs      07/18/17   0419  07/17/17   0251   CPK   --   386*   CKNDX   --   1.1   TROIQ 1.20*  0.45*     Lab Results   Component Value Date/Time    Cholesterol, total 162 09/20/2011 01:17 PM    HDL Cholesterol 55 09/20/2011 01:17 PM    LDL, calculated 78.4 09/20/2011 01:17 PM    Triglyceride 143 09/20/2011 01:17 PM    CHOL/HDL Ratio 2.9 09/20/2011 01:17 PM       Recent Labs      07/19/17   0356  07/17/17   0251   SGOT  179*  29   AP  39*  55   TP  4.4*  6.0*   ALB  2.0*  2.4*   GLOB  2.4  3.6     No results for input(s): PH, PCO2, PO2 in the last 72 hours.     Medications Personally Reviewed:    Current Facility-Administered Medications   Medication Dose Route Frequency    simethicone (MYLICON) 05II/7.6IH oral drops 80 mg  1.2 mL Oral Multiple    metoprolol tartrate (LOPRESSOR) tablet 25 mg  25 mg Oral Q6H    pantoprazole (PROTONIX) 40 mg in sodium chloride 0.9 % 10 mL injection  40 mg IntraVENous Q12H    amLODIPine (NORVASC) tablet 5 mg  5 mg Oral DAILY    sucralfate (CARAFATE) 100 mg/mL oral suspension 1 g  1 g Oral AC&HS    0.9% sodium chloride infusion 250 mL  250 mL IntraVENous PRN    0.9% sodium chloride infusion  50 mL/hr IntraVENous CONTINUOUS    levalbuterol (XOPENEX) nebulizer soln 0.63 mg/3 mL  0.63 mg Nebulization Q6H RT    ipratropium (ATROVENT) 0.02 % nebulizer solution 0.5 mg  0.5 mg Nebulization Q6H RT    insulin lispro (HUMALOG) injection   SubCUTAneous Q6H    mupirocin (BACTROBAN) 2 % ointment   Topical Q12H    hydrALAZINE (APRESOLINE) 20 mg/mL injection 10 mg  10 mg IntraVENous Q6H PRN    glucose chewable tablet 16 g  4 Tab Oral PRN    glucagon (GLUCAGEN) injection 1 mg  1 mg IntraMUSCular PRN    bisacodyl (DULCOLAX) suppository 10 mg  10 mg Rectal DAILY PRN    sodium chloride (NS) flush 5-10 mL  5-10 mL IntraVENous Q8H    sodium chloride (NS) flush 5-10 mL  5-10 mL IntraVENous PRN    naloxone (NARCAN) injection 0.4 mg  0.4 mg IntraVENous PRN    ondansetron (ZOFRAN ODT) tablet 4 mg  4 mg Oral Q4H PRN    oxyCODONE IR (ROXICODONE) tablet 5 mg  5 mg Oral Q4H PRN    acetaminophen (TYLENOL) tablet 650 mg  650 mg Oral Q6H    oxyCODONE IR (ROXICODONE) tablet 2.5 mg  2.5 mg Oral Q4H PRN    dextrose 10% infusion 125-250 mL  125-250 mL IntraVENous PRN         Aissatou Manning MD

## 2017-07-19 NOTE — PROGRESS NOTES
Problem: Self Care Deficits Care Plan (Adult)  Goal: *Acute Goals and Plan of Care (Insert Text)  Occupational Therapy Goals  Initiated 7/14/2017  1. Patient will perform sponge bathing with supervision/set-up within 7 day(s). 2. Patient will perform lower body dressing with supervision/set-up within 7 day(s). 3. Patient will perform BSC transfers, via stand pivot with RW, NWB on RLE with minimal assistance/contact guard assist within 7 day(s). 4. Patient will perform all aspects of toileting with supervision/set-up within 7 day(s). OCCUPATIONAL THERAPY TREATMENT  Patient: Wilber Bean (31 y.o. female)  Date: 7/19/2017  Diagnosis: right ankle fracture  Respiratory failure (HCC)  GI Bleed <principal problem not specified>  Procedure(s) (LRB):  ESOPHAGOGASTRODUODENOSCOPY (EGD) (N/A) 1 Day Post-Op  Precautions: NWB      ASSESSMENT:  Pt was cleared to be seen by nursing and was more alert today. All VSS and pt was on 2 liter os NC and O2% in the 90s. Pt was CGA for bed mobility, and she was mod assist of 1 and min assist of another to stand was able to maintain her NWB status. Pt rated her pain as a 5 and was given meds for pain prior to standing. Her transfer to the chair with min assist of 2. Pt was tired and nursing needed to change pts dressing on her right leg after the transfer. Will continue to work with pt on ADLs and recommend that pt have further therapy at discharge at in pt or SNf pending her progress. Progression toward goals:  [X]       Improving appropriately and progressing toward goals  [ ]       Improving slowly and progressing toward goals  [ ]       Not making progress toward goals and plan of care will be adjusted       PLAN:  Patient continues to benefit from skilled intervention to address the above impairments. Continue treatment per established plan of care. Discharge Recommendations:   To Be Determined  Further Equipment Recommendations for Discharge:  tbd       SUBJECTIVE: Patient stated I am cold. I want to go home .       OBJECTIVE DATA SUMMARY:   Cognitive/Behavioral Status:  Neurologic State: Drowsy  Orientation Level: Oriented X4  Cognition: Follows commands              Functional Mobility and Transfers for ADLs:  Bed Mobility:  Rolling: Minimum assistance  Supine to Sit: Minimum assistance  Scooting: Contact guard assistance     Transfers:  Sit to Stand: Moderate assistance;Assist x2        Balance:  Sitting: Intact; Without support  Standing: Impaired; With support  Standing - Static: Constant support; Fair  Standing - Dynamic : Poor     ADL Intervention:     Pt is setup for grooming and max assist for UB and LB ADLs. Pain:  Pain Scale 1: Numeric (0 - 10)  Pain Intensity 1: 0  Pain Location 1: Ankle  Pain Orientation 1: Right     Pain Intervention(s) 1: Medication (see MAR)  Activity Tolerance:   vss  Please refer to the flowsheet for vital signs taken during this treatment.   After treatment:   [X] Patient left in no apparent distress sitting up in chair  [ ] Patient left in no apparent distress in bed  [X] Call bell left within reach  [X] Nursing notified  [ ] Caregiver present  [ ] Bed alarm activated      COMMUNICATION/COLLABORATION:   The patients plan of care was discussed with: Physical Therapist and Registered Nurse     Dave Ruvalcaba OT  Time Calculation: 25 mins

## 2017-07-19 NOTE — PROGRESS NOTES
Hospitalist Progress Note    NAME: Stefan Gore   :  1945   MRN:  [id number]       Assessment / Plan:  Acute GIB with ABL anemia secondary to duodenal ulcers and esophagitis  -s/p  EGD   -Appreciate GI consult  - PPI bid, carafate, holding plavix  -S/p 3 units PRBC  -Monitor H&H, stable  - follow bx result, h.pylori    Asymptomatic 22 beat run of Wide Complex Tachycardia (VT) on early am of 7/15: question if related to Levaquin use in setting of renal failure  Atrial Fibrillation with RVR on early morning of   -Mild troponin elevation  -metoprolol,  norvasc  -Appreciate Cardiology consult  -suspect patient needs outpatient sleep study    Acute hypoxic respiratory failure  Acute COPD exacerbation  -continue scheduled and prn nebs, IV steroids change to Prednisone on , now back on iv solumedrol dt wheezing stopped  -suspect worsening Leukocytosis secondary to prednisone and reactive. BC  no growth, UA wnl, afebrile    ARF from aggressive diuresis; resolved with hydration, cr trended up likely secondary to contrast given , now trending down  Mild Hyperkalemia; resolved with hydration  Hyponatremia: Sodium Woody of 121 on 7/15; increased to 131 today;  ? SIADH  -hold ARB    CHF ruled out (LVEF 55-60%); Patient has interstitial lung disease that was suspected to be pulmonary edema on initial CXR and clinically with diuresis patient went into ARF and become increasingly hyponatremic (lasix was stopped on  with worsening hyponatremia) and CXR without significant change.   Acute Pulmonary Edema Ruled out    Abdominal pain on am of  with continued nausea: resolved after large BM this am  -KUB on  without ileus    Distal tibia and fibula fracture s/p mechanical fall  -s/p ORIF on   -appreciate ortho assistance  -elevated CK improving     Hx of CAD  HTN; uncontrolled  -decrease metoprolol dt bradycardia, cont amlodipine, holding plavix, statin      Hx of PVD  -s/p right renal artery stent, left fem-pop by Dr. Marleen Figueroa 3/2014     Impaired Glucose Management (Diabetes ruled out)  -A1c 5.7  -diet control  -SSI      Tobacco use  -nicotine patch    Obesity     Code Status: Full  Surrogate Decision Maker: daughter Maye Mansfield [phone]     DVT Prophylaxis: SCDs for now (lovenox if no plan procedure in the AM)  GI Prophylaxis: not indicated     Baseline: independent      Body mass index is 37.45 kg/(m^2). Subjective:     Chief Complaint / Reason for Physician Visit: follow-up respiratory failure  Pt denied any new c/o, feeling better    Review of Systems:  Symptom Y/N Comments  Symptom Y/N Comments   Fever/Chills    Chest Pain n    Poor Appetite y   Edema     Cough    Abdominal Pain n    Sputum    Joint Pain     SOB/ODOM y   Pruritis/Rash     Nausea/vomit n   Tolerating PT/OT     Diarrhea    Tolerating Diet     Constipation n   Other       Could NOT obtain due to:      Objective:     VITALS:   Last 24hrs VS reviewed since prior progress note.  Most recent are:  Patient Vitals for the past 24 hrs:   Temp Pulse Resp BP SpO2   07/19/17 1210 - - - - 96 %   07/19/17 1200 - 65 14 166/51 95 %   07/19/17 1124 97.8 °F (36.6 °C) 81 22 158/48 98 %   07/19/17 1100 - 68 14 (!) 171/37 97 %   07/19/17 1030 - 67 23 (!) 160/32 96 %   07/19/17 1000 - 79 24 152/41 100 %   07/19/17 0930 - 71 25 (!) 151/32 100 %   07/19/17 0900 - 69 20 (!) 165/36 100 %   07/19/17 0855 - - - - 95 %   07/19/17 0830 - (!) 59 16 148/41 96 %   07/19/17 0800 97.4 °F (36.3 °C) (!) 58 13 161/40 98 %   07/19/17 0730 - 62 22 (!) 154/39 97 %   07/19/17 0700 - (!) 57 18 (!) 145/34 99 %   07/19/17 0600 - 72 17 (!) 158/35 97 %   07/19/17 0500 - (!) 58 13 (!) 158/27 98 %   07/19/17 0400 97.6 °F (36.4 °C) (!) 59 16 (!) 157/26 99 %   07/19/17 0300 - (!) 54 12 (!) 131/37 94 %   07/19/17 0200 - (!) 58 14 (!) 142/37 99 %   07/19/17 0102 - - - - 98 %   07/19/17 0100 - 61 14 (!) 129/34 98 %   07/19/17 0000 97.8 °F (36.6 °C) 68 22 (!) 142/35 95 % 07/18/17 2300 - 67 17 148/45 97 %   07/18/17 2200 - 64 18 145/52 98 %   07/18/17 2100 - 62 16 (!) 132/37 97 %   07/18/17 2000 97.7 °F (36.5 °C) 65 23 145/41 98 %   07/18/17 1900 - 68 16 (!) 149/37 98 %   07/18/17 1800 - 67 13 (!) 136/31 97 %   07/18/17 1700 - 68 20 (!) 159/36 100 %   07/18/17 1629 - - - - 98 %   07/18/17 1600 97.3 °F (36.3 °C) 73 18 151/41 96 %   07/18/17 1500 - 73 25 145/42 96 %   07/18/17 1400 - 65 16 125/41 97 %       Intake/Output Summary (Last 24 hours) at 07/19/17 1331  Last data filed at 07/19/17 1127   Gross per 24 hour   Intake             1770 ml   Output             2925 ml   Net            -1155 ml        PHYSICAL EXAM:  General: WD, WN. Alert, cooperative, no acute distress    EENT:  EOMI. Anicteric sclerae. MMM  Resp:  + wheezing,  No accessory muscle use  CV:  Regular  rhythm,  No edema (left lower leg in external fixator and bandaged  GI:  Soft, Non distended, Non tender.  +Bowel sounds  Neurologic:  Alert and oriented X 3, normal speech,   Psych:   Good insight. Not anxious nor agitated  Skin:  No rashes. No jaundice    Reviewed most current lab test results and cultures  YES  Reviewed most current radiology test results   YES  Review and summation of old records today    NO  Reviewed patient's current orders and MAR    YES  PMH/SH reviewed - no change compared to H&P  ________________________________________________________________________  Care Plan discussed with:    Comments   Patient x    Family      RN x    Care Manager     Consultant                        Multidiciplinary team rounds were held today with , nursing, pharmacist and clinical coordinator. Patient's plan of care was discussed; medications were reviewed and discharge planning was addressed.      ________________________________________________________________________  Total NON critical care TIME:  25   Minutes    Total CRITICAL CARE TIME Spent:   Minutes non procedure based      Comments   >50% of visit spent in counseling and coordination of care     ________________________________________________________________________  Ragini Ramos MD     Procedures: see electronic medical records for all procedures/Xrays and details which were not copied into this note but were reviewed prior to creation of Plan. LABS:  I reviewed today's most current labs and imaging studies. Pertinent labs include:  Recent Labs      07/19/17   0356  07/18/17   2120  07/18/17   0515  07/18/17   0419   07/17/17   0251   WBC  27.9*   --    --   27.1*   --   18.3*   HGB  7.9*  7.4*  9.1*  9.7*   < >  10.1*   HCT  22.3*  20.6*  25.7*  27.1*   < >  28.5*   PLT  173   --    --   177   --   364    < > = values in this interval not displayed.      Recent Labs      07/19/17   0356  07/18/17   0900  07/18/17   0419  07/17/17   0251   NA  131*  130*  129*  124*   K  4.5  5.5*  5.5*  4.9   CL  99  99  97  88*   CO2  27  26  26  28   GLU  101*  125*  148*  177*   BUN  88*  103*  100*  51*   CREA  1.16*  1.28*  1.26*  0.89   CA  7.7*  8.0*  8.0*  9.2   MG  2.0  2.1  2.0   --    PHOS  3.5  3.2   --    --    ALB  2.0*   --    --   2.4*   TBILI  0.3   --    --   0.3   SGOT  179*   --    --   29   ALT  350*   --    --   19   INR   --    --   1.1   --        Signed: Ragini Ramos MD

## 2017-07-19 NOTE — PROGRESS NOTES
0820  AM assessment complete. Pt drowsy, but oriented x 4. C/o 5/10 pain in ankle. 2.5mg Roxicodone given. Pin care complete. [facility] with Dr. Wyatt Hurley and Marily Leyva NP at bedside. Ok to change dressing on ankle. 1534  Pt up in chair since 1200. Pt bathed and dressing change completed. When wrapping ankle, pt converted to 's. Called Dr. Balbir Fontanez to notify. Orders received. Transfer cancelled. 1600  Immediately prior to assisting pt back to bed, pt converted back to NSR 70's. No meds had been given at this time. Metoprolol 5mg given once pt in bed. Called Dr. Balbir Fontanez to notify.   Ok to give PO metoprolol early and if pt stable at 1830, may transfer pt to ortho tele

## 2017-07-19 NOTE — PROGRESS NOTES
Ortho NP Note:    Pt seen today with Dr. Tamela Granados  Pt resting in ICU bed. Receiving Neb tx  Feels okay today. Lethargic. Pain well controlled. Has neil in place. Recent Labs      17   0356   HCT  22.3*   HGB  7.9*       Ex-fix in place. Pin care performed this morning by nursing and new gauze dressing over wounds  Mild-mod edema present  Calves soft and supple  Sensation and motor intact  Cap refill <2 seconds     PLAN:  1) Ankle ORIF - continue NWB on RLE and mobilize with PT when medically stable. Pin Care and dressing changes as needed. 2) GI bleed - EDG done yesterday - followed gastroenterology; Aware that she is off anticoagulation due to current bleed. We will plan to defer decision to resume anticoagulation to medicine given her multiple medical problems  3)  Tachycardia - followed by Dr. Lucinda Harrison  4) Anemia - from GI Bleed - 7.9 today s/p multiple transfusions. Internal medicine and intensivist watching. 5) Stable from ortho standpoint, hopefully transfer to ortho unit soon. Will need SNF placement. Received Samir Peña on  and will  in 72 hours which is today. CM will need to seek new auth when closer to discharge.       Milly Diallo NP

## 2017-07-19 NOTE — PROGRESS NOTES
Name: Zenaida Mercado   MRN: [id number]  : 1945      A:  Hyponatremia- improved. Hx of SIADH; mild. Asymptomatic; TSH is nl. Cortisol is not low (but was checked last PM; not am cortisol)  Urine indices suggestive of SIADH  Hyperkalemia- likely due to blood Tx + mild JODY; improved  Mild JODY- likely due to ROSALIE (from CT chest w contrast on ). Resolving; UA is normal  HTN  Hx of R Renal artery stent  GI bleed  COPD/chronic lung changes on CXR and CT chest- might be contributory cause of SIADH  CAD  R ankle tib/fib fracture-s/p ORIF     P:  D/C IVF  Renal diet, when takes PO  No ACE-I or ARB  PRN blood Tx  PRN Lasix if K rises again  Fluid restriction when resumes PO of 1.2 L/day  Can increase Norvasc for HTN and then subsequently add Doxazosin if needed  Metoprolol reduced to BID  AM cortisol    Subjective:  Feels OK. More wheezing.  Started back on IV steroids    ROS:   No nausea, no vomiting  No chest pain, +shortness of breath    Exam:  Visit Vitals    BP (!) 165/36    Pulse 69    Temp 97.4 °F (36.3 °C)    Resp 20    Ht 5' 3\" (1.6 m)    Wt 95.9 kg (211 lb 6.7 oz)    SpO2 100%    BMI 37.45 kg/m2       NAD  Pallor+, no icterus  +b/l wheezing  RRR  Tr edema  AOx3    Current Facility-Administered Medications   Medication Dose Route Frequency Last Dose    metoprolol tartrate (LOPRESSOR) tablet 25 mg  25 mg Oral BID      methylPREDNISolone (PF) (SOLU-MEDROL) injection 40 mg  40 mg IntraVENous Q8H      ipratropium (ATROVENT) 0.02 % nebulizer solution 0.5 mg  0.5 mg Nebulization Q4H RT      simethicone (MYLICON) 06MG/0.2GM oral drops 80 mg  1.2 mL Oral Multiple      pantoprazole (PROTONIX) 40 mg in sodium chloride 0.9 % 10 mL injection  40 mg IntraVENous Q12H 40 mg at 17 0834    amLODIPine (NORVASC) tablet 5 mg  5 mg Oral DAILY 5 mg at 17 0835    sucralfate (CARAFATE) 100 mg/mL oral suspension 1 g  1 g Oral AC&HS 1 g at 07/19/17 0811    0.9% sodium chloride infusion 250 mL  250 mL IntraVENous PRN      levalbuterol (XOPENEX) nebulizer soln 0.63 mg/3 mL  0.63 mg Nebulization Q6H RT 0.63 mg at 07/19/17 0855    insulin lispro (HUMALOG) injection   SubCUTAneous Q6H Stopped at 07/19/17 0600    mupirocin (BACTROBAN) 2 % ointment   Topical Q12H      hydrALAZINE (APRESOLINE) 20 mg/mL injection 10 mg  10 mg IntraVENous Q6H PRN 10 mg at 07/18/17 1010    glucose chewable tablet 16 g  4 Tab Oral PRN      glucagon (GLUCAGEN) injection 1 mg  1 mg IntraMUSCular PRN      bisacodyl (DULCOLAX) suppository 10 mg  10 mg Rectal DAILY PRN      sodium chloride (NS) flush 5-10 mL  5-10 mL IntraVENous Q8H 10 mL at 07/19/17 0540    sodium chloride (NS) flush 5-10 mL  5-10 mL IntraVENous PRN      naloxone (NARCAN) injection 0.4 mg  0.4 mg IntraVENous PRN      ondansetron (ZOFRAN ODT) tablet 4 mg  4 mg Oral Q4H PRN 4 mg at 07/17/17 0921    oxyCODONE IR (ROXICODONE) tablet 5 mg  5 mg Oral Q4H PRN 5 mg at 07/17/17 1426    acetaminophen (TYLENOL) tablet 650 mg  650 mg Oral Q6H 650 mg at 07/19/17 0552    oxyCODONE IR (ROXICODONE) tablet 2.5 mg  2.5 mg Oral Q4H PRN 2.5 mg at 07/19/17 0811    dextrose 10% infusion 125-250 mL  125-250 mL IntraVENous PRN         Labs/Data:    Lab Results   Component Value Date/Time    WBC 27.9 07/19/2017 03:56 AM    HGB 7.9 07/19/2017 03:56 AM    HCT 22.3 07/19/2017 03:56 AM    PLATELET 825 54/54/9959 03:56 AM    MCV 86.4 07/19/2017 03:56 AM       Lab Results   Component Value Date/Time    Sodium 131 07/19/2017 03:56 AM    Potassium 4.5 07/19/2017 03:56 AM    Chloride 99 07/19/2017 03:56 AM    CO2 27 07/19/2017 03:56 AM    Anion gap 5 07/19/2017 03:56 AM    Glucose 101 07/19/2017 03:56 AM    BUN 88 07/19/2017 03:56 AM    Creatinine 1.16 07/19/2017 03:56 AM    BUN/Creatinine ratio 76 07/19/2017 03:56 AM    GFR est AA 56 07/19/2017 03:56 AM    GFR est non-AA 46 07/19/2017 03:56 AM    Calcium 7.7 07/19/2017 03:56 AM       Wt Readings from Last 3 Encounters:   07/18/17 95.9 kg (211 lb 6.7 oz)   01/26/15 73.1 kg (161 lb 4 oz)   11/15/14 72.1 kg (159 lb)         Intake/Output Summary (Last 24 hours) at 07/19/17 0928  Last data filed at 07/19/17 0848   Gross per 24 hour   Intake          1333.33 ml   Output             3680 ml   Net         -2346.67 ml       Patient seen and examined. Chart reviewed. Labs, data and other pertinent notes reviewed in last 24 hrs.     PMH/SH/FH reviewed and unchanged compared to H&P    Discussed with pt/Dr. Tone Klein MD

## 2017-07-19 NOTE — PROGRESS NOTES
PT note:    Chart reviewed and noted patient is still groggy from EGD procedure today. Will follow up for PT treatment.      Bert President, PT, DPT

## 2017-07-19 NOTE — PROGRESS NOTES
[facility] INTENSIVIST Consult Service Note  Pulmonary, Critical Care, and Sleep Medicine    Name: Dulce Maria Dela Cruz MRN: [id number]   : 1945 Hospital: [facility]   Date: 2017   Hospital Day: 8       Subjective/Interval History:     I have reviewed the flowsheet and previous days notes. Seen earlier today on rounds. 17: Pt has more wheezing today. NO chest pain. No back pain. Has expected pain at the post surgical site on RLE.       17:Reviewed the evaluation and will assist in implementing plan as outlined by Dr. Jelani Juárez and team  Pt seems more comfortable today. No chest pain. No back pain. Had 1200cc of urine with had I/O cath last night. IMPRESSION:   1. Acute respiratory failure with hypoxia, improved today. Weaning oxygen as tolerated. 2. Acute exacerbation of COPD, seems worse today. Has increased wheezing. 3. Acute GI bleed with bloody stools, Gi following, Hgb stabilizing. 4. Severe anemia from acute blood loss Hgb 10 to now 5.4  5. Right Subclavian CVC. 6. POD# 4  s/p RIGHT ANKLE OPEN REDUCTION INTERNAL FIXATION, application of external fixator S/p mechanical fall requiring orthopedic surgery of right leg  7. Ness for Urinary Retention. 8. Hyponatremia, seems chronic. May be SIADH,   9. Elevated K, limited options to treat. Will see if we can give Kayexalate, Possibly a diuretic. There are no ekg changes related to elevated K.   10. moderate CAD   11. Echo 2017 EF 55%, mild to mod MR, mild AI  12. Sinus with PSVT  ? AVNRT on telemetry  13. H/o HTN with right renal artery stent  14. DM  15. Dyslipidemia difficulty with daily statin  16. Hx of GI bleed, appreciated GI assistance. 17. PVD with left fem-pop placed on plavix for this  18. Discussed with Dr. Cash Julio. 23. , mild functional capacity, works at [employer]      RECOMMENDATIONS/PLAN:   1. Will restart steroids today due to wheezing.   Increased Nebs to every 4 hrs. 2. Appreciate renal assistance. 3. Follow Hgb at 7.9  4. Adjust nebs to every 4 hrs. 5. DVT, SUP prophylaxis  6. Will be available to assist in medical management while in the CCU pending disposition     Risk of deterioration: high   [x] High complexity decision making was performed  [x] See my orders for details  Tubes:     ICU disposition :Unchanged. Updated Family. Time 6:10PM    Code: Full Code        Subjective/Initial History:   I have reviewed the flowsheet and previous days notes. I was asked by Berl Koyanagi, MD to see John Choudhury in consultation for a chief complaint of acute respiratory failure, hypoxia and SOB in setting of acute GI bleed and profound blood loss anemia. Garima Dunham is a 81 y.o.  female admitted 7/14  After she slipped on a wet pool edge and found to have right leg fracture. In er found to be hypoxic, treated for CHF and COPD. Increased cr and decrease in sodium with diuresis, felt not to have CHF, now actually on fluids. On 15th asymptomatic WCT on telemetry. Levaquin stopped. Seen by Cardiology. Metoprolol increased. Last night SVT, awoke patient with palpitations. Spontaneously resolved. Cardizem added. Today no chest pain, dyspnea improved, no edema, no orthopnea, no presyncope. No other complaints. Today the patient transferred to ICU by Dr. Lyndsey Bradley. Pt had two bloody BMs. Called blood bank and asked for 3 units Emergency pRBC's, 3 units ordered. Plavix discontinued. All antihypertensives stopped (except for metoprolol which was changed to 12.5 mg every 6 hours with BP and HR restrictions) Protonix Bolus and Protonix gtt ordered. Limited IV access. No chest pain. Very wheezy. Mild SOB. Pt lethargic an d could not describe her BM's. CTa chest ordered and completed. No PE on my review. EVETTE abdominal pain or hematesis. Mild heartburn in the past. REmote PUD history    PMH:  has a past medical history of Arthritis; CAD (coronary artery disease);  Diabetes ([facility] [address] History of bronchitis; Hyperlipemia; and Hypertension. PSH:   has a past surgical history that includes cholecystectomy; hysterectomy; orthopaedic; vascular surgery procedure unlist; vascular surgery procedure unlist (3/5/14); cataract removal (Bilateral); and central venous line insertion (7/17/2017). FHX: family history includes Breast Cancer (age of onset: 61) in her sister; Cancer in her sister; Diabetes in her father and sister; Heart Disease in her father and mother; MS in her brother; Seizures in her mother. SHX:  reports that she quit smoking about 7 years ago. She has a 60.00 pack-year smoking history. She has never used smokeless tobacco. She reports that she does not drink alcohol or use illicit drugs. ROS:A comprehensive review of systems was negative except for that written in the HPI.     MAR reviewed and pertinent medications noted or modified as needed    Allergies   Allergen Reactions    Codeine Rash and Other (comments)     hyper    Ibuprofen [Ibuprofen] Other (comments)     Stomach pains    Simvastatin Itching        MEDS:   Current Facility-Administered Medications   Medication    simethicone (MYLICON) 41MC/7.6KV oral drops 80 mg    metoprolol tartrate (LOPRESSOR) tablet 25 mg    pantoprazole (PROTONIX) 40 mg in sodium chloride 0.9 % 10 mL injection    amLODIPine (NORVASC) tablet 5 mg    sucralfate (CARAFATE) 100 mg/mL oral suspension 1 g    0.9% sodium chloride infusion 250 mL    0.9% sodium chloride infusion    levalbuterol (XOPENEX) nebulizer soln 0.63 mg/3 mL    ipratropium (ATROVENT) 0.02 % nebulizer solution 0.5 mg    insulin lispro (HUMALOG) injection    mupirocin (BACTROBAN) 2 % ointment    hydrALAZINE (APRESOLINE) 20 mg/mL injection 10 mg    glucose chewable tablet 16 g    glucagon (GLUCAGEN) injection 1 mg    bisacodyl (DULCOLAX) suppository 10 mg    sodium chloride (NS) flush 5-10 mL    sodium chloride (NS) flush 5-10 mL    naloxone (NARCAN) injection 0.4 mg    ondansetron (ZOFRAN ODT) tablet 4 mg    oxyCODONE IR (ROXICODONE) tablet 5 mg    acetaminophen (TYLENOL) tablet 650 mg    oxyCODONE IR (ROXICODONE) tablet 2.5 mg    dextrose 10% infusion 125-250 mL        Telemetry:    normal sinus rhythm    Objective:     Vital Signs: Intake/Output: Intake/Output:   Visit Vitals    BP (!) 145/34    Pulse (!) 57    Temp 97.6 °F (36.4 °C)    Resp 18    Ht 5' 3\" (1.6 m)    Wt 95.9 kg (211 lb 6.7 oz)    SpO2 99%    BMI 37.45 kg/m2         O2 Device: Nasal cannula  O2 Flow Rate (L/min): 3 l/min    Wt Readings from Last 4 Encounters:   17 95.9 kg (211 lb 6.7 oz)   01/26/15 73.1 kg (161 lb 4 oz)   11/15/14 72.1 kg (159 lb)   14 73.2 kg (161 lb 6.4 oz)       Temp (24hrs), Av.8 °F (36.6 °C), Min:97.3 °F (36.3 °C), Max:98.6 °F (37 °C)     Intake/Output Summary (Last 24 hours) at 17 0819  Last data filed at 17 0700   Gross per 24 hour   Intake          1283.33 ml   Output             3310 ml   Net         -2026.67 ml     Last shift:         Last 3 shifts:  1901 -  0700  In: 3435.8 [I.V.:2713.3]  Out: 4560 [Urine:4560]     Hemodynamics:    CO:    CI:    CVP: CVP (mmHg): 11 mmHg (17 1008)  SVR:   PAP Systolic:    PAP Diastolic:    PVR:    AZ26:        Physical Exam:    General: comfortable. No distress, normal speech. HEENT: NCAT, pale   Neck, Lymph: No abnormally enlarged lymph nodes. Chest: increased AP diameter   Lungs: wheezes bilaterally and prolonged expiratory phase. Heart: Regular rate and rhythm   Abdomen: soft, protuberant, distended   :    Extremity: negative, cyanosis, clubbing; RLE immobilized with fixator    Neuro: lethargic   Psych: Alert and Oriented x 2   Skin: Pale;   Pulses: Bilateral, Radial, 2+ Normal upper and lower extremity pulses   Capillary refill: abnormal:  sluggish capillary refill, pale;    Data:   Interval lab and diagnostic data was reviewed.   Interval radiology images were independently viewed and available reports were reviewed. All lab results for the last 24 hours reviewed. Labs:    Recent Labs      07/19/17   0356  07/18/17   2120  07/18/17   0515  07/18/17 0419 07/17/17 0251   WBC  27.9*   --    --   27.1*   --   18.3*   HGB  7.9*  7.4*  9.1*  9.7*   < >  10.1*   PLT  173   --    --   177   --   364   INR   --    --    --   1.1   --    --    APTT   --    --    --   <20.0*   --    --     < > = values in this interval not displayed. Recent Labs      07/19/17   0356 07/18/17   0900  07/18/17 0419 07/18/17   0050  07/17/17 1734 07/17/17 0251   NA  131*  130*  129*   --    --   124*   K  4.5  5.5*  5.5*   --    --   4.9   CL  99  99  97   --    --   88*   CO2  27  26  26   --    --   28   GLU  101*  125*  148*   --    --   177*   BUN  88*  103*  100*   --    --   51*   CREA  1.16*  1.28*  1.26*   --    --   0.89   CA  7.7*  8.0*  8.0*   --    --   9.2   MG  2.0  2.1  2.0   --    --    --    PHOS  3.5  3.2   --    --    --    --    LAC   --    --    --   1.2  5.2*   --    ALB  2.0*   --    --    --    --   2.4*   SGOT  179*   --    --    --    --   29   ALT  350*   --    --    --    --   19     No results for input(s): PH, PCO2, PO2, HCO3, FIO2 in the last 72 hours. Recent Labs      07/18/17 0419 07/17/17 0251   CPK   --   386*   CKNDX   --   1.1   TROIQ  1.20*  0.45*     Lab Results   Component Value Date/Time    BNP 93 07/19/2017 03:56 AM     11/15/2014 04:15 AM      Lab Results   Component Value Date/Time    Culture result: NO GROWTH 5 DAYS 07/12/2017 11:16 PM    Culture result: NO GROWTH 5 DAYS 11/13/2014 05:18 AM     Lab Results   Component Value Date/Time     07/17/2017 02:51 AM     07/15/2017 11:30 PM          Imaging:  I have personally reviewed the patients radiographs and have reviewed the reports:   post central line CXR reviewed: line in good position; [facility].  No PTX       Results from Hospital Encounter encounter on 07/12/17   XR CHEST PORT   Narrative EXAM:  XR CHEST PORT    INDICATION:  Line placement. COMPARISON:  7/15/2017. FINDINGS:    An AP portable view was obtained of the chest.    A right subclavian line with the tip projected at the lower superior vena cava  has been introduced. No pneumothorax is appreciated. The heart is normal in size. The aorta is atherosclerotic. There is slight left basilar atelectasis. Impression IMPRESSION:   New right clavian line tip projected lower superior vena cava. No pneumothorax. My assessment/plan was discussed with:  [email or web address]    respiratory therapy    family         Thank you for allowing us to participate in the care of this patient. We will be happy to follow along with you.     Lesly Presley MD

## 2017-07-19 NOTE — PROGRESS NOTES
Critical care interdisciplinary rounds held on 07/19/2017. Following members present, Pharmacy, Diabetes Treatment, Case Management, Occupational Therapy, Physical Therapy, Pastoral Care  and Nutrition. H & H every 12 hours. Thi for retention. Transfer orders to ortho tele bed. Plan of care discussed. See clinical pathway fo plan of care and interventions and desired outcomes.

## 2017-07-19 NOTE — PROGRESS NOTES
Attended Interdisciplinary rounds in Critical Care Unit, where patient care was discussed. Visit by: Stacey Darling. Megha Rodriguez.  Deena Swanson MA, [address]

## 2017-07-19 NOTE — PROGRESS NOTES
Nutrition Assessment:    RECOMMENDATIONS:   Advance to Renal diet as medically able per GI  RD to add Ensure Clear BID while pt on clear liquids    ASSESSMENT:   Chart reviewed, case discussed during CCU rounds. Pt sitting up eating clear liquid tray at time of visit. She has a fairly good appetite. EGD yesterday showed severe esophagitis + duodenal ulcers. Noted Nephrology wants her on a renal diet and 1200mL once diet advances. K+ 4.5 today but was 5.5 yesterday. Phos 3.5. Pt agreeable to trying ensure clear while she is on clear liquids to boost kcal/protein intake. Dietitians Intervention(s)/Plan(s): Advance diet as able, add PO supplement, monitor K+ and phs  SUBJECTIVE/OBJECTIVE:   \"I don't really like the broth\"   Diet Order: Clear liquids  % Eaten:  Patient Vitals for the past 72 hrs:   % Diet Eaten   07/19/17 1035 100 %         Pertinent Medications:humalog, solumedrol, protonix, carafate. Chemistries:  Lab Results   Component Value Date/Time    Sodium 131 07/19/2017 03:56 AM    Potassium 4.5 07/19/2017 03:56 AM    Chloride 99 07/19/2017 03:56 AM    CO2 27 07/19/2017 03:56 AM    Anion gap 5 07/19/2017 03:56 AM    Glucose 101 07/19/2017 03:56 AM    BUN 88 07/19/2017 03:56 AM    Creatinine 1.16 07/19/2017 03:56 AM    BUN/Creatinine ratio 76 07/19/2017 03:56 AM    GFR est AA 56 07/19/2017 03:56 AM    GFR est non-AA 46 07/19/2017 03:56 AM    Calcium 7.7 07/19/2017 03:56 AM    Albumin 2.0 07/19/2017 03:56 AM      Anthropometrics: Height: 5' 3\" (160 cm) Weight: 95.9 kg (211 lb 6.7 oz)    IBW (%IBW):   ( ) UBW (%UBW):   (  %)    BMI: Body mass index is 37.45 kg/(m^2). This BMI is indicative of:  []Underweight   []Normal   []Overweight   [x] Obesity   [] Extreme Obesity (BMI>40)  Estimated Nutrition Needs (Based on): 1620 Kcals/day (MSJ: 1275 x 1.3) , 100 g (1.3 g/kg) Protein  Carbohydrate:  At Least 130 g/day  Fluids: 1200 mL/day    Last BM: 7/18   [x]Active     []Hyperactive  []Hypoactive [] Absent   BS  Skin:    [] Intact   [x] Incision  [] Breakdown   [] DTI   [] Tears/Excoriation/Abrasion  [x]Edema(+1-BUEm LLE; +2-RLE) [] Other: Wt Readings from Last 30 Encounters:   07/18/17 95.9 kg (211 lb 6.7 oz)   01/26/15 73.1 kg (161 lb 4 oz)   11/15/14 72.1 kg (159 lb)   11/03/14 73.2 kg (161 lb 6.4 oz)   03/05/14 75.3 kg (166 lb)   02/21/14 73.5 kg (162 lb 0.6 oz)   06/03/13 78.5 kg (173 lb)   10/17/11 77.1 kg (170 lb)   09/23/11 80.3 kg (177 lb)      NUTRITION DIAGNOSES:   Problem:  Increased protein needs      Etiology: related to fracture and surgery healing      Signs/Symptoms: as evidenced by s/p ankle ORIF     Previous dx re: increased protein needs continues. NUTRITION INTERVENTIONS:  Meals/Snacks: Other (advance diet as medically able per GI )   Supplements: Commercial supplement              GOAL:   Pt will consume >70% of meals/supplements in 3-5 days. NUTRITION MONITORING AND EVALUATION   Previous Goal: consume >75% of meals and ONS in 3-5 days   Previous Goal Met: Progressing   Previous Recommendations Implemented: Yes   Cultural, Moravian, or Ethnic Dietary Needs: None   LEARNING NEEDS (Diet, Food/Nutrient-Drug Interaction):    [x] None Identified   [] Identified and Education Provided/Documented   [] Identified and Pt declined/was not appropriate      [x] Interdisciplinary Care Plan Reviewed/Documented    [x] Participated in Discharge Planning:  To be determined    [x] Interdisciplinary Rounds     NUTRITION RISK:    [] High              [x] Moderate           []  Low  []  Minimal/Uncompromised      Jose Gandhi, [address] [place] [address], [address] [place] [address] Dr  Pager [phone]  Weekend Pager [phone]

## 2017-07-20 LAB
ALBUMIN SERPL BCP-MCNC: 2.2 G/DL (ref 3.5–5)
ALBUMIN/GLOB SERPL: 0.8 {RATIO} (ref 1.1–2.2)
ALP SERPL-CCNC: 46 U/L (ref 45–117)
ALT SERPL-CCNC: 384 U/L (ref 12–78)
ANION GAP BLD CALC-SCNC: 7 MMOL/L (ref 5–15)
AST SERPL W P-5'-P-CCNC: 157 U/L (ref 15–37)
BILIRUB SERPL-MCNC: 0.4 MG/DL (ref 0.2–1)
BUN SERPL-MCNC: 63 MG/DL (ref 6–20)
BUN/CREAT SERPL: 61 (ref 12–20)
CALCIUM SERPL-MCNC: 7.7 MG/DL (ref 8.5–10.1)
CHLORIDE SERPL-SCNC: 100 MMOL/L (ref 97–108)
CO2 SERPL-SCNC: 26 MMOL/L (ref 21–32)
CREAT SERPL-MCNC: 1.04 MG/DL (ref 0.55–1.02)
ERYTHROCYTE [DISTWIDTH] IN BLOOD BY AUTOMATED COUNT: 14.2 % (ref 11.5–14.5)
GLOBULIN SER CALC-MCNC: 2.7 G/DL (ref 2–4)
GLUCOSE BLD STRIP.AUTO-MCNC: 166 MG/DL (ref 65–100)
GLUCOSE BLD STRIP.AUTO-MCNC: 189 MG/DL (ref 65–100)
GLUCOSE BLD STRIP.AUTO-MCNC: 278 MG/DL (ref 65–100)
GLUCOSE BLD STRIP.AUTO-MCNC: 329 MG/DL (ref 65–100)
GLUCOSE SERPL-MCNC: 169 MG/DL (ref 65–100)
H PYLORI IGA SER-ACNC: <9 UNITS (ref 0–8.9)
H PYLORI IGG SER IA-ACNC: <0.9 U/ML (ref 0–0.8)
HCT VFR BLD AUTO: 21.2 % (ref 35–47)
HGB BLD-MCNC: 7.5 G/DL (ref 11.5–16)
MAGNESIUM SERPL-MCNC: 2.4 MG/DL (ref 1.6–2.4)
MCH RBC QN AUTO: 31 PG (ref 26–34)
MCHC RBC AUTO-ENTMCNC: 35.4 G/DL (ref 30–36.5)
MCV RBC AUTO: 87.6 FL (ref 80–99)
PHOSPHATE SERPL-MCNC: 3.3 MG/DL (ref 2.6–4.7)
PLATELET # BLD AUTO: 195 K/UL (ref 150–400)
POTASSIUM SERPL-SCNC: 4.9 MMOL/L (ref 3.5–5.1)
PROT SERPL-MCNC: 4.9 G/DL (ref 6.4–8.2)
RBC # BLD AUTO: 2.42 M/UL (ref 3.8–5.2)
SERVICE CMNT-IMP: ABNORMAL
SODIUM SERPL-SCNC: 133 MMOL/L (ref 136–145)
WBC # BLD AUTO: 24.6 K/UL (ref 3.6–11)

## 2017-07-20 PROCEDURE — 82962 GLUCOSE BLOOD TEST: CPT

## 2017-07-20 PROCEDURE — 97110 THERAPEUTIC EXERCISES: CPT

## 2017-07-20 PROCEDURE — 94640 AIRWAY INHALATION TREATMENT: CPT

## 2017-07-20 PROCEDURE — 74011000250 HC RX REV CODE- 250: Performed by: INTERNAL MEDICINE

## 2017-07-20 PROCEDURE — 65660000000 HC RM CCU STEPDOWN

## 2017-07-20 PROCEDURE — 74011636637 HC RX REV CODE- 636/637: Performed by: INTERNAL MEDICINE

## 2017-07-20 PROCEDURE — 74011250637 HC RX REV CODE- 250/637: Performed by: INTERNAL MEDICINE

## 2017-07-20 PROCEDURE — 74011250637 HC RX REV CODE- 250/637: Performed by: SPECIALIST

## 2017-07-20 PROCEDURE — 85027 COMPLETE CBC AUTOMATED: CPT | Performed by: INTERNAL MEDICINE

## 2017-07-20 PROCEDURE — 77030019607 HC DSG BURN S&N -A

## 2017-07-20 PROCEDURE — C9113 INJ PANTOPRAZOLE SODIUM, VIA: HCPCS | Performed by: INTERNAL MEDICINE

## 2017-07-20 PROCEDURE — 74011250636 HC RX REV CODE- 250/636: Performed by: INTERNAL MEDICINE

## 2017-07-20 PROCEDURE — 84100 ASSAY OF PHOSPHORUS: CPT | Performed by: INTERNAL MEDICINE

## 2017-07-20 PROCEDURE — 74011250637 HC RX REV CODE- 250/637: Performed by: PHYSICIAN ASSISTANT

## 2017-07-20 PROCEDURE — 80053 COMPREHEN METABOLIC PANEL: CPT | Performed by: INTERNAL MEDICINE

## 2017-07-20 PROCEDURE — 36415 COLL VENOUS BLD VENIPUNCTURE: CPT | Performed by: INTERNAL MEDICINE

## 2017-07-20 PROCEDURE — 83735 ASSAY OF MAGNESIUM: CPT | Performed by: INTERNAL MEDICINE

## 2017-07-20 RX ORDER — PREDNISONE 20 MG/1
20 TABLET ORAL
Status: DISCONTINUED | OUTPATIENT
Start: 2017-07-20 | End: 2017-07-22

## 2017-07-20 RX ORDER — METOPROLOL TARTRATE 25 MG/1
25 TABLET, FILM COATED ORAL ONCE
Status: COMPLETED | OUTPATIENT
Start: 2017-07-20 | End: 2017-07-20

## 2017-07-20 RX ORDER — LEVALBUTEROL INHALATION SOLUTION 0.63 MG/3ML
0.63 SOLUTION RESPIRATORY (INHALATION)
Status: DISCONTINUED | OUTPATIENT
Start: 2017-07-20 | End: 2017-07-21

## 2017-07-20 RX ORDER — FUROSEMIDE 40 MG/1
40 TABLET ORAL DAILY
Status: DISCONTINUED | OUTPATIENT
Start: 2017-07-20 | End: 2017-08-01 | Stop reason: HOSPADM

## 2017-07-20 RX ORDER — METOPROLOL TARTRATE 50 MG/1
50 TABLET ORAL 2 TIMES DAILY
Status: DISCONTINUED | OUTPATIENT
Start: 2017-07-20 | End: 2017-07-21

## 2017-07-20 RX ORDER — IPRATROPIUM BROMIDE 0.5 MG/2.5ML
0.5 SOLUTION RESPIRATORY (INHALATION)
Status: DISCONTINUED | OUTPATIENT
Start: 2017-07-20 | End: 2017-07-21

## 2017-07-20 RX ORDER — AMLODIPINE BESYLATE 5 MG/1
10 TABLET ORAL DAILY
Status: DISCONTINUED | OUTPATIENT
Start: 2017-07-21 | End: 2017-07-21

## 2017-07-20 RX ORDER — LEVALBUTEROL INHALATION SOLUTION 0.63 MG/3ML
0.63 SOLUTION RESPIRATORY (INHALATION)
Status: DISCONTINUED | OUTPATIENT
Start: 2017-07-20 | End: 2017-07-20

## 2017-07-20 RX ORDER — PREDNISONE 20 MG/1
20 TABLET ORAL
Status: DISCONTINUED | OUTPATIENT
Start: 2017-07-21 | End: 2017-07-20

## 2017-07-20 RX ORDER — FLUTICASONE FUROATE AND VILANTEROL 200; 25 UG/1; UG/1
1 POWDER RESPIRATORY (INHALATION) DAILY
Status: DISCONTINUED | OUTPATIENT
Start: 2017-07-21 | End: 2017-08-01 | Stop reason: HOSPADM

## 2017-07-20 RX ORDER — AMLODIPINE BESYLATE 5 MG/1
5 TABLET ORAL ONCE
Status: COMPLETED | OUTPATIENT
Start: 2017-07-20 | End: 2017-07-20

## 2017-07-20 RX ORDER — IPRATROPIUM BROMIDE 0.5 MG/2.5ML
0.5 SOLUTION RESPIRATORY (INHALATION)
Status: DISCONTINUED | OUTPATIENT
Start: 2017-07-20 | End: 2017-07-20

## 2017-07-20 RX ADMIN — SUCRALFATE 1 G: 1 SUSPENSION ORAL at 11:53

## 2017-07-20 RX ADMIN — IPRATROPIUM BROMIDE 0.5 MG: 0.5 SOLUTION RESPIRATORY (INHALATION) at 03:45

## 2017-07-20 RX ADMIN — OXYCODONE HYDROCHLORIDE 5 MG: 5 TABLET ORAL at 13:08

## 2017-07-20 RX ADMIN — Medication 10 ML: at 07:11

## 2017-07-20 RX ADMIN — INSULIN LISPRO 7 UNITS: 100 INJECTION, SOLUTION INTRAVENOUS; SUBCUTANEOUS at 13:22

## 2017-07-20 RX ADMIN — MUPIROCIN: 20 OINTMENT TOPICAL at 21:40

## 2017-07-20 RX ADMIN — IPRATROPIUM BROMIDE 0.5 MG: 0.5 SOLUTION RESPIRATORY (INHALATION) at 11:25

## 2017-07-20 RX ADMIN — SUCRALFATE 1 G: 1 SUSPENSION ORAL at 16:11

## 2017-07-20 RX ADMIN — INSULIN LISPRO 3 UNITS: 100 INJECTION, SOLUTION INTRAVENOUS; SUBCUTANEOUS at 22:00

## 2017-07-20 RX ADMIN — AMLODIPINE BESYLATE 5 MG: 5 TABLET ORAL at 08:29

## 2017-07-20 RX ADMIN — LEVALBUTEROL HYDROCHLORIDE 0.63 MG: 0.63 SOLUTION RESPIRATORY (INHALATION) at 03:45

## 2017-07-20 RX ADMIN — LEVALBUTEROL HYDROCHLORIDE 0.63 MG: 0.63 SOLUTION RESPIRATORY (INHALATION) at 19:50

## 2017-07-20 RX ADMIN — LEVALBUTEROL HYDROCHLORIDE 0.63 MG: 0.63 SOLUTION RESPIRATORY (INHALATION) at 11:25

## 2017-07-20 RX ADMIN — IPRATROPIUM BROMIDE 0.5 MG: 0.5 SOLUTION RESPIRATORY (INHALATION) at 19:53

## 2017-07-20 RX ADMIN — Medication 10 ML: at 00:51

## 2017-07-20 RX ADMIN — LEVALBUTEROL HYDROCHLORIDE 0.63 MG: 0.63 SOLUTION RESPIRATORY (INHALATION) at 07:35

## 2017-07-20 RX ADMIN — METHYLPREDNISOLONE SODIUM SUCCINATE 40 MG: 40 INJECTION, POWDER, FOR SOLUTION INTRAMUSCULAR; INTRAVENOUS at 08:28

## 2017-07-20 RX ADMIN — INSULIN LISPRO 2 UNITS: 100 INJECTION, SOLUTION INTRAVENOUS; SUBCUTANEOUS at 16:11

## 2017-07-20 RX ADMIN — FUROSEMIDE 40 MG: 40 TABLET ORAL at 11:54

## 2017-07-20 RX ADMIN — SODIUM CHLORIDE 40 MG: 9 INJECTION INTRAMUSCULAR; INTRAVENOUS; SUBCUTANEOUS at 21:29

## 2017-07-20 RX ADMIN — OXYCODONE HYDROCHLORIDE 5 MG: 5 TABLET ORAL at 16:55

## 2017-07-20 RX ADMIN — METOPROLOL TARTRATE 25 MG: 25 TABLET ORAL at 08:29

## 2017-07-20 RX ADMIN — METOPROLOL TARTRATE 50 MG: 50 TABLET ORAL at 21:27

## 2017-07-20 RX ADMIN — INSULIN LISPRO 2 UNITS: 100 INJECTION, SOLUTION INTRAVENOUS; SUBCUTANEOUS at 08:25

## 2017-07-20 RX ADMIN — PREDNISONE 20 MG: 20 TABLET ORAL at 11:55

## 2017-07-20 RX ADMIN — SUCRALFATE 1 G: 1 SUSPENSION ORAL at 21:28

## 2017-07-20 RX ADMIN — OXYCODONE HYDROCHLORIDE 5 MG: 5 TABLET ORAL at 22:04

## 2017-07-20 RX ADMIN — MUPIROCIN: 20 OINTMENT TOPICAL at 08:34

## 2017-07-20 RX ADMIN — METHYLPREDNISOLONE SODIUM SUCCINATE 40 MG: 40 INJECTION, POWDER, FOR SOLUTION INTRAMUSCULAR; INTRAVENOUS at 00:51

## 2017-07-20 RX ADMIN — Medication 10 ML: at 13:23

## 2017-07-20 RX ADMIN — AMLODIPINE BESYLATE 5 MG: 5 TABLET ORAL at 11:55

## 2017-07-20 RX ADMIN — SODIUM CHLORIDE 40 MG: 9 INJECTION INTRAMUSCULAR; INTRAVENOUS; SUBCUTANEOUS at 08:25

## 2017-07-20 RX ADMIN — IPRATROPIUM BROMIDE 0.5 MG: 0.5 SOLUTION RESPIRATORY (INHALATION) at 07:35

## 2017-07-20 RX ADMIN — LEVALBUTEROL HYDROCHLORIDE 0.63 MG: 0.63 SOLUTION RESPIRATORY (INHALATION) at 15:25

## 2017-07-20 RX ADMIN — METOPROLOL TARTRATE 25 MG: 25 TABLET ORAL at 09:47

## 2017-07-20 RX ADMIN — IPRATROPIUM BROMIDE 0.5 MG: 0.5 SOLUTION RESPIRATORY (INHALATION) at 15:25

## 2017-07-20 RX ADMIN — SUCRALFATE 1 G: 1 SUSPENSION ORAL at 08:26

## 2017-07-20 RX ADMIN — Medication 10 ML: at 21:34

## 2017-07-20 NOTE — PROGRESS NOTES
Name: Chino Kendrick   MRN: [id number]  : 1945      A:  Hyponatremia- improved. Na 133 today. Hx of SIADH; TSH is nl. Urine indices suggestive of SIADH  Hyperkalemia-resolved. likely due to blood Tx + mild JODY; improved  Mild JODY- resolved. likely due to ROSALIE (from CT chest w contrast on ). UA is normal  HTN  Hx of R Renal artery stent  GI bleed  COPD/chronic lung changes on CXR and CT chest- might be contributory cause of SIADH  CAD  R ankle tib/fib fracture-s/p ORIF     P:  Increase Amlodipine to 10 mg every day  Metoprolol increased back to 50 mg BID due to HTN/Tachycardia  Watch Na/K, while pt is on GI lite diet (has high water and K )  No ACE-I or ARB  Lasix resumed  PRN blood Tx  Will restrict fluid intake if Na trends down    Subjective:  Hungry.  Now on GI lite diet  Moved out of ICU    ROS:   No nausea, no vomiting  No chest pain, no worsening shortness of breath    Exam:  Visit Vitals    /72 (BP 1 Location: Left arm, BP Patient Position: At rest)    Pulse 75    Temp 97.8 °F (36.6 °C)    Resp 16    Ht 5' 3\" (1.6 m)    Wt 90.4 kg (199 lb 4.7 oz)    SpO2 98%    BMI 35.3 kg/m2       NAD  Pallor+, no icterus  +b/l wheezing  RRR, tachy  Tr edema; R leg in fixator  AOx3    Current Facility-Administered Medications   Medication Dose Route Frequency Last Dose    ipratropium (ATROVENT) 0.02 % nebulizer solution 0.5 mg  0.5 mg Nebulization Q4H PRN      levalbuterol (XOPENEX) nebulizer soln 0.63 mg/3 mL  0.63 mg Nebulization Q4H PRN      metoprolol tartrate (LOPRESSOR) tablet 50 mg  50 mg Oral BID      furosemide (LASIX) tablet 40 mg  40 mg Oral DAILY      [START ON 2017] amLODIPine (NORVASC) tablet 10 mg  10 mg Oral DAILY      amLODIPine (NORVASC) tablet 5 mg  5 mg Oral ONCE      methylPREDNISolone (PF) (SOLU-MEDROL) injection 40 mg  40 mg IntraVENous Q8H 40 mg at 07/20/17 0828    acetaminophen (TYLENOL) tablet 650 mg  650 mg Oral Q6H PRN      insulin lispro (HUMALOG) injection   SubCUTAneous AC&HS 2 Units at 07/20/17 0825    ipratropium (ATROVENT) 0.02 % nebulizer solution          pantoprazole (PROTONIX) 40 mg in sodium chloride 0.9 % 10 mL injection  40 mg IntraVENous Q12H 40 mg at 07/20/17 0825    sucralfate (CARAFATE) 100 mg/mL oral suspension 1 g  1 g Oral AC&HS 1 g at 07/20/17 0826    0.9% sodium chloride infusion 250 mL  250 mL IntraVENous PRN      mupirocin (BACTROBAN) 2 % ointment   Topical Q12H      hydrALAZINE (APRESOLINE) 20 mg/mL injection 10 mg  10 mg IntraVENous Q6H PRN 10 mg at 07/19/17 1740    glucose chewable tablet 16 g  4 Tab Oral PRN      glucagon (GLUCAGEN) injection 1 mg  1 mg IntraMUSCular PRN      bisacodyl (DULCOLAX) suppository 10 mg  10 mg Rectal DAILY PRN      sodium chloride (NS) flush 5-10 mL  5-10 mL IntraVENous Q8H 10 mL at 07/20/17 0711    sodium chloride (NS) flush 5-10 mL  5-10 mL IntraVENous PRN      naloxone (NARCAN) injection 0.4 mg  0.4 mg IntraVENous PRN      ondansetron (ZOFRAN ODT) tablet 4 mg  4 mg Oral Q4H PRN 4 mg at 07/17/17 0921    oxyCODONE IR (ROXICODONE) tablet 5 mg  5 mg Oral Q4H PRN 5 mg at 07/19/17 2043    oxyCODONE IR (ROXICODONE) tablet 2.5 mg  2.5 mg Oral Q4H PRN 2.5 mg at 07/19/17 0811    dextrose 10% infusion 125-250 mL  125-250 mL IntraVENous PRN         Labs/Data:    Lab Results   Component Value Date/Time    WBC 24.6 07/20/2017 04:28 AM    HGB 7.5 07/20/2017 04:28 AM    HCT 21.2 07/20/2017 04:28 AM    PLATELET 922 39/83/2770 04:28 AM    MCV 87.6 07/20/2017 04:28 AM       Lab Results   Component Value Date/Time    Sodium 133 07/20/2017 04:28 AM    Potassium 4.9 07/20/2017 04:28 AM    Chloride 100 07/20/2017 04:28 AM    CO2 26 07/20/2017 04:28 AM    Anion gap 7 07/20/2017 04:28 AM    Glucose 169 07/20/2017 04:28 AM    BUN 63 07/20/2017 04:28 AM    Creatinine 1.04 07/20/2017 04:28 AM    BUN/Creatinine ratio 61 07/20/2017 04:28 AM    GFR est AA >60 07/20/2017 04:28 AM    GFR est non-AA 52 07/20/2017 04:28 AM    Calcium 7.7 07/20/2017 04:28 AM       Wt Readings from Last 3 Encounters:   07/19/17 90.4 kg (199 lb 4.7 oz)   01/26/15 73.1 kg (161 lb 4 oz)   11/15/14 72.1 kg (159 lb)         Intake/Output Summary (Last 24 hours) at 07/20/17 1009  Last data filed at 07/19/17 2000   Gross per 24 hour   Intake              720 ml   Output             1730 ml   Net            -1010 ml       Patient seen and examined. Chart reviewed. Labs, data and other pertinent notes reviewed in last 24 hrs.     PMH/SH/FH reviewed and unchanged compared to H&P    Discussed with pt      Valencia Maher MD

## 2017-07-20 NOTE — PROGRESS NOTES
Bedside and Verbal shift change report given to Jennifer Hobbs (oncoming nurse) by Abdirahman Ma (offgoing nurse). Report included the following information SBAR, Kardex, ED Summary, OR Summary, Procedure Summary, Intake/Output, MAR, Accordion, Recent Results and Med Rec Status.

## 2017-07-20 NOTE — PROGRESS NOTES
Hospitalist Progress Note    NAME: Yulissa Morris   :  1945   MRN:  [id number]       Assessment / Plan:  Acute GIB with ABL anemia secondary to duodenal ulcers and esophagitis  -s/p  EGD   -Appreciate GI consult  - PPI bid, carafate, holding plavix  -S/p 3 units PRBC  -Monitor H&H, stable  - follow bx result, h.pylori, pending    Asymptomatic 22 beat run of Wide Complex Tachycardia (VT) on early am of 7/15: question if related to Levaquin use in setting of renal failure  Atrial Fibrillation with RVR on early morning of   SVT   -Mild troponin elevation  -increase metoprolol and norvasc, resume lasix  -Appreciate Cardiology consult  -suspect patient needs outpatient sleep study    Acute hypoxic respiratory failure  Acute COPD exacerbation  -continue scheduled and prn nebs, IV steroids change to Prednisone on , now back on iv solumedrol dt wheezing   -suspect worsening Leukocytosis secondary to prednisone and reactive. BC  no growth, UA wnl, afebrile    ARF from aggressive diuresis; resolved with hydration, cr trended up likely secondary to contrast given , now trending down  Mild Hyperkalemia; resolved with hydration  Hyponatremia: Sodium Woody of 121 on 7/15; increased to 133 today;  ? SIADH  -hold ARB    CHF ruled out (LVEF 55-60%); Patient has interstitial lung disease that was suspected to be pulmonary edema on initial CXR and clinically with diuresis patient went into ARF and become increasingly hyponatremic (lasix was stopped on  with worsening hyponatremia) and CXR without significant change.   Acute Pulmonary Edema Ruled out, lasix restarted    Abdominal pain on am of  with continued nausea: resolved after large BM this am  -KUB on  without ileus    Distal tibia and fibula fracture s/p mechanical fall  -s/p ORIF on   -appreciate ortho assistance  -elevated CK improving     Hx of CAD  HTN; uncontrolled  -decrease metoprolol dt bradycardia, cont amlodipine, holding plavix, statin      Hx of PVD  -s/p right renal artery stent, left fem-pop by Dr. Alegria Leaver 3/2014     Impaired Glucose Management (Diabetes ruled out)  -A1c 5.7  -diet control  -SSI      Tobacco use  -nicotine patch    Obesity     Code Status: Full  Surrogate Decision Maker: nicanor Ackerman [phone]     DVT Prophylaxis: SCDs for now (lovenox if no plan procedure in the AM)  GI Prophylaxis: not indicated     Baseline: independent      Body mass index is 35.3 kg/(m^2). Subjective:     Chief Complaint / Reason for Physician Visit: follow-up respiratory failure  Pt reports palpitation    Review of Systems:  Symptom Y/N Comments  Symptom Y/N Comments   Fever/Chills    Chest Pain n    Poor Appetite y   Edema     Cough    Abdominal Pain n    Sputum    Joint Pain     SOB/ODOM y   Pruritis/Rash     Nausea/vomit n   Tolerating PT/OT     Diarrhea    Tolerating Diet     Constipation n   Other       Could NOT obtain due to:      Objective:     VITALS:   Last 24hrs VS reviewed since prior progress note.  Most recent are:  Patient Vitals for the past 24 hrs:   Temp Pulse Resp BP SpO2   07/20/17 0947 - 75 - - -   07/20/17 0738 97.8 °F (36.6 °C) 81 16 160/72 98 %   07/20/17 0609 - 70 16 (!) 171/38 96 %   07/20/17 0357 98 °F (36.7 °C) 65 16 (!) 154/31 99 %   07/19/17 2356 97.4 °F (36.3 °C) 73 16 165/57 95 %   07/19/17 2106 97.9 °F (36.6 °C) 77 12 169/43 95 %   07/19/17 2000 98.5 °F (36.9 °C) 73 12 (!) 184/39 96 %   07/19/17 1924 - - - - 96 %   07/19/17 1700 - 76 12 175/42 97 %   07/19/17 1603 98.1 °F (36.7 °C) 80 20 167/48 96 %   07/19/17 1555 - 93 14 - 94 %   07/19/17 1551 - (!) 160 14 - 93 %   07/19/17 1539 - (!) 161 17 151/54 94 %   07/19/17 1536 - (!) 165 17 145/48 95 %   07/19/17 1346 - 80 14 (!) 167/39 96 %   07/19/17 1300 - 73 19 166/60 94 %   07/19/17 1230 - 77 14 179/49 96 %   07/19/17 1210 - - - - 96 %   07/19/17 1200 - 65 14 166/51 95 %   07/19/17 1124 97.8 °F (36.6 °C) 81 22 158/48 98 %   07/19/17 1100 - 68 14 (!) 171/37 97 %   07/19/17 1030 - 67 23 (!) 160/32 96 %       Intake/Output Summary (Last 24 hours) at 07/20/17 1020  Last data filed at 07/19/17 2000   Gross per 24 hour   Intake              720 ml   Output             1730 ml   Net            -1010 ml        PHYSICAL EXAM:  General: WD, WN. Alert, cooperative, no acute distress    EENT:  EOMI. Anicteric sclerae. MMM  Resp:  + wheezing,  No accessory muscle use  CV:  Regular  rhythm,  No edema (left lower leg in external fixator and bandaged  GI:  Soft, Non distended, Non tender.  +Bowel sounds  Neurologic:  Alert and oriented X 3, normal speech,   Psych:   Good insight. Not anxious nor agitated  Skin:  No rashes. No jaundice    Reviewed most current lab test results and cultures  YES  Reviewed most current radiology test results   YES  Review and summation of old records today    NO  Reviewed patient's current orders and MAR    YES  PMH/SH reviewed - no change compared to H&P  ________________________________________________________________________  Care Plan discussed with:    Comments   Patient x    Family      RN x    Care Manager     Consultant                        Multidiciplinary team rounds were held today with , nursing, pharmacist and clinical coordinator. Patient's plan of care was discussed; medications were reviewed and discharge planning was addressed. ________________________________________________________________________  Total NON critical care TIME:  25   Minutes    Total CRITICAL CARE TIME Spent:   Minutes non procedure based      Comments   >50% of visit spent in counseling and coordination of care     ________________________________________________________________________  Taty Monroe MD     Procedures: see electronic medical records for all procedures/Xrays and details which were not copied into this note but were reviewed prior to creation of Plan.       LABS:  I reviewed today's most current labs and imaging studies. Pertinent labs include:  Recent Labs      07/20/17 0428 07/19/17   1619  07/19/17   0356   07/18/17   0419   WBC  24.6*   --   27.9*   --   27.1*   HGB  7.5*  7.8*  7.9*   < >  9.7*   HCT  21.2*  22.9*  22.3*   < >  27.1*   PLT  195   --   173   --   177    < > = values in this interval not displayed.      Recent Labs      07/20/17 0428 07/19/17   0356  07/18/17   0900  07/18/17   0419   NA  133*  131*  130*  129*   K  4.9  4.5  5.5*  5.5*   CL  100  99  99  97   CO2  26  27  26  26   GLU  169*  101*  125*  148*   BUN  63*  88*  103*  100*   CREA  1.04*  1.16*  1.28*  1.26*   CA  7.7*  7.7*  8.0*  8.0*   MG  2.4  2.0  2.1  2.0   PHOS  3.3  3.5  3.2   --    ALB  2.2*  2.0*   --    --    TBILI  0.4  0.3   --    --    SGOT  157*  179*   --    --    ALT  384*  350*   --    --    INR   --    --    --   1.1       Signed: Ashwin Glez MD

## 2017-07-20 NOTE — PROGRESS NOTES
Ortho NP Note:    Pt now in ortho Room, transferred from ICU last night. Pt resting in bed, pale, States she feels tired and looks Lethargic. States \"my heart is acting crazy again\"  Pain well controlled. Denies CP, SOB    + void    Recent Labs      17   0428   HCT  21.2*   HGB  7.5*       Ex-fix in place. Pin care performed this morning by nursing and new gauze dressing over wounds  Mild-mod edema present  Calves soft and supple  Sensation and motor intact  Cap refill <2 seconds     PLAN:  1) Ankle ORIF - continue NWB on RLE and mobilize with PT when medically stable. Pin Care and dressing changes as needed. 2) GI bleed - EDG  - followed bygastroenterology; She is off anticoagulation due to current bleed. We will plan to defer decision to resume anticoagulation to medicine given her multiple medical problems  3)  Tachycardia - followed by Dr. Verito Good  4) Anemia - from GI Bleed - down from 7.9 today to 7.5 s/p multiple transfusions. May need more blood - will defer to care team.  Ankle is not source of blood loss. 5) COPD exacerbation? - Dr. Bryan Barrett following - PO Prednisone, PRN Nebs. 6) Stable from ortho standpoint, hopefully transfer to ortho unit soon. Will need SNF placement. Received [facility]man[facility] Boehringer on  and will  in 72 hours which is today. CM will need to seek new auth when closer to discharge.       Ceci Parr NP

## 2017-07-20 NOTE — PROGRESS NOTES
Bedside shift change report given to Abdirahman Ma (oncoming nurse) by Shannan Anna (offgoing nurse). Report included the following information SBAR, Kardex, Procedure Summary, Intake/Output and MAR.

## 2017-07-20 NOTE — PROGRESS NOTES
Bedside and Verbal shift change report given to Claudia Warren RN (oncoming nurse) by Tony Carrillo (offgoing nurse). Report included the following information SBAR, Kardex, Procedure Summary, MAR, Accordion, Recent Results and Cardiac Rhythm nsr w/ 1 minute run of svtl.

## 2017-07-20 NOTE — PROGRESS NOTES
Problem: Mobility Impaired (Adult and Pediatric)  Goal: *Acute Goals and Plan of Care (Insert Text)  Physical Therapy Goals  Initiated 7/14/2017  1. Patient will move from supine to sit and sit to supine in bed with supervision/set-up within 7 day(s). 2. Patient will transfer from bed to chair and chair to bed with minimal assistance/contact guard assist using the least restrictive device within 7 day(s). 3. Patient will perform sit to stand with contact guard assist within 7 day(s). 4. Patient will ambulate with minimal assistance/contact guard assist for 50 feet with the least restrictive device within 7 day(s). 5. Patient will ascend/descend 1 stairs with bilateral handrail(s) with minimal assistance/contact guard assist within 7 day(s). PHYSICAL THERAPY TREATMENT  Patient: Yulissa Morris (27 y.o. female)  Date: 7/20/2017  Diagnosis: right ankle fracture  Respiratory failure (HCC)  GI Bleed <principal problem not specified>  Procedure(s) (LRB):  ESOPHAGOGASTRODUODENOSCOPY (EGD) (N/A) 2 Days Post-Op  Precautions: NWB      ASSESSMENT:  Patient received supine in bed. Patient reported having an episode of \"heart racing\" this morning and feeling very fatigued. Patient also with reports of pain after pin care and getting washed up. Patient politely declined OOB therapy, but will to participate in supine therex. Patient tolerated supine LE therex well with only minimal reports of pain in RLE during quad sets. Patient encouraged to perform therex 3 x day and pt verbalized understanding. Patient will continue to benefit from PT to progress mobility as tolerated and reach highest level of independence. D/C rec: SNF.    Progression toward goals:  [ ]    Improving appropriately and progressing toward goals  [X]    Improving slowly and progressing toward goals  [ ]    Not making progress toward goals and plan of care will be adjusted       PLAN:  Patient continues to benefit from skilled intervention to address the above impairments. Continue treatment per established plan of care. Discharge Recommendations:  Skilled Nursing Facility  Further Equipment Recommendations for Discharge:  TBD at rehab       SUBJECTIVE:   Patient stated I don't feel that well right now. I would like to rest, but I'll do those exercises. Jaswinder Sen      OBJECTIVE DATA SUMMARY:   Critical Behavior:  Neurologic State: Alert  Orientation Level: Oriented X4  Cognition: Appropriate decision making, Appropriate safety awareness, Appropriate for age attention/concentration, Follows commands     Functional Mobility Training:  Bed Mobility:     Supine to Sit:  (pt declined)              Transfers    Patient declined OOB transfers at this time. Reported pain, fatigue        Therapeutic Exercises:     EXERCISE   Sets   Reps   Active Active Assist   Passive   Comments   Ankle pumps (L)   10 [X]                        [ ]                        [ ]                            Quad sets   10 [X]                        [ ]                        [ ]                            Sebastian Limb sets   10 [X]                        [ ]                        [ ]                            Knee flexion (L)   10 [X]                        [ ]                        [ ]                            SLR (L)   10 [X]                        [ ]                        [ ]                            Hip abduction (L)   10 [X]                        [ ]                        [ ]                                  Pain:                    Activity Tolerance:   Good. VSS. Limited by fatigue  Please refer to the flowsheet for vital signs taken during this treatment.   After treatment:   [ ]    Patient left in no apparent distress sitting up in chair  [X]    Patient left in no apparent distress in bed  [X]    Call bell left within reach  [X]    Nursing notified  [ ]    Caregiver present  [ ]    Bed alarm activated      COMMUNICATION/COLLABORATION:   The patients plan of care was discussed with: Occupational Therapist and Registered Nurse     Lizbet Rayo, PT, DPT   Time Calculation: 8 mins

## 2017-07-20 NOTE — PROGRESS NOTES
[facility] INTENSIVIST Consult Service Note  Pulmonary, Critical Care, and Sleep Medicine    Name: Wilber Bean MRN: [id number]   : 1945 Hospital: [facility]   Date: 2017   Hospital Day: 9       Subjective/Interval History:     I have reviewed the flowsheet and previous days notes. Seen earlier today on rounds.  poor sleep quality. Still tired. Less wheeze. On IV steroids. No fever. SVT this AM    17: Pt has more wheezing today. NO chest pain. No back pain. Has expected pain at the post surgical site on RLE.       17:Reviewed the evaluation and will assist in implementing plan as outlined by Dr. Louis Hayes and team  Pt seems more comfortable today. No chest pain. No back pain. Had 1200cc of urine with had I/O cath last night. IMPRESSION:   1. Acute respiratory failure with hypoxia, Weaning oxygen as tolerated. 2. Acute exacerbation of COPD? Was not on inhalers at home. Has increased wheezing. 3. Acute GI bleed with bloody stools, Gi following, Hgb stabilizing. 4. Severe anemia from acute blood loss Hgb 10 to now 5.4  5. Right Subclavian CVC. 6.  s/p RIGHT ANKLE OPEN REDUCTION INTERNAL FIXATION, application of external fixator S/p mechanical fall requiring orthopedic surgery of right leg  7. Ness for Urinary Retention. 8. Hyponatremia, seems chronic. May be SIADH,   9. Elevated K, limited options to treat. Will see if we can give Kayexalate, Possibly a diuretic. There are no ekg changes related to elevated K.   10. moderate CAD   11. Echo 2017 EF 55%, mild to mod MR, mild AI  12. Sinus with PSVT  ? AVNRT on telemetry  13. H/o HTN with right renal artery stent  14. DM  15. Dyslipidemia difficulty with daily statin  16. Hx of GI bleed, appreciated GI assistance. 17. PVD with left fem-pop placed on plavix for this  18. Discussed with Dr. Tej Overton.    23. , mild functional capacity, works at [employer] RECOMMENDATIONS/PLAN:   1. PO prednisone  2. Pt needs scheduled nebs not just prn, levalbuterol and atrovent  3. Mobilize  4. Would benefit from outpt PFTs when she has recovered with subsequent adjustment of meds  5. Add Breo and hope then to reduce nebs     Risk of deterioration: high   [x] High complexity decision making was performed  [x] See my orders for details      Code: Full Code        Subjective/Initial History:   I have reviewed the flowsheet and previous days notes. I was asked by Tanna Ford MD to see July Lin in consultation for a chief complaint of acute respiratory failure, hypoxia and SOB in setting of acute GI bleed and profound blood loss anemia. Queenie Perez is a 81 y.o.  female admitted 7/14  After she slipped on a wet pool edge and found to have right leg fracture. In er found to be hypoxic, treated for CHF and COPD. Increased cr and decrease in sodium with diuresis, felt not to have CHF, now actually on fluids. On 15th asymptomatic WCT on telemetry. Levaquin stopped. Seen by Cardiology. Metoprolol increased. Last night SVT, awoke patient with palpitations. Spontaneously resolved. Cardizem added. Today no chest pain, dyspnea improved, no edema, no orthopnea, no presyncope. No other complaints. Today the patient transferred to ICU by Dr. Lamar Kate. Pt had two bloody BMs. Called blood bank and asked for 3 units Emergency pRBC's, 3 units ordered. Plavix discontinued. All antihypertensives stopped (except for metoprolol which was changed to 12.5 mg every 6 hours with BP and HR restrictions) Protonix Bolus and Protonix gtt ordered. Limited IV access. No chest pain. Very wheezy. Mild SOB. Pt lethargic an d could not describe her BM's. CTa chest ordered and completed. No PE on my review. EVETTE abdominal pain or hematesis. Mild heartburn in the past. REmote PUD history    PMH:  has a past medical history of Arthritis; CAD (coronary artery disease); Diabetes ([facility] [address]  History of bronchitis; Hyperlipemia; and Hypertension. PSH:   has a past surgical history that includes cholecystectomy; hysterectomy; orthopaedic; vascular surgery procedure unlist; vascular surgery procedure unlist (3/5/14); cataract removal (Bilateral); and central venous line insertion (7/17/2017). FHX: family history includes Breast Cancer (age of onset: 61) in her sister; Cancer in her sister; Diabetes in her father and sister; Heart Disease in her father and mother; MS in her brother; Seizures in her mother. SHX:  reports that she quit smoking about 7 years ago. She has a 60.00 pack-year smoking history. She has never used smokeless tobacco. She reports that she does not drink alcohol or use illicit drugs. ROS:A comprehensive review of systems was negative except for that written in the HPI.     MAR reviewed and pertinent medications noted or modified as needed    Allergies   Allergen Reactions    Codeine Rash and Other (comments)     hyper    Ibuprofen [Ibuprofen] Other (comments)     Stomach pains    Simvastatin Itching        MEDS:   Current Facility-Administered Medications   Medication    metoprolol tartrate (LOPRESSOR) tablet 50 mg    furosemide (LASIX) tablet 40 mg    [START ON 7/21/2017] amLODIPine (NORVASC) tablet 10 mg    amLODIPine (NORVASC) tablet 5 mg    predniSONE (DELTASONE) tablet 20 mg    ipratropium (ATROVENT) 0.02 % nebulizer solution 0.5 mg    levalbuterol (XOPENEX) nebulizer soln 0.63 mg/3 mL    acetaminophen (TYLENOL) tablet 650 mg    insulin lispro (HUMALOG) injection    pantoprazole (PROTONIX) 40 mg in sodium chloride 0.9 % 10 mL injection    sucralfate (CARAFATE) 100 mg/mL oral suspension 1 g    0.9% sodium chloride infusion 250 mL    mupirocin (BACTROBAN) 2 % ointment    hydrALAZINE (APRESOLINE) 20 mg/mL injection 10 mg    glucose chewable tablet 16 g    glucagon (GLUCAGEN) injection 1 mg    bisacodyl (DULCOLAX) suppository 10 mg    sodium chloride (NS) flush 5-10 mL  sodium chloride (NS) flush 5-10 mL    naloxone (NARCAN) injection 0.4 mg    ondansetron (ZOFRAN ODT) tablet 4 mg    oxyCODONE IR (ROXICODONE) tablet 5 mg    oxyCODONE IR (ROXICODONE) tablet 2.5 mg    dextrose 10% infusion 125-250 mL        Telemetry:    normal sinus rhythm    Objective:     Vital Signs: Intake/Output: Intake/Output:   Visit Vitals    /72 (BP 1 Location: Left arm, BP Patient Position: At rest)    Pulse 75    Temp 97.8 °F (36.6 °C)    Resp 16    Ht 5' 3\" (1.6 m)    Wt 90.4 kg (199 lb 4.7 oz)    SpO2 98%    BMI 35.3 kg/m2         O2 Device: Nasal cannula  O2 Flow Rate (L/min): 2 l/min    Wt Readings from Last 4 Encounters:   17 90.4 kg (199 lb 4.7 oz)   01/26/15 73.1 kg (161 lb 4 oz)   11/15/14 72.1 kg (159 lb)   14 73.2 kg (161 lb 6.4 oz)       Temp (24hrs), Av.9 °F (36.6 °C), Min:97.4 °F (36.3 °C), Max:98.5 °F (36.9 °C)     Intake/Output Summary (Last 24 hours) at 17 1109  Last data filed at 17 2000   Gross per 24 hour   Intake                0 ml   Output             1730 ml   Net            -1730 ml     Last shift:         Last 3 shifts:  1901 -  0700  In: 8380 [P.O.:820; I.V.:650]  Out: 3740 [Urine:3740]       Physical Exam:    General: comfortable. No distress, normal speech. HEENT: NCAT, pale   Neck, Lymph: No abnormally enlarged lymph nodes. Chest: increased AP diameter   Lungs: few expiratory wheezes. Heart: Regular rate and rhythm   Abdomen: soft, protuberant, distended   :    Extremity: negative, cyanosis, clubbing; RLE immobilized with fixator    Neuro: lethargic   Psych: Alert and Oriented x 2   Skin: Pale;   Pulses: Bilateral, Radial, 2+ Normal upper and lower extremity pulses   Capillary refill: abnormal:  sluggish capillary refill, pale;    Data:   Interval lab and diagnostic data was reviewed. Interval radiology images were independently viewed and available reports were reviewed.         All lab results for the last 24 hours reviewed. Labs:    Recent Labs      07/20/17   0428  07/19/17   1619  07/19/17   0356   07/18/17   0419   WBC  24.6*   --   27.9*   --   27.1*   HGB  7.5*  7.8*  7.9*   < >  9.7*   PLT  195   --   173   --   177   INR   --    --    --    --   1.1   APTT   --    --    --    --   <20.0*    < > = values in this interval not displayed. Recent Labs      07/20/17   0428  07/19/17   0356  07/18/17   0900   07/18/17   0050  07/17/17   1734   NA  133*  131*  130*   < >   --    --    K  4.9  4.5  5.5*   < >   --    --    CL  100  99  99   < >   --    --    CO2  26  27  26   < >   --    --    GLU  169*  101*  125*   < >   --    --    BUN  63*  88*  103*   < >   --    --    CREA  1.04*  1.16*  1.28*   < >   --    --    CA  7.7*  7.7*  8.0*   < >   --    --    MG  2.4  2.0  2.1   < >   --    --    PHOS  3.3  3.5  3.2   --    --    --    LAC   --    --    --    --   1.2  5.2*   ALB  2.2*  2.0*   --    --    --    --    SGOT  157*  179*   --    --    --    --    ALT  384*  350*   --    --    --    --     < > = values in this interval not displayed. No results for input(s): PH, PCO2, PO2, HCO3, FIO2 in the last 72 hours. Recent Labs      07/18/17   0419   TROIQ  1.20*     Lab Results   Component Value Date/Time    BNP 93 07/19/2017 03:56 AM     11/15/2014 04:15 AM      Lab Results   Component Value Date/Time    Culture result: NO GROWTH 5 DAYS 07/12/2017 11:16 PM    Culture result: NO GROWTH 5 DAYS 11/13/2014 05:18 AM     Lab Results   Component Value Date/Time     07/17/2017 02:51 AM     07/15/2017 11:30 PM          Imaging:  I have personally reviewed the patients radiographs and have reviewed the reports:   post central line CXR reviewed: line in good position; [place] [facility] [place]. No PTX       Results from Hospital Encounter encounter on 07/12/17   XR CHEST PORT   Narrative EXAM:  XR CHEST PORT    INDICATION:  Line placement. COMPARISON:  7/15/2017.     FINDINGS:    An AP portable view was obtained of the chest.    A right subclavian line with the tip projected at the lower superior vena cava  has been introduced. No pneumothorax is appreciated. The heart is normal in size. The aorta is atherosclerotic. There is slight left basilar atelectasis. Impression IMPRESSION:   New right clavian line tip projected lower superior vena cava. No pneumothorax. My assessment/plan was discussed with:  nursingxx    respiratory therapy    family         Thank you for allowing us to participate in the care of this patient. We will be happy to follow along with you.     Aye De Souza MD

## 2017-07-20 NOTE — PROGRESS NOTES
Progress Note      7/20/2017 8:14 AM  NAME: Naima Shannon   MRN:  [id number]   Admit Diagnosis: right ankle fracture  Respiratory failure (HCC)  GI Bleed                          Assessment:                 1. S/p mechanical fall requiring orthopedic surgery of right leg, found to be hypoxic, likely COPD exacerbation, PSVT noted on telemetry, mild rhabo with increased troponin being managed medically, then with GI bleed s/p 3 units  2. In 2011 found to have hyponatremia and TWI on ECG with cath with moderate CAD  3. Echo 6/2017 EF 55%, mild to mod MR, mild AI  4. Sinus with PSVT  ? AVNRT on telemetry  5. HTN with right renal artery stent  6. DM  7. Dyslipidemia difficulty with daily statin  8. Hx of GI bleed found to have PUD Dr. Sandra Kang  9. PVD with left fem-pop placed on plavix for this  10. , mild functional capacity, works at [employer]:                  Mechanical fall  Mild rhabdo with increased troponin, agree with medical management of CAD  PSVT with HR of 150, looks like AVNRT does not appear to be afib, self terminated, 20 beats of tachycardia on 7/15 not recorded on tele monitor or printed, but likely same. Mildly increased troponin, would manage CAD medically. Increased cr after diuresis, doubt significant CHF. Low sodium improving. Gi bleed s/p 3 U PRBC      1. Holding plavix, was on this for PVD, reveiwed with Dr. Jayleen Blandon hold for 10 days, resume on 7/27/2017  2. Increase metoprolol back to 50mg bid, was on metoprolol sr 100mg daily as outpt  3. cont amlodipine  4. Holding avapro, hydralazine prn  5. ?on Doxazosin 1mg per my records  6. Resume lasix 40mg daily, follow bmp, bnp  7. Cont statin, would stop niacin  8. Changed nebs to PRN, taper O2, ?taper prednisone this may have contributed to ulcers.          [x]        High complexity decision making was performed         Subjective:     Naima Shannon denies chest pain, dyspnea.   Discussed with RN events overnight. Review of Systems:    Symptom Y/N Comments  Symptom Y/N Comments   Fever/Chills N   Chest Pain N    Poor Appetite N   Edema N    Cough N   Abdominal Pain N    Sputum N   Joint Pain N    SOB/ODOM N   Pruritis/Rash N    Nausea/vomit N   Tolerating PT/OT Y    Diarrhea N   Tolerating Diet Y    Constipation N   Other       Could NOT obtain due to:      Objective:      Physical Exam:    Last 24hrs VS reviewed since prior progress note. Most recent are:    Visit Vitals    /72 (BP 1 Location: Left arm, BP Patient Position: At rest)    Pulse 81    Temp 97.8 °F (36.6 °C)    Resp 16    Ht 5' 3\" (1.6 m)    Wt 90.4 kg (199 lb 4.7 oz)    SpO2 98%    BMI 35.3 kg/m2       Intake/Output Summary (Last 24 hours) at 07/20/17 0859  Last data filed at 07/19/17 2000   Gross per 24 hour   Intake              720 ml   Output             1890 ml   Net            -1170 ml        General Appearance: Well developed, well nourished, alert & oriented x 3,    no acute distress. Ears/Nose/Mouth/Throat: Hearing grossly normal.  Neck: Supple. Chest: Lungs clear to auscultation bilaterally. Cardiovascular: Regular rate and rhythm, S1S2 normal, no murmur. Abdomen: Soft, non-tender, bowel sounds are active. Extremities: No edema bilaterally. Skin: Warm and dry.     PMH/SH reviewed - no change compared to H&P    Data Review    Telemetry: normal sinus rhythm     Lab Data Personally Reviewed:    Recent Labs      07/20/17   0428  07/19/17   1619  07/19/17   0356   WBC  24.6*   --   27.9*   HGB  7.5*  7.8*  7.9*   HCT  21.2*  22.9*  22.3*   PLT  195   --   173     Recent Labs      07/18/17   0419   INR  1.1   PTP  11.5*   APTT  <20.0*      Recent Labs      07/20/17   0428  07/19/17   0356  07/18/17   0900   NA  133*  131*  130*   K  4.9  4.5  5.5*   CL  100  99  99   CO2  26  27  26   BUN  63*  88*  103*   CREA  1.04*  1.16*  1.28*   GLU  169*  101*  125*   CA  7.7*  7.7*  8.0*   MG  2.4  2.0  2.1     Recent Labs 07/18/17   0419   TROIQ  1.20*     Lab Results   Component Value Date/Time    Cholesterol, total 162 09/20/2011 01:17 PM    HDL Cholesterol 55 09/20/2011 01:17 PM    LDL, calculated 78.4 09/20/2011 01:17 PM    Triglyceride 143 09/20/2011 01:17 PM    CHOL/HDL Ratio 2.9 09/20/2011 01:17 PM       Recent Labs      07/20/17   0428  07/19/17   0356   SGOT  157*  179*   AP  46  39*   TP  4.9*  4.4*   ALB  2.2*  2.0*   GLOB  2.7  2.4     No results for input(s): PH, PCO2, PO2 in the last 72 hours.     Medications Personally Reviewed:    Current Facility-Administered Medications   Medication Dose Route Frequency    ipratropium (ATROVENT) 0.02 % nebulizer solution 0.5 mg  0.5 mg Nebulization Q4H PRN    levalbuterol (XOPENEX) nebulizer soln 0.63 mg/3 mL  0.63 mg Nebulization Q4H PRN    metoprolol tartrate (LOPRESSOR) tablet 50 mg  50 mg Oral BID    metoprolol tartrate (LOPRESSOR) tablet 25 mg  25 mg Oral ONCE    methylPREDNISolone (PF) (SOLU-MEDROL) injection 40 mg  40 mg IntraVENous Q8H    acetaminophen (TYLENOL) tablet 650 mg  650 mg Oral Q6H PRN    insulin lispro (HUMALOG) injection   SubCUTAneous AC&HS    ipratropium (ATROVENT) 0.02 % nebulizer solution        pantoprazole (PROTONIX) 40 mg in sodium chloride 0.9 % 10 mL injection  40 mg IntraVENous Q12H    amLODIPine (NORVASC) tablet 5 mg  5 mg Oral DAILY    sucralfate (CARAFATE) 100 mg/mL oral suspension 1 g  1 g Oral AC&HS    0.9% sodium chloride infusion 250 mL  250 mL IntraVENous PRN    mupirocin (BACTROBAN) 2 % ointment   Topical Q12H    hydrALAZINE (APRESOLINE) 20 mg/mL injection 10 mg  10 mg IntraVENous Q6H PRN    glucose chewable tablet 16 g  4 Tab Oral PRN    glucagon (GLUCAGEN) injection 1 mg  1 mg IntraMUSCular PRN    bisacodyl (DULCOLAX) suppository 10 mg  10 mg Rectal DAILY PRN    sodium chloride (NS) flush 5-10 mL  5-10 mL IntraVENous Q8H    sodium chloride (NS) flush 5-10 mL  5-10 mL IntraVENous PRN    naloxone (NARCAN) injection 0.4 mg 0.4 mg IntraVENous PRN    ondansetron (ZOFRAN ODT) tablet 4 mg  4 mg Oral Q4H PRN    oxyCODONE IR (ROXICODONE) tablet 5 mg  5 mg Oral Q4H PRN    oxyCODONE IR (ROXICODONE) tablet 2.5 mg  2.5 mg Oral Q4H PRN    dextrose 10% infusion 125-250 mL  125-250 mL IntraVENous PRN         Latanya Rosales MD

## 2017-07-20 NOTE — PROGRESS NOTES
Gastroenterology Daily Progress Note (Dr. Luis Antonio Grover)    Admit Date: 7/12/2017       Subjective:       Patient transferred to regular bed overnight and reports feeling better. No BM yesterday.     Current Facility-Administered Medications   Medication Dose Route Frequency    methylPREDNISolone (PF) (SOLU-MEDROL) injection 40 mg  40 mg IntraVENous Q8H    ipratropium (ATROVENT) 0.02 % nebulizer solution 0.5 mg  0.5 mg Nebulization Q4H RT    acetaminophen (TYLENOL) tablet 650 mg  650 mg Oral Q6H PRN    levalbuterol (XOPENEX) nebulizer soln 0.63 mg/3 mL  0.63 mg Nebulization Q4H RT    metoprolol tartrate (LOPRESSOR) tablet 25 mg  25 mg Oral BID    insulin lispro (HUMALOG) injection   SubCUTAneous AC&HS    ipratropium (ATROVENT) 0.02 % nebulizer solution        pantoprazole (PROTONIX) 40 mg in sodium chloride 0.9 % 10 mL injection  40 mg IntraVENous Q12H    amLODIPine (NORVASC) tablet 5 mg  5 mg Oral DAILY    sucralfate (CARAFATE) 100 mg/mL oral suspension 1 g  1 g Oral AC&HS    0.9% sodium chloride infusion 250 mL  250 mL IntraVENous PRN    mupirocin (BACTROBAN) 2 % ointment   Topical Q12H    hydrALAZINE (APRESOLINE) 20 mg/mL injection 10 mg  10 mg IntraVENous Q6H PRN    glucose chewable tablet 16 g  4 Tab Oral PRN    glucagon (GLUCAGEN) injection 1 mg  1 mg IntraMUSCular PRN    bisacodyl (DULCOLAX) suppository 10 mg  10 mg Rectal DAILY PRN    sodium chloride (NS) flush 5-10 mL  5-10 mL IntraVENous Q8H    sodium chloride (NS) flush 5-10 mL  5-10 mL IntraVENous PRN    naloxone (NARCAN) injection 0.4 mg  0.4 mg IntraVENous PRN    ondansetron (ZOFRAN ODT) tablet 4 mg  4 mg Oral Q4H PRN    oxyCODONE IR (ROXICODONE) tablet 5 mg  5 mg Oral Q4H PRN    oxyCODONE IR (ROXICODONE) tablet 2.5 mg  2.5 mg Oral Q4H PRN    dextrose 10% infusion 125-250 mL  125-250 mL IntraVENous PRN        Objective:     Visit Vitals    BP (!) 171/38    Pulse 70    Temp 98 °F (36.7 °C)    Resp 16    Ht 5' 3\" (1.6 m)  Wt 90.4 kg (199 lb 4.7 oz)    SpO2 96%    BMI 35.3 kg/m2   Blood pressure (!) 171/38, pulse 70, temperature 98 °F (36.7 °C), resp. rate 16, height 5' 3\" (1.6 m), weight 90.4 kg (199 lb 4.7 oz), SpO2 96 %.     07/18 1901 - 07/20 0700  In: 9561 [P.O.:820; I.V.:650]  Out: 3740 [Urine:3740]      Intake/Output Summary (Last 24 hours) at 07/20/17 8653  Last data filed at 07/19/17 2000   Gross per 24 hour   Intake              870 ml   Output             2260 ml   Net            -1390 ml     Physical Exam:     General: awake and conversant WF in NAD  GI: Soft, NT, ND + bowel sounds    Labs:       Recent Results (from the past 24 hour(s))   GLUCOSE, POC    Collection Time: 07/19/17 12:16 PM   Result Value Ref Range    Glucose (POC) 195 (H) 65 - 100 mg/dL    Performed by Isidro Flores    HGB & HCT    Collection Time: 07/19/17  4:19 PM   Result Value Ref Range    HGB 7.8 (L) 11.5 - 16.0 g/dL    HCT 22.9 (L) 35.0 - 47.0 %   GLUCOSE, POC    Collection Time: 07/19/17  5:35 PM   Result Value Ref Range    Glucose (POC) 251 (H) 65 - 100 mg/dL    Performed by Isidro Flores    GLUCOSE, POC    Collection Time: 07/19/17  9:07 PM   Result Value Ref Range    Glucose (POC) 216 (H) 65 - 100 mg/dL    Performed by Po Rao (PCT)    METABOLIC PANEL, COMPREHENSIVE    Collection Time: 07/20/17  4:28 AM   Result Value Ref Range    Sodium 133 (L) 136 - 145 mmol/L    Potassium 4.9 3.5 - 5.1 mmol/L    Chloride 100 97 - 108 mmol/L    CO2 26 21 - 32 mmol/L    Anion gap 7 5 - 15 mmol/L    Glucose 169 (H) 65 - 100 mg/dL    BUN 63 (H) 6 - 20 MG/DL    Creatinine 1.04 (H) 0.55 - 1.02 MG/DL    BUN/Creatinine ratio 61 (H) 12 - 20      GFR est AA >60 >60 ml/min/1.73m2    GFR est non-AA 52 (L) >60 ml/min/1.73m2    Calcium 7.7 (L) 8.5 - 10.1 MG/DL    Bilirubin, total 0.4 0.2 - 1.0 MG/DL    ALT (SGPT) 384 (H) 12 - 78 U/L    AST (SGOT) 157 (H) 15 - 37 U/L    Alk.  phosphatase 46 45 - 117 U/L    Protein, total 4.9 (L) 6.4 - 8.2 g/dL    Albumin 2.2 (L) 3.5 - 5.0 g/dL    Globulin 2.7 2.0 - 4.0 g/dL    A-G Ratio 0.8 (L) 1.1 - 2.2     MAGNESIUM    Collection Time: 07/20/17  4:28 AM   Result Value Ref Range    Magnesium 2.4 1.6 - 2.4 mg/dL   PHOSPHORUS    Collection Time: 07/20/17  4:28 AM   Result Value Ref Range    Phosphorus 3.3 2.6 - 4.7 MG/DL   CBC W/O DIFF    Collection Time: 07/20/17  4:28 AM   Result Value Ref Range    WBC 24.6 (H) 3.6 - 11.0 K/uL    RBC 2.42 (L) 3.80 - 5.20 M/uL    HGB 7.5 (L) 11.5 - 16.0 g/dL    HCT 21.2 (L) 35.0 - 47.0 %    MCV 87.6 80.0 - 99.0 FL    MCH 31.0 26.0 - 34.0 PG    MCHC 35.4 30.0 - 36.5 g/dL    RDW 14.2 11.5 - 14.5 %    PLATELET 450 180 - 106 K/uL   LABRCNT(wbc:2,hgb:2,hct:2,plt:2,)  Recent Labs      07/20/17   0428  07/19/17   0356  07/18/17   0900   NA  133*  131*  130*   K  4.9  4.5  5.5*   CL  100  99  99   CO2  26  27  26   BUN  63*  88*  103*   CREA  1.04*  1.16*  1.28*   GLU  169*  101*  125*   CA  7.7*  7.7*  8.0*   MG  2.4  2.0  2.1   PHOS  3.3  3.5  3.2     Recent Labs      07/18/17   0419   INR  1.1   PTP  11.5*   APTT  <20.0*     Recent Labs      07/20/17   0428  07/19/17   0356   SGOT  157*  179*   AP  46  39*   TP  4.9*  4.4*   ALB  2.2*  2.0*   GLOB  2.7  2.4      Ref.  Range 7/18/2017 05:15 7/18/2017 21:20 7/19/2017 03:56 7/19/2017 16:19 7/20/2017 04:28   HGB Latest Ref Range: 11.5 - 16.0 g/dL 9.1 (L) 7.4 (L) 7.9 (L) 7.8 (L) 7.5 (L)   HCT Latest Ref Range: 35.0 - 47.0 % 25.7 (L) 20.6 (L) 22.3 (L) 22.9 (L) 21.2 (L)     Impression:    Acute GI blood loss anemia secondary to Duodenal ulcers and ulcerative esophagitis on EGD 7/18/17  Hyponatremia  Hyperkalemia  COPD  CHF         Plan:  -h/h stable with no ongoing bleeding  -continue on carafate qid and BID protonix  -f/u H pylori ab sent and treat if positive given recurrent nature of her DU  -this is a second bleeding episode after one in 2014 from asa/plavix per pt so  plavix on hold given high rebleeding risk  -would keep on long term acid suppression after discharge -f/u with Dr. Kimo Crow after d/c for repeat EGD in 3 mo       Abigail Mcclelland MD    7/20/2017  3500  [address], [address] [place] [address]  [id number]  [address] [place] [address] [place]: [phone]

## 2017-07-20 NOTE — PROGRESS NOTES
As per telemetry monitoring, patient went into one minute run of svt rate of 138. Vs 171/38, hr 70 as per monitor, sp02 96% on 2lnc. Patient marcelino sob, chest pain. W/o c/o. Hospitalist on call, dr Cody Waldron notified. Lab results discussed. Md made aware patient was down grade from pcu this shift. Patient currently appears comfortable, asymptomatic.  nsr on monitor. Mag level ordered now.

## 2017-07-21 ENCOUNTER — APPOINTMENT (OUTPATIENT)
Dept: GENERAL RADIOLOGY | Age: 72
DRG: 492 | End: 2017-07-21
Attending: NURSE PRACTITIONER
Payer: MEDICARE

## 2017-07-21 LAB
ANION GAP BLD CALC-SCNC: 7 MMOL/L (ref 5–15)
BASOPHILS # BLD AUTO: 0 K/UL
BASOPHILS # BLD: 0 %
BNP SERPL-MCNC: 673 PG/ML (ref 0–100)
BUN SERPL-MCNC: 57 MG/DL (ref 6–20)
BUN/CREAT SERPL: 54 (ref 12–20)
CALCIUM SERPL-MCNC: 7.4 MG/DL (ref 8.5–10.1)
CHLORIDE SERPL-SCNC: 97 MMOL/L (ref 97–108)
CO2 SERPL-SCNC: 27 MMOL/L (ref 21–32)
CREAT SERPL-MCNC: 1.06 MG/DL (ref 0.55–1.02)
DIFFERENTIAL METHOD BLD: ABNORMAL
EOSINOPHIL # BLD: 0 K/UL
EOSINOPHIL NFR BLD: 0 %
ERYTHROCYTE [DISTWIDTH] IN BLOOD BY AUTOMATED COUNT: 14.1 % (ref 11.5–14.5)
GLUCOSE BLD STRIP.AUTO-MCNC: 169 MG/DL (ref 65–100)
GLUCOSE BLD STRIP.AUTO-MCNC: 186 MG/DL (ref 65–100)
GLUCOSE BLD STRIP.AUTO-MCNC: 191 MG/DL (ref 65–100)
GLUCOSE BLD STRIP.AUTO-MCNC: 294 MG/DL (ref 65–100)
GLUCOSE SERPL-MCNC: 144 MG/DL (ref 65–100)
HCT VFR BLD AUTO: 19.5 % (ref 35–47)
HCT VFR BLD AUTO: 20.7 % (ref 35–47)
HGB BLD-MCNC: 6.8 G/DL (ref 11.5–16)
HGB BLD-MCNC: 7 G/DL (ref 11.5–16)
LYMPHOCYTES # BLD AUTO: 8 %
LYMPHOCYTES # BLD: 2.3 K/UL
MAGNESIUM SERPL-MCNC: 2 MG/DL (ref 1.6–2.4)
MCH RBC QN AUTO: 30.9 PG (ref 26–34)
MCHC RBC AUTO-ENTMCNC: 34.9 G/DL (ref 30–36.5)
MCV RBC AUTO: 88.6 FL (ref 80–99)
METAMYELOCYTES NFR BLD MANUAL: 1 %
MONOCYTES # BLD: 0.6 K/UL
MONOCYTES NFR BLD AUTO: 2 %
NEUTS BAND NFR BLD MANUAL: 5 %
NEUTS SEG # BLD: 25.5 K/UL
NEUTS SEG NFR BLD AUTO: 84 %
PLATELET # BLD AUTO: 247 K/UL (ref 150–400)
POTASSIUM SERPL-SCNC: 4.5 MMOL/L (ref 3.5–5.1)
RBC # BLD AUTO: 2.2 M/UL (ref 3.8–5.2)
RBC MORPH BLD: ABNORMAL
RBC MORPH BLD: ABNORMAL
SERVICE CMNT-IMP: ABNORMAL
SODIUM SERPL-SCNC: 131 MMOL/L (ref 136–145)
WBC # BLD AUTO: 28.7 K/UL (ref 3.6–11)
WBC MORPH BLD: ABNORMAL

## 2017-07-21 PROCEDURE — 83735 ASSAY OF MAGNESIUM: CPT | Performed by: INTERNAL MEDICINE

## 2017-07-21 PROCEDURE — 97530 THERAPEUTIC ACTIVITIES: CPT

## 2017-07-21 PROCEDURE — 97530 THERAPEUTIC ACTIVITIES: CPT | Performed by: OCCUPATIONAL THERAPIST

## 2017-07-21 PROCEDURE — 36415 COLL VENOUS BLD VENIPUNCTURE: CPT | Performed by: SPECIALIST

## 2017-07-21 PROCEDURE — C9113 INJ PANTOPRAZOLE SODIUM, VIA: HCPCS | Performed by: INTERNAL MEDICINE

## 2017-07-21 PROCEDURE — 74011636637 HC RX REV CODE- 636/637: Performed by: INTERNAL MEDICINE

## 2017-07-21 PROCEDURE — 74011250637 HC RX REV CODE- 250/637: Performed by: INTERNAL MEDICINE

## 2017-07-21 PROCEDURE — 74011250636 HC RX REV CODE- 250/636: Performed by: INTERNAL MEDICINE

## 2017-07-21 PROCEDURE — 94640 AIRWAY INHALATION TREATMENT: CPT

## 2017-07-21 PROCEDURE — 86900 BLOOD TYPING SEROLOGIC ABO: CPT | Performed by: INTERNAL MEDICINE

## 2017-07-21 PROCEDURE — 74011250637 HC RX REV CODE- 250/637: Performed by: PHYSICIAN ASSISTANT

## 2017-07-21 PROCEDURE — 74011000250 HC RX REV CODE- 250: Performed by: INTERNAL MEDICINE

## 2017-07-21 PROCEDURE — 74011250636 HC RX REV CODE- 250/636

## 2017-07-21 PROCEDURE — 85018 HEMOGLOBIN: CPT | Performed by: INTERNAL MEDICINE

## 2017-07-21 PROCEDURE — 83880 ASSAY OF NATRIURETIC PEPTIDE: CPT | Performed by: SPECIALIST

## 2017-07-21 PROCEDURE — 74011250637 HC RX REV CODE- 250/637: Performed by: SPECIALIST

## 2017-07-21 PROCEDURE — 65270000029 HC RM PRIVATE

## 2017-07-21 PROCEDURE — P9016 RBC LEUKOCYTES REDUCED: HCPCS | Performed by: INTERNAL MEDICINE

## 2017-07-21 PROCEDURE — 86920 COMPATIBILITY TEST SPIN: CPT | Performed by: INTERNAL MEDICINE

## 2017-07-21 PROCEDURE — 73630 X-RAY EXAM OF FOOT: CPT

## 2017-07-21 PROCEDURE — 82962 GLUCOSE BLOOD TEST: CPT

## 2017-07-21 PROCEDURE — 36430 TRANSFUSION BLD/BLD COMPNT: CPT

## 2017-07-21 PROCEDURE — 77010033678 HC OXYGEN DAILY

## 2017-07-21 PROCEDURE — 73600 X-RAY EXAM OF ANKLE: CPT

## 2017-07-21 PROCEDURE — 85025 COMPLETE CBC W/AUTO DIFF WBC: CPT | Performed by: SPECIALIST

## 2017-07-21 PROCEDURE — 80048 BASIC METABOLIC PNL TOTAL CA: CPT | Performed by: INTERNAL MEDICINE

## 2017-07-21 PROCEDURE — 97535 SELF CARE MNGMENT TRAINING: CPT | Performed by: OCCUPATIONAL THERAPIST

## 2017-07-21 PROCEDURE — 71010 XR CHEST SNGL V: CPT

## 2017-07-21 RX ORDER — IPRATROPIUM BROMIDE 0.5 MG/2.5ML
0.5 SOLUTION RESPIRATORY (INHALATION)
Status: DISCONTINUED | OUTPATIENT
Start: 2017-07-21 | End: 2017-08-01 | Stop reason: HOSPADM

## 2017-07-21 RX ORDER — SODIUM CHLORIDE 9 MG/ML
250 INJECTION, SOLUTION INTRAVENOUS AS NEEDED
Status: DISCONTINUED | OUTPATIENT
Start: 2017-07-21 | End: 2017-08-01 | Stop reason: ALTCHOICE

## 2017-07-21 RX ORDER — DIPHENHYDRAMINE HCL 25 MG
25 CAPSULE ORAL
Status: DISCONTINUED | OUTPATIENT
Start: 2017-07-21 | End: 2017-08-01 | Stop reason: HOSPADM

## 2017-07-21 RX ORDER — LEVOFLOXACIN 750 MG/1
750 TABLET ORAL
Status: DISCONTINUED | OUTPATIENT
Start: 2017-07-21 | End: 2017-07-21

## 2017-07-21 RX ORDER — LEVOFLOXACIN 750 MG/1
750 TABLET ORAL
Status: DISCONTINUED | OUTPATIENT
Start: 2017-07-21 | End: 2017-07-25

## 2017-07-21 RX ORDER — FUROSEMIDE 10 MG/ML
40 INJECTION INTRAMUSCULAR; INTRAVENOUS ONCE
Status: COMPLETED | OUTPATIENT
Start: 2017-07-21 | End: 2017-07-21

## 2017-07-21 RX ORDER — LEVALBUTEROL INHALATION SOLUTION 0.63 MG/3ML
0.63 SOLUTION RESPIRATORY (INHALATION)
Status: DISCONTINUED | OUTPATIENT
Start: 2017-07-21 | End: 2017-08-01 | Stop reason: HOSPADM

## 2017-07-21 RX ORDER — AMLODIPINE BESYLATE 5 MG/1
5 TABLET ORAL DAILY
Status: DISCONTINUED | OUTPATIENT
Start: 2017-07-22 | End: 2017-08-01 | Stop reason: HOSPADM

## 2017-07-21 RX ADMIN — MUPIROCIN: 20 OINTMENT TOPICAL at 08:21

## 2017-07-21 RX ADMIN — Medication 10 ML: at 21:40

## 2017-07-21 RX ADMIN — LEVOFLOXACIN 750 MG: 750 TABLET, FILM COATED ORAL at 19:52

## 2017-07-21 RX ADMIN — METOPROLOL TARTRATE 75 MG: 50 TABLET ORAL at 21:41

## 2017-07-21 RX ADMIN — OXYCODONE HYDROCHLORIDE 5 MG: 5 TABLET ORAL at 12:51

## 2017-07-21 RX ADMIN — SODIUM CHLORIDE 40 MG: 9 INJECTION INTRAMUSCULAR; INTRAVENOUS; SUBCUTANEOUS at 08:15

## 2017-07-21 RX ADMIN — METOPROLOL TARTRATE 50 MG: 50 TABLET ORAL at 08:11

## 2017-07-21 RX ADMIN — INSULIN LISPRO 2 UNITS: 100 INJECTION, SOLUTION INTRAVENOUS; SUBCUTANEOUS at 17:03

## 2017-07-21 RX ADMIN — SUCRALFATE 1 G: 1 SUSPENSION ORAL at 17:03

## 2017-07-21 RX ADMIN — SODIUM CHLORIDE 40 MG: 9 INJECTION INTRAMUSCULAR; INTRAVENOUS; SUBCUTANEOUS at 21:40

## 2017-07-21 RX ADMIN — Medication 10 ML: at 21:43

## 2017-07-21 RX ADMIN — OXYCODONE HYDROCHLORIDE 5 MG: 5 TABLET ORAL at 08:12

## 2017-07-21 RX ADMIN — FUROSEMIDE 40 MG: 40 TABLET ORAL at 08:12

## 2017-07-21 RX ADMIN — Medication 10 ML: at 13:29

## 2017-07-21 RX ADMIN — SUCRALFATE 1 G: 1 SUSPENSION ORAL at 11:52

## 2017-07-21 RX ADMIN — INSULIN LISPRO 2 UNITS: 100 INJECTION, SOLUTION INTRAVENOUS; SUBCUTANEOUS at 07:22

## 2017-07-21 RX ADMIN — ACETAMINOPHEN 650 MG: 325 TABLET, FILM COATED ORAL at 17:03

## 2017-07-21 RX ADMIN — FLUTICASONE FUROATE AND VILANTEROL TRIFENATATE 1 PUFF: 200; 25 POWDER RESPIRATORY (INHALATION) at 13:28

## 2017-07-21 RX ADMIN — AMLODIPINE BESYLATE 10 MG: 5 TABLET ORAL at 08:11

## 2017-07-21 RX ADMIN — PREDNISONE 20 MG: 20 TABLET ORAL at 07:22

## 2017-07-21 RX ADMIN — HYDRALAZINE HYDROCHLORIDE 10 MG: 20 INJECTION INTRAMUSCULAR; INTRAVENOUS at 18:17

## 2017-07-21 RX ADMIN — SUCRALFATE 1 G: 1 SUSPENSION ORAL at 21:41

## 2017-07-21 RX ADMIN — LEVALBUTEROL HYDROCHLORIDE 0.63 MG: 0.63 SOLUTION RESPIRATORY (INHALATION) at 07:15

## 2017-07-21 RX ADMIN — FUROSEMIDE 40 MG: 10 INJECTION, SOLUTION INTRAMUSCULAR; INTRAVENOUS at 11:53

## 2017-07-21 RX ADMIN — OXYCODONE HYDROCHLORIDE 5 MG: 5 TABLET ORAL at 17:03

## 2017-07-21 RX ADMIN — SUCRALFATE 1 G: 1 SUSPENSION ORAL at 07:22

## 2017-07-21 RX ADMIN — IPRATROPIUM BROMIDE 0.5 MG: 0.5 SOLUTION RESPIRATORY (INHALATION) at 07:15

## 2017-07-21 RX ADMIN — INSULIN LISPRO 5 UNITS: 100 INJECTION, SOLUTION INTRAVENOUS; SUBCUTANEOUS at 12:47

## 2017-07-21 RX ADMIN — OXYCODONE HYDROCHLORIDE 5 MG: 5 TABLET ORAL at 20:41

## 2017-07-21 RX ADMIN — Medication 10 ML: at 07:24

## 2017-07-21 NOTE — PROGRESS NOTES
Ortho NP Note:    Pt resting in bed. Pt resting in bed, pale, dizzy when out of bed, feels tired. Pain well controlled. Denies CP, SOB    Recent Labs      17   0323   HCT  19.5*   HGB  6.8*       Ex-fix in place. Min serous drainage noted. Mild-mod edema present  Calves soft and supple  Sensation and motor intact  Cap refill intact. PLAN:  1) Ankle ORIF - continue NWB on RLE and mobilize with PT. Got dizzy this AM.   2) GI bleed - EDG  - followed by gastroenterology; She is off anticoagulation due to current bleed. We will plan to defer decision to resume anticoagulation to medicine given her multiple medical problems  3)  Tachycardia - followed by Dr. Chris Wright, better today  4) Anemia - from GI Bleed - Hgb still going down- transfusion ordered by medicine team. Ankle is not source of blood loss. 5) COPD exacerbation? - Dr. Rosita Lovell following - PO Prednisone, PRN Nebs. 6) Ness- Consider removal from ortho standpoint if not medically required. 7) Stable from ortho standpoint. Will need SNF placement. Received  Kristina- since . CM will need to seek new auth when closer to discharge.       Rosalynd Dance, NP

## 2017-07-21 NOTE — PROGRESS NOTES
Right subclavian central line site dressing is being wet at times with serosanguinous  drainage. Very small amount. Dressing had to be changed twice. Dr. Lucretia Niño notified and telephone order noted for chest xray to check placement. All ports flush with blood return. Surrounding skin pink.

## 2017-07-21 NOTE — PROGRESS NOTES
Awaiting results of chest xray to confirm central line placement. Blood will be started after x ray results.

## 2017-07-21 NOTE — PROGRESS NOTES
Bedside and Verbal shift change report given to MIKE Shaw (oncoming nurse) by Анна Mayorga (offgoing nurse). Report included the following information SBAR, Kardex, MAR and Recent Results.

## 2017-07-21 NOTE — PROGRESS NOTES
Problem: Self Care Deficits Care Plan (Adult)  Goal: *Acute Goals and Plan of Care (Insert Text)  Occupational Therapy Goals  Initiated 7/14/2017  1. Patient will perform sponge bathing with supervision/set-up within 7 day(s). 2. Patient will perform lower body dressing with supervision/set-up within 7 day(s). 3. Patient will perform BSC transfers, via stand pivot with RW, NWB on RLE with minimal assistance/contact guard assist within 7 day(s). 4. Patient will perform all aspects of toileting with supervision/set-up within 7 day(s). Occupational Therapy TREATMENT  Patient: Nacho Sen (25 y.o. female)  Date: 7/21/2017  Diagnosis: right ankle fracture  Respiratory failure (HCC)  GI Bleed <principal problem not specified>  Procedure(s) (LRB):  ESOPHAGOGASTRODUODENOSCOPY (EGD) (N/A) 3 Days Post-Op  Precautions: NWB    ASSESSMENT:  Patient very pleasant and motivated to participate in therapy, but fatigues easily and requires frequent rest breaks. She was overall supervision to min A for bed mobility, performed donning of L sock with mod A, and required max A for clothing management in preparation for toileting. Transfer to [facility] via squat pivot went well with patient only requiring min A, following all instructions appropriately and maintaining RLE NWB. Once returning from [facility] to bed, patient was unable to transfer via squat pivot 2/2 limited confidence and sudden difficulty with motor planning. Patient transferred back to bed via T transfer with mod A of 2 and significant cueing. Patient having great difficulty maintaining RLE NWB with any attempt at transferring back to bed, including pushing through RLE when scooting back in bed while long sitting. Patient VSS were stable t/o session. At this point she continues to be appropriate for SNF rehab after discharge.    Progression toward goals:  []       Improving appropriately and progressing toward goals  [x]       Improving slowly and progressing toward goals  []       Not making progress toward goals and plan of care will be adjusted     PLAN:  Patient continues to benefit from skilled intervention to address the above impairments. Continue treatment per established plan of care. Discharge Recommendations:  Dilip Syed  Further Equipment Recommendations for Discharge:  TBD         OBJECTIVE DATA SUMMARY:   Cognitive/Behavioral Status:  Neurologic State: Alert  Orientation Level: Oriented X4  Cognition: Impaired decision making;Poor safety awareness; Impulsive;Decreased attention/concentration        Safety/Judgement: Decreased awareness of need for safety (difficulty adhering to RLE NWB)  Functional Mobility and Transfers for ADLs:  Bed Mobility:  Rolling: Supervision  Supine to Sit: Supervision  Sit to Supine: Minimum assistance  Scooting: supervision to scoot forward in bed. Transfers:  Sit to Stand: Moderate assistance;Assist x2 (x 2 trials)          Toilet Transfer : Minimum assistance (bed to [facility] squat pivot. mod A of 2 T transfer back to bed. )              Balance:  Sitting: Intact  Sitting - Static: Good (unsupported)  Sitting - Dynamic: Fair (occasional)  Standing: Impaired  Standing - Static: Poor  Standing - Dynamic : Poor  ADL Intervention:  Lower Body Dressing Assistance  Socks: Moderate assistance; Compensatory technique training (for L sock seated EOB)  Cues: Verbal cues provided;Visual cues provided    Toileting  Clothing Management: Compensatory technique training (provided via demonstration. Patient requires max A)  Cues: Tactile cues provided;Verbal cues provided;Visual cues provided    Cognitive Retraining  Organizing/Sequencing: Breaking task down; Two step sequence (when attempting to transfer back to bed from [facility])  Safety/Judgement: Decreased awareness of need for safety (difficulty adhering to RLE NWB)  Pain:  Pain Scale 1: Numeric (0 - 10)  Pain Intensity 1: 0  Pain Location 1: Ankle  Pain Orientation 1: Right  Pain Description 1: Burning  Pain Intervention(s) 1: Medication (see MAR)  Activity Tolerance:   BP and SpO2 stable t/o session. Patient with poor activity tolerance requiring frequent rest breaks.     After treatment:   [] Patient left in no apparent distress sitting up in chair  [x] Patient left in no apparent distress in bed  [x] Call bell left within reach  [x] Nursing notified  [] Caregiver present  [] Bed alarm activated    COMMUNICATION/COLLABORATION:   The patients plan of care was discussed with: Physical Therapist and Registered Nurse    Chiquita [place], OTR/L  Time Calculation: 43 mins

## 2017-07-21 NOTE — PROGRESS NOTES
Problem: Mobility Impaired (Adult and Pediatric)  Goal: *Acute Goals and Plan of Care (Insert Text)  Physical Therapy Goals  Initiated 7/14/2017  1. Patient will move from supine to sit and sit to supine in bed with supervision/set-up within 7 day(s). 2. Patient will transfer from bed to chair and chair to bed with minimal assistance/contact guard assist using the least restrictive device within 7 day(s). 3. Patient will perform sit to stand with contact guard assist within 7 day(s). 4. Patient will ambulate with minimal assistance/contact guard assist for 50 feet with the least restrictive device within 7 day(s). 5. Patient will ascend/descend 1 stairs with bilateral handrail(s) with minimal assistance/contact guard assist within 7 day(s). PHYSICAL THERAPY TREATMENT  Patient: Chino Kendrick (38 y.o. female)  Date: 7/21/2017  Diagnosis: right ankle fracture  Respiratory failure (HCC)  GI Bleed <principal problem not specified>  Procedure(s) (LRB):  ESOPHAGOGASTRODUODENOSCOPY (EGD) (N/A) 3 Days Post-Op  Precautions: NWB      ASSESSMENT:  Patient received supine in bed and eager to participate in therapy. Patient reported feeling better than yesterday and HR \"doesn't feel like it's racing anymore. \" Patient requesting to use  BSC. Patient completed supine to sit with min assist and additional time to complete. Patient able to sit edge of bed with supervision. Patient completed sit<>stand with RW with mod assist x 2 and reported increased dizziness. Pt returned to seated position and BP take. VSS (see flowsheet). Patient requesting prolonged time to sit edge of bed for resolution of symptoms. Patient attempted another sit<>stand and required mod assist x 1 and max assist x 1 with RW. Patient declined attempt to transfer to [facility] and requested to return to supine position with mod assist to manage bilateral LEs. Pt remains limited by decreased tolerance to activity, impaired standing tolerance and balance.  Patient will continue to benefit from PT to progress mobility as tolerated. D/C: SNF  Progression toward goals:  [ ]    Improving appropriately and progressing toward goals  [X]    Improving slowly and progressing toward goals  [ ]    Not making progress toward goals and plan of care will be adjusted       PLAN:  Patient continues to benefit from skilled intervention to address the above impairments. Continue treatment per established plan of care. Discharge Recommendations:  Skilled Nursing Facility  Further Equipment Recommendations for Discharge:  tbd at SNF       SUBJECTIVE:   Patient stated I'm sorry I didn't do as well as I expected.       OBJECTIVE DATA SUMMARY:   Critical Behavior:  Neurologic State: Alert, Appropriate for age  Orientation Level: Oriented X4  Cognition: Appropriate decision making, Follows commands     Functional Mobility Training:  Bed Mobility:     Supine to Sit: Minimum assistance  Sit to Supine: Moderate assistance  Scooting: Maximum assistance        Transfers:  Sit to Stand: Moderate assistance;Assist x2 (x 2 trials)  Stand to Sit: Moderate assistance;Assist x2                             Balance:  Sitting: Intact  Sitting - Static: Good (unsupported)  Sitting - Dynamic: Fair (occasional)  Standing: Impaired  Standing - Static: Constant support; Fair  Standing - Dynamic : Poor  Ambulation/Gait Training:                                      Pain:  Pain Scale 1: Numeric (0 - 10)  Pain Intensity 1: 7  Pain Location 1: Ankle  Pain Orientation 1: Right  Pain Description 1: Burning  Pain Intervention(s) 1: Medication (see MAR)  Activity Tolerance:   Fair. Pt limited by decreased tolerance to activity  Please refer to the flowsheet for vital signs taken during this treatment.   After treatment:   [ ]    Patient left in no apparent distress sitting up in chair  [X]    Patient left in no apparent distress in bed  [X]    Call bell left within reach  [X]    Nursing notified  [ ]    Caregiver present  [ ]    Bed alarm activated      COMMUNICATION/COLLABORATION:   The patients plan of care was discussed with: Occupational Therapist and Registered Nurse     Tejal Me, PT, DPT   Time Calculation: 25 mins

## 2017-07-21 NOTE — PROGRESS NOTES
Name: America Moody   MRN: [id number]  : 1945      ASSESSMENT:  Hyponatremia- improved. Na 133 today. Hx of SIADH; TSH is nl. Urine indices suggestive of SIADH  Hyperkalemia-resolved. likely due to blood Tx + mild JODY; improved  Mild JODY- resolved. likely due to ROSALIE (from CT chest w contrast on ). UA is normal  HTN/Hx of R Renal artery stent-labiile but not optimal. Norvasc reduced back due to edema  GI bleed-EGD with duodenal ulcers and ulcerative esophagitis  COPD/chronic lung changes on CXR and CT chest- might be contributory cause of SIADH  CAD  R ankle tib/fib fracture-s/p ORIF  Leg edema     PLAN:  Metoprolol increased back to 75 mg BID due to HTN/Tachycardia  Na stable around early 130's- ct lasix. Add fluid restriction  No ACE-I or ARB- high risk for hyperkalemia (while she gets intermittent blood transfusion)  Start Cardura for HTN  Ct lasix-increase to BID if needed  PRN blood Tx    Subjective:  No acute c/o. hgb still low. Blood ordered  Tired.  Dizzy when out of bed    ROS:   No nausea, no vomiting  No chest pain, no worsening shortness of breath    Exam:  Visit Vitals    /68 (BP 1 Location: Right arm, BP Patient Position: Sitting)    Pulse 68    Temp 98.7 °F (37.1 °C)    Resp 18    Ht 5' 3\" (1.6 m)    Wt 90.4 kg (199 lb 4.7 oz)    SpO2 95%    BMI 35.3 kg/m2       NAD  Pallor+, no icterus  +b/l wheezing, no distress  RRR, not tachy at present  +edema; R leg in fixator  AOx3    Current Facility-Administered Medications   Medication Dose Route Frequency Last Dose    [START ON 2017] amLODIPine (NORVASC) tablet 5 mg  5 mg Oral DAILY      ipratropium (ATROVENT) 0.02 % nebulizer solution 0.5 mg  0.5 mg Nebulization Q6H PRN      levalbuterol (XOPENEX) nebulizer soln 0.63 mg/3 mL  0.63 mg Nebulization Q6H PRN      metoprolol tartrate (LOPRESSOR) tablet 50 mg  50 mg Oral BID 50 mg at 07/21/17 0811    furosemide (LASIX) tablet 40 mg  40 mg Oral DAILY 40 mg at 07/21/17 0812    predniSONE (DELTASONE) tablet 20 mg  20 mg Oral DAILY WITH BREAKFAST 20 mg at 07/21/17 0722    fluticasone-vilanterol (BREO ELLIPTA) 200mcg-25mcg/puff  1 Puff Inhalation DAILY      acetaminophen (TYLENOL) tablet 650 mg  650 mg Oral Q6H PRN      insulin lispro (HUMALOG) injection   SubCUTAneous AC&HS 2 Units at 07/21/17 0722    pantoprazole (PROTONIX) 40 mg in sodium chloride 0.9 % 10 mL injection  40 mg IntraVENous Q12H 40 mg at 07/21/17 0815    sucralfate (CARAFATE) 100 mg/mL oral suspension 1 g  1 g Oral AC&HS 1 g at 07/21/17 0722    mupirocin (BACTROBAN) 2 % ointment   Topical Q12H      hydrALAZINE (APRESOLINE) 20 mg/mL injection 10 mg  10 mg IntraVENous Q6H PRN 10 mg at 07/19/17 1740    glucose chewable tablet 16 g  4 Tab Oral PRN      glucagon (GLUCAGEN) injection 1 mg  1 mg IntraMUSCular PRN      bisacodyl (DULCOLAX) suppository 10 mg  10 mg Rectal DAILY PRN      sodium chloride (NS) flush 5-10 mL  5-10 mL IntraVENous Q8H 10 mL at 07/21/17 0724    sodium chloride (NS) flush 5-10 mL  5-10 mL IntraVENous PRN      naloxone (NARCAN) injection 0.4 mg  0.4 mg IntraVENous PRN      ondansetron (ZOFRAN ODT) tablet 4 mg  4 mg Oral Q4H PRN 4 mg at 07/17/17 0921    oxyCODONE IR (ROXICODONE) tablet 5 mg  5 mg Oral Q4H PRN 5 mg at 07/21/17 6571    oxyCODONE IR (ROXICODONE) tablet 2.5 mg  2.5 mg Oral Q4H PRN 2.5 mg at 07/19/17 0811    dextrose 10% infusion 125-250 mL  125-250 mL IntraVENous PRN         Labs/Data:    Lab Results   Component Value Date/Time    WBC 28.7 07/21/2017 03:23 AM    HGB 6.8 07/21/2017 03:23 AM    HCT 19.5 07/21/2017 03:23 AM    PLATELET 712 35/28/9674 03:23 AM    MCV 88.6 07/21/2017 03:23 AM       Lab Results   Component Value Date/Time    Sodium 131 07/21/2017 03:23 AM    Potassium 4.5 07/21/2017 03:23 AM    Chloride 97 07/21/2017 03:23 AM CO2 27 07/21/2017 03:23 AM    Anion gap 7 07/21/2017 03:23 AM    Glucose 144 07/21/2017 03:23 AM    BUN 57 07/21/2017 03:23 AM    Creatinine 1.06 07/21/2017 03:23 AM    BUN/Creatinine ratio 54 07/21/2017 03:23 AM    GFR est AA >60 07/21/2017 03:23 AM    GFR est non-AA 51 07/21/2017 03:23 AM    Calcium 7.4 07/21/2017 03:23 AM       Wt Readings from Last 3 Encounters:   07/19/17 90.4 kg (199 lb 4.7 oz)   01/26/15 73.1 kg (161 lb 4 oz)   11/15/14 72.1 kg (159 lb)         Intake/Output Summary (Last 24 hours) at 07/21/17 1105  Last data filed at 07/20/17 2211   Gross per 24 hour   Intake             1080 ml   Output             1900 ml   Net             -820 ml       Patient seen and examined. Chart reviewed. Labs, data and other pertinent notes reviewed in last 24 hrs.     PMH/SH/FH reviewed and unchanged compared to H&P    Discussed with pt      Andrew Khan MD

## 2017-07-21 NOTE — PROGRESS NOTES
Pt. Heart rate up to 140 denies any chest pain. Receiving [employer]. Treatment at present and when heart rate increased.   Reported to [employer] Anuradha

## 2017-07-21 NOTE — PROGRESS NOTES
ADULT PROTOCOL: JET AEROSOL ASSESSMENT    Patient  Laury Goldmann     81 y.o.   female     7/21/2017  10:22 AM    Breath Sounds Pre Procedure: Right Breath Sounds: Scattered wheezing                               Left Breath Sounds: Scattered wheezing    Breath Sounds Post Procedure: Right Breath Sounds: Scattered wheezing                                 Left Breath Sounds: Scattered wheezing    Breathing pattern: Pre procedure Breathing Pattern: Regular          Post procedure Breathing Pattern: Regular    Heart Rate: Pre procedure Pulse: 70           Post procedure Pulse: 66    Resp Rate: Pre procedure Respirations: 16           Post procedure Respirations: 16      Incentive Spirometry:  Actual Volume (ml): 500 ml          Cough: Pre procedure Cough: Non-productive               Post procedure Cough: Non-productive      Sputum: Pre procedure Sputum color/odor:  Tan                 Post procedure      Oxygen: O2 Device: Nasal cannula   Flow rate (L/min) 2 lpm     Changed: NO    SpO2: Pre procedure SpO2: 95 %   with oxygen              Post procedure SpO2: 95 %  with oxygen    Nebulizer Therapy: Current medications Aerosolized Medications: Ipratropium bromide, Xopenex      Changed: NO    Smoking History: former smoker    Problem List:   Patient Active Problem List   Diagnosis Code    Abnormal ECG R94.31    Respiratory failure, acute ([facility]) J96.00    Respiratory failure ([address] J96.90       Respiratory Therapist: Priscila Puente RT

## 2017-07-21 NOTE — PROGRESS NOTES
Ortho:     1st metatarsal base pin pulled out. She has soft bone. Pin site clean and not concerning. Removed medial arm of ex-fix. Pin site cleaned and applied xeroform. Lateral side of ex-fix intact, clamps tight. Posterior splint placed as able, should be able to access pin sites for care. Remained NVI intact throughout. Sent for plain films.      Dr Robbie Rosenthal aware and agrees    Luz De Souza PA-C

## 2017-07-21 NOTE — PROGRESS NOTES
Progress Note      7/21/2017 8:14 AM  NAME: Cassandra Cuellar   MRN:  [id number]   Admit Diagnosis: right ankle fracture  Respiratory failure (HCC)  GI Bleed                          Assessment:                 1. S/p mechanical fall requiring orthopedic surgery of right leg, found to be hypoxic, likely COPD exacerbation, PSVT noted on telemetry, mild rhabo with increased troponin being managed medically, then with GI bleed s/p 3 units  2. In 2011 found to have hyponatremia and TWI on ECG with cath with moderate CAD  3. Echo 6/2017 EF 55%, mild to mod MR, mild AI  4. Sinus with PSVT  ? AVNRT on telemetry  5. HTN with right renal artery stent  6. DM  7. Dyslipidemia difficulty with daily statin  8. Hx of GI bleed found to have PUD Dr. Willie Olmstead  9. PVD with left fem-pop placed on plavix for this  10. , mild functional capacity, works at [employer]:                  Mechanical fall  Mild rhabdo with increased troponin, agree with medical management of CAD  PSVT with HR of 150, looks like AVNRT does not appear to be afib, self terminated, 20 beats of tachycardia on 7/15 not recorded on tele monitor or printed, but likely same. Mildly increased troponin, would manage CAD medically. Increased cr after diuresis, doubt significant CHF initially. Low sodium improving. Gi bleed s/p 3 U PRBC      1. Holding plavix, was on this for PVD, reveiwed with Dr. Low Oats hold for 10 days, resume on 7/27/2017  2. Increase metoprolol to 75mg bid, was on metoprolol sr 100mg daily as outpt  3. cont amlodipine at 5mg, would not titrate due to edema in legs  4. Holding avapro, hydralazine prn  5. ?on Doxazosin 1mg per my records  6. Cont lasix 40mg daily, Will give single dose of lasix IV today, follow bmp, bnp mildly elevated  7. Cont statin, would stop niacin  8.  Changed nebs to PRN, taper O2, ?taper prednisone this may have contributed to ulcers.          [x]        High complexity decision making was performed         Subjective:     Claire Blanchard denies chest pain, dyspnea. Discussed with RN events overnight. Review of Systems:    Symptom Y/N Comments  Symptom Y/N Comments   Fever/Chills N   Chest Pain N    Poor Appetite N   Edema N    Cough N   Abdominal Pain N    Sputum N   Joint Pain N    SOB/ODOM N   Pruritis/Rash N    Nausea/vomit N   Tolerating PT/OT Y    Diarrhea N   Tolerating Diet Y    Constipation N   Other       Could NOT obtain due to:      Objective:      Physical Exam:    Last 24hrs VS reviewed since prior progress note. Most recent are:    Visit Vitals    /68 (BP 1 Location: Right arm, BP Patient Position: Sitting)    Pulse 68    Temp 98.7 °F (37.1 °C)    Resp 18    Ht 5' 3\" (1.6 m)    Wt 90.4 kg (199 lb 4.7 oz)    SpO2 95%    BMI 35.3 kg/m2       Intake/Output Summary (Last 24 hours) at 07/21/17 1106  Last data filed at 07/20/17 2211   Gross per 24 hour   Intake             1080 ml   Output             1900 ml   Net             -820 ml        General Appearance: Well developed, well nourished, alert & oriented x 3,    no acute distress. Ears/Nose/Mouth/Throat: Hearing grossly normal.  Neck: Supple. Chest: Lungs clear to auscultation bilaterally. Cardiovascular: Regular rate and rhythm, S1S2 normal, no murmur. Abdomen: Soft, non-tender, bowel sounds are active. Extremities: No edema bilaterally. Skin: Warm and dry. PMH/SH reviewed - no change compared to H&P    Data Review    Telemetry: normal sinus rhythm     Lab Data Personally Reviewed:    Recent Labs      07/21/17 0323 07/20/17 0428   WBC  28.7*  24.6*   HGB  6.8*  7.5*   HCT  19.5*  21.2*   PLT  247  195     No results for input(s): INR, PTP, APTT in the last 72 hours.     No lab exists for component: [place]   Recent Labs      07/21/17 0323 07/20/17 0428 07/19/17   0356   NA  131*  133*  131*   K  4.5  4.9  4.5   CL  97  100  99   CO2  27  26  27   BUN  57*  63*  88*   CREA  1.06*  1.04*  1.16* GLU  144*  169*  101*   CA  7.4*  7.7*  7.7*   MG  2.0  2.4  2.0     No results for input(s): CPK, CKNDX, TROIQ in the last 72 hours. No lab exists for component: CPKMB  Lab Results   Component Value Date/Time    Cholesterol, total 162 09/20/2011 01:17 PM    HDL Cholesterol 55 09/20/2011 01:17 PM    LDL, calculated 78.4 09/20/2011 01:17 PM    Triglyceride 143 09/20/2011 01:17 PM    CHOL/HDL Ratio 2.9 09/20/2011 01:17 PM       Recent Labs      07/20/17   0428  07/19/17   0356   SGOT  157*  179*   AP  46  39*   TP  4.9*  4.4*   ALB  2.2*  2.0*   GLOB  2.7  2.4     No results for input(s): PH, PCO2, PO2 in the last 72 hours.     Medications Personally Reviewed:    Current Facility-Administered Medications   Medication Dose Route Frequency    [START ON 7/22/2017] amLODIPine (NORVASC) tablet 5 mg  5 mg Oral DAILY    ipratropium (ATROVENT) 0.02 % nebulizer solution 0.5 mg  0.5 mg Nebulization Q6H PRN    levalbuterol (XOPENEX) nebulizer soln 0.63 mg/3 mL  0.63 mg Nebulization Q6H PRN    furosemide (LASIX) injection 40 mg  40 mg IntraVENous ONCE    metoprolol tartrate (LOPRESSOR) tablet 75 mg  75 mg Oral BID    furosemide (LASIX) tablet 40 mg  40 mg Oral DAILY    predniSONE (DELTASONE) tablet 20 mg  20 mg Oral DAILY WITH BREAKFAST    fluticasone-vilanterol (BREO ELLIPTA) 200mcg-25mcg/puff  1 Puff Inhalation DAILY    acetaminophen (TYLENOL) tablet 650 mg  650 mg Oral Q6H PRN    insulin lispro (HUMALOG) injection   SubCUTAneous AC&HS    pantoprazole (PROTONIX) 40 mg in sodium chloride 0.9 % 10 mL injection  40 mg IntraVENous Q12H    sucralfate (CARAFATE) 100 mg/mL oral suspension 1 g  1 g Oral AC&HS    mupirocin (BACTROBAN) 2 % ointment   Topical Q12H    hydrALAZINE (APRESOLINE) 20 mg/mL injection 10 mg  10 mg IntraVENous Q6H PRN    glucose chewable tablet 16 g  4 Tab Oral PRN    glucagon (GLUCAGEN) injection 1 mg  1 mg IntraMUSCular PRN    bisacodyl (DULCOLAX) suppository 10 mg  10 mg Rectal DAILY PRN  sodium chloride (NS) flush 5-10 mL  5-10 mL IntraVENous Q8H    sodium chloride (NS) flush 5-10 mL  5-10 mL IntraVENous PRN    naloxone (NARCAN) injection 0.4 mg  0.4 mg IntraVENous PRN    ondansetron (ZOFRAN ODT) tablet 4 mg  4 mg Oral Q4H PRN    oxyCODONE IR (ROXICODONE) tablet 5 mg  5 mg Oral Q4H PRN    oxyCODONE IR (ROXICODONE) tablet 2.5 mg  2.5 mg Oral Q4H PRN    dextrose 10% infusion 125-250 mL  125-250 mL IntraVENous PRN         Aissatou Manning MD

## 2017-07-21 NOTE — PROGRESS NOTES
Bedside and Verbal shift change report given to [address] (oncoming nurse) by Wyatt Simon RN (offgoing nurse). Report included the following information SBAR, Kardex, OR Summary, Procedure Summary, Intake/Output, MAR, Recent Results and Cardiac Rhythm NST/ST.

## 2017-07-21 NOTE — PROGRESS NOTES
Ortho/ NeuroSurgery NP Note    Subjective:  Nursing reports \"pin is out during dressing change\"    Objective:  Patient in no acute distress. Pin at the base of the great toe is attached to ex-fix but no longer in the patient. Assessment:  As above. Plan:  Xrays of foot and ankle. Message out to Dr. Ananth Colon. Awaiting response.      Yordan Ventura, NP

## 2017-07-21 NOTE — PROGRESS NOTES
Hospitalist Progress Note    NAME: July Lin   :  1945   MRN:  [id number]       Assessment / Plan:  Acute GIB with ABL anemia secondary to duodenal ulcers and esophagitis  -s/p  EGD   -Appreciate GI consult  - PPI bid, carafate, holding plavix  -S/p 3 units PRBC, PRBC  Hb 7.0  -Monitor H&H, stable  - follow bx result, h.pylori, negative    Asymptomatic 22 beat run of Wide Complex Tachycardia (VT) on early am of 7/15: question if related to Levaquin use in setting of renal failure  Atrial Fibrillation with RVR on early morning of   SVT   -Mild troponin elevation  -increase metoprolol, cont  norvasc,  Lasix, lasix iv x 1 dose   -Appreciate Cardiology consult  -suspect patient needs outpatient sleep study    Acute hypoxic respiratory failure  Acute COPD exacerbation  -continue scheduled and prn nebs, IV steroids change to Prednisone on , now back on iv solumedrol dt wheezing , switched to po, start taper  -suspect worsening Leukocytosis secondary to prednisone and reactive. BC  no growth, UA wnl, afebrile    ARF from aggressive diuresis; resolved with hydration, cr trended up likely secondary to contrast given , now trending down  Mild Hyperkalemia; resolved with hydration  Hyponatremia: Sodium Woody of 121 on 7/15; increased to 131 today;  ? SIADH  -hold ARB    CHF ruled out (LVEF 55-60%); Patient has interstitial lung disease that was suspected to be pulmonary edema on initial CXR and clinically with diuresis patient went into ARF and become increasingly hyponatremic (lasix was stopped on  with worsening hyponatremia) and CXR without significant change.   Acute Pulmonary Edema Ruled out, lasix restarted    Abdominal pain on am of  with continued nausea: resolved after large BM this am  -KUB on  without ileus    Distal tibia and fibula fracture s/p mechanical fall  -s/p ORIF on   -appreciate ortho assistance  -elevated CK improving     Hx of CAD  HTN; uncontrolled  -decrease metoprolol dt bradycardia, cont amlodipine, holding plavix, statin      Hx of PVD  -s/p right renal artery stent, left fem-pop by Dr. Malena Lomax 3/2014     Impaired Glucose Management (Diabetes ruled out)  -A1c 5.7  -diet control  -SSI      Tobacco use  -nicotine patch    Obesity     Code Status: Full  Surrogate Decision Maker: daughter Laura Ely [phone]     DVT Prophylaxis: SCDs for now (lovenox if no plan procedure in the AM)  GI Prophylaxis: not indicated     Baseline: independent      Body mass index is 35.3 kg/(m^2). Subjective:     Chief Complaint / Reason for Physician Visit: follow-up respiratory failure  Pt reports feeling fine    Review of Systems:  Symptom Y/N Comments  Symptom Y/N Comments   Fever/Chills    Chest Pain n    Poor Appetite y   Edema     Cough    Abdominal Pain n    Sputum    Joint Pain     SOB/ODOM y   Pruritis/Rash     Nausea/vomit n   Tolerating PT/OT     Diarrhea    Tolerating Diet     Constipation n   Other       Could NOT obtain due to:      Objective:     VITALS:   Last 24hrs VS reviewed since prior progress note. Most recent are:  Patient Vitals for the past 24 hrs:   Temp Pulse Resp BP SpO2   07/21/17 1151 98.6 °F (37 °C) 70 18 172/59 94 %   07/21/17 0950 - - - 174/68 -   07/21/17 0945 - 68 - (!) 194/97 95 %   07/21/17 0735 98.7 °F (37.1 °C) 64 18 160/47 96 %   07/21/17 0715 - - - - 95 %   07/21/17 0314 98.1 °F (36.7 °C) 60 16 151/48 97 %   07/20/17 2324 98 °F (36.7 °C) 67 16 (!) 148/37 96 %   07/20/17 1926 97.9 °F (36.6 °C) 76 18 144/58 97 %   07/20/17 1612 98.1 °F (36.7 °C) 69 18 152/46 98 %       Intake/Output Summary (Last 24 hours) at 07/21/17 1421  Last data filed at 07/21/17 1111   Gross per 24 hour   Intake              720 ml   Output             2900 ml   Net            -2180 ml        PHYSICAL EXAM:  General: WD, WN. Alert, cooperative, no acute distress    EENT:  EOMI. Anicteric sclerae.  MMM  Resp:  CTA BL,  No accessory muscle use  CV:  Regular rhythm,  No edema (left lower leg in external fixator and bandaged  GI:  Soft, Non distended, Non tender.  +Bowel sounds  Neurologic:  Alert and oriented X 3, normal speech,   Psych:   Good insight. Not anxious nor agitated  Skin:  No rashes. No jaundice    Reviewed most current lab test results and cultures  YES  Reviewed most current radiology test results   YES  Review and summation of old records today    NO  Reviewed patient's current orders and MAR    YES  PMH/SH reviewed - no change compared to H&P  ________________________________________________________________________  Care Plan discussed with:    Comments   Patient x    Family      RN x    Care Manager     Consultant                        Multidiciplinary team rounds were held today with , nursing, pharmacist and clinical coordinator. Patient's plan of care was discussed; medications were reviewed and discharge planning was addressed. ________________________________________________________________________  Total NON critical care TIME:  25   Minutes    Total CRITICAL CARE TIME Spent:   Minutes non procedure based      Comments   >50% of visit spent in counseling and coordination of care     ________________________________________________________________________  Gary Benton MD     Procedures: see electronic medical records for all procedures/Xrays and details which were not copied into this note but were reviewed prior to creation of Plan. LABS:  I reviewed today's most current labs and imaging studies. Pertinent labs include:  Recent Labs      07/21/17   1217  07/21/17   0323  07/20/17   0428   07/19/17   0356   WBC   --   28.7*  24.6*   --   27.9*   HGB  7.0*  6.8*  7.5*   < >  7.9*   HCT  20.7*  19.5*  21.2*   < >  22.3*   PLT   --   247  195   --   173    < > = values in this interval not displayed.      Recent Labs      07/21/17   0323  07/20/17   0428  07/19/17   0356   NA  131*  133*  131*   K  4.5  4.9  4.5   CL  97 100  99   CO2  27  26  27   GLU  144*  169*  101*   BUN  57*  63*  88*   CREA  1.06*  1.04*  1.16*   CA  7.4*  7.7*  7.7*   MG  2.0  2.4  2.0   PHOS   --   3.3  3.5   ALB   --   2.2*  2.0*   TBILI   --   0.4  0.3   SGOT   --   157*  179*   ALT   --   384*  350*       Signed: Shari Nicolas MD

## 2017-07-22 LAB
ANION GAP BLD CALC-SCNC: 7 MMOL/L (ref 5–15)
BUN SERPL-MCNC: 49 MG/DL (ref 6–20)
BUN/CREAT SERPL: 51 (ref 12–20)
CALCIUM SERPL-MCNC: 7.6 MG/DL (ref 8.5–10.1)
CHLORIDE SERPL-SCNC: 95 MMOL/L (ref 97–108)
CK SERPL-CCNC: 111 U/L (ref 26–192)
CO2 SERPL-SCNC: 28 MMOL/L (ref 21–32)
CREAT SERPL-MCNC: 0.97 MG/DL (ref 0.55–1.02)
ERYTHROCYTE [DISTWIDTH] IN BLOOD BY AUTOMATED COUNT: 14.4 % (ref 11.5–14.5)
GLUCOSE BLD STRIP.AUTO-MCNC: 110 MG/DL (ref 65–100)
GLUCOSE BLD STRIP.AUTO-MCNC: 204 MG/DL (ref 65–100)
GLUCOSE BLD STRIP.AUTO-MCNC: 262 MG/DL (ref 65–100)
GLUCOSE BLD STRIP.AUTO-MCNC: 291 MG/DL (ref 65–100)
GLUCOSE SERPL-MCNC: 86 MG/DL (ref 65–100)
HCT VFR BLD AUTO: 30.5 % (ref 35–47)
HGB BLD-MCNC: 10.5 G/DL (ref 11.5–16)
MAGNESIUM SERPL-MCNC: 1.9 MG/DL (ref 1.6–2.4)
MCH RBC QN AUTO: 30.3 PG (ref 26–34)
MCHC RBC AUTO-ENTMCNC: 34.4 G/DL (ref 30–36.5)
MCV RBC AUTO: 87.9 FL (ref 80–99)
PHOSPHATE SERPL-MCNC: 2.5 MG/DL (ref 2.6–4.7)
PLATELET # BLD AUTO: 207 K/UL (ref 150–400)
POTASSIUM SERPL-SCNC: 3.7 MMOL/L (ref 3.5–5.1)
RBC # BLD AUTO: 3.47 M/UL (ref 3.8–5.2)
SERVICE CMNT-IMP: ABNORMAL
SODIUM SERPL-SCNC: 130 MMOL/L (ref 136–145)
WBC # BLD AUTO: 21.1 K/UL (ref 3.6–11)

## 2017-07-22 PROCEDURE — 65270000029 HC RM PRIVATE

## 2017-07-22 PROCEDURE — 97530 THERAPEUTIC ACTIVITIES: CPT

## 2017-07-22 PROCEDURE — 74011636637 HC RX REV CODE- 636/637: Performed by: INTERNAL MEDICINE

## 2017-07-22 PROCEDURE — 83735 ASSAY OF MAGNESIUM: CPT | Performed by: INTERNAL MEDICINE

## 2017-07-22 PROCEDURE — 74011250637 HC RX REV CODE- 250/637: Performed by: INTERNAL MEDICINE

## 2017-07-22 PROCEDURE — 36415 COLL VENOUS BLD VENIPUNCTURE: CPT | Performed by: INTERNAL MEDICINE

## 2017-07-22 PROCEDURE — C9113 INJ PANTOPRAZOLE SODIUM, VIA: HCPCS | Performed by: INTERNAL MEDICINE

## 2017-07-22 PROCEDURE — 82550 ASSAY OF CK (CPK): CPT | Performed by: INTERNAL MEDICINE

## 2017-07-22 PROCEDURE — 84100 ASSAY OF PHOSPHORUS: CPT | Performed by: INTERNAL MEDICINE

## 2017-07-22 PROCEDURE — 74011250637 HC RX REV CODE- 250/637: Performed by: SPECIALIST

## 2017-07-22 PROCEDURE — 82962 GLUCOSE BLOOD TEST: CPT

## 2017-07-22 PROCEDURE — 80048 BASIC METABOLIC PNL TOTAL CA: CPT | Performed by: INTERNAL MEDICINE

## 2017-07-22 PROCEDURE — 85027 COMPLETE CBC AUTOMATED: CPT | Performed by: INTERNAL MEDICINE

## 2017-07-22 PROCEDURE — 74011250637 HC RX REV CODE- 250/637: Performed by: PHYSICIAN ASSISTANT

## 2017-07-22 PROCEDURE — 74011250636 HC RX REV CODE- 250/636: Performed by: INTERNAL MEDICINE

## 2017-07-22 PROCEDURE — 74011000250 HC RX REV CODE- 250: Performed by: INTERNAL MEDICINE

## 2017-07-22 RX ORDER — PREDNISONE 10 MG/1
10 TABLET ORAL
Status: DISCONTINUED | OUTPATIENT
Start: 2017-07-23 | End: 2017-07-23

## 2017-07-22 RX ORDER — IRBESARTAN 75 MG/1
75 TABLET ORAL DAILY
Status: DISCONTINUED | OUTPATIENT
Start: 2017-07-22 | End: 2017-07-31

## 2017-07-22 RX ADMIN — MUPIROCIN: 20 OINTMENT TOPICAL at 08:01

## 2017-07-22 RX ADMIN — PREDNISONE 20 MG: 20 TABLET ORAL at 08:00

## 2017-07-22 RX ADMIN — SUCRALFATE 1 G: 1 SUSPENSION ORAL at 11:44

## 2017-07-22 RX ADMIN — OXYCODONE HYDROCHLORIDE 5 MG: 5 TABLET ORAL at 07:58

## 2017-07-22 RX ADMIN — INSULIN LISPRO 3 UNITS: 100 INJECTION, SOLUTION INTRAVENOUS; SUBCUTANEOUS at 17:27

## 2017-07-22 RX ADMIN — SUCRALFATE 1 G: 1 SUSPENSION ORAL at 21:55

## 2017-07-22 RX ADMIN — AMLODIPINE BESYLATE 5 MG: 5 TABLET ORAL at 08:00

## 2017-07-22 RX ADMIN — OXYCODONE HYDROCHLORIDE 5 MG: 5 TABLET ORAL at 21:55

## 2017-07-22 RX ADMIN — Medication 10 ML: at 13:44

## 2017-07-22 RX ADMIN — POTASSIUM PHOSPHATE, MONOBASIC 500 MG: 500 TABLET, SOLUBLE ORAL at 13:43

## 2017-07-22 RX ADMIN — IRBESARTAN 75 MG: 75 TABLET ORAL at 17:32

## 2017-07-22 RX ADMIN — SUCRALFATE 1 G: 1 SUSPENSION ORAL at 17:27

## 2017-07-22 RX ADMIN — SODIUM CHLORIDE 40 MG: 9 INJECTION INTRAMUSCULAR; INTRAVENOUS; SUBCUTANEOUS at 21:59

## 2017-07-22 RX ADMIN — Medication 10 ML: at 21:59

## 2017-07-22 RX ADMIN — FUROSEMIDE 40 MG: 40 TABLET ORAL at 08:00

## 2017-07-22 RX ADMIN — METOPROLOL TARTRATE 75 MG: 50 TABLET ORAL at 21:56

## 2017-07-22 RX ADMIN — Medication 10 ML: at 07:59

## 2017-07-22 RX ADMIN — METOPROLOL TARTRATE 75 MG: 50 TABLET ORAL at 08:00

## 2017-07-22 RX ADMIN — POTASSIUM PHOSPHATE, MONOBASIC 500 MG: 500 TABLET, SOLUBLE ORAL at 17:28

## 2017-07-22 RX ADMIN — FLUTICASONE FUROATE AND VILANTEROL TRIFENATATE 1 PUFF: 200; 25 POWDER RESPIRATORY (INHALATION) at 08:01

## 2017-07-22 RX ADMIN — SUCRALFATE 1 G: 1 SUSPENSION ORAL at 07:57

## 2017-07-22 RX ADMIN — SODIUM CHLORIDE 40 MG: 9 INJECTION INTRAMUSCULAR; INTRAVENOUS; SUBCUTANEOUS at 08:00

## 2017-07-22 RX ADMIN — INSULIN LISPRO 3 UNITS: 100 INJECTION, SOLUTION INTRAVENOUS; SUBCUTANEOUS at 21:55

## 2017-07-22 RX ADMIN — INSULIN LISPRO 5 UNITS: 100 INJECTION, SOLUTION INTRAVENOUS; SUBCUTANEOUS at 11:44

## 2017-07-22 NOTE — PROGRESS NOTES
Gastroenterology Daily Progress Note (Dr. Lara Marinelli)    Admit Date: 7/12/2017       Subjective:       Received 1 U of prbcs last night. No bloody BMs reported. No new c/o.     Current Facility-Administered Medications   Medication Dose Route Frequency    amLODIPine (NORVASC) tablet 5 mg  5 mg Oral DAILY    ipratropium (ATROVENT) 0.02 % nebulizer solution 0.5 mg  0.5 mg Nebulization Q6H PRN    levalbuterol (XOPENEX) nebulizer soln 0.63 mg/3 mL  0.63 mg Nebulization Q6H PRN    metoprolol tartrate (LOPRESSOR) tablet 75 mg  75 mg Oral BID    0.9% sodium chloride infusion 250 mL  250 mL IntraVENous PRN    diphenhydrAMINE (BENADRYL) capsule 25 mg  25 mg Oral Q6H PRN    levoFLOXacin (LEVAQUIN) tablet 750 mg  750 mg Oral Q48H    furosemide (LASIX) tablet 40 mg  40 mg Oral DAILY    predniSONE (DELTASONE) tablet 20 mg  20 mg Oral DAILY WITH BREAKFAST    fluticasone-vilanterol (BREO ELLIPTA) 200mcg-25mcg/puff  1 Puff Inhalation DAILY    acetaminophen (TYLENOL) tablet 650 mg  650 mg Oral Q6H PRN    insulin lispro (HUMALOG) injection   SubCUTAneous AC&HS    pantoprazole (PROTONIX) 40 mg in sodium chloride 0.9 % 10 mL injection  40 mg IntraVENous Q12H    sucralfate (CARAFATE) 100 mg/mL oral suspension 1 g  1 g Oral AC&HS    mupirocin (BACTROBAN) 2 % ointment   Topical Q12H    hydrALAZINE (APRESOLINE) 20 mg/mL injection 10 mg  10 mg IntraVENous Q6H PRN    glucose chewable tablet 16 g  4 Tab Oral PRN    glucagon (GLUCAGEN) injection 1 mg  1 mg IntraMUSCular PRN    bisacodyl (DULCOLAX) suppository 10 mg  10 mg Rectal DAILY PRN    sodium chloride (NS) flush 5-10 mL  5-10 mL IntraVENous Q8H    sodium chloride (NS) flush 5-10 mL  5-10 mL IntraVENous PRN    naloxone (NARCAN) injection 0.4 mg  0.4 mg IntraVENous PRN    ondansetron (ZOFRAN ODT) tablet 4 mg  4 mg Oral Q4H PRN    oxyCODONE IR (ROXICODONE) tablet 5 mg  5 mg Oral Q4H PRN    oxyCODONE IR (ROXICODONE) tablet 2.5 mg  2.5 mg Oral Q4H PRN    dextrose 10% infusion 125-250 mL  125-250 mL IntraVENous PRN        Objective:     Visit Vitals    /42    Pulse (!) 59    Temp 98.3 °F (36.8 °C)    Resp 16    Ht 5' 3\" (1.6 m)    Wt 90.4 kg (199 lb 4.7 oz)    SpO2 97%    BMI 35.3 kg/m2   Blood pressure 146/42, pulse (!) 59, temperature 98.3 °F (36.8 °C), resp.  rate 16, height 5' 3\" (1.6 m), weight 90.4 kg (199 lb 4.7 oz), SpO2 97 %.  07/22 0701 - 07/22 1900  In: -   Out: 800 [Urine:800]  07/20 1901 - 07/22 0700  In: 240 [P.O.:240]  Out: 8580 [Urine:3550]      Intake/Output Summary (Last 24 hours) at 07/22/17 1154  Last data filed at 07/22/17 0900   Gross per 24 hour   Intake                0 ml   Output             3100 ml   Net            -3100 ml     Physical Exam:     General: awake and conversant WF in NAD  GI: Soft, NT, ND + bowel sounds    Labs:     Recent Results (from the past 24 hour(s))   GLUCOSE, POC    Collection Time: 07/21/17 12:16 PM   Result Value Ref Range    Glucose (POC) 294 (H) 65 - 100 mg/dL    Performed by Tere Shelton*    HGB & HCT    Collection Time: 07/21/17 12:17 PM   Result Value Ref Range    HGB 7.0 (L) 11.5 - 16.0 g/dL    HCT 20.7 (L) 35.0 - 47.0 %   TYPE & CROSSMATCH    Collection Time: 07/21/17  3:51 PM   Result Value Ref Range    Crossmatch Expiration 07/24/2017     ABO/Rh(D) O POSITIVE     Antibody screen NEG     Unit number [id number]     Blood component type RC LR AS3,2     Unit division 00     Status of unit TRANSFUSED     Crossmatch result Compatible    GLUCOSE, POC    Collection Time: 07/21/17  4:15 PM   Result Value Ref Range    Glucose (POC) 191 (H) 65 - 100 mg/dL    Performed by Vaughn Barriga \"Sandy\"    GLUCOSE, POC    Collection Time: 07/21/17 11:25 PM   Result Value Ref Range    Glucose (POC) 169 (H) 65 - 100 mg/dL    Performed by Dorethea Claude    CBC W/O DIFF    Collection Time: 07/22/17  4:27 AM   Result Value Ref Range    WBC 21.1 (H) 3.6 - 11.0 K/uL    RBC 3.47 (L) 3.80 - 5.20 M/uL    HGB 10.5 (L) 11.5 - 16.0 g/dL    HCT 30.5 (L) 35.0 - 47.0 %    MCV 87.9 80.0 - 99.0 FL    MCH 30.3 26.0 - 34.0 PG    MCHC 34.4 30.0 - 36.5 g/dL    RDW 14.4 11.5 - 14.5 %    PLATELET 690 441 - 491 K/uL   PHOSPHORUS    Collection Time: 07/22/17  4:27 AM   Result Value Ref Range    Phosphorus 2.5 (L) 2.6 - 4.7 MG/DL   MAGNESIUM    Collection Time: 07/22/17  4:27 AM   Result Value Ref Range    Magnesium 1.9 1.6 - 2.4 mg/dL   CK    Collection Time: 07/22/17  4:27 AM   Result Value Ref Range     26 - 019 U/L   METABOLIC PANEL, BASIC    Collection Time: 07/22/17  4:27 AM   Result Value Ref Range    Sodium 130 (L) 136 - 145 mmol/L    Potassium 3.7 3.5 - 5.1 mmol/L    Chloride 95 (L) 97 - 108 mmol/L    CO2 28 21 - 32 mmol/L    Anion gap 7 5 - 15 mmol/L    Glucose 86 65 - 100 mg/dL    BUN 49 (H) 6 - 20 MG/DL    Creatinine 0.97 0.55 - 1.02 MG/DL    BUN/Creatinine ratio 51 (H) 12 - 20      GFR est AA >60 >60 ml/min/1.73m2    GFR est non-AA 57 (L) >60 ml/min/1.73m2    Calcium 7.6 (L) 8.5 - 10.1 MG/DL   GLUCOSE, POC    Collection Time: 07/22/17  7:27 AM   Result Value Ref Range    Glucose (POC) 110 (H) 65 - 100 mg/dL    Performed by Hanna Amel \"Sandy\"    GLUCOSE, POC    Collection Time: 07/22/17 10:58 AM   Result Value Ref Range    Glucose (POC) 262 (H) 65 - 100 mg/dL    Performed by Hanna Amel \"Sandy\"      Recent Labs      07/22/17   0427  07/21/17   0323  07/20/17   0428   NA  130*  131*  133*   K  3.7  4.5  4.9   CL  95*  97  100   CO2  28  27  26   BUN  49*  57*  63*   CREA  0.97  1.06*  1.04*   GLU  86  144*  169*   CA  7.6*  7.4*  7.7*   MG  1.9  2.0  2.4   PHOS  2.5*   --   3.3     Lab Results   Component Value Date/Time    WBC 21.1 07/22/2017 04:27 AM    HGB 10.5 07/22/2017 04:27 AM    HCT 30.5 07/22/2017 04:27 AM    PLATELET 437 20/16/6392 04:27 AM    MCV 87.9 07/22/2017 04:27 AM         Impression:    Acute GI blood loss anemia secondary to Duodenal ulcers and ulcerative esophagitis on EGD 7/18/17  COPD  CHF Plan:  Hgb up to 10.5 after one unit of blood from 6.8 with no ongoing bleeding.  -continuePPI BID and carafate for PUD and also ulcerative esophagitis at discharge for 2 months  -hold anticoagulants for 4-5 more days  -f/u with me after discharge for repeat EGD in 2 months  -signing off       Eli Reardon MD    7/22/2017  [address]  [address] [facility], [address] [place] [address]. [address] [place] [address]: [phone]

## 2017-07-22 NOTE — PROGRESS NOTES
Bedside shift change report given to Charly Yang (oncoming nurse) by Angelo Cifuentes (offgoing nurse). Report included the following information SBAR, Kardex, Procedure Summary, Intake/Output and MAR.

## 2017-07-22 NOTE — PROGRESS NOTES
Cardiology Progress Note  7/22/2017     Admit Date: 7/12/2017  Admit Diagnosis: right ankle fracture  Respiratory failure (HCC)  GI Bleed  CC: none currently                        Assessment (as per Dr Chepe Pascual  1. S/p mechanical fall requiring orthopedic surgery of right leg, found to be hypoxic, likely COPD exacerbation, PSVT noted on telemetry, mild rhabo with increased troponin being managed medically, then with GI bleed s/p 3 units  2. In 2011 found to have hyponatremia and TWI on ECG with cath with moderate CAD  3. Echo 6/2017 EF 55%, mild to mod MR, mild AI  4. Sinus with PSVT  ? AVNRT on telemetry  5. HTN with right renal artery stent  6. DM  7. Dyslipidemia difficulty with daily statin  8. Hx of GI bleed found to have PUD Dr. Speedy August  9. PVD with left fem-pop placed on plavix for this  10. , mild functional capacity, works at [employer] (as per Dr Glez):                   Mechanical fall  Mild rhabdo with increased troponin, agree with medical management of CAD  PSVT with HR of 150, looks like AVNRT does not appear to be afib, self terminated, 20 beats of tachycardia on 7/15 not recorded on tele monitor or printed, but likely same. Mildly increased troponin, would manage CAD medically. Increased cr after diuresis, doubt significant CHF initially. Low sodium improving. Gi bleed s/p 3 U PRBC       1. Holding plavix, was on this for PVD, reveiwed with Dr. Nanntete Schaeffer hold for 10 days, resume on 7/27/2017  2. Increase metoprolol to 75mg bid, was on metoprolol sr 100mg daily as outpt  3. cont amlodipine at 5mg, would not titrate due to edema in legs  4. Holding avapro, hydralazine prn  5. ?on Doxazosin 1mg per my records  6. Cont lasix 40mg daily, Will give single dose of lasix IV today, follow bmp, bnp mildly elevated  7. Cont statin, would stop niacin  8.  Changed nebs to PRN, taper O2, ?taper prednisone this may have contributed to ulcers.           : Brief ASx PAT overnight: Cont current Rx; May need other anti-arrhythmic: HR too low for higher dose bblocker. Will see. For other plans, see orders. Hospital problem list   Active Hospital Problems    Diagnosis Date Noted    Respiratory failure ([facility] [address] 2017        Subjective: Delicia Dai reports   Chest pain X none  consistent with:  Non-cardiac CP         Atypical CP     None now  On going  Anginal CP     Dyspnea X none  at rest  with exertion         improved  unchanged  worse              PND X none  overnight       Orthopnea X none  improved  unchanged  worse   Presyncope X none  improved  unchanged  worse     Ambulated in hallway without symptoms   Yes   Ambulated in room without symptoms  Yes   Objective:    Physical Exam:  Overall VSSAF;    Visit Vitals    /42    Pulse (!) 59    Temp 98.3 °F (36.8 °C)    Resp 16    Ht 5' 3\" (1.6 m)    Wt 90.4 kg (199 lb 4.7 oz)    SpO2 97%    BMI 35.3 kg/m2     Temp (24hrs), Av.1 °F (36.7 °C), Min:97.7 °F (36.5 °C), Max:98.6 °F (37 °C)    Patient Vitals for the past 8 hrs:   Pulse   17 1129 (!) 59   17 0744 67   17 0407 (!) 58    Patient Vitals for the past 8 hrs:   Resp   17 1129 16   17 0744 12   17 0407 18    Patient Vitals for the past 8 hrs:   BP   17 1129 146/42   17 0744 153/67   17 0407 138/76        Intake/Output Summary (Last 24 hours) at 17 1146  Last data filed at 17 0900   Gross per 24 hour   Intake                0 ml   Output             3100 ml   Net            -3100 ml     General Appearance: Well developed, obsese, no acute distress. Ears/Nose/Mouth/Throat:   Normal MM; anicteric. JVP: WNL   Resp:   clear to auscultation bilaterally. Nl resp effort. Cardiovascular:  RRR, S1, S2 normal, no new murmur. No gallop or rub. Abdomen:   Soft, non-tender, bowel sounds are present. Extremities: No edema bilaterally.     Skin:  Neuro: Warm and dry. A/O x3, grossly nonfocal   cath site intact w/o hematoma or new bruit; distal pulse unchanged  Yes   Data Review:     Telemetry independently reviewed x sinus  chronic afib x parox SVT  NSVT     ECG independently reviewed  NSR  afib  no significant changes  NSST-Tw chgs   x no new ECG provided for review   Lab results reviewed as noted below. Current medications reviewed as noted below. No results for input(s): PH, PCO2, PO2 in the last 72 hours. Recent Labs      07/22/17   0427   CPK  111     Recent Labs      07/22/17   0427  07/21/17   1217  07/21/17   0323  07/20/17   0428   NA  130*   --   131*  133*   K  3.7   --   4.5  4.9   CL  95*   --   97  100   CO2  28   --   27  26   BUN  49*   --   57*  63*   CREA  0.97   --   1.06*  1.04*   GLU  86   --   144*  169*   PHOS  2.5*   --    --   3.3   CA  7.6*   --   7.4*  7.7*   ALB   --    --    --   2.2*   WBC  21.1*   --   28.7*  24.6*   HGB  10.5*  7.0*  6.8*  7.5*   HCT  30.5*  20.7*  19.5*  21.2*   PLT  207   --   247  195     Lab Results   Component Value Date/Time    Cholesterol, total 162 09/20/2011 01:17 PM    HDL Cholesterol 55 09/20/2011 01:17 PM    LDL, calculated 78.4 09/20/2011 01:17 PM    Triglyceride 143 09/20/2011 01:17 PM    CHOL/HDL Ratio 2.9 09/20/2011 01:17 PM     Recent Labs      07/20/17   0428   SGOT  157*   AP  46   TP  4.9*   ALB  2.2*   GLOB  2.7     No results for input(s): INR, PTP, APTT in the last 72 hours.     No lab exists for component: INREXT   No components found for: GLPOC    Current Facility-Administered Medications   Medication Dose Route Frequency    amLODIPine (NORVASC) tablet 5 mg  5 mg Oral DAILY    ipratropium (ATROVENT) 0.02 % nebulizer solution 0.5 mg  0.5 mg Nebulization Q6H PRN    levalbuterol (XOPENEX) nebulizer soln 0.63 mg/3 mL  0.63 mg Nebulization Q6H PRN    metoprolol tartrate (LOPRESSOR) tablet 75 mg  75 mg Oral BID    0.9% sodium chloride infusion 250 mL  250 mL IntraVENous PRN    diphenhydrAMINE (BENADRYL) capsule 25 mg  25 mg Oral Q6H PRN    levoFLOXacin (LEVAQUIN) tablet 750 mg  750 mg Oral Q48H    furosemide (LASIX) tablet 40 mg  40 mg Oral DAILY    predniSONE (DELTASONE) tablet 20 mg  20 mg Oral DAILY WITH BREAKFAST    fluticasone-vilanterol (BREO ELLIPTA) 200mcg-25mcg/puff  1 Puff Inhalation DAILY    acetaminophen (TYLENOL) tablet 650 mg  650 mg Oral Q6H PRN    insulin lispro (HUMALOG) injection   SubCUTAneous AC&HS    pantoprazole (PROTONIX) 40 mg in sodium chloride 0.9 % 10 mL injection  40 mg IntraVENous Q12H    sucralfate (CARAFATE) 100 mg/mL oral suspension 1 g  1 g Oral AC&HS    mupirocin (BACTROBAN) 2 % ointment   Topical Q12H    hydrALAZINE (APRESOLINE) 20 mg/mL injection 10 mg  10 mg IntraVENous Q6H PRN    glucose chewable tablet 16 g  4 Tab Oral PRN    glucagon (GLUCAGEN) injection 1 mg  1 mg IntraMUSCular PRN    bisacodyl (DULCOLAX) suppository 10 mg  10 mg Rectal DAILY PRN    sodium chloride (NS) flush 5-10 mL  5-10 mL IntraVENous Q8H    sodium chloride (NS) flush 5-10 mL  5-10 mL IntraVENous PRN    naloxone (NARCAN) injection 0.4 mg  0.4 mg IntraVENous PRN    ondansetron (ZOFRAN ODT) tablet 4 mg  4 mg Oral Q4H PRN    oxyCODONE IR (ROXICODONE) tablet 5 mg  5 mg Oral Q4H PRN    oxyCODONE IR (ROXICODONE) tablet 2.5 mg  2.5 mg Oral Q4H PRN    dextrose 10% infusion 125-250 mL  125-250 mL IntraVENous PRN        Alyson Sheikh MD

## 2017-07-22 NOTE — PROGRESS NOTES
Problem: Mobility Impaired (Adult and Pediatric)  Goal: *Acute Goals and Plan of Care (Insert Text)  Physical Therapy Goals  Initiated 7/14/2017  1. Patient will move from supine to sit and sit to supine in bed with supervision/set-up within 7 day(s). 2. Patient will transfer from bed to chair and chair to bed with minimal assistance/contact guard assist using the least restrictive device within 7 day(s). 3. Patient will perform sit to stand with contact guard assist within 7 day(s). 4. Patient will ambulate with minimal assistance/contact guard assist for 50 feet with the least restrictive device within 7 day(s). 5. Patient will ascend/descend 1 stairs with bilateral handrail(s) with minimal assistance/contact guard assist within 7 day(s). PHYSICAL THERAPY TREATMENT  Patient: Marisa Trejo (57 y.o. female)  Date: 7/22/2017  Diagnosis: right ankle fracture, Respiratory failure ([address] GI Bleed      Procedure(s) (LRB):  ESOPHAGOGASTRODUODENOSCOPY (EGD) (N/A) 4 Days Post-Op      Precautions: NWB RLE  Chart, physical therapy assessment, plan of care and goals were reviewed. ASSESSMENT: pt unable to amb at this time and unable to maintain NWB on RLE, she tries very hard but can't support her weight thru her arms, large fall risk at this time, max vc's for safety and proper RW use, will need SNF at d/c. Progression toward goals:  [ ]      Improving appropriately and progressing toward goals  [X]      Improving very slowly and progressing toward goals  [ ]      Not making progress toward goals and plan of care will be adjusted       PLAN:  Patient continues to benefit from skilled intervention to address the above impairments. Continue treatment per established plan of care.   Discharge Recommendations:  Dilip Syed  Further Equipment Recommendations for Discharge:  bedside commode and rolling walker       OBJECTIVE DATA SUMMARY:      Critical Behavior:  Neurologic State: Alert  Orientation Level: Oriented X4  Cognition: Appropriate decision making, Appropriate for age attention/concentration, Appropriate safety awareness, Follows commands  Safety/Judgement: Decreased awareness of need for safety (difficulty adhering to RLE NWB)      Functional Mobility Training:  Bed Mobility:  Rolling: Minimum assistance;Assist x1  Supine to Sit: Contact guard assistance  Scooting: Contact guard assistance  Level of Assistance: Minimum assistance              Interventions: Tactile cues; Verbal cues      Transfers:  Sit to Stand: Minimum assistance;Assist x2; Additional time  Stand to Sit: Minimum assistance;Assist x1  Stand Pivot Transfers: Minimal assistance  Bed to Chair: Moderate assistance;Assist x2; Additional time  Interventions: Verbal cues; Tactile cues  Level of Assistance: Moderate assistance      Balance:  Sitting: Intact; Without support  Sitting - Static: Good (unsupported)  Sitting - Dynamic: Not tested  Standing: Impaired; With support  Standing - Static: Poor;Constant support  Standing - Dynamic : Poor      Ambulation/Gait Training: unable     Pain:  Pain Scale 1: Numeric (0 - 10)  Pain Intensity 1: 0  Pain Location 1: Foot  Pain Orientation 1: Right  Pain Description 1: Aching;Burning  Pain Intervention(s) 1: Medication (see MAR)      Activity Tolerance: poor     After treatment:   [X] Patient left in no apparent distress sitting up in chair  [ ] Patient left in no apparent distress in bed  [X] Call bell left within reach  [X] Nursing notified  [ ] Caregiver present  [ ] Bed alarm activated      COMMUNICATION/COLLABORATION:   The patients plan of care was discussed with: Registered Nurse     Mehnaz Chung PTA   Time Calculation: 20 mins

## 2017-07-22 NOTE — PROGRESS NOTES
Bedside shift change report given to Kim (oncoming nurse) by Yovana (offgoing nurse). Report included the following information SBAR, Kardex, Intake/Output, MAR, Accordion and Recent Results.

## 2017-07-22 NOTE — PROGRESS NOTES
Hospitalist Progress Note    NAME: July Lin   :  1945   MRN:  [id number]       Assessment / Plan:  Acute GIB with ABL anemia secondary to duodenal ulcers and esophagitis  -s/p  EGD   -Appreciate GI consult  - PPI bid, carafate, holding plavix  -S/p 3 units PRBC, PRBC  Hb 10.5  -Monitor H&H, stable  - follow bx result, h.pylori, negative    Asymptomatic 22 beat run of Wide Complex Tachycardia (VT) on early am of 7/15: question if related to Levaquin use in setting of renal failure  Atrial Fibrillation with RVR on early morning of   SVT   -Mild troponin elevation  -Cont metoprolol, cont  norvasc,  Lasix. Lasix iv x 1 dose   -Appreciate Cardiology consult  -suspect patient needs outpatient sleep study    Acute hypoxic respiratory failure  Acute COPD exacerbation  -continue scheduled and prn nebs, IV steroids change to Prednisone on , now back on iv solumedrol dt wheezing , switched to po, start taper  -suspect worsening Leukocytosis secondary to prednisone and reactive. Trending down  [facility]  no growth, UA wnl, afebrile    ARF from aggressive diuresis; resolved with hydration, cr trended up likely secondary to contrast given , now trending down  Mild Hyperkalemia; resolved with hydration  Hyponatremia: Sodium Woody of 121 on 7/15; increased to 131 today;  ? SIADH  -hold ARB    CHF ruled out (LVEF 55-60%); Patient has interstitial lung disease that was suspected to be pulmonary edema on initial CXR and clinically with diuresis patient went into ARF and become increasingly hyponatremic (lasix was stopped on  with worsening hyponatremia) and CXR without significant change.   Acute Pulmonary Edema Ruled out, lasix restarted    Abdominal pain on am of  with continued nausea: resolved after large BM this am  -KUB on  without ileus    Distal tibia and fibula fracture s/p mechanical fall  -s/p ORIF on   -appreciate ortho assistance  -elevated CK improved     Hx of CAD  HTN;   -decrease metoprolol dt bradycardia, cont amlodipine, holding plavix, statin      Hx of PVD  -s/p right renal artery stent, left fem-pop by Dr. Noni Isbell 3/2014     Impaired Glucose Management (Diabetes ruled out)  -A1c 5.7  -diet control  -SSI      Tobacco use  -nicotine patch    Obesity     Code Status: Full  Surrogate Decision Maker: daughter Olman Bolaños [phone]     DVT Prophylaxis: SCDs for now (lovenox if no plan procedure in the AM)  GI Prophylaxis: not indicated     Baseline: independent    Dispo: tbd, CM consult      Body mass index is 35.3 kg/(m^2). Subjective:     Chief Complaint / Reason for Physician Visit: follow-up respiratory failure  Pt reports feeling fine, no c/o    Review of Systems:  Symptom Y/N Comments  Symptom Y/N Comments   Fever/Chills    Chest Pain n    Poor Appetite y   Edema     Cough    Abdominal Pain n    Sputum    Joint Pain     SOB/ODOM y   Pruritis/Rash     Nausea/vomit n   Tolerating PT/OT     Diarrhea    Tolerating Diet     Constipation n   Other       Could NOT obtain due to:      Objective:     VITALS:   Last 24hrs VS reviewed since prior progress note. Most recent are:  Patient Vitals for the past 24 hrs:   Temp Pulse Resp BP SpO2   07/22/17 1129 98.3 °F (36.8 °C) (!) 59 16 146/42 97 %   07/22/17 0744 98.2 °F (36.8 °C) 67 12 153/67 93 %   07/22/17 0407 97.9 °F (36.6 °C) (!) 58 18 138/76 96 %   07/22/17 0013 97.7 °F (36.5 °C) 79 18 155/57 96 %   07/21/17 2141 - 78 - 149/72 -   07/21/17 1842 98.2 °F (36.8 °C) 78 18 149/72 96 %   07/21/17 1822 98 °F (36.7 °C) 68 - 184/79 95 %   07/21/17 1806 98 °F (36.7 °C) 79 16 189/85 95 %   07/21/17 1530 98 °F (36.7 °C) 71 18 153/68 95 %       Intake/Output Summary (Last 24 hours) at 07/22/17 1314  Last data filed at 07/22/17 0900   Gross per 24 hour   Intake                0 ml   Output             3100 ml   Net            -3100 ml        PHYSICAL EXAM:  General: WD, WN. Alert, cooperative, no acute distress    EENT:  EOMI. Anicteric sclerae. MMM  Resp:  CTA BL,  No accessory muscle use  CV:  Regular  rhythm,  No edema (left lower leg in external fixator and bandaged  GI:  Soft, Non distended, Non tender.  +Bowel sounds  Neurologic:  Alert and oriented X 3, normal speech,   Psych:   Good insight. Not anxious nor agitated  Skin:  No rashes. No jaundice    Reviewed most current lab test results and cultures  YES  Reviewed most current radiology test results   YES  Review and summation of old records today    NO  Reviewed patient's current orders and MAR    YES  PMH/SH reviewed - no change compared to H&P  ________________________________________________________________________  Care Plan discussed with:    Comments   Patient x    Family      RN x    Care Manager     Consultant                        Multidiciplinary team rounds were held today with , nursing, pharmacist and clinical coordinator. Patient's plan of care was discussed; medications were reviewed and discharge planning was addressed. ________________________________________________________________________  Total NON critical care TIME:  25   Minutes    Total CRITICAL CARE TIME Spent:   Minutes non procedure based      Comments   >50% of visit spent in counseling and coordination of care     ________________________________________________________________________  Codie Velez MD     Procedures: see electronic medical records for all procedures/Xrays and details which were not copied into this note but were reviewed prior to creation of Plan. LABS:  I reviewed today's most current labs and imaging studies.   Pertinent labs include:  Recent Labs      07/22/17   0427  07/21/17   1217  07/21/17   0323  07/20/17   0428   WBC  21.1*   --   28.7*  24.6*   HGB  10.5*  7.0*  6.8*  7.5*   HCT  30.5*  20.7*  19.5*  21.2*   PLT  207   --   247  195     Recent Labs      07/22/17   0427  07/21/17   0323  07/20/17 0428   NA  130*  131*  133*   K  3.7  4.5  4.9 CL  95*  97  100   CO2  28  27  26   GLU  86  144*  169*   BUN  49*  57*  63*   CREA  0.97  1.06*  1.04*   CA  7.6*  7.4*  7.7*   MG  1.9  2.0  2.4   PHOS  2.5*   --   3.3   ALB   --    --   2.2*   TBILI   --    --   0.4   SGOT   --    --   157*   ALT   --    --   384*       Signed: Angel Andrade MD

## 2017-07-22 NOTE — PROGRESS NOTES
Po ankle fracture, pain managed .  No new complaints    Patient Vitals for the past 24 hrs:   Temp Pulse Resp BP SpO2   07/22/17 0744 98.2 °F (36.8 °C) 67 12 153/67 93 %   07/22/17 0407 97.9 °F (36.6 °C) (!) 58 18 138/76 96 %   07/22/17 0013 97.7 °F (36.5 °C) 79 18 155/57 96 %   07/21/17 2141 - 78 - 149/72 -   07/21/17 1842 98.2 °F (36.8 °C) 78 18 149/72 96 %   07/21/17 1822 98 °F (36.7 °C) 68 - 184/79 95 %   07/21/17 1806 98 °F (36.7 °C) 79 16 189/85 95 %   07/21/17 1530 98 °F (36.7 °C) 71 18 153/68 95 %   07/21/17 1151 98.6 °F (37 °C) 70 18 172/59 94 %       Splint intact  Toes warm and well perfused    Dc when medically ready

## 2017-07-22 NOTE — PROGRESS NOTES
Name: Charlie Carlisle   MRN: [id number]  : 1945      ASSESSMENT:  Hyponatremia-  Hx of SIADH; TSH is nl. Urine indices suggestive of SIADH  JODY/High K -resolved  HTN/Hx of R Renal artery stent-labile but not optimal. Norvasc reduced back due to edema  GI bleed-EGD with duodenal ulcers and ulcerative esophagitis  COPD/chronic lung changes on CXR and CT chest- might be contributory cause of SIADH  CAD  R ankle tib/fib fracture-s/p ORIF  Leg edema     PLAN:  Metoprolol increased back to 75 mg BID due to HTN/Tachycardia  Na stable around early 130's- ct lasix.  Add fluid restriction  Will start low dose Avapro (watch for any recurrence of high K)  If High K limites use or titration of Avapro, then cardura would be next option  Ct lasix-increase to BID if needed  PRN blood Tx    Subjective:  No acute c/o. hgb after 1 unit blood overnight  ROS:   No nausea, no vomiting  No chest pain, no worsening shortness of breath    Exam:  Visit Vitals    /42    Pulse (!) 59    Temp 98.3 °F (36.8 °C)    Resp 16    Ht 5' 3\" (1.6 m)    Wt 90.4 kg (199 lb 4.7 oz)    SpO2 97%    BMI 35.3 kg/m2       NAD  Pallor+, no icterus  +edema; R leg in fixator  AOx3    Current Facility-Administered Medications   Medication Dose Route Frequency Last Dose    potassium phosphate, monobasic (K-PHOS) tablet 500 mg  1 Tab Oral QID WITH MEALS 500 mg at 17 1343    [START ON 2017] predniSONE (DELTASONE) tablet 10 mg  10 mg Oral DAILY WITH BREAKFAST      amLODIPine (NORVASC) tablet 5 mg  5 mg Oral DAILY 5 mg at 17 0800    ipratropium (ATROVENT) 0.02 % nebulizer solution 0.5 mg  0.5 mg Nebulization Q6H PRN      levalbuterol (XOPENEX) nebulizer soln 0.63 mg/3 mL  0.63 mg Nebulization Q6H PRN      metoprolol tartrate (LOPRESSOR) tablet 75 mg  75 mg Oral BID 75 mg at 17 0800    0.9% sodium chloride infusion 250 mL  250 mL IntraVENous PRN      diphenhydrAMINE (BENADRYL) capsule 25 mg  25 mg Oral Q6H PRN      levoFLOXacin (LEVAQUIN) tablet 750 mg  750 mg Oral Q48H 750 mg at 07/21/17 1952    furosemide (LASIX) tablet 40 mg  40 mg Oral DAILY 40 mg at 07/22/17 0800    fluticasone-vilanterol (BREO ELLIPTA) 200mcg-25mcg/puff  1 Puff Inhalation DAILY 1 Puff at 07/22/17 0801    acetaminophen (TYLENOL) tablet 650 mg  650 mg Oral Q6H  mg at 07/21/17 1703    insulin lispro (HUMALOG) injection   SubCUTAneous AC&HS 5 Units at 07/22/17 1144    pantoprazole (PROTONIX) 40 mg in sodium chloride 0.9 % 10 mL injection  40 mg IntraVENous Q12H 40 mg at 07/22/17 0800    sucralfate (CARAFATE) 100 mg/mL oral suspension 1 g  1 g Oral AC&HS 1 g at 07/22/17 1144    mupirocin (BACTROBAN) 2 % ointment   Topical Q12H      hydrALAZINE (APRESOLINE) 20 mg/mL injection 10 mg  10 mg IntraVENous Q6H PRN 10 mg at 07/21/17 1817    glucose chewable tablet 16 g  4 Tab Oral PRN      glucagon (GLUCAGEN) injection 1 mg  1 mg IntraMUSCular PRN      bisacodyl (DULCOLAX) suppository 10 mg  10 mg Rectal DAILY PRN      sodium chloride (NS) flush 5-10 mL  5-10 mL IntraVENous Q8H 10 mL at 07/22/17 1344    sodium chloride (NS) flush 5-10 mL  5-10 mL IntraVENous PRN 10 mL at 07/21/17 2140    naloxone (NARCAN) injection 0.4 mg  0.4 mg IntraVENous PRN      ondansetron (ZOFRAN ODT) tablet 4 mg  4 mg Oral Q4H PRN 4 mg at 07/17/17 0921    oxyCODONE IR (ROXICODONE) tablet 5 mg  5 mg Oral Q4H PRN 5 mg at 07/22/17 0758    oxyCODONE IR (ROXICODONE) tablet 2.5 mg  2.5 mg Oral Q4H PRN 2.5 mg at 07/19/17 0811    dextrose 10% infusion 125-250 mL  125-250 mL IntraVENous PRN         Labs/Data:    Lab Results   Component Value Date/Time    WBC 21.1 07/22/2017 04:27 AM    HGB 10.5 07/22/2017 04:27 AM    HCT 30.5 07/22/2017 04:27 AM    PLATELET 659 55/41/6239 04:27 AM    MCV 87.9 07/22/2017 04:27 AM       Lab Results   Component Value Date/Time    Sodium 130 07/22/2017 04:27 AM    Potassium 3.7 07/22/2017 04:27 AM    Chloride 95 07/22/2017 04:27 AM    CO2 28 07/22/2017 04:27 AM    Anion gap 7 07/22/2017 04:27 AM    Glucose 86 07/22/2017 04:27 AM    BUN 49 07/22/2017 04:27 AM    Creatinine 0.97 07/22/2017 04:27 AM    BUN/Creatinine ratio 51 07/22/2017 04:27 AM    GFR est AA >60 07/22/2017 04:27 AM    GFR est non-AA 57 07/22/2017 04:27 AM    Calcium 7.6 07/22/2017 04:27 AM       Wt Readings from Last 3 Encounters:   07/19/17 90.4 kg (199 lb 4.7 oz)   01/26/15 73.1 kg (161 lb 4 oz)   11/15/14 72.1 kg (159 lb)         Intake/Output Summary (Last 24 hours) at 07/22/17 1504  Last data filed at 07/22/17 1333   Gross per 24 hour   Intake                0 ml   Output             4100 ml   Net            -4100 ml       Patient seen and examined. Chart reviewed. Labs, data and other pertinent notes reviewed in last 24 hrs.     PMH/SH/FH reviewed and unchanged compared to H&P    Discussed with pt      Sheryl Hackett MD

## 2017-07-23 LAB
ANION GAP BLD CALC-SCNC: 7 MMOL/L (ref 5–15)
BUN SERPL-MCNC: 43 MG/DL (ref 6–20)
BUN/CREAT SERPL: 46 (ref 12–20)
CALCIUM SERPL-MCNC: 7.3 MG/DL (ref 8.5–10.1)
CHLORIDE SERPL-SCNC: 97 MMOL/L (ref 97–108)
CO2 SERPL-SCNC: 29 MMOL/L (ref 21–32)
CREAT SERPL-MCNC: 0.94 MG/DL (ref 0.55–1.02)
ERYTHROCYTE [DISTWIDTH] IN BLOOD BY AUTOMATED COUNT: 14.5 % (ref 11.5–14.5)
GLUCOSE BLD STRIP.AUTO-MCNC: 106 MG/DL (ref 65–100)
GLUCOSE BLD STRIP.AUTO-MCNC: 183 MG/DL (ref 65–100)
GLUCOSE BLD STRIP.AUTO-MCNC: 194 MG/DL (ref 65–100)
GLUCOSE BLD STRIP.AUTO-MCNC: 198 MG/DL (ref 65–100)
GLUCOSE SERPL-MCNC: 101 MG/DL (ref 65–100)
HCT VFR BLD AUTO: 22 % (ref 35–47)
HGB BLD-MCNC: 7.7 G/DL (ref 11.5–16)
MAGNESIUM SERPL-MCNC: 2.1 MG/DL (ref 1.6–2.4)
MCH RBC QN AUTO: 30.8 PG (ref 26–34)
MCHC RBC AUTO-ENTMCNC: 35 G/DL (ref 30–36.5)
MCV RBC AUTO: 88 FL (ref 80–99)
PHOSPHATE SERPL-MCNC: 2.6 MG/DL (ref 2.6–4.7)
PLATELET # BLD AUTO: 254 K/UL (ref 150–400)
POTASSIUM SERPL-SCNC: 3.4 MMOL/L (ref 3.5–5.1)
RBC # BLD AUTO: 2.5 M/UL (ref 3.8–5.2)
SERVICE CMNT-IMP: ABNORMAL
SODIUM SERPL-SCNC: 133 MMOL/L (ref 136–145)
WBC # BLD AUTO: 27.3 K/UL (ref 3.6–11)

## 2017-07-23 PROCEDURE — 74011250637 HC RX REV CODE- 250/637: Performed by: INTERNAL MEDICINE

## 2017-07-23 PROCEDURE — 82962 GLUCOSE BLOOD TEST: CPT

## 2017-07-23 PROCEDURE — 74011250637 HC RX REV CODE- 250/637: Performed by: SPECIALIST

## 2017-07-23 PROCEDURE — 74011250637 HC RX REV CODE- 250/637: Performed by: PHYSICIAN ASSISTANT

## 2017-07-23 PROCEDURE — 74011250636 HC RX REV CODE- 250/636: Performed by: INTERNAL MEDICINE

## 2017-07-23 PROCEDURE — 80048 BASIC METABOLIC PNL TOTAL CA: CPT | Performed by: INTERNAL MEDICINE

## 2017-07-23 PROCEDURE — 83735 ASSAY OF MAGNESIUM: CPT | Performed by: INTERNAL MEDICINE

## 2017-07-23 PROCEDURE — 74011000250 HC RX REV CODE- 250: Performed by: INTERNAL MEDICINE

## 2017-07-23 PROCEDURE — 85027 COMPLETE CBC AUTOMATED: CPT | Performed by: INTERNAL MEDICINE

## 2017-07-23 PROCEDURE — 84100 ASSAY OF PHOSPHORUS: CPT | Performed by: INTERNAL MEDICINE

## 2017-07-23 PROCEDURE — 65270000029 HC RM PRIVATE

## 2017-07-23 PROCEDURE — 74011636637 HC RX REV CODE- 636/637: Performed by: INTERNAL MEDICINE

## 2017-07-23 PROCEDURE — 36415 COLL VENOUS BLD VENIPUNCTURE: CPT | Performed by: INTERNAL MEDICINE

## 2017-07-23 PROCEDURE — 97116 GAIT TRAINING THERAPY: CPT

## 2017-07-23 PROCEDURE — C9113 INJ PANTOPRAZOLE SODIUM, VIA: HCPCS | Performed by: INTERNAL MEDICINE

## 2017-07-23 RX ORDER — PANTOPRAZOLE SODIUM 40 MG/1
40 TABLET, DELAYED RELEASE ORAL
Status: DISCONTINUED | OUTPATIENT
Start: 2017-07-23 | End: 2017-08-01 | Stop reason: HOSPADM

## 2017-07-23 RX ORDER — METOPROLOL TARTRATE 50 MG/1
50 TABLET ORAL 2 TIMES DAILY
Status: DISCONTINUED | OUTPATIENT
Start: 2017-07-23 | End: 2017-07-27

## 2017-07-23 RX ORDER — PREDNISONE 5 MG/1
5 TABLET ORAL
Status: DISCONTINUED | OUTPATIENT
Start: 2017-07-24 | End: 2017-07-25

## 2017-07-23 RX ORDER — POTASSIUM CHLORIDE 750 MG/1
20 TABLET, FILM COATED, EXTENDED RELEASE ORAL
Status: COMPLETED | OUTPATIENT
Start: 2017-07-23 | End: 2017-07-23

## 2017-07-23 RX ADMIN — SUCRALFATE 1 G: 1 SUSPENSION ORAL at 10:32

## 2017-07-23 RX ADMIN — Medication 10 ML: at 05:24

## 2017-07-23 RX ADMIN — SUCRALFATE 1 G: 1 SUSPENSION ORAL at 08:25

## 2017-07-23 RX ADMIN — POTASSIUM CHLORIDE 20 MEQ: 750 TABLET, FILM COATED, EXTENDED RELEASE ORAL at 15:35

## 2017-07-23 RX ADMIN — METOPROLOL TARTRATE 75 MG: 50 TABLET ORAL at 08:27

## 2017-07-23 RX ADMIN — OXYCODONE HYDROCHLORIDE 5 MG: 5 TABLET ORAL at 22:45

## 2017-07-23 RX ADMIN — AMLODIPINE BESYLATE 5 MG: 5 TABLET ORAL at 08:26

## 2017-07-23 RX ADMIN — SUCRALFATE 1 G: 1 SUSPENSION ORAL at 15:35

## 2017-07-23 RX ADMIN — FUROSEMIDE 40 MG: 40 TABLET ORAL at 08:26

## 2017-07-23 RX ADMIN — SODIUM CHLORIDE 40 MG: 9 INJECTION INTRAMUSCULAR; INTRAVENOUS; SUBCUTANEOUS at 08:27

## 2017-07-23 RX ADMIN — INSULIN LISPRO 2 UNITS: 100 INJECTION, SOLUTION INTRAVENOUS; SUBCUTANEOUS at 12:47

## 2017-07-23 RX ADMIN — Medication 10 ML: at 12:48

## 2017-07-23 RX ADMIN — METOPROLOL TARTRATE 50 MG: 50 TABLET ORAL at 22:39

## 2017-07-23 RX ADMIN — FLUTICASONE FUROATE AND VILANTEROL TRIFENATATE 1 PUFF: 200; 25 POWDER RESPIRATORY (INHALATION) at 08:28

## 2017-07-23 RX ADMIN — SUCRALFATE 1 G: 1 SUSPENSION ORAL at 22:39

## 2017-07-23 RX ADMIN — LEVOFLOXACIN 750 MG: 750 TABLET, FILM COATED ORAL at 22:39

## 2017-07-23 RX ADMIN — Medication 10 ML: at 22:40

## 2017-07-23 RX ADMIN — IRBESARTAN 75 MG: 75 TABLET ORAL at 08:27

## 2017-07-23 RX ADMIN — OXYCODONE HYDROCHLORIDE 5 MG: 5 TABLET ORAL at 10:32

## 2017-07-23 RX ADMIN — PREDNISONE 10 MG: 10 TABLET ORAL at 08:27

## 2017-07-23 RX ADMIN — INSULIN LISPRO 2 UNITS: 100 INJECTION, SOLUTION INTRAVENOUS; SUBCUTANEOUS at 17:03

## 2017-07-23 RX ADMIN — PANTOPRAZOLE SODIUM 40 MG: 40 TABLET, DELAYED RELEASE ORAL at 15:35

## 2017-07-23 NOTE — PROGRESS NOTES
Ness removed 1030, at 1630 patient unable to void c/o pressure in abdomen. Bladder scan showed 250 per PCT. Dr. Cash Julio notified orders to straight cath now, then bladder scan Q6 hours and straight cath if >400. Straight cath for 1300.

## 2017-07-23 NOTE — PROGRESS NOTES
Patient with 7 beats of VTach. Sitting up and eating breakfast in the chair - asymptomatic. Printed and placed on paper chart. Primary RN aware.

## 2017-07-23 NOTE — PROGRESS NOTES
Hospitalist Progress Note    NAME: Rut Webber   :  1945   MRN:  [id number]       Assessment / Plan:  Acute GIB with ABL anemia secondary to duodenal ulcers and esophagitis  -s/p  EGD   -Appreciate GI consult  - PPI bid, carafate, holding plavix  -S/p 3 units PRBC, PRBC  Hb 10.5  -Monitor H&H, stable  - follow bx result, h.pylori, negative    Asymptomatic 22 beat run of Wide Complex Tachycardia (VT) on early am of 7/15: question if related to Levaquin use in setting of renal failure  Atrial Fibrillation with RVR on early morning of   SVT   -Mild troponin elevation  -Cont metoprolol, cont  norvasc,  Lasix. -Appreciate Cardiology consult  -suspect patient needs outpatient sleep study    Acute hypoxic respiratory failure  Acute COPD exacerbation  -continue scheduled and prn nebs, IV steroids change to Prednisone on , now back on iv solumedrol dt wheezing , switched to po, start taper  -suspect worsening Leukocytosis secondary to prednisone and reactive. Trending down  [facility]  no growth, UA wnl, afebrile  Po abx    ARF from aggressive diuresis; resolved with hydration, cr trended up likely secondary to contrast given , now trending down  Mild Hyperkalemia; resolved with hydration  Hyponatremia: Sodium Woody of 121 on 7/15; increased to 133 today;  ? SIADH  -hold ARB    CHF ruled out (LVEF 55-60%); Patient has interstitial lung disease that was suspected to be pulmonary edema on initial CXR and clinically with diuresis patient went into ARF and become increasingly hyponatremic (lasix was stopped on  with worsening hyponatremia) and CXR without significant change.   Acute Pulmonary Edema Ruled out, lasix restarted    Abdominal pain on am of  with continued nausea: resolved after large BM this am  -KUB on  without ileus    Distal tibia and fibula fracture s/p mechanical fall  -s/p ORIF on   -appreciate ortho assistance  -elevated CK improved     Hx of CAD  HTN; -decrease metoprolol dt bradycardia, cont amlodipine, holding plavix, statin      Hx of PVD  -s/p right renal artery stent, left fem-pop by Dr. Myles Young 3/2014     Impaired Glucose Management (Diabetes ruled out)  -A1c 5.7  -diet control  -SSI      Tobacco use  -nicotine patch    Obesity     Code Status: Full  Surrogate Decision Maker: daughter Trav Quintero [phone]     DVT Prophylaxis: SCDs for now (lovenox if no plan procedure in the AM)  GI Prophylaxis: not indicated     Baseline: independent    Dispo: ? SNF, await placement      Body mass index is 35.3 kg/(m^2). Subjective:     Chief Complaint / Reason for Physician Visit: follow-up respiratory failure  Pt reports feeling fine, no c/o, sitting in chair    Review of Systems:  Symptom Y/N Comments  Symptom Y/N Comments   Fever/Chills    Chest Pain n    Poor Appetite y   Edema     Cough    Abdominal Pain n    Sputum    Joint Pain     SOB/ODOM y   Pruritis/Rash     Nausea/vomit n   Tolerating PT/OT     Diarrhea    Tolerating Diet     Constipation n   Other       Could NOT obtain due to:      Objective:     VITALS:   Last 24hrs VS reviewed since prior progress note. Most recent are:  Patient Vitals for the past 24 hrs:   Temp Pulse Resp BP SpO2   07/23/17 1159 98.1 °F (36.7 °C) 68 16 147/64 95 %   07/23/17 0805 98 °F (36.7 °C) (!) 58 18 153/43 96 %   07/23/17 0012 98 °F (36.7 °C) 65 16 136/43 91 %   07/22/17 2021 98 °F (36.7 °C) 61 16 125/48 93 %   07/22/17 1600 98 °F (36.7 °C) 80 16 103/44 96 %       Intake/Output Summary (Last 24 hours) at 07/23/17 1250  Last data filed at 07/23/17 0703   Gross per 24 hour   Intake              400 ml   Output             2700 ml   Net            -2300 ml        PHYSICAL EXAM:  General: WD, WN. Alert, cooperative, no acute distress    EENT:  EOMI. Anicteric sclerae. MMM  Resp:  CTA BL,  No accessory muscle use  CV:  Regular  rhythm,  No edema (left lower leg in external fixator and bandaged  GI:  Soft, Non distended, Non tender.  +Bowel sounds  Neurologic:  Alert and oriented X 3, normal speech,   Psych:   Good insight. Not anxious nor agitated  Skin:  No rashes. No jaundice    Reviewed most current lab test results and cultures  YES  Reviewed most current radiology test results   YES  Review and summation of old records today    NO  Reviewed patient's current orders and MAR    YES  PMH/SH reviewed - no change compared to H&P  ________________________________________________________________________  Care Plan discussed with:    Comments   Patient x    Family      RN x    Care Manager     Consultant                        Multidiciplinary team rounds were held today with , nursing, pharmacist and clinical coordinator. Patient's plan of care was discussed; medications were reviewed and discharge planning was addressed. ________________________________________________________________________  Total NON critical care TIME:  25   Minutes    Total CRITICAL CARE TIME Spent:   Minutes non procedure based      Comments   >50% of visit spent in counseling and coordination of care     ________________________________________________________________________  Daniella Pope MD     Procedures: see electronic medical records for all procedures/Xrays and details which were not copied into this note but were reviewed prior to creation of Plan. LABS:  I reviewed today's most current labs and imaging studies.   Pertinent labs include:  Recent Labs      07/23/17   0420  07/22/17   0427  07/21/17   1217  07/21/17   0323   WBC  27.3*  21.1*   --   28.7*   HGB  7.7*  10.5*  7.0*  6.8*   HCT  22.0*  30.5*  20.7*  19.5*   PLT  254  207   --   247     Recent Labs      07/23/17   0420  07/22/17   0427  07/21/17   0323   NA  133*  130*  131*   K  3.4*  3.7  4.5   CL  97  95*  97   CO2  29  28  27   GLU  101*  86  144*   BUN  43*  49*  57*   CREA  0.94  0.97  1.06*   CA  7.3*  7.6*  7.4*   MG  2.1  1.9  2.0   PHOS  2.6  2.5*   --        Signed: Ashwin Glez MD

## 2017-07-23 NOTE — PROGRESS NOTES
Problem: Mobility Impaired (Adult and Pediatric)  Goal: *Acute Goals and Plan of Care (Insert Text)  Physical Therapy Goals  Initiated 7/14/2017  1. Patient will move from supine to sit and sit to supine in bed with supervision/set-up within 7 day(s). 2. Patient will transfer from bed to chair and chair to bed with minimal assistance/contact guard assist using the least restrictive device within 7 day(s). 3. Patient will perform sit to stand with contact guard assist within 7 day(s). 4. Patient will ambulate with minimal assistance/contact guard assist for 50 feet with the least restrictive device within 7 day(s). 5. Patient will ascend/descend 1 stairs with bilateral handrail(s) with minimal assistance/contact guard assist within 7 day(s). PHYSICAL THERAPY TREATMENT  Patient: Derrick Soto (63 y.o. female)  Date: 7/23/2017  Diagnosis: right ankle fracture  Respiratory failure (HCC)  GI Bleed <principal problem not specified>  Procedure(s) (LRB):  ESOPHAGOGASTRODUODENOSCOPY (EGD) (N/A) 5 Days Post-Op  Precautions: Fall, NWB      ASSESSMENT:  Patient making gains, able to participate in brief gait period noted below utilizing effective hop-step-pivot technique while maintaining NWB of % of time. She was unable to step further after rest period with aborted attempt to forward step with chair follow. Given recent history with fatigue upon return to bed from chair, santiago lift harness placed below to allow for ease of such. She should do well with SNF rehab ahead, with pleasant and motivated demeanor. Progression toward goals:  [X]    Improving appropriately and progressing toward goals  [ ]    Improving slowly and progressing toward goals  [ ]    Not making progress toward goals and plan of care will be adjusted       PLAN:  Patient continues to benefit from skilled intervention to address the above impairments. Continue treatment per established plan of care.   Discharge Recommendations:  Skilled Nursing Facility  Further Equipment Recommendations for Discharge:  defer       SUBJECTIVE   Patient stated Douglas Rucker you go to rehab too?       OBJECTIVE DATA SUMMARY:   Critical Behavior:  Neurologic State: Alert  Orientation Level: Oriented X4  Cognition: Appropriate decision making  Safety/Judgement: Awareness of environment  Functional Mobility Training:  Bed Mobility:  Rolling: Contact guard assistance  Supine to Sit: Contact guard assistance     Scooting: Contact guard assistance        Transfers:  Sit to Stand: Minimum assistance;Assist x2  Stand to Sit: Minimum assistance;Assist x2  Stand Pivot Transfers: Moderate assistance (x1 )                          Balance:  Sitting: Intact; Without support  Standing: Impaired; With support (of RW )  Standing - Static: Fair  Standing - Dynamic : Fair  Ambulation/Gait Training:  Distance (ft): 3 Feet (ft)  Assistive Device: Walker, rolling;Gait belt  Ambulation - Level of Assistance: Minimal assistance; Moderate assistance;Assist x2     Gait Description (WDL): Exceptions to WDL  Gait Abnormalities: Decreased step clearance; Other (hop-step)           Stance: Left increased     Step Length: Left shortened                             VC's for step technique with RW management, with author's foot placed below NWB RLE to assess following   Pain:  Pain Scale 1: Numeric (0 - 10)  Pain Intensity 1: 4  Pain Location 1: Ankle  Pain Orientation 1: Right  Pain Description 1: Aching  Pain Intervention(s) 1: Medication (see MAR); Elevation;Cold pack  Activity Tolerance: WNL     Please refer to the flowsheet for vital signs taken during this treatment.   After treatment:   [X]    Patient left in no apparent distress sitting up in chair  [ ]    Patient left in no apparent distress in bed  [X]    Call bell left within reach  [X]    Nursing notified  [ ]    Caregiver present  [ ]    Bed alarm activated      COMMUNICATION/COLLABORATION:   The patients plan of care was discussed with: Registered Nurse     Mana Sen, PT, DPT    Time Calculation: 30 mins

## 2017-07-23 NOTE — PROGRESS NOTES
Rec'd call from tele room, patient had columba'd down to 44, maintained in the 40s then returned to 46s. Pulse radially 48, on tele showing upper 40s-low 50s. Dr. Kenyatta Duran notified possibly on [facility] while bradying down. States he will decrease metoprolol dose.

## 2017-07-23 NOTE — PROGRESS NOTES
Cardiology Progress Note  7/23/2017     Admit Date: 7/12/2017  Admit Diagnosis: right ankle fracture  Respiratory failure (HCC)  GI Bleed  CC: none currently                        Assessment (as per Dr Daniela Galindo  1. S/p mechanical fall requiring orthopedic surgery of right leg, found to be hypoxic, likely COPD exacerbation, PSVT noted on telemetry, mild rhabo with increased troponin being managed medically, then with GI bleed s/p 3 units  2. In 2011 found to have hyponatremia and TWI on ECG with cath with moderate CAD  3. Echo 6/2017 EF 55%, mild to mod MR, mild AI  4. Sinus with PSVT  ? AVNRT on telemetry  5. HTN with right renal artery stent  6. DM  7. Dyslipidemia difficulty with daily statin  8. Hx of GI bleed found to have PUD Dr. Aura Huang  9. PVD with left fem-pop placed on plavix for this  10. , mild functional capacity, works at [employer] (as per Dr Glez):                   Mechanical fall  Mild rhabdo with increased troponin, agree with medical management of CAD  PSVT with HR of 150, looks like AVNRT does not appear to be afib, self terminated, 20 beats of tachycardia on 7/15 not recorded on tele monitor or printed, but likely same. Mildly increased troponin, would manage CAD medically. Increased cr after diuresis, doubt significant CHF initially. Low sodium improving. Gi bleed s/p 3 U PRBC       1. Holding plavix, was on this for PVD, reveiwed with Dr. Nathanael Bernheim hold for 10 days, resume on 7/27/2017  2. Increase metoprolol to 75mg bid, was on metoprolol sr 100mg daily as outpt  3. cont amlodipine at 5mg, would not titrate due to edema in legs  4. Holding avapro, hydralazine prn  5. ?on Doxazosin 1mg per my records  6. Cont lasix 40mg daily, Will give single dose of lasix IV today, follow bmp, bnp mildly elevated  7. Cont statin, would stop niacin  8.  Changed nebs to PRN, taper O2, ?taper prednisone this may have contributed to ulcers.           : Brief ASx PAT overnight: Cont current Rx; May need other anti-arrhythmic: HR too low for higher dose bblocker. Will see.    : Few beats PAT: ASx sinus columba; stable cardiac status otherwise; No new cardiac recs. Adden: resting bradycardia to 40s, ASx: will decrease metoprolol back to 5 bid;   Dr Felicity Lyons to see pt tomorrow (Dr Davina Parr off all week). For other plans, see orders. Hospital problem list   Active Hospital Problems    Diagnosis Date Noted    Respiratory failure ([facility] [address] 2017        Subjective: Bailey Thomason reports   Chest pain X none  consistent with:  Non-cardiac CP         Atypical CP     None now  On going  Anginal CP     Dyspnea X none  at rest  with exertion         improved  unchanged  worse              PND X none  overnight       Orthopnea X none  improved  unchanged  worse   Presyncope X none  improved  unchanged  worse     Ambulated in hallway without symptoms   Yes   Ambulated in room without symptoms  Yes   Objective:    Physical Exam:  Overall VSSAF;    Visit Vitals    /43    Pulse (!) 58    Temp 98 °F (36.7 °C)    Resp 18    Ht 5' 3\" (1.6 m)    Wt 90.4 kg (199 lb 4.7 oz)    SpO2 96%    BMI 35.3 kg/m2     Temp (24hrs), Av.1 °F (36.7 °C), Min:98 °F (36.7 °C), Max:98.3 °F (36.8 °C)    Patient Vitals for the past 8 hrs:   Pulse   17 0805 (!) 58    Patient Vitals for the past 8 hrs:   Resp   17 0805 18    Patient Vitals for the past 8 hrs:   BP   17 0805 153/43          Intake/Output Summary (Last 24 hours) at 17 1126  Last data filed at 17 0703   Gross per 24 hour   Intake              400 ml   Output             2700 ml   Net            -2300 ml     General Appearance: Well developed, obsese, no acute distress. Ears/Nose/Mouth/Throat:   Normal MM; anicteric. JVP: WNL   Resp:   clear to auscultation bilaterally. Nl resp effort. Cardiovascular:  RRR, S1, S2 normal, no new murmur. No gallop or rub. Abdomen:   Soft, non-tender, bowel sounds are present. Extremities: No edema bilaterally. Skin:  Neuro: Warm and dry. A/O x3, grossly nonfocal   cath site intact w/o hematoma or new bruit; distal pulse unchanged  Yes   Data Review:     Telemetry independently reviewed x sinus  chronic afib  parox SVT  NSVT     ECG independently reviewed  NSR  afib  no significant changes  NSST-Tw chgs   x no new ECG provided for review   Lab results reviewed as noted below. Current medications reviewed as noted below. No results for input(s): PH, PCO2, PO2 in the last 72 hours. Recent Labs      07/22/17 0427   CPK  111     Recent Labs      07/23/17   0420  07/22/17   0427  07/21/17   1217  07/21/17   0323   NA  133*  130*   --   131*   K  3.4*  3.7   --   4.5   CL  97  95*   --   97   CO2  29  28   --   27   BUN  43*  49*   --   57*   CREA  0.94  0.97   --   1.06*   GLU  101*  86   --   144*   PHOS  2.6  2.5*   --    --    CA  7.3*  7.6*   --   7.4*   WBC  27.3*  21.1*   --   28.7*   HGB  7.7*  10.5*  7.0*  6.8*   HCT  22.0*  30.5*  20.7*  19.5*   PLT  254  207   --   247     Lab Results   Component Value Date/Time    Cholesterol, total 162 09/20/2011 01:17 PM    HDL Cholesterol 55 09/20/2011 01:17 PM    LDL, calculated 78.4 09/20/2011 01:17 PM    Triglyceride 143 09/20/2011 01:17 PM    CHOL/HDL Ratio 2.9 09/20/2011 01:17 PM     No results for input(s): SGOT, GPT, AP, TBIL, TP, ALB, GLOB, GGT, AML, LPSE in the last 72 hours. No lab exists for component: AMYP, HLPSE  No results for input(s): INR, PTP, APTT in the last 72 hours.     No lab exists for component: INREXT, INREXT   No components found for: [place]    Current Facility-Administered Medications   Medication Dose Route Frequency    predniSONE (DELTASONE) tablet 10 mg  10 mg Oral DAILY WITH BREAKFAST    irbesartan (AVAPRO) tablet 75 mg  75 mg Oral DAILY    amLODIPine (NORVASC) tablet 5 mg  5 mg Oral DAILY    ipratropium (ATROVENT) 0.02 % nebulizer solution 0.5 mg  0.5 mg Nebulization Q6H PRN    levalbuterol (XOPENEX) nebulizer soln 0.63 mg/3 mL  0.63 mg Nebulization Q6H PRN    metoprolol tartrate (LOPRESSOR) tablet 75 mg  75 mg Oral BID    0.9% sodium chloride infusion 250 mL  250 mL IntraVENous PRN    diphenhydrAMINE (BENADRYL) capsule 25 mg  25 mg Oral Q6H PRN    levoFLOXacin (LEVAQUIN) tablet 750 mg  750 mg Oral Q48H    furosemide (LASIX) tablet 40 mg  40 mg Oral DAILY    fluticasone-vilanterol (BREO ELLIPTA) 200mcg-25mcg/puff  1 Puff Inhalation DAILY    acetaminophen (TYLENOL) tablet 650 mg  650 mg Oral Q6H PRN    insulin lispro (HUMALOG) injection   SubCUTAneous AC&HS    pantoprazole (PROTONIX) 40 mg in sodium chloride 0.9 % 10 mL injection  40 mg IntraVENous Q12H    sucralfate (CARAFATE) 100 mg/mL oral suspension 1 g  1 g Oral AC&HS    hydrALAZINE (APRESOLINE) 20 mg/mL injection 10 mg  10 mg IntraVENous Q6H PRN    glucose chewable tablet 16 g  4 Tab Oral PRN    glucagon (GLUCAGEN) injection 1 mg  1 mg IntraMUSCular PRN    bisacodyl (DULCOLAX) suppository 10 mg  10 mg Rectal DAILY PRN    sodium chloride (NS) flush 5-10 mL  5-10 mL IntraVENous Q8H    sodium chloride (NS) flush 5-10 mL  5-10 mL IntraVENous PRN    naloxone (NARCAN) injection 0.4 mg  0.4 mg IntraVENous PRN    ondansetron (ZOFRAN ODT) tablet 4 mg  4 mg Oral Q4H PRN    oxyCODONE IR (ROXICODONE) tablet 5 mg  5 mg Oral Q4H PRN    oxyCODONE IR (ROXICODONE) tablet 2.5 mg  2.5 mg Oral Q4H PRN    dextrose 10% infusion 125-250 mL  125-250 mL IntraVENous PRN        Josue La MD

## 2017-07-23 NOTE — PROGRESS NOTES
[facility] - Progress Note  Post Op day: 5 Days [facility]     [id number]  female    81 y.o.    1945    Admit date:  2017  Date of Surgery:  2017   Procedures:  Procedure(s):  ESOPHAGOGASTRODUODENOSCOPY (EGD)  Admitting Physician:  Deyanira Macias MD   Surgeon:  Christinia Bloch) and Role:     * Abigail Mcclelland MD - Primary    Consulting Physician(s): Treatment Team: Attending Provider: Luis Antonio Sheehan MD; Consulting Provider: CHARANJIT Reza; Consulting Provider: Deyanira Macias MD; Consulting Provider: Morgan Muniz MD; Utilization Review: Olman Gutierrez RN; Care Manager: Germain Chacon; Consulting Provider: Edith Mendez MD; Consulting Provider: Carmella Alicia MD; Consulting Provider: Jarek Agosto MD; Consulting Provider: Elle Kaye MD; Consulting Provider: Simon Yee MD  SUBJECTIVE:     Derrick Soto is a 81 y.o. female s/p a right Procedure(s): RLE ORIF/x-fix resting in the chair. Patient has complaints of burning in the top of the foot(unchanged since they removed a pice of her hardware). Denies sig pain! States overall she is feeling better, still a little sluggish. OBJECTIVE:       Physical Exam:  General: alert, cooperative, no distress. Cardiovascular: RRR  Genitourinary: Ness    Respiratory: no resp distress  Neurological:Neurovascular exam within normal limits. Senstion intact: light touch LE bilat. Motor: flex/ext of all digits intact. Skin: NWD  Dressing/Wound:  Clean, dry and intact. Post splint in excellent condition.     Vital Signs:       Patient Vitals for the past 8 hrs:   BP Temp Pulse Resp SpO2   17 0805 153/43 98 °F (36.7 °C) (!) 58 18 96 %                                          Temp (24hrs), Av.1 °F (36.7 °C), Min:98 °F (36.7 °C), Max:98.3 °F (36.8 °C)      Date 17 0700 - 17 0659   Shift 1631-8242 4738-8302 2381-7684 24 Hour Total   I  N  T  A  K  E   Shift Total  (mL/kg)       O  U  T  P  U  T   Urine  (mL/kg/hr) 658 552 Shift Total  (mL/kg) 875  (9.7)   875  (9.7)   Weight (kg) 90.4 90.4 90.4 90.4     Labs:        Recent Labs      07/23/17   0420   HCT  22.0*   HGB  7.7*     PT/OT:        Gait  Ambulation - Level of Assistance: Minimal assistance, Moderate assistance, Assist x2  Assistive Device: Walker, rolling, Gait belt     ASSESSMENT / PLAN:   Active Problems:    Respiratory failure ([address] (7/13/2017)    Try and DC jolynn SOLORZANOB on the RLE- continue to work with therapy  DC when medically stable          Signed By: Karishma Sparrow PA-C        #[id number]

## 2017-07-23 NOTE — PROGRESS NOTES
Bedside shift change report given to Christian Jackson (oncoming nurse) by Regina Tejada (offgoing nurse). Report included the following information SBAR, Kardex, Procedure Summary, Intake/Output and MAR.

## 2017-07-23 NOTE — PROGRESS NOTES
Bedside and Verbal shift change report given to [address] [facility] (oncoming nurse) by Heidi Boyle (offgoing nurse). Report included the following information SBAR, Kardex, Procedure Summary, Intake/Output, MAR, Recent Results, Med Rec Status and Cardiac Rhythm nsr/sb.

## 2017-07-23 NOTE — PROGRESS NOTES
Bedside and Verbal shift change report given to Charles MILLARD (oncoming nurse) by Louise Holt (offgoing nurse). Report included the following information SBAR, Kardex, Intake/Output, MAR and Recent Results.

## 2017-07-23 NOTE — PROGRESS NOTES
Pharmacy IV to PO Conversion Program    IV to PO conversion of Pantoprazole for this 81 y.o. female being treated for GI Bleed. Recent Labs      17   0420  17   0427  17   0323   CREA  0.94  0.97  1.06*   BUN  43*  49*  57*   WBC  27.3*  21.1*  28.7*     Temp (24hrs), Av °F (36.7 °C), Min:98 °F (36.7 °C), Max:98.1 °F (36.7 °C)      Criteria for IV to PO switch - Antibiotics  Received IV therapy for at least 48 hours and   Not applicable   1. Functioning GI tract  a. Taking scheduled oral medications  b. Tolerating tube feeds at goal rate or a full liquid, soft, or regular diet  c. Patient has no documented malabsorption syndrome  d. Patient has not experienced emesis or severe diarrhea within the past 24 hours     Not applicable   2. Clinically Stable  a. Afebrile (temperature < 100.4°F or 38°C) for at least 24 hours  b. White blood count is trending down towards normal range     Not applicable            3. Does not have an exclusion criteria (See list below) Not applicable       Criteria for IV to PO switch  Nonantibiotics  1. Functioning GI tract  a. Taking scheduled oral medications  b. Tolerating tube feeds at goal rate or a full liquid, soft, or regular diet  c. No seizure activity in 24 hours  d.   Patient has not experienced emesis or severe diarrhea within the past 24 hours           Yes     Thanks,    Patrizia Elise, PHARMD         IV to Oral Conversion Program (Equivalent Doses)  Frequency is unchanged unless otherwise noted    IV Oral Oral Bioavailability   Acetaminophen Same Same    Azithromycin Same Same    Co-trimoxazole / Bactrim Same Same 95%   Ciprofloxacin 400 mg 500 mg 80%   Ciprofloxacin 200 mg 250 mg 80%   Clindamycin 900 mg Q8h 450 mg Q6h 90%   Clindamycin 600 mg q8h 300 Q6h 90%   Dexamethasone Same Same    Doxycycline Same Same 95%   Famotidine Same Same 40- 45%   Fluconazole Same Same 93%   Isovuconizonium Same Same 98%   Hydralazine 10 mg 25 mg 25-30% Levetiracetam Same Same 100&   Levofloxacin Same Same 99%   Levothyroxine 50%-80% of Oral 1.25-2 Times IV 50-80%   Linezolid Same Same 100%   Metronidazole Same Same 100%   Minocycline Same Same 90%   Moxifloxacin Same Same 90%   Pantoprazole Same Same 77%   Promethazine Same Same    Rifampin Same Same 95%   Voriconazole Same Same 96%   Warfarin Same Same 100%       Exclusion Criteria  - Patient is being treated for endocarditis, osteomyelitis, fungemia, mediastinitis, meningitis or other CNS infection, brain abscess, orbital cellulitis, endophthalimitis, necrotizing facititis, neutorpenia, vascular graft infections, or bacteremia (antimicrobials only)   - Seizure activity within 24 hours  - Decompensated CHF  receiving IV vasoactive agents or IV diuretics  - Small or large bowel surgery performed on the current admission  - NG tube with continous suction  - Severe/persistent nausea or vomiting  - Aspiration risk  - Malabsorption syndromes, partial or total gastrectomy, short bowel syndrome, ileus, or GI obstruction  - Active GI bleeding  - Hemodynamically unstable (e.g. receiving vasopressor therapy)  - Patients with neutropenia (ANC < 1500) (antimicrobials only)  - Organ transplant patients  - Patients with HIV/AIDS undergoing treatment for PCP (for sulfamethoxazole/trimethoprim)   - Patients < 15years old            Background: Many medications have excellent bioavailability, several approaching 100%. While the intravenous (IV) forms of these agents may be necessary in certain situations, the oral (PO) route is often as effective with associated lower costs (Table 2) and shorter lengths of stay. 2-12  According to the Infectious Diseases Society of [facility], the Antimicrobial Stewardship Guidelines recommend a systematic plan for parenteral to oral conversion of antimicrobials with excellent bioavailability, when the patients condition allows as an A-I recommendation. 1  There are multiple studies demonstrating a decrease in length of stay ranging from 0.7 to 3.7 days when switching to oral alternatives with no significant differences in clinical cure, mortality or hospital re-admission. 2-12     Purpose: To establish criteria for the conversion of selected IV to PO medications in appropriate patients after 48 hours of IV therapy. Medications to be included:  - Acetaminophen  - Azithromycin  - Ciprofloxacin  - Clindamycin  - Dexamethasone  - Doxycycline  - Famotidine  - Fluconazole  - Isovuconazonium  - Levetiracetam  - Levofloxacin  - Levothyroxine  - Linezolid  - Metronidazole  - Pantoprazole  - Promethazine  - Rifampin  - Sulfamethoxazole/trimethoprim   - Voriconazole  - Warfarin    Rationale:  Decrease risk of line infections, phlebitis, and extravasation. Decreased pharmacy and nursing time in preparation and administration. More convenient and comfortable for patient. Lower total drug and supply cost.        References:  1. Chinedu ROSAS. Infectious Disease Society of [address] [place] [address] for developing an institutional program to enhance antimicrobial stewardship. Clin Infect Dis 9746;82:683-20. 2. Erwin Rogers. Outcomes of early switching from intravenous to oral antibiotics on medical wards. J Antimicrob Chem 8036;57:701-29.  3. Yaima GUERRERO. Reduction of unnecessary IV antibiotic days using general criteria for antibiotic switch. [place] Infect Dis 0947;38:509-95. 4. Shauna WOODS. A pharmacist-initiated program of intravenous to oral antibiotic conversion. Pharmacotherapy 1997;17:271-6.  5. Jeannie CRUZ. Getting physicians to make \"the switch\": the role of clinical guidelines in the management of community-acquired pneumonia. Am J Med Qual 2005; 20:15-21. 6. Alissa Ruby. Pharmacoeconomic benefit of antibiotic step-down therapy: converting patients from intravenous ceftriaxone to oral cefpodoxime proxetil. [facility]; 29:561-5.  [facility] 97.  Early transition to oral antibiotic therapy for community-acquired pneumonia: duration of therapy, clinical outcomes, and cost analysis. Respir Med 1998; 92:1032-9.  [facility] Effectiveness of early switch from intravenous to oral antibiotics in severe community acquired pneumonia: multicentre randomised trial. BMJ 2006; 333(0882):1193.  9. Giacomo WOODS. A pharmacist-initiated program of intravenous to oral antibiotic conversion. Pharmacotherapy 1997; 17:271-6. 10. Yaima GUERRERO. Reduction of unnecessary iv antibiotic days using general criteria for antibiotic switch. [facility] Nancy Dis 2008; 79:740-80. 11. Artemio Woodson. Implication of clinical pathway care for community-acquired pneumonia in a community hospital: early switch from an intravenous beta-lactam plus a macrolide to an oral respiratory fluoroquinolone. Intern Med 2008; 67:1238-79. 15. Merari WARD. Budget Impact Analysis of Conversion from Intravenous to Oral Medication When Clinically Eligible for Oral Intake Clin Ther 2011;33:1792-6.

## 2017-07-24 LAB
ERYTHROCYTE [DISTWIDTH] IN BLOOD BY AUTOMATED COUNT: 14.8 % (ref 11.5–14.5)
GLUCOSE BLD STRIP.AUTO-MCNC: 120 MG/DL (ref 65–100)
GLUCOSE BLD STRIP.AUTO-MCNC: 138 MG/DL (ref 65–100)
GLUCOSE BLD STRIP.AUTO-MCNC: 158 MG/DL (ref 65–100)
GLUCOSE BLD STRIP.AUTO-MCNC: 198 MG/DL (ref 65–100)
HCT VFR BLD AUTO: 23.6 % (ref 35–47)
HGB BLD-MCNC: 8.1 G/DL (ref 11.5–16)
MCH RBC QN AUTO: 30.5 PG (ref 26–34)
MCHC RBC AUTO-ENTMCNC: 34.3 G/DL (ref 30–36.5)
MCV RBC AUTO: 88.7 FL (ref 80–99)
PLATELET # BLD AUTO: 270 K/UL (ref 150–400)
RBC # BLD AUTO: 2.66 M/UL (ref 3.8–5.2)
SERVICE CMNT-IMP: ABNORMAL
WBC # BLD AUTO: 27.1 K/UL (ref 3.6–11)

## 2017-07-24 PROCEDURE — 74011250637 HC RX REV CODE- 250/637: Performed by: INTERNAL MEDICINE

## 2017-07-24 PROCEDURE — 74011250637 HC RX REV CODE- 250/637: Performed by: SPECIALIST

## 2017-07-24 PROCEDURE — 65270000029 HC RM PRIVATE

## 2017-07-24 PROCEDURE — 82962 GLUCOSE BLOOD TEST: CPT

## 2017-07-24 PROCEDURE — 85027 COMPLETE CBC AUTOMATED: CPT | Performed by: INTERNAL MEDICINE

## 2017-07-24 PROCEDURE — 36415 COLL VENOUS BLD VENIPUNCTURE: CPT | Performed by: INTERNAL MEDICINE

## 2017-07-24 PROCEDURE — 74011636637 HC RX REV CODE- 636/637: Performed by: INTERNAL MEDICINE

## 2017-07-24 PROCEDURE — P9016 RBC LEUKOCYTES REDUCED: HCPCS | Performed by: INTERNAL MEDICINE

## 2017-07-24 PROCEDURE — 36430 TRANSFUSION BLD/BLD COMPNT: CPT

## 2017-07-24 RX ORDER — LEVOFLOXACIN 750 MG/1
750 TABLET ORAL
Qty: 4 TAB | Refills: 0 | Status: SHIPPED | OUTPATIENT
Start: 2017-07-24 | End: 2017-08-01

## 2017-07-24 RX ORDER — PANTOPRAZOLE SODIUM 40 MG/1
40 TABLET, DELAYED RELEASE ORAL
Qty: 60 TAB | Refills: 1 | Status: SHIPPED | OUTPATIENT
Start: 2017-07-24 | End: 2017-08-01

## 2017-07-24 RX ORDER — LEVALBUTEROL INHALATION SOLUTION 0.63 MG/3ML
0.63 SOLUTION RESPIRATORY (INHALATION)
Qty: 10 PACKAGE | Refills: 1 | Status: SHIPPED | OUTPATIENT
Start: 2017-07-24

## 2017-07-24 RX ORDER — FUROSEMIDE 40 MG/1
TABLET ORAL
Qty: 30 TAB | Refills: 0 | Status: SHIPPED | OUTPATIENT
Start: 2017-07-24 | End: 2017-08-01

## 2017-07-24 RX ORDER — OXYCODONE HYDROCHLORIDE 5 MG/1
2.5-5 TABLET ORAL
Qty: 30 TAB | Refills: 0 | Status: SHIPPED | OUTPATIENT
Start: 2017-07-24 | End: 2017-08-01

## 2017-07-24 RX ORDER — FLUTICASONE FUROATE AND VILANTEROL 200; 25 UG/1; UG/1
1 POWDER RESPIRATORY (INHALATION) DAILY
Qty: 1 INHALER | Refills: 0 | Status: SHIPPED | OUTPATIENT
Start: 2017-07-24 | End: 2017-08-01

## 2017-07-24 RX ORDER — SAME BUTANEDISULFONATE/BETAINE 400-600 MG
250 POWDER IN PACKET (EA) ORAL 2 TIMES DAILY
Qty: 14 CAP | Refills: 0 | Status: SHIPPED | OUTPATIENT
Start: 2017-07-24 | End: 2017-07-31

## 2017-07-24 RX ORDER — SODIUM CHLORIDE 9 MG/ML
250 INJECTION, SOLUTION INTRAVENOUS AS NEEDED
Status: DISCONTINUED | OUTPATIENT
Start: 2017-07-24 | End: 2017-08-01 | Stop reason: ALTCHOICE

## 2017-07-24 RX ORDER — METOPROLOL TARTRATE 50 MG/1
50 TABLET ORAL 2 TIMES DAILY
Qty: 60 TAB | Refills: 0 | Status: SHIPPED | OUTPATIENT
Start: 2017-07-24 | End: 2017-08-01

## 2017-07-24 RX ORDER — SUCRALFATE 1 G/10ML
1 SUSPENSION ORAL
Qty: 100 ML | Refills: 0 | Status: SHIPPED | OUTPATIENT
Start: 2017-07-24 | End: 2017-08-01

## 2017-07-24 RX ORDER — AMLODIPINE BESYLATE 5 MG/1
5 TABLET ORAL DAILY
Qty: 30 TAB | Refills: 0 | Status: SHIPPED | OUTPATIENT
Start: 2017-07-24 | End: 2017-08-01

## 2017-07-24 RX ORDER — IPRATROPIUM BROMIDE 0.5 MG/2.5ML
0.5 SOLUTION RESPIRATORY (INHALATION)
Qty: 10 ML | Refills: 0 | Status: SHIPPED | OUTPATIENT
Start: 2017-07-24

## 2017-07-24 RX ORDER — IRBESARTAN 75 MG/1
75 TABLET ORAL DAILY
Qty: 30 TAB | Refills: 0 | Status: SHIPPED | OUTPATIENT
Start: 2017-07-24 | End: 2017-08-01

## 2017-07-24 RX ADMIN — PANTOPRAZOLE SODIUM 40 MG: 40 TABLET, DELAYED RELEASE ORAL at 15:55

## 2017-07-24 RX ADMIN — FUROSEMIDE 40 MG: 40 TABLET ORAL at 08:59

## 2017-07-24 RX ADMIN — INSULIN LISPRO 2 UNITS: 100 INJECTION, SOLUTION INTRAVENOUS; SUBCUTANEOUS at 18:11

## 2017-07-24 RX ADMIN — IRBESARTAN 75 MG: 75 TABLET ORAL at 09:03

## 2017-07-24 RX ADMIN — FLUTICASONE FUROATE AND VILANTEROL TRIFENATATE 1 PUFF: 200; 25 POWDER RESPIRATORY (INHALATION) at 09:03

## 2017-07-24 RX ADMIN — METOPROLOL TARTRATE 50 MG: 50 TABLET ORAL at 09:00

## 2017-07-24 RX ADMIN — PREDNISONE 5 MG: 5 TABLET ORAL at 08:59

## 2017-07-24 RX ADMIN — SUCRALFATE 1 G: 1 SUSPENSION ORAL at 12:10

## 2017-07-24 RX ADMIN — METOPROLOL TARTRATE 50 MG: 50 TABLET ORAL at 21:52

## 2017-07-24 RX ADMIN — INSULIN LISPRO 2 UNITS: 100 INJECTION, SOLUTION INTRAVENOUS; SUBCUTANEOUS at 12:10

## 2017-07-24 RX ADMIN — AMLODIPINE BESYLATE 5 MG: 5 TABLET ORAL at 08:59

## 2017-07-24 RX ADMIN — Medication 10 ML: at 15:56

## 2017-07-24 RX ADMIN — SUCRALFATE 1 G: 1 SUSPENSION ORAL at 08:59

## 2017-07-24 RX ADMIN — PANTOPRAZOLE SODIUM 40 MG: 40 TABLET, DELAYED RELEASE ORAL at 08:59

## 2017-07-24 RX ADMIN — SUCRALFATE 1 G: 1 SUSPENSION ORAL at 15:55

## 2017-07-24 RX ADMIN — Medication 10 ML: at 05:32

## 2017-07-24 RX ADMIN — SUCRALFATE 1 G: 1 SUSPENSION ORAL at 21:52

## 2017-07-24 NOTE — PROGRESS NOTES
Renal -    Sodium stable, low 130's. Resolved JODY. Stable back on ARB. I will sign off, but please call with questions or changes.     Jacqueline Obrien

## 2017-07-24 NOTE — PROGRESS NOTES
Hospitalist Progress Note    NAME: Ramses Aguillon   :  1945   MRN:  [id number]       Assessment / Plan:  Acute GIB with ABL anemia secondary to duodenal ulcers and esophagitis  -s/p  EGD   -Appreciate GI consult  - PPI bid, carafate, holding plavix  -S/p 3 units PRBC, PRBC  Hb 10.5  -Monitor H&H, stable  - follow bx result, h.pylori, negative   recurrent melena, for PRBC and repeat H&H in am    Asymptomatic 22 beat run of Wide Complex Tachycardia (VT) on early am of 7/15: question if related to Levaquin use in setting of renal failure  Atrial Fibrillation with RVR on early morning of   SVT   -Mild troponin elevation  -Cont metoprolol, cont  norvasc,  Lasix. ARB  -Appreciate Cardiology consult  -suspect patient needs outpatient sleep study    Acute hypoxic respiratory failure  Acute COPD exacerbation  -continue scheduled and prn nebs, IV steroids change to Prednisone on , now back on iv solumedrol dt wheezing , switched to po, start taper  -suspect worsening Leukocytosis secondary to prednisone and reactive. Trending down  [facility]  no growth, UA wnl, afebrile  Po abx    ARF from aggressive diuresis; resolved with hydration, cr trended up likely secondary to contrast given , now trending down  Mild Hyperkalemia; resolved with hydration  Hyponatremia: Sodium Woody of 121 on 7/15; increased to 133 today;  ? SIADH  -hold ARB    CHF ruled out (LVEF 55-60%); Patient has interstitial lung disease that was suspected to be pulmonary edema on initial CXR and clinically with diuresis patient went into ARF and become increasingly hyponatremic (lasix was stopped on  with worsening hyponatremia) and CXR without significant change.   Acute Pulmonary Edema Ruled out, lasix restarted    Abdominal pain on am of  with continued nausea: resolved after large BM this am  -KUB on  without ileus    Distal tibia and fibula fracture s/p mechanical fall  -s/p ORIF on   -appreciate ortho assistance  -elevated CK improved     Hx of CAD  HTN;   -decrease metoprolol dt bradycardia, cont amlodipine, holding plavix, statin      Hx of PVD  -s/p right renal artery stent, left fem-pop by Dr. Cami Connell 3/2014     Impaired Glucose Management (Diabetes ruled out)  -A1c 5.7  -diet control  -SSI      Tobacco use  -nicotine patch    Obesity     Code Status: Full  Surrogate Decision Maker: daughter Jaclyn [facility] [phone]     DVT Prophylaxis: SCDs for now (lovenox if no plan procedure in the AM)  GI Prophylaxis: not indicated     Baseline: independent    Dispo:  SNF, dischrge held dt recurrent GIB      Body mass index is 35.3 kg/(m^2). Subjective:     Chief Complaint / Reason for Physician Visit: follow-up respiratory failure  Pt reports feeling fine, no c/o, per nursing melena    Review of Systems:  Symptom Y/N Comments  Symptom Y/N Comments   Fever/Chills    Chest Pain n    Poor Appetite y   Edema     Cough    Abdominal Pain n    Sputum    Joint Pain     SOB/ODOM y   Pruritis/Rash     Nausea/vomit n   Tolerating PT/OT     Diarrhea    Tolerating Diet     Constipation n   Other       Could NOT obtain due to:      Objective:     VITALS:   Last 24hrs VS reviewed since prior progress note. Most recent are:  Patient Vitals for the past 24 hrs:   Temp Pulse Resp BP SpO2   07/24/17 1219 98.1 °F (36.7 °C) 79 18 141/69 96 %   07/24/17 0749 98.4 °F (36.9 °C) 75 18 169/66 95 %   07/23/17 2239 - 79 - 162/55 -   07/23/17 1939 97.9 °F (36.6 °C) (!) 57 20 154/70 92 %   07/23/17 1537 98.2 °F (36.8 °C) (!) 57 16 151/61 94 %       Intake/Output Summary (Last 24 hours) at 07/24/17 1522  Last data filed at 07/24/17 1433   Gross per 24 hour   Intake              720 ml   Output             4800 ml   Net            -4080 ml        PHYSICAL EXAM:  General: WD, WN. Alert, cooperative, no acute distress    EENT:  EOMI. Anicteric sclerae.  MMM  Resp:  CTA BL,  No accessory muscle use  CV:  Regular  rhythm,  No edema (left lower leg in external fixator and bandaged  GI:  Soft, Non distended, Non tender.  +Bowel sounds  Neurologic:  Alert and oriented X 3, normal speech,   Psych:   Good insight. Not anxious nor agitated  Skin:  No rashes. No jaundice    Reviewed most current lab test results and cultures  YES  Reviewed most current radiology test results   YES  Review and summation of old records today    NO  Reviewed patient's current orders and MAR    YES  PMH/SH reviewed - no change compared to H&P  ________________________________________________________________________  Care Plan discussed with:    Comments   Patient x    Family      RN x    Care Manager     Consultant                        Multidiciplinary team rounds were held today with , nursing, pharmacist and clinical coordinator. Patient's plan of care was discussed; medications were reviewed and discharge planning was addressed. ________________________________________________________________________  Total NON critical care TIME:  25   Minutes    Total CRITICAL CARE TIME Spent:   Minutes non procedure based      Comments   >50% of visit spent in counseling and coordination of care     ________________________________________________________________________  Rhona Arreola MD     Procedures: see electronic medical records for all procedures/Xrays and details which were not copied into this note but were reviewed prior to creation of Plan. LABS:  I reviewed today's most current labs and imaging studies.   Pertinent labs include:  Recent Labs      07/24/17   1035  07/23/17   0420  07/22/17   0427   WBC  27.1*  27.3*  21.1*   HGB  8.1*  7.7*  10.5*   HCT  23.6*  22.0*  30.5*   PLT  270  254  207     Recent Labs      07/23/17   0420  07/22/17   0427   NA  133*  130*   K  3.4*  3.7   CL  97  95*   CO2  29  28   GLU  101*  86   BUN  43*  49*   CREA  0.94  0.97   CA  7.3*  7.6*   MG  2.1  1.9   PHOS  2.6  2.5*       Signed: Rhona Arreola MD

## 2017-07-24 NOTE — INTERDISCIPLINARY ROUNDS
Bedside interdisciplinary rounds were held today to discuss patient plan of care and outcomes. The following members were present: Nurse Practitioner, Nurse, Clinical Care Leader, Pharmacy, Physical Therapy, and Case Management. Plan:  Plan for DC this afternoon if able to obtain insurance authorization.

## 2017-07-24 NOTE — PROGRESS NOTES
Bedside and Verbal shift change report given to Lorena (oncoming nurse) by Kody Hoffman (offgoing nurse).  Report included the following information SBAR, Kardex, Procedure Summary, Intake/Output, MAR, Accordion and Cardiac Rhythm Nsr.

## 2017-07-24 NOTE — PROGRESS NOTES
Pt required a new SNF authorization. [facility] is working with [facility]. SNF will updated CM after they receive conformation from [facility]  . 2.00 PM  [employer] received SNF pre authorization from [facility]. As per attending physician pt discharge is on hold. Pt has GI bleed and is under observation before sending pt to [address]   Ted Villar  Ext [phone]

## 2017-07-24 NOTE — PROGRESS NOTES
Ortho NP Note:    Pt resting in bed. Feeling better today. Pain well controlled. Denies CP, SOB    Recent Labs      07/23/17   0420   HCT  22.0*   HGB  7.7*       Ex-fix in place without the pin that was noted to be out on Friday 7/21. Partial splint in place to keep foot in alignment post pin removal.   Mild-mod edema present  Calves soft and supple  Sensation and motor intact  Cap refill intact. PLAN:  1) Ankle ORIF - continue NWB on RLE and mobilize with PT. 2) GI bleed - EDG 7/18 - followed by gastroenterology; She is off anticoagulation due to current bleed. We will plan to defer decision to resume anticoagulation to medicine given her multiple medical problems  3)  Tachycardia - resolved  4) Anemia - from GI Bleed - Ankle is not source of blood loss. Hgb down yesterday and nursing reports black tarry stool. New CBC drawn today and hospitalist aware. GI to be notified with CBC results. 5) COPD exacerbation? - Dr. Aris Brian following - PO Prednisone, PRN Nebs. 6) Ness- removal attempted on 7/23 with retention. Cath replaced. 7) Stable from ortho standpoint. [facility] is acquiring new auth.       Hodan Mcadams, NP

## 2017-07-24 NOTE — PROGRESS NOTES
Cardiology Progress Note  7/24/2017     Admit Date: 7/12/2017  Admit Diagnosis: right ankle fracture  Respiratory failure ([facility]  GI Bleed                          Assessment:                  1. Paroxysmal SVT, self-limited. 2. Sinus bradycardia due to beta-blocker. Adjusted. 3. S/p mechanical fall requiring orthopedic surgery of right leg, found to be hypoxic, likely COPD exacerbation, PSVT noted on telemetry, mild rhabo with increased troponin being managed medically, then with GI bleed s/p 3 units prbc.  4. In 2011 found to have hyponatremia and TWI on ECG with cath with moderate CAD. 5. Echo 6/2017 EF 55%, mild to mod MR, mild AI. 6. HTN with right renal artery stent. 7. DM type 2.  8. Dyslipidemia difficulty with daily statin. 9. Hx of GI bleed found to have PUD, transfused 3 units prbc, Dr. Charlee Pope.  8. PVD with left fem-pop placed on plavix for this. 6. , mild functional capacity, works at [employer]                        [employer]:                    1. Holding plavix, was on this for PVD, reviewed with Dr. Anupama Ibarra hold for 10 days, due to resume on 7/27/2017, but now severely anemic again. 2. Increased metoprolol to 75mg bid, was on metoprolol ER 100mg daily as outpt. Now back to 50 bid due to bradycardia. 3. Cont amlodipine, would not titrate due to edema in legs  4. Back on ARB  5. Cont lasix 40mg daily  6. Cont statin, stopped niacin      Asked to see her regarding bradycardia. Meds have been adjusted, I feel comfortable with the change. Now severely anemic again.       For other plans, see orders.   Hospital problem list   Active Hospital Problems    Diagnosis Date Noted    Respiratory failure ([facility] 07/13/2017        Subjective: Bailey Thomason reports   Chest pain X none  consistent with:  Non-cardiac CP         Atypical CP     None now  On going  Anginal CP     Dyspnea X none  at rest  with exertion         improved  unchanged  worse              PND X none overnight       Orthopnea X none  improved  unchanged  worse   Presyncope X none  improved  unchanged  worse     Ambulated in hallway without symptoms   Yes   Ambulated in room without symptoms  Yes   Objective:    Physical Exam:  Overall VSSAF;    Visit Vitals    /66 (BP 1 Location: Left arm, BP Patient Position: At rest)    Pulse 75    Temp 98.4 °F (36.9 °C)    Resp 18    Ht 5' 3\" (1.6 m)    Wt 90.4 kg (199 lb 4.7 oz)    SpO2 95%    BMI 35.3 kg/m2     Temp (24hrs), Av.2 °F (36.8 °C), Min:97.9 °F (36.6 °C), Max:98.4 °F (36.9 °C)    Patient Vitals for the past 8 hrs:   Pulse   17 0749 75    Patient Vitals for the past 8 hrs:   Resp   17 0749 18    Patient Vitals for the past 8 hrs:   BP   17 0749 169/66          Intake/Output Summary (Last 24 hours) at 17 0931  Last data filed at 17 0826   Gross per 24 hour   Intake                0 ml   Output             3400 ml   Net            -3400 ml     General Appearance: Well developed, obsese, no acute distress. Ears/Nose/Mouth/Throat:   Normal MM; anicteric. JVP: WNL   Resp:   clear to auscultation bilaterally anteriorly. Nl resp effort. Cardiovascular:  RRR, S1, S2 normal, no new murmur. No gallop or rub. Abdomen:   Soft, non-tender, bowel sounds are present. Extremities: No edema bilaterally. Right foot in boot. Skin:  Neuro: Warm and dry. A/O x3, grossly nonfocal     cath site intact w/o hematoma or new bruit; distal pulse unchanged  Yes   Data Review:     Telemetry independently reviewed x sinus  chronic afib  parox SVT  NSVT     ECG independently reviewed  NSR  afib  no significant changes  NSST-Tw chgs   x no new ECG provided for review   Lab results reviewed as noted below. Current medications reviewed as noted below. No results for input(s): PH, PCO2, PO2 in the last 72 hours.   Recent Labs      17   0427   CPK  111     Recent Labs      17   0420  17   0427  17   1217   NA 133*  130*   --    K  3.4*  3.7   --    CL  97  95*   --    CO2  29  28   --    BUN  43*  49*   --    CREA  0.94  0.97   --    GLU  101*  86   --    PHOS  2.6  2.5*   --    CA  7.3*  7.6*   --    WBC  27.3*  21.1*   --    HGB  7.7*  10.5*  7.0*   HCT  22.0*  30.5*  20.7*   PLT  254  207   --      Lab Results   Component Value Date/Time    Cholesterol, total 162 09/20/2011 01:17 PM    HDL Cholesterol 55 09/20/2011 01:17 PM    LDL, calculated 78.4 09/20/2011 01:17 PM    Triglyceride 143 09/20/2011 01:17 PM    CHOL/HDL Ratio 2.9 09/20/2011 01:17 PM     No results for input(s): SGOT, GPT, AP, TBIL, TP, ALB, GLOB, GGT, AML, LPSE in the last 72 hours. No lab exists for component: AMYP, HLPSE  No results for input(s): INR, PTP, APTT in the last 72 hours.     No lab exists for component: INREXT, INREXT   No components found for: [place]    Current Facility-Administered Medications   Medication Dose Route Frequency    metoprolol tartrate (LOPRESSOR) tablet 50 mg  50 mg Oral BID    predniSONE (DELTASONE) tablet 5 mg  5 mg Oral DAILY WITH BREAKFAST    pantoprazole (PROTONIX) tablet 40 mg  40 mg Oral ACB&D    irbesartan (AVAPRO) tablet 75 mg  75 mg Oral DAILY    amLODIPine (NORVASC) tablet 5 mg  5 mg Oral DAILY    ipratropium (ATROVENT) 0.02 % nebulizer solution 0.5 mg  0.5 mg Nebulization Q6H PRN    levalbuterol (XOPENEX) nebulizer soln 0.63 mg/3 mL  0.63 mg Nebulization Q6H PRN    0.9% sodium chloride infusion 250 mL  250 mL IntraVENous PRN    diphenhydrAMINE (BENADRYL) capsule 25 mg  25 mg Oral Q6H PRN    levoFLOXacin (LEVAQUIN) tablet 750 mg  750 mg Oral Q48H    furosemide (LASIX) tablet 40 mg  40 mg Oral DAILY    fluticasone-vilanterol (BREO ELLIPTA) 200mcg-25mcg/puff  1 Puff Inhalation DAILY    acetaminophen (TYLENOL) tablet 650 mg  650 mg Oral Q6H PRN    insulin lispro (HUMALOG) injection   SubCUTAneous AC&HS    sucralfate (CARAFATE) 100 mg/mL oral suspension 1 g  1 g Oral AC&HS    hydrALAZINE (APRESOLINE) 20 mg/mL injection 10 mg  10 mg IntraVENous Q6H PRN    glucose chewable tablet 16 g  4 Tab Oral PRN    glucagon (GLUCAGEN) injection 1 mg  1 mg IntraMUSCular PRN    bisacodyl (DULCOLAX) suppository 10 mg  10 mg Rectal DAILY PRN    sodium chloride (NS) flush 5-10 mL  5-10 mL IntraVENous Q8H    sodium chloride (NS) flush 5-10 mL  5-10 mL IntraVENous PRN    naloxone (NARCAN) injection 0.4 mg  0.4 mg IntraVENous PRN    ondansetron (ZOFRAN ODT) tablet 4 mg  4 mg Oral Q4H PRN    oxyCODONE IR (ROXICODONE) tablet 5 mg  5 mg Oral Q4H PRN    oxyCODONE IR (ROXICODONE) tablet 2.5 mg  2.5 mg Oral Q4H PRN    dextrose 10% infusion 125-250 mL  125-250 mL IntraVENous PRN        Tatyana Sawyer MD

## 2017-07-24 NOTE — PROGRESS NOTES
[facility] Consult Service Note  Pulmonary, Critical Care, and Sleep Medicine    Name: Ramses Aguillon MRN: [id number]   : 1945 Hospital: [facility]   Date: 2017   Hospital Day: 13       Subjective/Interval History:     I have reviewed the flowsheet and previous days notes. Seen earlier today on rounds. Pt seems more comfortable today. No chest pain. No back pain. IMPRESSION:   1. Acute respiratory failure with hypoxia, Weaning oxygen as tolerated. 2. Acute exacerbation of COPD? Was not on inhalers at home. Has increased wheezing. 3. Acute GI bleed with bloody stools, Gi following, Hgb stabilizing. 4. Severe anemia from acute blood loss Hgb 10 to now 5.4  5. Right Subclavian CVC. 6.  s/p RIGHT ANKLE OPEN REDUCTION INTERNAL FIXATION, application of external fixator S/p mechanical fall requiring orthopedic surgery of right leg  7. Ness for Urinary Retention. 8. Hyponatremia, seems chronic. May be SIADH,   9. Elevated K, limited options to treat. Will see if we can give Kayexalate, Possibly a diuretic. There are no ekg changes related to elevated K.   10. moderate CAD   11. Echo 2017 EF 55%, mild to mod MR, mild AI  12. Sinus with PSVT  ? AVNRT on telemetry  13. H/o HTN with right renal artery stent  14. DM  15. Dyslipidemia difficulty with daily statin  16. Hx of GI bleed, appreciated GI assistance. 17. PVD with left fem-pop placed on plavix for this  18. Discussed with Dr. Daniella Landrmu. 23. , mild functional capacity, works at [employer]      RECOMMENDATIONS/PLAN:   1. PO prednisone  2. Wean O2 off  3. Scheduled nebs   4. Mobilize  5. Would benefit from outpt PFTs when she has recovered with subsequent adjustment of meds  6. On Breo and hope to reduce nebs  7.  Will sign off     Risk of deterioration: high   [x] High complexity decision making was performed  [x] See my orders for details      Code: Full Code Subjective/Initial History:   I have reviewed the flowsheet and previous days notes. No acute complaints  No acute distress    ROS:A comprehensive review of systems was negative except for that written in the HPI.     MAR reviewed and pertinent medications noted or modified as needed         MEDS:   Current Facility-Administered Medications   Medication    metoprolol tartrate (LOPRESSOR) tablet 50 mg    predniSONE (DELTASONE) tablet 5 mg    pantoprazole (PROTONIX) tablet 40 mg    irbesartan (AVAPRO) tablet 75 mg    amLODIPine (NORVASC) tablet 5 mg    ipratropium (ATROVENT) 0.02 % nebulizer solution 0.5 mg    levalbuterol (XOPENEX) nebulizer soln 0.63 mg/3 mL    0.9% sodium chloride infusion 250 mL    diphenhydrAMINE (BENADRYL) capsule 25 mg    levoFLOXacin (LEVAQUIN) tablet 750 mg    furosemide (LASIX) tablet 40 mg    fluticasone-vilanterol (BREO ELLIPTA) 200mcg-25mcg/puff    acetaminophen (TYLENOL) tablet 650 mg    insulin lispro (HUMALOG) injection    sucralfate (CARAFATE) 100 mg/mL oral suspension 1 g    hydrALAZINE (APRESOLINE) 20 mg/mL injection 10 mg    glucose chewable tablet 16 g    glucagon (GLUCAGEN) injection 1 mg    bisacodyl (DULCOLAX) suppository 10 mg    sodium chloride (NS) flush 5-10 mL    sodium chloride (NS) flush 5-10 mL    naloxone (NARCAN) injection 0.4 mg    ondansetron (ZOFRAN ODT) tablet 4 mg    oxyCODONE IR (ROXICODONE) tablet 5 mg    oxyCODONE IR (ROXICODONE) tablet 2.5 mg    dextrose 10% infusion 125-250 mL        Telemetry:    normal sinus rhythm    Objective:     Vital Signs: Intake/Output: Intake/Output:   Visit Vitals    /66 (BP 1 Location: Left arm, BP Patient Position: At rest)    Pulse 75    Temp 98.4 °F (36.9 °C)    Resp 18    Ht 5' 3\" (1.6 m)    Wt 90.4 kg (199 lb 4.7 oz)    SpO2 95%    BMI 35.3 kg/m2         O2 Device: Room air  O2 Flow Rate (L/min): 1 l/min    Wt Readings from Last 4 Encounters:   07/19/17 90.4 kg (199 lb 4.7 oz) 01/26/15 73.1 kg (161 lb 4 oz)   11/15/14 72.1 kg (159 lb)   14 73.2 kg (161 lb 6.4 oz)       Temp (24hrs), Av.2 °F (36.8 °C), Min:97.9 °F (36.6 °C), Max:98.4 °F (36.9 °C)     Intake/Output Summary (Last 24 hours) at 17 0950  Last data filed at 17 0826   Gross per 24 hour   Intake                0 ml   Output             3400 ml   Net            -3400 ml     Last shift:      701 - 1900  In: -   Out: 1200 [Urine:1200]  Last 3 shifts: 1901 -  07  In: -   Out: 0144 [Urine:3075]       Physical Exam:    General: comfortable. No distress, normal speech. HEENT: NCAT, pale   Neck, Lymph: No abnormally enlarged lymph nodes. Chest: increased AP diameter   Lungs: few expiratory wheezes. Heart: Regular rate and rhythm   Abdomen: soft, protuberant, distended   :    Extremity: negative, cyanosis, clubbing; RLE immobilized with fixator    Neuro: lethargic   Psych: Alert and Oriented x 2   Skin: Pale;   Pulses: Bilateral, Radial, 2+ Normal upper and lower extremity pulses   Capillary refill: abnormal:  sluggish capillary refill, pale;    Data:   Interval lab and diagnostic data was reviewed. Interval radiology images were independently viewed and available reports were reviewed. All lab results for the last 24 hours reviewed. Labs:    Recent Labs      17   1217   WBC  27.3*  21.1*   --    HGB  7.7*  10.5*  7.0*   PLT  254  207   --      Recent Labs      17   NA  133*  130*   K  3.4*  3.7   CL  97  95*   CO2  29  28   GLU  101*  86   BUN  43*  49*   CREA  0.94  0.97   CA  7.3*  7.6*   MG  2.1  1.9   PHOS  2.6  2.5*     No results for input(s): PH, PCO2, PO2, HCO3, FIO2 in the last 72 hours.   Recent Labs      17   CPK  111     Lab Results   Component Value Date/Time     2017 03:23 AM     11/15/2014 04:15 AM      Lab Results   Component Value Date/Time Culture result: NO GROWTH 5 DAYS 07/12/2017 11:16 PM    Culture result: NO GROWTH 5 DAYS 11/13/2014 05:18 AM     Lab Results   Component Value Date/Time     07/22/2017 04:27 AM     07/17/2017 02:51 AM          Imaging:  I have personally reviewed the patients radiographs and have reviewed the reports:            My assessment/plan was discussed with:  [email or web address]    respiratory therapy    family         Thank you for allowing us to participate in the care of this patient. We will be happy to follow along with you.     Sakshi Ferrer MD

## 2017-07-24 NOTE — DISCHARGE SUMMARY
Hospitalist Discharge Summary     Patient ID:  Vani Wynne  [id number]  19 y.o.  1945    PCP on record: Josey Umana MD    Admit date: 7/12/2017  Discharge date and time: 8/1/2017    DISCHARGE DIAGNOSIS:  Acute blood loss anemia due to GIB/duodenal ulcers and severe esophagitis: Hg stable today @8.5 recheck in am  - Transfused 4u pRBCs this admission  - EGD 7/18 with Dr. Priscila Jennings demonstrating diffuse ulceration with friability in entire lower 1/3 of the esophagus, nearly circumferential c/w severe erosive esophagitis.  No varices seen, no active bleeding. The gastric mucosa appeared normal. Duodenum with multiple clean based ulcers (3 in the duodenal bulb) and 3 additional ones in the second portion of the duodenum with black base but no visible vessel, no active bleeding.  The ulcers in the bulb had a chronic appearance. - repeat EGD with Dr. Tone Marques 7/27 with exudative LA grade C esophagitis noted with a hiatal hernia.  Duodenum deep bulb and  bulbar sweep has 4, large, over 1 cm each, deeply excavated ulcers with clean base. No active bleeding.  - appreciate GI consult, plan for discharge when bed is available in NH   - con't BID PPI and carafate  - con't holding plavix on this for PVD, does not need to urgently be restarted          Minimally elevated troponin in setting of transient atrial fibrillation with RVR (7/17), SVT (7/20), 22 beat vtach (7/15) in setting of CAD and benign hypertension:  - echo 7/14 with EF 55-60 %. There were no regional wall motion abnormalities. - appreciate cardiology consult  - con't lopressor, avapro, lasix, norvasc  - off plavix due to GIB as above      Distal tibia and fibula fracture s/p mechanical fall:  s/p ORIF on 7/13, note ex-fix in place without the pin that was noted to be out on Friday 7/21.  Partial splint in place to keep foot in alignment post pin removal.   - appreciate ortho assistance, con't NWB per their recommendations-stable   - PT/OT - Awaiting placement CM in the case.      Acute hypoxic respiratory failure (resolved) due to acute COPD exacerbation and ?ILD: off steroids/abx- con't breo/Incruse  - prn ipratropium, xopenex nebs  - con't to mobilize as able  ARF, resolved  Hyponatremia, on lasix po  na 129  PAD:  s/p right renal artery stent, left fem-pop by Dr. Ashlyn Casanova 3/2014  Tobacco use:  con't nicotine patch    E: replace prn  Code Status: Full  Surrogate Decision Maker: daughter Herber Ngo [phone], son Pedro Preciado [phone] cell, home [phone]  DVT Prophylaxis: SCDs for now will d/w gi re:lovenox prior to [facility] [address] placement   SNF when accepted     CONSULTATIONS:  IP CONSULT TO ORTHOPEDIC SURGERY  IP CONSULT TO HOSPITALIST  IP CONSULT TO CARDIOLOGY  IP CONSULT TO GASTROENTEROLOGY  IP CONSULT TO NEPHROLOGY    Excerpted HPI from H&P of Evangelista Rhoades MD:  as above    ______________________________________________________________________  DISCHARGE SUMMARY/HOSPITAL COURSE:  for full details see H&P, daily progress notes, labs, consult notes. _______________________________________________________________________  Patient seen and examined by me on discharge day. Pertinent Findings:  Gen:    Not in distress  Chest: Clear lungs  CVS:   Regular rhythm. No edema  Abd:  Soft, not distended, not tender  Neuro:  Alert, awake, oriented, grossly intact  _______________________________________________________________________  DISCHARGE MEDICATIONS:   Current Discharge Medication List      START taking these medications    Details   oxyCODONE IR (ROXICODONE) 5 mg immediate release tablet Take 0.5-1 Tabs by mouth every four (4) hours as needed. Max Daily Amount: 30 mg.  Qty: 30 Tab, Refills: 0      polyethylene glycol (MIRALAX) 17 gram packet Take 1 Packet by mouth daily as needed (constipation) for up to 15 days.   Qty: 15 Packet, Refills: 0      senna-docusate (PERICOLACE) 8.6-50 mg per tablet Take 1 Tab by mouth daily. Qty: 30 Tab, Refills: 0         CONTINUE these medications which have CHANGED    Details   acetaminophen (TYLENOL) 325 mg tablet Take 2 Tabs by mouth every six (6) hours for 14 days. Qty: 112 Tab, Refills: 0             My Recommended Diet, Activity, Wound Care, and follow-up labs are listed in the patient's Discharge Insturctions which I have personally completed and reviewed.     _______________________________________________________________________  DISPOSITION:    Home with Family:    Home with HH/PT/OT/RN:    SNF/LTC: x   LIANET:    OTHER:        Condition at Discharge:  Stable  _______________________________________________________________________  Follow up with:   PCP : Taryn Oakley MD  Follow-up Information     Follow up With Details Comments Contact Info    Yousif Woodall MD In 2 weeks  [address]  [address] [facility] [place] [facility] [address] [facility])   [address] [facility] [address]      Taryn Oakley MD In 1 week follow-up , check CBC, BMP in 1 week [address]      Daniella Baires MD In 2 weeks brandon/hyponatremia [address]  [address]  [address] [phone]      Lioenl Almaraz MD In 2 weeks folow-up hopsital discharge, [address] [facility] [place] [facility] [address] [facility]      Yousif Woodall MD In 3 weeks s/p tibia and fibula fracture surgery [address]  [address]  [address]      Darrin Lennox, MD In 2 weeks  [address]  [id number]  [phone]      [facility] In 3 weeks urinary retention [address]                Total time in minutes spent coordinating this discharge (includes going over instructions, follow-up, prescriptions, and preparing report for sign off to her PCP) :  54 minutes    Signed:  Fernando Rosario MD

## 2017-07-24 NOTE — PROGRESS NOTES
10:00 - Patient had small black, tarry stool per bedpan this AM.  Notified Dr. Reinier Chacon, CBC ordered. 11:30 - Dr. Dario To paged re: stool, asked to have page back once Hgb arrived back. 12:00 - Dr. Joselyn Josue, working with Dr. Dario To, arrived to floor, ordered 1 unit PRBC's and recheck H&H in AM.    12:15 - Paged Dr. Reinier Chacon, informed her of cancelled discharge for today.

## 2017-07-24 NOTE — PROGRESS NOTES
Gastroenterology Daily Progress Note (Dr. Jeannie Payne)    Admit Date: 7/12/2017       Subjective:       + melena per RN. Pt denies it. Hgb has dropped. EGD reviewed.     Current Facility-Administered Medications   Medication Dose Route Frequency    metoprolol tartrate (LOPRESSOR) tablet 50 mg  50 mg Oral BID    predniSONE (DELTASONE) tablet 5 mg  5 mg Oral DAILY WITH BREAKFAST    pantoprazole (PROTONIX) tablet 40 mg  40 mg Oral ACB&D    irbesartan (AVAPRO) tablet 75 mg  75 mg Oral DAILY    amLODIPine (NORVASC) tablet 5 mg  5 mg Oral DAILY    ipratropium (ATROVENT) 0.02 % nebulizer solution 0.5 mg  0.5 mg Nebulization Q6H PRN    levalbuterol (XOPENEX) nebulizer soln 0.63 mg/3 mL  0.63 mg Nebulization Q6H PRN    0.9% sodium chloride infusion 250 mL  250 mL IntraVENous PRN    diphenhydrAMINE (BENADRYL) capsule 25 mg  25 mg Oral Q6H PRN    levoFLOXacin (LEVAQUIN) tablet 750 mg  750 mg Oral Q48H    furosemide (LASIX) tablet 40 mg  40 mg Oral DAILY    fluticasone-vilanterol (BREO ELLIPTA) 200mcg-25mcg/puff  1 Puff Inhalation DAILY    acetaminophen (TYLENOL) tablet 650 mg  650 mg Oral Q6H PRN    insulin lispro (HUMALOG) injection   SubCUTAneous AC&HS    sucralfate (CARAFATE) 100 mg/mL oral suspension 1 g  1 g Oral AC&HS    hydrALAZINE (APRESOLINE) 20 mg/mL injection 10 mg  10 mg IntraVENous Q6H PRN    glucose chewable tablet 16 g  4 Tab Oral PRN    glucagon (GLUCAGEN) injection 1 mg  1 mg IntraMUSCular PRN    bisacodyl (DULCOLAX) suppository 10 mg  10 mg Rectal DAILY PRN    sodium chloride (NS) flush 5-10 mL  5-10 mL IntraVENous Q8H    sodium chloride (NS) flush 5-10 mL  5-10 mL IntraVENous PRN    naloxone (NARCAN) injection 0.4 mg  0.4 mg IntraVENous PRN    ondansetron (ZOFRAN ODT) tablet 4 mg  4 mg Oral Q4H PRN    oxyCODONE IR (ROXICODONE) tablet 5 mg  5 mg Oral Q4H PRN    oxyCODONE IR (ROXICODONE) tablet 2.5 mg  2.5 mg Oral Q4H PRN    dextrose 10% infusion 125-250 mL  125-250 mL IntraVENous PRN        Objective:     Visit Vitals    /66 (BP 1 Location: Left arm, BP Patient Position: At rest)    Pulse 75    Temp 98.4 °F (36.9 °C)    Resp 18    Ht 5' 3\" (1.6 m)    Wt 90.4 kg (199 lb 4.7 oz)    SpO2 95%    BMI 35.3 kg/m2   Blood pressure 169/66, pulse 75, temperature 98.4 °F (36.9 °C), resp.  rate 18, height 5' 3\" (1.6 m), weight 90.4 kg (199 lb 4.7 oz), SpO2 95 %.  07/24 0701 - 07/24 1900  In: -   Out: 1200 [Urine:1200]  07/22 1901 - 07/24 0700  In: -   Out: 3374 [Urine:3075]      Intake/Output Summary (Last 24 hours) at 07/24/17 1200  Last data filed at 07/24/17 0826   Gross per 24 hour   Intake                0 ml   Output             3400 ml   Net            -3400 ml     Physical Exam:     General: awake and conversant WF in NAD  GI: Soft, NT, ND + bowel sounds    Labs:     Recent Results (from the past 24 hour(s))   GLUCOSE, POC    Collection Time: 07/23/17  3:10 PM   Result Value Ref Range    Glucose (POC) 198 (H) 65 - 100 mg/dL    Performed by Abigail Cardozo    GLUCOSE, POC    Collection Time: 07/23/17  9:22 PM   Result Value Ref Range    Glucose (POC) 194 (H) 65 - 100 mg/dL    Performed by Alfonso Singh (PCT)    GLUCOSE, POC    Collection Time: 07/24/17  7:51 AM   Result Value Ref Range    Glucose (POC) 120 (H) 65 - 100 mg/dL    Performed by Miladys Ashby    CBC W/O DIFF    Collection Time: 07/24/17 10:35 AM   Result Value Ref Range    WBC 27.1 (H) 3.6 - 11.0 K/uL    RBC 2.66 (L) 3.80 - 5.20 M/uL    HGB 8.1 (L) 11.5 - 16.0 g/dL    HCT 23.6 (L) 35.0 - 47.0 %    MCV 88.7 80.0 - 99.0 FL    MCH 30.5 26.0 - 34.0 PG    MCHC 34.3 30.0 - 36.5 g/dL    RDW 14.8 (H) 11.5 - 14.5 %    PLATELET 759 219 - 521 K/uL   GLUCOSE, POC    Collection Time: 07/24/17 11:15 AM   Result Value Ref Range    Glucose (POC) 198 (H) 65 - 100 mg/dL    Performed by Miladys Ashby      Recent Labs      07/23/17   0420  07/22/17   0427   NA  133*  130*   K  3.4*  3.7   CL  97  95*   CO2  29  28   BUN  43*  49*   CREA 0. 94  0.97   GLU  101*  86   CA  7.3*  7.6*   MG  2.1  1.9   PHOS  2.6  2.5*     Lab Results   Component Value Date/Time    WBC 27.1 07/24/2017 10:35 AM    HGB 8.1 07/24/2017 10:35 AM    HCT 23.6 07/24/2017 10:35 AM    PLATELET 431 11/06/2727 10:35 AM    MCV 88.7 07/24/2017 10:35 AM         Impression:    Acute GI blood loss anemia secondary to Duodenal ulcers and ulcerative esophagitis on EGD 7/18/17  COPD  CHF         Plan:    - Hgb was up to 10.5 but again dropped and now is 8.1.  - Continue PPI BID and carafate for PUD   - Hold anticoagulants. High GI bleed risk,  - I will give one more unit PRBC. - If Hgb keeps drifting down, she may need repeat EGD (pt is hopeful that she would not need another). - d/w family, pt and RN in person. - Her HP serologies were negative.        Braden Mcintosh MD    7/24/2017  3500  [address] [place] [address], [address] [place] [address]  [address] [address]  [address] [place] [address] South: [phone]

## 2017-07-25 LAB
ABO + RH BLD: NORMAL
ANION GAP BLD CALC-SCNC: 8 MMOL/L (ref 5–15)
BLD PROD TYP BPU: NORMAL
BLD PROD TYP BPU: NORMAL
BLOOD GROUP ANTIBODIES SERPL: NORMAL
BPU ID: NORMAL
BPU ID: NORMAL
BUN SERPL-MCNC: 27 MG/DL (ref 6–20)
BUN/CREAT SERPL: 34 (ref 12–20)
CALCIUM SERPL-MCNC: 7.6 MG/DL (ref 8.5–10.1)
CHLORIDE SERPL-SCNC: 99 MMOL/L (ref 97–108)
CK SERPL-CCNC: 77 U/L (ref 26–192)
CO2 SERPL-SCNC: 28 MMOL/L (ref 21–32)
CREAT SERPL-MCNC: 0.79 MG/DL (ref 0.55–1.02)
CROSSMATCH RESULT,%XM: NORMAL
CROSSMATCH RESULT,%XM: NORMAL
ERYTHROCYTE [DISTWIDTH] IN BLOOD BY AUTOMATED COUNT: 14.8 % (ref 11.5–14.5)
GLUCOSE BLD STRIP.AUTO-MCNC: 102 MG/DL (ref 65–100)
GLUCOSE BLD STRIP.AUTO-MCNC: 122 MG/DL (ref 65–100)
GLUCOSE BLD STRIP.AUTO-MCNC: 163 MG/DL (ref 65–100)
GLUCOSE BLD STRIP.AUTO-MCNC: 183 MG/DL (ref 65–100)
GLUCOSE SERPL-MCNC: 102 MG/DL (ref 65–100)
HCT VFR BLD AUTO: 28.3 % (ref 35–47)
HGB BLD-MCNC: 9.7 G/DL (ref 11.5–16)
MCH RBC QN AUTO: 30 PG (ref 26–34)
MCHC RBC AUTO-ENTMCNC: 34.3 G/DL (ref 30–36.5)
MCV RBC AUTO: 87.6 FL (ref 80–99)
PLATELET # BLD AUTO: 244 K/UL (ref 150–400)
POTASSIUM SERPL-SCNC: 3.4 MMOL/L (ref 3.5–5.1)
RBC # BLD AUTO: 3.23 M/UL (ref 3.8–5.2)
SERVICE CMNT-IMP: ABNORMAL
SODIUM SERPL-SCNC: 135 MMOL/L (ref 136–145)
SPECIMEN EXP DATE BLD: NORMAL
STATUS OF UNIT,%ST: NORMAL
STATUS OF UNIT,%ST: NORMAL
UNIT DIVISION, %UDIV: 0
UNIT DIVISION, %UDIV: 0
WBC # BLD AUTO: 23.6 K/UL (ref 3.6–11)

## 2017-07-25 PROCEDURE — 74011250637 HC RX REV CODE- 250/637: Performed by: SPECIALIST

## 2017-07-25 PROCEDURE — 97530 THERAPEUTIC ACTIVITIES: CPT

## 2017-07-25 PROCEDURE — 74011250637 HC RX REV CODE- 250/637: Performed by: INTERNAL MEDICINE

## 2017-07-25 PROCEDURE — 85027 COMPLETE CBC AUTOMATED: CPT | Performed by: INTERNAL MEDICINE

## 2017-07-25 PROCEDURE — 74011636637 HC RX REV CODE- 636/637: Performed by: INTERNAL MEDICINE

## 2017-07-25 PROCEDURE — 80048 BASIC METABOLIC PNL TOTAL CA: CPT | Performed by: INTERNAL MEDICINE

## 2017-07-25 PROCEDURE — 36415 COLL VENOUS BLD VENIPUNCTURE: CPT | Performed by: INTERNAL MEDICINE

## 2017-07-25 PROCEDURE — 82962 GLUCOSE BLOOD TEST: CPT

## 2017-07-25 PROCEDURE — 97535 SELF CARE MNGMENT TRAINING: CPT | Performed by: OCCUPATIONAL THERAPIST

## 2017-07-25 PROCEDURE — 82550 ASSAY OF CK (CPK): CPT | Performed by: INTERNAL MEDICINE

## 2017-07-25 PROCEDURE — 74011250637 HC RX REV CODE- 250/637: Performed by: PHYSICIAN ASSISTANT

## 2017-07-25 PROCEDURE — 97530 THERAPEUTIC ACTIVITIES: CPT | Performed by: OCCUPATIONAL THERAPIST

## 2017-07-25 PROCEDURE — 65270000029 HC RM PRIVATE

## 2017-07-25 RX ORDER — LEVOFLOXACIN 750 MG/1
750 TABLET ORAL EVERY 24 HOURS
Status: DISCONTINUED | OUTPATIENT
Start: 2017-07-25 | End: 2017-07-25

## 2017-07-25 RX ADMIN — AMLODIPINE BESYLATE 5 MG: 5 TABLET ORAL at 08:42

## 2017-07-25 RX ADMIN — INSULIN LISPRO 2 UNITS: 100 INJECTION, SOLUTION INTRAVENOUS; SUBCUTANEOUS at 12:53

## 2017-07-25 RX ADMIN — Medication 10 ML: at 14:03

## 2017-07-25 RX ADMIN — SUCRALFATE 1 G: 1 SUSPENSION ORAL at 11:20

## 2017-07-25 RX ADMIN — IRBESARTAN 75 MG: 75 TABLET ORAL at 08:49

## 2017-07-25 RX ADMIN — SUCRALFATE 1 G: 1 SUSPENSION ORAL at 08:41

## 2017-07-25 RX ADMIN — METOPROLOL TARTRATE 50 MG: 50 TABLET ORAL at 08:41

## 2017-07-25 RX ADMIN — METOPROLOL TARTRATE 50 MG: 50 TABLET ORAL at 22:00

## 2017-07-25 RX ADMIN — Medication 10 ML: at 08:42

## 2017-07-25 RX ADMIN — FUROSEMIDE 40 MG: 40 TABLET ORAL at 08:41

## 2017-07-25 RX ADMIN — Medication 10 ML: at 22:01

## 2017-07-25 RX ADMIN — SUCRALFATE 1 G: 1 SUSPENSION ORAL at 22:01

## 2017-07-25 RX ADMIN — FLUTICASONE FUROATE AND VILANTEROL TRIFENATATE 1 PUFF: 200; 25 POWDER RESPIRATORY (INHALATION) at 08:42

## 2017-07-25 RX ADMIN — PANTOPRAZOLE SODIUM 40 MG: 40 TABLET, DELAYED RELEASE ORAL at 16:56

## 2017-07-25 RX ADMIN — OXYCODONE HYDROCHLORIDE 5 MG: 5 TABLET ORAL at 22:01

## 2017-07-25 RX ADMIN — LEVOFLOXACIN 750 MG: 750 TABLET, FILM COATED ORAL at 11:20

## 2017-07-25 RX ADMIN — SUCRALFATE 1 G: 1 SUSPENSION ORAL at 16:56

## 2017-07-25 RX ADMIN — OXYCODONE HYDROCHLORIDE 5 MG: 5 TABLET ORAL at 07:20

## 2017-07-25 RX ADMIN — PREDNISONE 5 MG: 5 TABLET ORAL at 08:41

## 2017-07-25 RX ADMIN — PANTOPRAZOLE SODIUM 40 MG: 40 TABLET, DELAYED RELEASE ORAL at 08:41

## 2017-07-25 NOTE — PROGRESS NOTES
Gastroenterology Daily Progress Note (Dr. Estela Garcia)    Admit Date: 7/12/2017       Subjective:       Few small dark stools per RN. Hgb is stable and appropriate after transfusion.       Current Facility-Administered Medications   Medication Dose Route Frequency    0.9% sodium chloride infusion 250 mL  250 mL IntraVENous PRN    metoprolol tartrate (LOPRESSOR) tablet 50 mg  50 mg Oral BID    predniSONE (DELTASONE) tablet 5 mg  5 mg Oral DAILY WITH BREAKFAST    pantoprazole (PROTONIX) tablet 40 mg  40 mg Oral ACB&D    irbesartan (AVAPRO) tablet 75 mg  75 mg Oral DAILY    amLODIPine (NORVASC) tablet 5 mg  5 mg Oral DAILY    ipratropium (ATROVENT) 0.02 % nebulizer solution 0.5 mg  0.5 mg Nebulization Q6H PRN    levalbuterol (XOPENEX) nebulizer soln 0.63 mg/3 mL  0.63 mg Nebulization Q6H PRN    0.9% sodium chloride infusion 250 mL  250 mL IntraVENous PRN    diphenhydrAMINE (BENADRYL) capsule 25 mg  25 mg Oral Q6H PRN    levoFLOXacin (LEVAQUIN) tablet 750 mg  750 mg Oral Q48H    furosemide (LASIX) tablet 40 mg  40 mg Oral DAILY    fluticasone-vilanterol (BREO ELLIPTA) 200mcg-25mcg/puff  1 Puff Inhalation DAILY    acetaminophen (TYLENOL) tablet 650 mg  650 mg Oral Q6H PRN    insulin lispro (HUMALOG) injection   SubCUTAneous AC&HS    sucralfate (CARAFATE) 100 mg/mL oral suspension 1 g  1 g Oral AC&HS    hydrALAZINE (APRESOLINE) 20 mg/mL injection 10 mg  10 mg IntraVENous Q6H PRN    glucose chewable tablet 16 g  4 Tab Oral PRN    glucagon (GLUCAGEN) injection 1 mg  1 mg IntraMUSCular PRN    bisacodyl (DULCOLAX) suppository 10 mg  10 mg Rectal DAILY PRN    sodium chloride (NS) flush 5-10 mL  5-10 mL IntraVENous Q8H    sodium chloride (NS) flush 5-10 mL  5-10 mL IntraVENous PRN    naloxone (NARCAN) injection 0.4 mg  0.4 mg IntraVENous PRN    ondansetron (ZOFRAN ODT) tablet 4 mg  4 mg Oral Q4H PRN    oxyCODONE IR (ROXICODONE) tablet 5 mg  5 mg Oral Q4H PRN    oxyCODONE IR (ROXICODONE) tablet 2.5 mg 2.5 mg Oral Q4H PRN    dextrose 10% infusion 125-250 mL  125-250 mL IntraVENous PRN        Objective:     Visit Vitals    /40 (BP 1 Location: Right arm, BP Patient Position: At rest)    Pulse 62    Temp 98.1 °F (36.7 °C)    Resp 18    Ht 5' 3\" (1.6 m)    Wt 90.4 kg (199 lb 4.7 oz)    SpO2 95%    BMI 35.3 kg/m2   Blood pressure 160/40, pulse 62, temperature 98.1 °F (36.7 °C), resp.  rate 18, height 5' 3\" (1.6 m), weight 90.4 kg (199 lb 4.7 oz), SpO2 95 %.     07/23 1901 - 07/25 0700  In: 1020 [P.O.:720]  Out: 5100 [Urine:5100]      Intake/Output Summary (Last 24 hours) at 07/25/17 0738  Last data filed at 07/24/17 2310   Gross per 24 hour   Intake             1020 ml   Output             4200 ml   Net            -3180 ml     Physical Exam:     General: awake and conversant WF in NAD  GI: Soft, NT, ND + bowel sounds    Labs:     Recent Results (from the past 24 hour(s))   GLUCOSE, POC    Collection Time: 07/24/17  7:51 AM   Result Value Ref Range    Glucose (POC) 120 (H) 65 - 100 mg/dL    Performed by Earma Binder    CBC W/O DIFF    Collection Time: 07/24/17 10:35 AM   Result Value Ref Range    WBC 27.1 (H) 3.6 - 11.0 K/uL    RBC 2.66 (L) 3.80 - 5.20 M/uL    HGB 8.1 (L) 11.5 - 16.0 g/dL    HCT 23.6 (L) 35.0 - 47.0 %    MCV 88.7 80.0 - 99.0 FL    MCH 30.5 26.0 - 34.0 PG    MCHC 34.3 30.0 - 36.5 g/dL    RDW 14.8 (H) 11.5 - 14.5 %    PLATELET 099 495 - 496 K/uL   GLUCOSE, POC    Collection Time: 07/24/17 11:15 AM   Result Value Ref Range    Glucose (POC) 198 (H) 65 - 100 mg/dL    Performed by Earma Binder    GLUCOSE, POC    Collection Time: 07/24/17  4:26 PM   Result Value Ref Range    Glucose (POC) 158 (H) 65 - 100 mg/dL    Performed by Kailey Hameed POC    Collection Time: 07/24/17  9:21 PM   Result Value Ref Range    Glucose (POC) 138 (H) 65 - 100 mg/dL    Performed by Ashanti Murphy (PCT)    CBC W/O DIFF    Collection Time: 07/25/17  5:30 AM   Result Value Ref Range    WBC 23.6 (H) 3.6 - 11.0 K/uL    RBC 3.23 (L) 3.80 - 5.20 M/uL    HGB 9.7 (L) 11.5 - 16.0 g/dL    HCT 28.3 (L) 35.0 - 47.0 %    MCV 87.6 80.0 - 99.0 FL    MCH 30.0 26.0 - 34.0 PG    MCHC 34.3 30.0 - 36.5 g/dL    RDW 14.8 (H) 11.5 - 14.5 %    PLATELET 212 906 - 747 K/uL   METABOLIC PANEL, BASIC    Collection Time: 07/25/17  5:30 AM   Result Value Ref Range    Sodium 135 (L) 136 - 145 mmol/L    Potassium 3.4 (L) 3.5 - 5.1 mmol/L    Chloride 99 97 - 108 mmol/L    CO2 28 21 - 32 mmol/L    Anion gap 8 5 - 15 mmol/L    Glucose 102 (H) 65 - 100 mg/dL    BUN 27 (H) 6 - 20 MG/DL    Creatinine 0.79 0.55 - 1.02 MG/DL    BUN/Creatinine ratio 34 (H) 12 - 20      GFR est AA >60 >60 ml/min/1.73m2    GFR est non-AA >60 >60 ml/min/1.73m2    Calcium 7.6 (L) 8.5 - 10.1 MG/DL   CK    Collection Time: 07/25/17  5:30 AM   Result Value Ref Range    CK 77 26 - 192 U/L     Recent Labs      07/25/17   0530  07/23/17   0420   NA  135*  133*   K  3.4*  3.4*   CL  99  97   CO2  28  29   BUN  27*  43*   CREA  0.79  0.94   GLU  102*  101*   CA  7.6*  7.3*   MG   --   2.1   PHOS   --   2.6     Lab Results   Component Value Date/Time    WBC 23.6 07/25/2017 05:30 AM    HGB 9.7 07/25/2017 05:30 AM    HCT 28.3 07/25/2017 05:30 AM    PLATELET 524 83/21/4175 05:30 AM    MCV 87.6 07/25/2017 05:30 AM         Impression:    Acute GI blood loss anemia secondary to Duodenal ulcers and ulcerative esophagitis on EGD 7/18/17  COPD  CHF         Plan:    - Hgb has stabilized. Some dark stools noted, hopefully residual blood, as her vital signs are normal  - Continue PPI BID and carafate for PUD   - Hold anticoagulants. High GI bleed risk,  - She may need repeat EGD for a  precipitous hgb drop or hemodynamic changes. RN was at bedside.  -On a diabetic diet.        Sharif Romo MD    7/25/2017 [address] [facility], [address] [place] [address]  [phone] [id number]  [address] [place] [address] [place]: [phone]

## 2017-07-25 NOTE — PROGRESS NOTES
Pharmacy Automatic Renal Dosing Protocol - Antimicrobials    Indication for Antimicrobials: COPD exacerbation    Current Regimen of Each Antimicrobial (Start Day & Day of Therapy):  Levofloxacin 750 mg PO q48h (start , Day 5)    Previously on:  Levofloxacin 750mg IV q24h x 5 days (start , stop 7/15)    Significant Cultures:    Blood NG x 5 days - Final    CAPD, Hemodialysis or Renal Replacement Therapy: none   Paralysis, amputations, malnutrition: none  Recent Labs      17   0530  17   1035  17   0420   CREA  0.79   --   0.94   BUN  27*   --   43*   WBC  23.6*  27.1*  27.3*     Temp (24hrs), Av.3 °F (36.8 °C), Min:97.7 °F (36.5 °C), Max:98.8 °F (37.1 °C)    Creatinine Clearance (Creatinine Clearance (ml/min)):  54    Impression/Plan:   Discharge held due to GIB - ppi bid and transusion. Anticoag on hold   What is the length of therapy of LQ ? Renal function has improved, will change to q24h dosing       Pharmacy will follow daily and adjust medications as appropriate for renal function and/or serum levels.     Thank you,  Analilia Ortega, [facility]

## 2017-07-25 NOTE — PROGRESS NOTES
Hospitalist Progress Note    NAME: Marisa Trejo   :  1945   MRN:  [id number]       Assessment / Plan:  Acute blood loss anemia due to GIB/duodenal ulcers and severe esophagitis:  - s/p 3u pRBCs this admission  - EGD  with Dr. Nathanael Bernheim demonstrating diffuse ulceration with friability the entire lower 1/3 of the esophagus, nearly circumferential c/w severe erosive esophagitis. No varices seen, no active bleeding. The gastric mucosa appeared normal. Duodenum with multiple clean based ulcers (3 in the duodenal bulb) and 3 additional ones in the second portion of the duodenum with black base but no visible vessel, no active bleeding. The ulcers in the bulb had a chronic appearance. - appreciate GI consult  - con't BID PPI and carafate  - con't holding plavix x 2 weeks  Minimally elevated troponin in setting of transient atrial fibrillation with RVR (), SVT (), 22 beat vtach (7/15) in setting of CAD and benign hypertension:  - echo  with EF 55-60 %. There were no regional wall motion abnormalities. - appreciate cardiology consult  - con't lopressor, avapro, lasix  - con't norvasc  - holding plavix due to GIB  Distal tibia and fibula fracture s/p mechanical fall:  s/p ORIF on , note ex-fix in place without the pin that was noted to be out on . Partial splint in place to keep foot in alignment post pin removal.   - appreciate ortho assistance, con't NWB per their recommendations  - PT/OT as able, CM to assist with SNF discharge  Acute hypoxic respiratory failure (resolved) due to acute COPD exacerbation and ?ILD:  Pt with interstitial lung disease that was suspected to be pulmonary edema on initial CXR  - CXR  with no significant change. Chronic left lower lobe atelectasis. Stable appearance of the right subclavian central venous catheter.  - d/c prednisone. D/c levaquin - not sure what we are treating.  - con't breo. Incruse added.   - prn ipratropium, xopenex nebs  - con't to mobilize as able  ARF, resolved  Hyponatremia, resolved   PAD:  s/p right renal artery stent, left fem-pop by Dr. Carter Tomlin 3/2014  Tobacco use:  con't nicotine patch  Obesity (BMI 35.3)      Code Status: Full  Surrogate Decision Maker: daughter Michell Horne [phone], son Isabel Gillette [phone] cell, home [phone]  DVT Prophylaxis: SCDs for now (lovenox if no plan procedure in the AM)     Subjective:     Chief Complaint / Reason for Physician Visit  \"I think I'm doing better\". Some dark stools overnight. Discussed with RN events overnight. Review of Systems:  Symptom Y/N Comments  Symptom Y/N Comments   Fever/Chills n   Chest Pain n    Poor Appetite n   Edema n    Cough n   Abdominal Pain n    Sputum n   Joint Pain     SOB/ODOM n   Pruritis/Rash     Nausea/vomit    Tolerating PT/OT     Diarrhea    Tolerating Diet     Constipation    Other       Could NOT obtain due to:      Objective:     VITALS:   Last 24hrs VS reviewed since prior progress note. Most recent are:  Patient Vitals for the past 24 hrs:   Temp Pulse Resp BP SpO2   07/25/17 1124 - 64 - 164/73 94 %   07/25/17 0400 98.1 °F (36.7 °C) 62 18 160/40 95 %   07/24/17 2303 97.7 °F (36.5 °C) (!) 54 20 163/42 92 %   07/24/17 2152 - 77 - 160/41 -   07/24/17 1948 97.8 °F (36.6 °C) 77 20 160/41 97 %   07/24/17 1745 98.8 °F (37.1 °C) 60 18 137/60 94 %   07/24/17 1700 98.7 °F (37.1 °C) (!) 58 18 129/63 93 %   07/24/17 1554 98.5 °F (36.9 °C) 60 16 132/52 92 %   07/24/17 1530 98.6 °F (37 °C) (!) 55 16 123/47 90 %       Intake/Output Summary (Last 24 hours) at 07/25/17 1236  Last data filed at 07/24/17 2310   Gross per 24 hour   Intake              660 ml   Output             3000 ml   Net            -2340 ml        PHYSICAL EXAM:  General: Obese. Alert, cooperative, no acute distress    EENT:  EOMI. Anicteric sclerae. MMM  Resp:  CTA bilaterally, no wheezing or rales.   No accessory muscle use  CV:  Regular  rhythm,  No edema in exposed leg (surgical dressing on RLE)  GI:  Soft, mildly distended, Non tender.  +Bowel sounds  Neurologic:  Alert and oriented X 3, normal speech,   Psych:   Good insight. Not anxious nor agitated  Skin:  No rashes. No jaundice    Reviewed most current lab test results and cultures  YES  Reviewed most current radiology test results   YES  Review and summation of old records today    NO  Reviewed patient's current orders and MAR    YES  PMH/SH reviewed - no change compared to H&P  ________________________________________________________________________  Care Plan discussed with:    Comments   Patient x    Family      RN x    Care Manager     Consultant                        Multidiciplinary team rounds were held today with , nursing, pharmacist and clinical coordinator. Patient's plan of care was discussed; medications were reviewed and discharge planning was addressed. ________________________________________________________________________  Total NON critical care TIME:  35 Minutes    Total CRITICAL CARE TIME Spent:   Minutes non procedure based      Comments   >50% of visit spent in counseling and coordination of care x    ________________________________________________________________________  Shobha Baird MD     Procedures: see electronic medical records for all procedures/Xrays and details which were not copied into this note but were reviewed prior to creation of Plan. LABS:  I reviewed today's most current labs and imaging studies.   Pertinent labs include:  Recent Labs      07/25/17   0530  07/24/17   1035  07/23/17   0420   WBC  23.6*  27.1*  27.3*   HGB  9.7*  8.1*  7.7*   HCT  28.3*  23.6*  22.0*   PLT  244  270  254     Recent Labs      07/25/17   0530  07/23/17   0420   NA  135*  133*   K  3.4*  3.4*   CL  99  97   CO2  28  29   GLU  102*  101*   BUN  27*  43*   CREA  0.79  0.94   CA  7.6*  7.3*   MG   --   2.1   PHOS   --   2.6       Signed: Shobha Baird MD

## 2017-07-25 NOTE — PROGRESS NOTES
Problem: Mobility Impaired (Adult and Pediatric)  Goal: *Acute Goals and Plan of Care (Insert Text)  Physical Therapy Goals  Initiated 7/14/2017  1. Patient will move from supine to sit and sit to supine in bed with supervision/set-up within 7 day(s). 2. Patient will transfer from bed to chair and chair to bed with minimal assistance/contact guard assist using the least restrictive device within 7 day(s). 3. Patient will perform sit to stand with contact guard assist within 7 day(s). 4. Patient will ambulate with minimal assistance/contact guard assist for 50 feet with the least restrictive device within 7 day(s). 5. Patient will ascend/descend 1 stairs with bilateral handrail(s) with minimal assistance/contact guard assist within 7 day(s). PHYSICAL THERAPY TREATMENT  Patient: John Choudhury (89 y.o. female)  Date: 7/25/2017  Diagnosis: right ankle fracture  Respiratory failure (HCC)  GI Bleed <principal problem not specified>  Procedure(s) (LRB):  ESOPHAGOGASTRODUODENOSCOPY (EGD) (N/A) 7 Days Post-Op  Precautions: Fall, NWB      ASSESSMENT:  Progressing slowly with activity and had increased difficulty with transfers today compared to her last session. She is aware of her NWB status but required moderate assistance x2 to safely complete a sit to stand from an elevated position and she could not clear her LLE to hop while maintaining right NWB. REcommend discharge to a SNF rehab when medically stable. Progression toward goals:  [ ]    Improving appropriately and progressing toward goals  [X]    Improving slowly and progressing toward goals  [ ]    Not making progress toward goals and plan of care will be adjusted       PLAN:  Patient continues to benefit from skilled intervention to address the above impairments. Continue treatment per established plan of care.   Discharge Recommendations:  Rehab  Further Equipment Recommendations for Discharge:  to be determined          SUBJECTIVE:   Patient stated Wait a minute. I just can't do it today.       OBJECTIVE DATA SUMMARY:   Critical Behavior:  Neurologic State: Drowsy, Alert  Orientation Level: Oriented X4  Cognition: Follows commands  Safety/Judgement: Awareness of environment  Functional Mobility Training:  Bed Mobility:  Rolling: Contact guard assistance  Supine to Sit: Minimum assistance; Additional time              Transfers:  Sit to Stand: Moderate assistance;Assist x2  Stand to Sit: Minimum assistance;Assist x2     Stand Pivot Transfers: Moderate assistance                       Balance:  Sitting: Intact  Sitting - Static: Good (unsupported)  Standing: Impaired  Standing - Static: Poor  Standing - Dynamic : Poor  Ambulation/Gait Training:              Attempted to heel/toe pivot both forwards and laterally to chair. Increased difficulty maintaining stance today in addition to transferring  Stairs:                       Neuro Re-Education:     Therapeutic Exercises:      Pain:  Pain Scale 1: Numeric (0 - 10)  Pain Intensity 1: 7  Pain Location 1: Abdomen     Pain Description 1: Aching  Pain Intervention(s) 1: Medication (see MAR)  Activity Tolerance:      Please refer to the flowsheet for vital signs taken during this treatment.   After treatment:   [X]    Patient left in no apparent distress sitting up in chair  [ ]    Patient left in no apparent distress in bed  [X]    Call bell left within reach  [X]    Nursing notified  [ ]    Caregiver present  [ ]    Bed alarm activated      COMMUNICATION/COLLABORATION:   The patients plan of care was discussed with: Registered Nurse     Lata Alexis PT, DPT   Time Calculation: 18 mins

## 2017-07-25 NOTE — PROGRESS NOTES
Nutrition Assessment:    INTERVENTIONS/RECOMMENDATIONS:   Consider advancing to Consistent carb diet (diabetic)   Continue ensure clear, can switch to glucerna  if BG become elevated    ASSESSMENT:   Chart reviewed, discharge cancelled yesterday due to dark stools yesterday. Pt is eating well and enjoys ensure clears. BG controlled. Currently on GI lite diet with 1.2 L fluid restriction that was placed on 7/21, however GI note documents pt on diabetic diet. Diet Order: Other (comment) (GI lite)  % Eaten:  Patient Vitals for the past 72 hrs:   % Diet Eaten   07/24/17 1433 100 %   07/24/17 0826 100 %   07/22/17 1839 90 %     Pertinent Medications: [x] Reviewed []Other: (lasix, insulin, prednisone, PPI)  Pertinent Labs: [x]Reviewed  []Other: (K+3.4)  Food Allergies: [x]None []Other:     Last BM: 7/25   []Active     []Hyperactive  []Hypoactive       [] Absent  BS  Skin:    [] Intact   [x] Incision  [] Breakdown   []Edema   []Other:    Anthropometrics: Height: 5' 3\" (160 cm) Weight: 90.4 kg (199 lb 4.7 oz)    IBW (%IBW):   ( ) UBW (%UBW):   (  %)    BMI: Body mass index is 35.3 kg/(m^2). This BMI is indicative of:  []Underweight   []Normal   []Overweight   [x] Obesity   [] Extreme Obesity (BMI>40)  Last Weight Metrics:  Weight Loss Metrics 7/19/2017 1/26/2015 11/15/2014 11/3/2014 3/5/2014 2/21/2014 6/3/2013   Today's Wt 199 lb 4.7 oz 161 lb 4 oz 159 lb 161 lb 6.4 oz 166 lb 162 lb 0.6 oz 173 lb   BMI 35.3 kg/m2 28.57 kg/m2 28.17 kg/m2 27.93 kg/m2 29.41 kg/m2 28.71 kg/m2 30.65 kg/m2       Estimated Nutrition Needs (Based on): 1620 Kcals/day (MSJ: 1275 x 1.3) , 100 g (1.3 g/kg) Protein  Carbohydrate: At Least 130 g/day  Fluids: 1620 mL/day or per primary team    NUTRITION DIAGNOSES:   Problem:  Increased protein needs      Etiology: related to fracture and surgery healing      Signs/Symptoms: as evidenced by s/p ankle ORIF       NUTRITION INTERVENTIONS:  Meals/Snacks:  Other (advance diet as medically able per GI ) Supplements: Commercial supplement              GOAL:   continue to consume >75% of meals and ONS in 5-7 days    NUTRITION MONITORING AND EVALUATION      Food/Nutrient Intake Outcomes:  Total energy intake, Liquid meal replacement  Physical Signs/Symptoms Outcomes: GI, Glucose profile, GI profile, Electrolyte and renal profile, Weight/weight change    Previous Goal Met:   [x] Met              [] Progressing Towards Goal              [] Not Progressing Towards Goal   Previous Recommendations:   [x] Implemented          [] Not Implemented          [] Not Applicable    LEARNING NEEDS (Diet, Food/Nutrient-Drug Interaction):    [] None Identified   [] Identified and Education Provided/Documented   [] Identified and Pt declined/was not appropriate     Cultural, Taoism, OR Ethnic Dietary Needs:    [x] None Identified   [] Identified and Addressed     [x] Interdisciplinary Care Plan Reviewed/Documented    [x] Discharge Planning: Consistent carb diet   [] Participated in Interdisciplinary Rounds    NUTRITION RISK:    [] High              [] Moderate           [x]  Low  []  Minimal/Uncompromised      Marylee Minister, RDN  Pager [phone]  Weekend Pager [phone]

## 2017-07-25 NOTE — PROGRESS NOTES
Ortho NP Note:    Pt OOB to the chair. No complaints. Pain well controlled. Denies CP, SOB    Recent Labs      07/25/17   0530   HCT  28.3*   HGB  9.7*       Ex-fix in place without the pin that was noted to be out on Friday 7/21. Partial splint in place to keep foot in alignment post pin removal.   Mild-mod edema present  Calves soft and supple  Sensation and motor intact  Cap refill intact. PLAN:  1) Ankle ORIF - continue NWB on RLE and mobilize with PT. 2) GI bleed - EDG 7/18 - followed by gastroenterology; She is off anticoagulation due to current bleed and will remain off per GI. They have signed off. 3)  Tachycardia - resolved  4) Anemia - from GI Bleed - Ankle is not source of blood loss. One unit yesterday with improved Hgb today. 5) COPD exacerbation? - Dr. Jeanine Bajwa following - PO Prednisone, PRN Nebs. 6) Ness- removal attempted on 7/23 with retention. Cath replaced. To be removed in 4-5 days with voiding trial.    7) Stable from ortho standpoint. [facility] will have a bed tomorrow 7/26. GI appears to have signed off.        Effie Jean, RODY

## 2017-07-25 NOTE — PROGRESS NOTES
Occupational Therapy Goals  Initiated 7/14/2017  1. Patient will perform sponge bathing with supervision/set-up within 7 day(s). 2.  Patient will perform lower body dressing with supervision/set-up within 7 day(s). 3.  Patient will perform BSC transfers, via stand pivot with RW, NWB on RLE with minimal assistance/contact guard assist within 7 day(s). 4.  Patient will perform all aspects of toileting with supervision/set-up within 7 day(s). Occupational Therapy TREATMENT  Patient: Tony Guillaume (71 y.o. female)  Date: 7/25/2017  Diagnosis: right ankle fracture  Respiratory failure (HCC)  GI Bleed <principal problem not specified>  Procedure(s) (LRB):  ESOPHAGOGASTRODUODENOSCOPY (EGD) (N/A) 7 Days Post-Op  Precautions: Fall, NWB    ASSESSMENT:  Patient generally motivated and cooperative, with the exception of occasional coaxing to participate in increasingly challenging functional activity, or to work through fatigue in order to increase her endurance. She was able to perform bed mobility at a supervision level and was min A for all LB dressing performed using AE while in sitting. Patient much less SOB during bed mobility and when performing ADLs, than she has been in past sessions. At this point she continues to benefit from acute OT , but her progress is slow and she will need SNF rehab after discharge. Progression toward goals:  []       Improving appropriately and progressing toward goals  [x]       Improving slowly and progressing toward goals  []       Not making progress toward goals and plan of care will be adjusted     PLAN:  Patient continues to benefit from skilled intervention to address the above impairments. Continue treatment per established plan of care. Discharge Recommendations:  Skilled Nursing Facility  Further Equipment Recommendations for Discharge:  TBD     SUBJECTIVE:   Patient stated I accomplished something!     OBJECTIVE DATA SUMMARY:   Cognitive/Behavioral Status:  Neurologic State: Alert  Orientation Level: Oriented X4  Cognition: Follows commands; Impaired decision making; Appropriate for age attention/concentration        Safety/Judgement: Insight into deficits; Decreased awareness of need for safety (limited adherence to RLE NWB. )  Functional Mobility and Transfers for ADLs:  Bed Mobility:  Rolling: Supervision; Additional time  Supine to Sit: Supervision; Additional time  Sit to Supine: Supervision; Additional time  Scooting: Supervision  Transfers: not attempted   Balance:  Sitting: Intact  Sitting - Static: Good (unsupported)  Sitting - Dynamic: Good (unsupported)  Standing: Impaired  Standing - Static: Poor  Standing - Dynamic : Poor  ADL Intervention:   Lower Body Dressing Assistance  Underpants: Minimum assistance; Compensatory technique training (to taras to upper thigh, seated and weight shifting laterally)  Socks: Minimum assistance; Compensatory technique training (to doff L sock, supervision to taras)  Cues: Doff;Don;Tactile cues provided;Verbal cues provided;Visual cues provided  Adaptive Equipment Used: Reacher;Sock aid    Cognitive Retraining  Problem Solving: Deductive reason;General alternative solution; Identifying the problem (during LB dressing/AE training)  Safety/Judgement: Insight into deficits; Decreased awareness of need for safety (limited adherence to RLE NWB. )  Cues: Tactile cues provided;Verbal cues provided;Visual cues provided    Pain:  Pain Scale 1: Numeric (0 - 10)  Pain Intensity 1: 7  Pain Location 1: Abdomen     Pain Description 1: Aching  Pain Intervention(s) 1: Medication (see MAR)    After treatment:   [] Patient left in no apparent distress sitting up in chair  [x] Patient left in no apparent distress in bed  [x] Call bell left within reach  [x] Nursing notified  [] Caregiver present  [] Bed alarm activated    COMMUNICATION/COLLABORATION:   The patients plan of care was discussed with: Physical Therapist    Alan Orlando OTR/L  Time Calculation: 28 mins

## 2017-07-26 LAB
ALBUMIN SERPL BCP-MCNC: 1.9 G/DL (ref 3.5–5)
ALBUMIN/GLOB SERPL: 0.7 {RATIO} (ref 1.1–2.2)
ALP SERPL-CCNC: 56 U/L (ref 45–117)
ALT SERPL-CCNC: 50 U/L (ref 12–78)
ANION GAP BLD CALC-SCNC: 6 MMOL/L (ref 5–15)
AST SERPL W P-5'-P-CCNC: 15 U/L (ref 15–37)
BILIRUB SERPL-MCNC: 0.5 MG/DL (ref 0.2–1)
BUN SERPL-MCNC: 23 MG/DL (ref 6–20)
BUN/CREAT SERPL: 30 (ref 12–20)
CALCIUM SERPL-MCNC: 7.4 MG/DL (ref 8.5–10.1)
CHLORIDE SERPL-SCNC: 99 MMOL/L (ref 97–108)
CO2 SERPL-SCNC: 29 MMOL/L (ref 21–32)
CREAT SERPL-MCNC: 0.76 MG/DL (ref 0.55–1.02)
ERYTHROCYTE [DISTWIDTH] IN BLOOD BY AUTOMATED COUNT: 14.8 % (ref 11.5–14.5)
GLOBULIN SER CALC-MCNC: 2.7 G/DL (ref 2–4)
GLUCOSE BLD STRIP.AUTO-MCNC: 112 MG/DL (ref 65–100)
GLUCOSE BLD STRIP.AUTO-MCNC: 124 MG/DL (ref 65–100)
GLUCOSE BLD STRIP.AUTO-MCNC: 195 MG/DL (ref 65–100)
GLUCOSE BLD STRIP.AUTO-MCNC: 210 MG/DL (ref 65–100)
GLUCOSE SERPL-MCNC: 97 MG/DL (ref 65–100)
HCT VFR BLD AUTO: 21.5 % (ref 35–47)
HGB BLD-MCNC: 7.3 G/DL (ref 11.5–16)
MCH RBC QN AUTO: 30 PG (ref 26–34)
MCHC RBC AUTO-ENTMCNC: 34 G/DL (ref 30–36.5)
MCV RBC AUTO: 88.5 FL (ref 80–99)
PLATELET # BLD AUTO: 245 K/UL (ref 150–400)
POTASSIUM SERPL-SCNC: 3.2 MMOL/L (ref 3.5–5.1)
PROT SERPL-MCNC: 4.6 G/DL (ref 6.4–8.2)
RBC # BLD AUTO: 2.43 M/UL (ref 3.8–5.2)
SERVICE CMNT-IMP: ABNORMAL
SODIUM SERPL-SCNC: 134 MMOL/L (ref 136–145)
WBC # BLD AUTO: 22.3 K/UL (ref 3.6–11)

## 2017-07-26 PROCEDURE — 74011250637 HC RX REV CODE- 250/637: Performed by: SPECIALIST

## 2017-07-26 PROCEDURE — 80053 COMPREHEN METABOLIC PANEL: CPT | Performed by: INTERNAL MEDICINE

## 2017-07-26 PROCEDURE — P9016 RBC LEUKOCYTES REDUCED: HCPCS | Performed by: INTERNAL MEDICINE

## 2017-07-26 PROCEDURE — 74011250637 HC RX REV CODE- 250/637: Performed by: INTERNAL MEDICINE

## 2017-07-26 PROCEDURE — 86923 COMPATIBILITY TEST ELECTRIC: CPT | Performed by: INTERNAL MEDICINE

## 2017-07-26 PROCEDURE — 82962 GLUCOSE BLOOD TEST: CPT

## 2017-07-26 PROCEDURE — 36415 COLL VENOUS BLD VENIPUNCTURE: CPT | Performed by: INTERNAL MEDICINE

## 2017-07-26 PROCEDURE — 86900 BLOOD TYPING SEROLOGIC ABO: CPT | Performed by: INTERNAL MEDICINE

## 2017-07-26 PROCEDURE — 36430 TRANSFUSION BLD/BLD COMPNT: CPT

## 2017-07-26 PROCEDURE — 85027 COMPLETE CBC AUTOMATED: CPT | Performed by: INTERNAL MEDICINE

## 2017-07-26 PROCEDURE — 65270000029 HC RM PRIVATE

## 2017-07-26 PROCEDURE — 74011250636 HC RX REV CODE- 250/636: Performed by: INTERNAL MEDICINE

## 2017-07-26 PROCEDURE — 74011250637 HC RX REV CODE- 250/637: Performed by: PHYSICIAN ASSISTANT

## 2017-07-26 RX ORDER — SODIUM CHLORIDE 9 MG/ML
250 INJECTION, SOLUTION INTRAVENOUS AS NEEDED
Status: DISCONTINUED | OUTPATIENT
Start: 2017-07-26 | End: 2017-08-01 | Stop reason: ALTCHOICE

## 2017-07-26 RX ORDER — POTASSIUM CHLORIDE 7.45 MG/ML
10 INJECTION INTRAVENOUS
Status: DISPENSED | OUTPATIENT
Start: 2017-07-26 | End: 2017-07-26

## 2017-07-26 RX ORDER — POTASSIUM CHLORIDE 7.45 MG/ML
10 INJECTION INTRAVENOUS
Status: COMPLETED | OUTPATIENT
Start: 2017-07-26 | End: 2017-07-26

## 2017-07-26 RX ADMIN — Medication 10 ML: at 14:54

## 2017-07-26 RX ADMIN — POTASSIUM CHLORIDE 10 MEQ: 10 INJECTION, SOLUTION INTRAVENOUS at 12:45

## 2017-07-26 RX ADMIN — POTASSIUM CHLORIDE 10 MEQ: 10 INJECTION, SOLUTION INTRAVENOUS at 09:48

## 2017-07-26 RX ADMIN — PANTOPRAZOLE SODIUM 40 MG: 40 TABLET, DELAYED RELEASE ORAL at 09:46

## 2017-07-26 RX ADMIN — SUCRALFATE 1 G: 1 SUSPENSION ORAL at 09:46

## 2017-07-26 RX ADMIN — IRBESARTAN 75 MG: 75 TABLET ORAL at 11:20

## 2017-07-26 RX ADMIN — AMLODIPINE BESYLATE 5 MG: 5 TABLET ORAL at 09:47

## 2017-07-26 RX ADMIN — FLUTICASONE FUROATE AND VILANTEROL TRIFENATATE 1 PUFF: 200; 25 POWDER RESPIRATORY (INHALATION) at 11:20

## 2017-07-26 RX ADMIN — Medication 1 CAPSULE: at 09:46

## 2017-07-26 RX ADMIN — OXYCODONE HYDROCHLORIDE 5 MG: 5 TABLET ORAL at 17:51

## 2017-07-26 RX ADMIN — ACETAMINOPHEN 650 MG: 325 TABLET, FILM COATED ORAL at 23:41

## 2017-07-26 RX ADMIN — UMECLIDINIUM 1 PUFF: 62.5 AEROSOL, POWDER ORAL at 09:00

## 2017-07-26 RX ADMIN — Medication 10 ML: at 21:53

## 2017-07-26 RX ADMIN — SUCRALFATE 1 G: 1 SUSPENSION ORAL at 21:56

## 2017-07-26 RX ADMIN — SUCRALFATE 1 G: 1 SUSPENSION ORAL at 12:44

## 2017-07-26 RX ADMIN — POTASSIUM CHLORIDE 10 MEQ: 10 INJECTION, SOLUTION INTRAVENOUS at 20:59

## 2017-07-26 RX ADMIN — PANTOPRAZOLE SODIUM 40 MG: 40 TABLET, DELAYED RELEASE ORAL at 17:51

## 2017-07-26 RX ADMIN — METOPROLOL TARTRATE 50 MG: 50 TABLET ORAL at 20:59

## 2017-07-26 RX ADMIN — POTASSIUM CHLORIDE 10 MEQ: 10 INJECTION, SOLUTION INTRAVENOUS at 18:39

## 2017-07-26 RX ADMIN — POTASSIUM CHLORIDE 10 MEQ: 10 INJECTION, SOLUTION INTRAVENOUS at 21:52

## 2017-07-26 RX ADMIN — FUROSEMIDE 40 MG: 40 TABLET ORAL at 09:46

## 2017-07-26 RX ADMIN — POTASSIUM CHLORIDE 10 MEQ: 10 INJECTION, SOLUTION INTRAVENOUS at 11:00

## 2017-07-26 RX ADMIN — SUCRALFATE 1 G: 1 SUSPENSION ORAL at 17:51

## 2017-07-26 NOTE — PROGRESS NOTES
Ortho NP Note:    Pt OOB to the chair. No complaints. Pain well controlled. Denies CP, SOB    Recent Labs      07/26/17   0513   HCT  21.5*   HGB  7.3*       Ex-fix in place without the pin that was noted to be out on Friday 7/21. Partial splint in place to keep foot in alignment post pin removal.   Mild-mod edema present  Calves soft and supple  Sensation and motor intact  Cap refill intact. PLAN:  1) Ankle ORIF - continue NWB on RLE and mobilize with PT. 2) GI bleed - EDG 7/18 - followed by gastroenterology; She is off anticoagulation due to current bleed . Hgb down again today and Dr. Sy Sidhu saw patient and plan for repeat EGD tomorrow. 3)  Tachycardia - resolved  4) Anemia - from GI Bleed - Ankle is not source of blood loss. One unit yesterday with improved Hgb today. 5) COPD exacerbation? - Dr. Devi Osborne following - PO Prednisone, PRN Nebs. 6) Ness- removal attempted on 7/23 with retention. Cath replaced. To be removed in 4-5 days with voiding trial.    7) Stable from ortho standpoint. [facility] has bed.       Beto Borjas, RODY

## 2017-07-26 NOTE — PROGRESS NOTES
Cardiology Progress Note  7/25/2017     Admit Date: 7/12/2017  Admit Diagnosis: right ankle fracture  Respiratory failure ([facility] [address]  GI Bleed                          Assessment:                  1. Paroxysmal SVT, self-limited. 2. Sinus bradycardia due to beta-blocker. Adjusted. 3. S/p mechanical fall requiring orthopedic surgery of right leg, found to be hypoxic, likely COPD exacerbation, PSVT noted on telemetry, mild rhabo with increased troponin being managed medically, then with GI bleed s/p 3 units prbc.  4. In 2011 found to have hyponatremia and TWI on ECG with cath with moderate CAD. 5. Echo 6/2017 EF 55%, mild to mod MR, mild AI. 6. HTN with right renal artery stent. 7. DM type 2.  8. Dyslipidemia difficulty with daily statin. 9. Melena, GI bleed found to have PUD by EGD, transfused 3 units and then another 1 unit of prbc, Dr. Louie Chu following. 10. PVD with left fem-pop placed on Plavix for this. 6. , mild functional capacity, works at [employer]:                    1. Holding plavix, was on this for PVD. I think this should be held for as long as needed until bleeding has resolved. 2. Metoprolol 50 bid. Was increased to 75mg bid but reduced due to bradycardia. Was on ER formulation 100 daily as OP. 3. Cont amlodipine, would not titrate due to edema in legs. 4. Back on ARB. 5. Cont lasix 40mg daily. 6. Cont statin, stopped niacin.      Stable from cardiac rhythm standpoint.       For other plans, see orders.   Hospital problem list   Active Hospital Problems    Diagnosis Date Noted    Respiratory failure ([facility] [address] 07/13/2017        Subjective: Herbert Gilbert reports   Chest pain X none  consistent with:  Non-cardiac CP         Atypical CP     None now  On going  Anginal CP     Dyspnea X none  at rest  with exertion         improved  unchanged  worse              PND X none  overnight       Orthopnea X none  improved  unchanged  worse Presyncope X none  improved  unchanged  worse     Ambulated in hallway without symptoms   Yes   Ambulated in room without symptoms  Yes   Objective:    Physical Exam:  Overall VSSAF;    Visit Vitals    /52 (BP 1 Location: Left arm, BP Patient Position: At rest)    Pulse 63    Temp 98.3 °F (36.8 °C)    Resp 18    Ht 5' 3\" (1.6 m)    Wt 90.4 kg (199 lb 4.7 oz)    SpO2 98%    BMI 35.3 kg/m2     Temp (24hrs), Av °F (36.7 °C), Min:97.7 °F (36.5 °C), Max:98.3 °F (36.8 °C)    Patient Vitals for the past 8 hrs:   Pulse   17 63    Patient Vitals for the past 8 hrs:   Resp   17 18    Patient Vitals for the past 8 hrs:   BP   17 155/52          Intake/Output Summary (Last 24 hours) at 17  Last data filed at 17 2310   Gross per 24 hour   Intake                0 ml   Output             1000 ml   Net            -1000 ml     General Appearance: Well developed, obsese, no acute distress. Ears/Nose/Mouth/Throat:   Normal MM; anicteric. JVP: WNL   Resp:   clear to auscultation bilaterally anteriorly. Nl resp effort. Cardiovascular:  RRR, S1, S2 normal, no new murmur. No gallop or rub. Abdomen:   Soft, non-tender, bowel sounds are present. Extremities: No edema bilaterally. Right foot in boot. Skin:  Neuro: Warm and dry. A/O x3, grossly nonfocal     cath site intact w/o hematoma or new bruit; distal pulse unchanged  Yes   Data Review:     Telemetry independently reviewed x sinus  chronic afib  parox SVT  NSVT     ECG independently reviewed  NSR  afib  no significant changes  NSST-Tw chgs   x no new ECG provided for review   Lab results reviewed as noted below. Current medications reviewed as noted below. No results for input(s): PH, PCO2, PO2 in the last 72 hours.   Recent Labs      17   0530   CPK  77     Recent Labs      17   0530  17   1035  17   0420   NA  135*   --   133*   K  3.4*   --   3.4*   CL  99   --   97   CO2 28   --   29   BUN  27*   --   43*   CREA  0.79   --   0.94   GLU  102*   --   101*   PHOS   --    --   2.6   CA  7.6*   --   7.3*   WBC  23.6*  27.1*  27.3*   HGB  9.7*  8.1*  7.7*   HCT  28.3*  23.6*  22.0*   PLT  244  270  254     Lab Results   Component Value Date/Time    Cholesterol, total 162 09/20/2011 01:17 PM    HDL Cholesterol 55 09/20/2011 01:17 PM    LDL, calculated 78.4 09/20/2011 01:17 PM    Triglyceride 143 09/20/2011 01:17 PM    CHOL/HDL Ratio 2.9 09/20/2011 01:17 PM     No results for input(s): SGOT, GPT, AP, TBIL, TP, ALB, GLOB, GGT, AML, LPSE in the last 72 hours. No lab exists for component: AMYP, HLPSE  No results for input(s): INR, PTP, APTT in the last 72 hours.     No lab exists for component: INREXT, INREXT   No components found for: GLPOC    Current Facility-Administered Medications   Medication Dose Route Frequency    [START ON 7/26/2017] umeclidinium (INCRUSE ELLIPTA) 62.5 mcg/actuation  1 Puff Inhalation DAILY    [START ON 7/26/2017] lactobac ac& pc-s.therm-b.anim (ROSANGELA Q/RISAQUAD)  1 Cap Oral DAILY    0.9% sodium chloride infusion 250 mL  250 mL IntraVENous PRN    metoprolol tartrate (LOPRESSOR) tablet 50 mg  50 mg Oral BID    pantoprazole (PROTONIX) tablet 40 mg  40 mg Oral ACB&D    irbesartan (AVAPRO) tablet 75 mg  75 mg Oral DAILY    amLODIPine (NORVASC) tablet 5 mg  5 mg Oral DAILY    ipratropium (ATROVENT) 0.02 % nebulizer solution 0.5 mg  0.5 mg Nebulization Q6H PRN    levalbuterol (XOPENEX) nebulizer soln 0.63 mg/3 mL  0.63 mg Nebulization Q6H PRN    0.9% sodium chloride infusion 250 mL  250 mL IntraVENous PRN    diphenhydrAMINE (BENADRYL) capsule 25 mg  25 mg Oral Q6H PRN    furosemide (LASIX) tablet 40 mg  40 mg Oral DAILY    fluticasone-vilanterol (BREO ELLIPTA) 200mcg-25mcg/puff  1 Puff Inhalation DAILY    acetaminophen (TYLENOL) tablet 650 mg  650 mg Oral Q6H PRN    sucralfate (CARAFATE) 100 mg/mL oral suspension 1 g  1 g Oral AC&HS    hydrALAZINE (APRESOLINE) 20 mg/mL injection 10 mg  10 mg IntraVENous Q6H PRN    bisacodyl (DULCOLAX) suppository 10 mg  10 mg Rectal DAILY PRN    sodium chloride (NS) flush 5-10 mL  5-10 mL IntraVENous Q8H    sodium chloride (NS) flush 5-10 mL  5-10 mL IntraVENous PRN    naloxone (NARCAN) injection 0.4 mg  0.4 mg IntraVENous PRN    ondansetron (ZOFRAN ODT) tablet 4 mg  4 mg Oral Q4H PRN    oxyCODONE IR (ROXICODONE) tablet 5 mg  5 mg Oral Q4H PRN    oxyCODONE IR (ROXICODONE) tablet 2.5 mg  2.5 mg Oral Q4H PRN        Julius Swanson MD

## 2017-07-26 NOTE — ROUTINE PROCESS
Patient received 1 1/2 bags of potassium. Receiving blood now. Will resume after blood is complete. No adverse reactions to blood transfusion noted.

## 2017-07-26 NOTE — PROGRESS NOTES
Cardiology Progress Note  7/26/2017     Admit Date: 7/12/2017  Admit Diagnosis: right ankle fracture  Respiratory failure (HCC)  GI Bleed  GI BLEED, ANEMIA  gi bleeding                          Assessment:                  1. Paroxysmal SVT, self-limited. 2. Sinus bradycardia due to beta-blocker. Adjusted. 3. Occasional PVC's including trigeminy. 4. S/p mechanical fall requiring orthopedic surgery of right leg, found to be hypoxic, likely COPD exacerbation, PSVT noted on telemetry, mild rhabo with increased troponin being managed medically, then with GI bleed s/p 3 units prbc.  5. In 2011 found to have hyponatremia and TWI on ECG with cath with moderate CAD. 6. Echo 6/2017 EF 55%, mild to mod MR, mild AI.  7. HTN with right renal artery stent. 8. DM type 2.  9. Dyslipidemia difficulty with daily statin. 10. Melena, GI bleed found to have PUD by EGD#1 but no active bleeding, transfused multiple units of prbc here, Dr. Tone Marques following. 11. PVD with left fem-pop placed on Plavix for this. 15. , mild functional capacity, works at [employer]                        DYMH:                    1. Holding Plavix for as long as needed until bleeding has resolved. 2. Cont metoprolol 50 bid. Was increased to 75mg bid but reduced due to bradycardia. (Was on the ER formulation 100 daily as OP.)  3. Cont amlodipine, would not titrate due to edema in legs. 4. Back on ARB. 5. Cont lasix 40mg daily. 6. Cont statin, stopped niacin.      Stable from cardiac rhythm standpoint. Acceptable risk from cardiac standpoint for GI procedure(s).     For other plans, see orders.   Hospital problem list   Active Hospital Problems    Diagnosis Date Noted    Respiratory failure ([facility] [address] 07/13/2017        Subjective: Vani Sherrell reports   Chest pain X none  consistent with:  Non-cardiac CP         Atypical CP     None now  On going  Anginal CP     Dyspnea X none  at rest  with exertion         improved unchanged  worse              PND X none  overnight       Orthopnea X none  improved  unchanged  worse   Presyncope X none  improved  unchanged  worse     Ambulated in hallway without symptoms   Yes   Ambulated in room without symptoms  Yes   Objective:    Physical Exam:  Overall VSSAF;    Visit Vitals    /68 (BP Patient Position: At rest)    Pulse 62    Temp 98.3 °F (36.8 °C)    Resp 18    Ht 5' 3\" (1.6 m)    Wt 90.4 kg (199 lb 4.7 oz)    SpO2 92%    BMI 35.3 kg/m2     Temp (24hrs), Av.1 °F (36.7 °C), Min:97.5 °F (36.4 °C), Max:98.5 °F (36.9 °C)    Patient Vitals for the past 8 hrs:   Pulse   17 1545 62   17 1417 65   17 1405 85   17 1312 63   17 0836 (!) 57    Patient Vitals for the past 8 hrs:   Resp   17 1545 18   17 1417 20   17 1405 16   17 1312 18   17 0836 16    Patient Vitals for the past 8 hrs:   BP   17 1545 161/68   17 1417 154/66   17 1405 167/72   17 1312 142/44   17 0836 175/77          Intake/Output Summary (Last 24 hours) at 17 1550  Last data filed at 17 1325   Gross per 24 hour   Intake              360 ml   Output             2075 ml   Net            -1715 ml     General Appearance: Well developed, obsese, no acute distress. Ears/Nose/Mouth/Throat:   Normal MM; anicteric. JVP: WNL   Resp:   clear to auscultation bilaterally anteriorly. Nl resp effort. Cardiovascular:  RRR, S1, S2 normal, no new murmur. No gallop or rub. Abdomen:   Soft, non-tender, bowel sounds are present. Extremities: No edema bilaterally. Right foot in boot. Skin:  Neuro: Warm and dry.   A/O x3, grossly nonfocal     cath site intact w/o hematoma or new bruit; distal pulse unchanged  Yes   Data Review:     Telemetry independently reviewed x sinus  chronic afib  parox SVT  NSVT     ECG independently reviewed  NSR  afib  no significant changes  NSST-Tw chgs   x no new ECG provided for review   Lab results reviewed as noted below. Current medications reviewed as noted below. No results for input(s): PH, PCO2, PO2 in the last 72 hours. Recent Labs      07/25/17   0530   CPK  77     Recent Labs      07/26/17   0513  07/25/17   0530  07/24/17   1035   NA  134*  135*   --    K  3.2*  3.4*   --    CL  99  99   --    CO2  29  28   --    BUN  23*  27*   --    CREA  0.76  0.79   --    GLU  97  102*   --    CA  7.4*  7.6*   --    ALB  1.9*   --    --    WBC  22.3*  23.6*  27.1*   HGB  7.3*  9.7*  8.1*   HCT  21.5*  28.3*  23.6*   PLT  245  244  270     Lab Results   Component Value Date/Time    Cholesterol, total 162 09/20/2011 01:17 PM    HDL Cholesterol 55 09/20/2011 01:17 PM    LDL, calculated 78.4 09/20/2011 01:17 PM    Triglyceride 143 09/20/2011 01:17 PM    CHOL/HDL Ratio 2.9 09/20/2011 01:17 PM     Recent Labs      07/26/17   0513   SGOT  15   AP  56   TP  4.6*   ALB  1.9*   GLOB  2.7     No results for input(s): INR, PTP, APTT in the last 72 hours.     No lab exists for component: INREXT, INREXT   No components found for: GLPOC    Current Facility-Administered Medications   Medication Dose Route Frequency    0.9% sodium chloride infusion 250 mL  250 mL IntraVENous PRN    umeclidinium (INCRUSE ELLIPTA) 62.5 mcg/actuation  1 Puff Inhalation DAILY    lactobac ac& pc-s.therm-b.anim (ROSANGELA Q/RISAQUAD)  1 Cap Oral DAILY    0.9% sodium chloride infusion 250 mL  250 mL IntraVENous PRN    metoprolol tartrate (LOPRESSOR) tablet 50 mg  50 mg Oral BID    pantoprazole (PROTONIX) tablet 40 mg  40 mg Oral ACB&D    irbesartan (AVAPRO) tablet 75 mg  75 mg Oral DAILY    amLODIPine (NORVASC) tablet 5 mg  5 mg Oral DAILY    ipratropium (ATROVENT) 0.02 % nebulizer solution 0.5 mg  0.5 mg Nebulization Q6H PRN    levalbuterol (XOPENEX) nebulizer soln 0.63 mg/3 mL  0.63 mg Nebulization Q6H PRN    0.9% sodium chloride infusion 250 mL  250 mL IntraVENous PRN    diphenhydrAMINE (BENADRYL) capsule 25 mg  25 mg Oral Q6H PRN  furosemide (LASIX) tablet 40 mg  40 mg Oral DAILY    fluticasone-vilanterol (BREO ELLIPTA) 200mcg-25mcg/puff  1 Puff Inhalation DAILY    acetaminophen (TYLENOL) tablet 650 mg  650 mg Oral Q6H PRN    sucralfate (CARAFATE) 100 mg/mL oral suspension 1 g  1 g Oral AC&HS    hydrALAZINE (APRESOLINE) 20 mg/mL injection 10 mg  10 mg IntraVENous Q6H PRN    bisacodyl (DULCOLAX) suppository 10 mg  10 mg Rectal DAILY PRN    sodium chloride (NS) flush 5-10 mL  5-10 mL IntraVENous Q8H    sodium chloride (NS) flush 5-10 mL  5-10 mL IntraVENous PRN    naloxone (NARCAN) injection 0.4 mg  0.4 mg IntraVENous PRN    ondansetron (ZOFRAN ODT) tablet 4 mg  4 mg Oral Q4H PRN    oxyCODONE IR (ROXICODONE) tablet 5 mg  5 mg Oral Q4H PRN    oxyCODONE IR (ROXICODONE) tablet 2.5 mg  2.5 mg Oral Q4H PRN        Grayson Pruitt MD

## 2017-07-26 NOTE — PROGRESS NOTES
Chart reviewed. Noted pt's hgb to be 7.3 with plans to transfuse 1u PRBCs. Attempted to see pt this PM for bed level UE and LE exercises as blood transfusion had been initiated however pt politely declining participation with therapy. Pt with reports of general fatigue and generally \"not feeling well\" and requested that therapy hold for today and f/u tomorrow. Pt declined perform LE/UE bed level exercises or any form of participation. Will hold today per pt request and follow up tomorrow. Thank you    Emiliano Duncan.  Lizzeth Josue, JEBT

## 2017-07-26 NOTE — PROGRESS NOTES
Hospitalist Progress Note    NAME: Cassandra Cuellar   :  1945   MRN:  [id number]       Assessment / Plan:  Acute blood loss anemia due to GIB/duodenal ulcers and severe esophagitis:  Hg lower this morning with ongoing melena  - Transfuse 1 additional unit pRBCs. Has already received 3u pRBCs this admission.  - EGD  with Dr. Devante Verudzco demonstrating diffuse ulceration with friability in entire lower 1/3 of the esophagus, nearly circumferential c/w severe erosive esophagitis.  No varices seen, no active bleeding. The gastric mucosa appeared normal. Duodenum with multiple clean based ulcers (3 in the duodenal bulb) and 3 additional ones in the second portion of the duodenum with black base but no visible vessel, no active bleeding.  The ulcers in the bulb had a chronic appearance. - appreciate GI consult, plan for repeat EGD tomorrow afternoon given e/o ongoing bleeding  - con't BID PPI and carafate  - con't holding plavix on this for PVD, does not need to urgently be restarted  Minimally elevated troponin in setting of transient atrial fibrillation with RVR (), SVT (), 22 beat vtach (7/15) in setting of CAD and benign hypertension:  - echo  with EF 55-60 %. There were no regional wall motion abnormalities. - appreciate cardiology consult  - con't lopressor, avapro, lasix, norvasc  - off plavix due to GIB as above  Distal tibia and fibula fracture s/p mechanical fall:  s/p ORIF on , note ex-fix in place without the pin that was noted to be out on .  Partial splint in place to keep foot in alignment post pin removal.   - appreciate ortho assistance, con't [facility] per their recommendations  - PT/OT as able - Pt too fatigued to work with them today, CM to assist with SNF discharge  Acute hypoxic respiratory failure (resolved) due to acute COPD exacerbation and ?ILD:  Pt with interstitial lung disease that was suspected to be pulmonary edema on initial CXR  - CXR  with no significant change. Chronic left lower lobe atelectasis. Stable appearance of the right subclavian central venous catheter. - off prednisone and levaquin - not sure what we are treating. Will hopefully see decline in WBC off steroids. - con't breo. Incruse added this admission.  - prn ipratropium, xopenex nebs  - con't to mobilize as able  ARF, resolved  Hyponatremia, resolved   PAD:  s/p right renal artery stent, left fem-pop by Dr. Myles Young 3/2014  Tobacco use:  con't nicotine patch  Obesity (BMI 35.3)      Code Status: Full  Surrogate Decision Maker: daughter Trav Quintero [phone], son Rafiq Camara [phone] cell, home [phone]  DVT Prophylaxis: SCDs for now (lovenox if no plan procedure in the AM)     Subjective:     Chief Complaint / Reason for Physician Visit  \"I'm just really tired today. \"  Discussed with RN events overnight. Review of Systems:  Symptom Y/N Comments  Symptom Y/N Comments   Fever/Chills n   Chest Pain n    Poor Appetite n   Edema n    Cough n   Abdominal Pain n    Sputum n   Joint Pain     SOB/ODOM n   Pruritis/Rash     Nausea/vomit    Tolerating PT/OT     Diarrhea    Tolerating Diet     Constipation    Other       Could NOT obtain due to:      Objective:     VITALS:   Last 24hrs VS reviewed since prior progress note. Most recent are:  Patient Vitals for the past 24 hrs:   Temp Pulse Resp BP SpO2   07/26/17 1417 97.8 °F (36.6 °C) 65 20 154/66 94 %   07/26/17 1405 97.9 °F (36.6 °C) 85 16 167/72 94 %   07/26/17 1312 98.5 °F (36.9 °C) 63 18 142/44 96 %   07/26/17 0836 98.4 °F (36.9 °C) (!) 57 16 175/77 96 %   07/26/17 0358 97.7 °F (36.5 °C) (!) 58 16 155/72 94 %   07/26/17 0022 97.5 °F (36.4 °C) (!) 55 16 155/71 94 %   07/25/17 1941 98.3 °F (36.8 °C) 63 18 155/52 98 %       Intake/Output Summary (Last 24 hours) at 07/26/17 1459  Last data filed at 07/26/17 1325   Gross per 24 hour   Intake              360 ml   Output             2075 ml   Net            -1715 ml        PHYSICAL EXAM:  General: WD, WN. Alert, cooperative, no acute distress    EENT:  EOMI. Anicteric sclerae. MMM  Resp:  CTA bilaterally, no wheezing or rales. No accessory muscle use  CV:  Regular  rhythm,  No edema  GI:  Soft, mildly distended, Non tender.  +Bowel sounds  Neurologic:  Alert and oriented X 3, normal speech,   Psych:   Fair insight. Not anxious nor agitated  Skin:  Pale, No rashes. No jaundice    Reviewed most current lab test results and cultures  YES  Reviewed most current radiology test results   YES  Review and summation of old records today    NO  Reviewed patient's current orders and MAR    YES  PMH/SH reviewed - no change compared to H&P  ________________________________________________________________________  Care Plan discussed with:    Comments   Patient x    Family  x sister   RN x    Care Manager     Consultant  x GI                     Multidiciplinary team rounds were held today with , nursing, pharmacist and clinical coordinator. Patient's plan of care was discussed; medications were reviewed and discharge planning was addressed. ________________________________________________________________________  Total NON critical care TIME: 30 Minutes    Total CRITICAL CARE TIME Spent:   Minutes non procedure based      Comments   >50% of visit spent in counseling and coordination of care x    ________________________________________________________________________  Anupam Lopez MD     Procedures: see electronic medical records for all procedures/Xrays and details which were not copied into this note but were reviewed prior to creation of Plan. LABS:  I reviewed today's most current labs and imaging studies.   Pertinent labs include:  Recent Labs      07/26/17   0513  07/25/17   0530  07/24/17   1035   WBC  22.3*  23.6*  27.1*   HGB  7.3*  9.7*  8.1*   HCT  21.5*  28.3*  23.6*   PLT  245  244  270     Recent Labs      07/26/17   0513  07/25/17   0530   NA  134*  135*   K  3.2*  3.4*   CL  99  99   CO2  29 28   GLU  97  102*   BUN  23*  27*   CREA  0.76  0.79   CA  7.4*  7.6*   ALB  1.9*   --    TBILI  0.5   --    SGOT  15   --    ALT  50   --        Signed: Lizzy Isidro MD

## 2017-07-26 NOTE — PROGRESS NOTES
Gastroenterology Daily Progress Note (Dr. Charlee Pope)    Admit Date: 7/12/2017       Subjective:       Hgb has dropped again. Some dark stools reported.       Current Facility-Administered Medications   Medication Dose Route Frequency    potassium chloride 20 mEq in 50 ml IVPB  20 mEq IntraVENous Q1H    umeclidinium (INCRUSE ELLIPTA) 62.5 mcg/actuation  1 Puff Inhalation DAILY    lactobac ac& pc-s.therm-b.anim (ROSANGELA Q/RISAQUAD)  1 Cap Oral DAILY    0.9% sodium chloride infusion 250 mL  250 mL IntraVENous PRN    metoprolol tartrate (LOPRESSOR) tablet 50 mg  50 mg Oral BID    pantoprazole (PROTONIX) tablet 40 mg  40 mg Oral ACB&D    irbesartan (AVAPRO) tablet 75 mg  75 mg Oral DAILY    amLODIPine (NORVASC) tablet 5 mg  5 mg Oral DAILY    ipratropium (ATROVENT) 0.02 % nebulizer solution 0.5 mg  0.5 mg Nebulization Q6H PRN    levalbuterol (XOPENEX) nebulizer soln 0.63 mg/3 mL  0.63 mg Nebulization Q6H PRN    0.9% sodium chloride infusion 250 mL  250 mL IntraVENous PRN    diphenhydrAMINE (BENADRYL) capsule 25 mg  25 mg Oral Q6H PRN    furosemide (LASIX) tablet 40 mg  40 mg Oral DAILY    fluticasone-vilanterol (BREO ELLIPTA) 200mcg-25mcg/puff  1 Puff Inhalation DAILY    acetaminophen (TYLENOL) tablet 650 mg  650 mg Oral Q6H PRN    sucralfate (CARAFATE) 100 mg/mL oral suspension 1 g  1 g Oral AC&HS    hydrALAZINE (APRESOLINE) 20 mg/mL injection 10 mg  10 mg IntraVENous Q6H PRN    bisacodyl (DULCOLAX) suppository 10 mg  10 mg Rectal DAILY PRN    sodium chloride (NS) flush 5-10 mL  5-10 mL IntraVENous Q8H    sodium chloride (NS) flush 5-10 mL  5-10 mL IntraVENous PRN    naloxone (NARCAN) injection 0.4 mg  0.4 mg IntraVENous PRN    ondansetron (ZOFRAN ODT) tablet 4 mg  4 mg Oral Q4H PRN    oxyCODONE IR (ROXICODONE) tablet 5 mg  5 mg Oral Q4H PRN    oxyCODONE IR (ROXICODONE) tablet 2.5 mg  2.5 mg Oral Q4H PRN        Objective:     Visit Vitals    /72 (BP 1 Location: Left arm, BP Patient Position: At rest)    Pulse (!) 58    Temp 97.7 °F (36.5 °C)    Resp 16    Ht 5' 3\" (1.6 m)    Wt 90.4 kg (199 lb 4.7 oz)    SpO2 94%    BMI 35.3 kg/m2   Blood pressure 155/72, pulse (!) 58, temperature 97.7 °F (36.5 °C), resp. rate 16, height 5' 3\" (1.6 m), weight 90.4 kg (199 lb 4.7 oz), SpO2 94 %.     07/24 1901 - 07/26 0700  In: -   Out: 1300 [Urine:1300]      Intake/Output Summary (Last 24 hours) at 07/26/17 0802  Last data filed at 07/26/17 0636   Gross per 24 hour   Intake                0 ml   Output              300 ml   Net             -300 ml     Physical Exam:     General: awake and conversant WF in NAD  GI: Soft, NT, ND + bowel sounds    Labs:     Recent Results (from the past 24 hour(s))   GLUCOSE, POC    Collection Time: 07/25/17 12:10 PM   Result Value Ref Range    Glucose (POC) 163 (H) 65 - 100 mg/dL    Performed by Christin Pineda    GLUCOSE, POC    Collection Time: 07/25/17  3:45 PM   Result Value Ref Range    Glucose (POC) 102 (H) 65 - 100 mg/dL    Performed by Iza Peterson    GLUCOSE, POC    Collection Time: 07/25/17  9:02 PM   Result Value Ref Range    Glucose (POC) 183 (H) 65 - 100 mg/dL    Performed by Elder Burch (PCT)    METABOLIC PANEL, COMPREHENSIVE    Collection Time: 07/26/17  5:13 AM   Result Value Ref Range    Sodium 134 (L) 136 - 145 mmol/L    Potassium 3.2 (L) 3.5 - 5.1 mmol/L    Chloride 99 97 - 108 mmol/L    CO2 29 21 - 32 mmol/L    Anion gap 6 5 - 15 mmol/L    Glucose 97 65 - 100 mg/dL    BUN 23 (H) 6 - 20 MG/DL    Creatinine 0.76 0.55 - 1.02 MG/DL    BUN/Creatinine ratio 30 (H) 12 - 20      GFR est AA >60 >60 ml/min/1.73m2    GFR est non-AA >60 >60 ml/min/1.73m2    Calcium 7.4 (L) 8.5 - 10.1 MG/DL    Bilirubin, total 0.5 0.2 - 1.0 MG/DL    ALT (SGPT) 50 12 - 78 U/L    AST (SGOT) 15 15 - 37 U/L    Alk.  phosphatase 56 45 - 117 U/L    Protein, total 4.6 (L) 6.4 - 8.2 g/dL    Albumin 1.9 (L) 3.5 - 5.0 g/dL    Globulin 2.7 2.0 - 4.0 g/dL    A-G Ratio 0.7 (L) 1.1 - 2.2 CBC W/O DIFF    Collection Time: 07/26/17  5:13 AM   Result Value Ref Range    WBC 22.3 (H) 3.6 - 11.0 K/uL    RBC 2.43 (L) 3.80 - 5.20 M/uL    HGB 7.3 (L) 11.5 - 16.0 g/dL    HCT 21.5 (L) 35.0 - 47.0 %    MCV 88.5 80.0 - 99.0 FL    MCH 30.0 26.0 - 34.0 PG    MCHC 34.0 30.0 - 36.5 g/dL    RDW 14.8 (H) 11.5 - 14.5 %    PLATELET 288 484 - 187 K/uL     Recent Labs      07/26/17   0513  07/25/17   0530   NA  134*  135*   K  3.2*  3.4*   CL  99  99   CO2  29  28   BUN  23*  27*   CREA  0.76  0.79   GLU  97  102*   CA  7.4*  7.6*     Lab Results   Component Value Date/Time    WBC 22.3 07/26/2017 05:13 AM    HGB 7.3 07/26/2017 05:13 AM    HCT 21.5 07/26/2017 05:13 AM    PLATELET 338 83/77/7317 05:13 AM    MCV 88.5 07/26/2017 05:13 AM         Impression:    Acute GI blood loss anemia secondary to Duodenal ulcers and ulcerative esophagitis on EGD 7/18/17  COPD  CHF         Plan:    - Hgb is dropping again. I will repeat an EGD tomorrow. Pt is aware and was   disappointed but knows why EGD needs to be repeated. -Continue PPI BID and carafate for PUD. - I will give her another unit PRBC (4th)  - Hold anticoagulants.  High GI bleed risk,         Stephanie Jennings MD    7/26/2017 [address] [facility], [address] [place] [address]  [phone] [address] [place]: [phone]

## 2017-07-26 NOTE — PROGRESS NOTES
Attempted to see patient for OT, but patient very fatigued and politely declining participation in OT. Patient also receiving a unit of blood due to continued anemia. OT deferred, but will follow back tomorrow.

## 2017-07-26 NOTE — PROGRESS NOTES
As per [facility] admission dept. pt SNF pre authorization from [facility] will  on 17.     Ada Adan  Ext [phone]

## 2017-07-27 ENCOUNTER — ANESTHESIA (OUTPATIENT)
Dept: ENDOSCOPY | Age: 72
DRG: 492 | End: 2017-07-27
Payer: MEDICARE

## 2017-07-27 ENCOUNTER — ANESTHESIA EVENT (OUTPATIENT)
Dept: ENDOSCOPY | Age: 72
DRG: 492 | End: 2017-07-27
Payer: MEDICARE

## 2017-07-27 LAB
ABO + RH BLD: NORMAL
ANION GAP BLD CALC-SCNC: 7 MMOL/L (ref 5–15)
BLD PROD TYP BPU: NORMAL
BLOOD GROUP ANTIBODIES SERPL: NORMAL
BPU ID: NORMAL
BUN SERPL-MCNC: 25 MG/DL (ref 6–20)
BUN/CREAT SERPL: 37 (ref 12–20)
CALCIUM SERPL-MCNC: 7.2 MG/DL (ref 8.5–10.1)
CHLORIDE SERPL-SCNC: 97 MMOL/L (ref 97–108)
CO2 SERPL-SCNC: 29 MMOL/L (ref 21–32)
CREAT SERPL-MCNC: 0.68 MG/DL (ref 0.55–1.02)
CROSSMATCH RESULT,%XM: NORMAL
ERYTHROCYTE [DISTWIDTH] IN BLOOD BY AUTOMATED COUNT: 14.3 % (ref 11.5–14.5)
GLUCOSE BLD STRIP.AUTO-MCNC: 101 MG/DL (ref 65–100)
GLUCOSE BLD STRIP.AUTO-MCNC: 174 MG/DL (ref 65–100)
GLUCOSE BLD STRIP.AUTO-MCNC: 94 MG/DL (ref 65–100)
GLUCOSE BLD STRIP.AUTO-MCNC: 98 MG/DL (ref 65–100)
GLUCOSE SERPL-MCNC: 111 MG/DL (ref 65–100)
HCT VFR BLD AUTO: 28 % (ref 35–47)
HGB BLD-MCNC: 9.5 G/DL (ref 11.5–16)
MCH RBC QN AUTO: 29.8 PG (ref 26–34)
MCHC RBC AUTO-ENTMCNC: 33.9 G/DL (ref 30–36.5)
MCV RBC AUTO: 87.8 FL (ref 80–99)
PLATELET # BLD AUTO: 210 K/UL (ref 150–400)
POTASSIUM SERPL-SCNC: 3.3 MMOL/L (ref 3.5–5.1)
RBC # BLD AUTO: 3.19 M/UL (ref 3.8–5.2)
SERVICE CMNT-IMP: ABNORMAL
SERVICE CMNT-IMP: ABNORMAL
SERVICE CMNT-IMP: NORMAL
SERVICE CMNT-IMP: NORMAL
SODIUM SERPL-SCNC: 133 MMOL/L (ref 136–145)
SPECIMEN EXP DATE BLD: NORMAL
STATUS OF UNIT,%ST: NORMAL
UNIT DIVISION, %UDIV: 0
WBC # BLD AUTO: 18.7 K/UL (ref 3.6–11)

## 2017-07-27 PROCEDURE — 77030019988 HC FCPS ENDOSC DISP BSC -B: Performed by: INTERNAL MEDICINE

## 2017-07-27 PROCEDURE — 80048 BASIC METABOLIC PNL TOTAL CA: CPT | Performed by: INTERNAL MEDICINE

## 2017-07-27 PROCEDURE — 74011250636 HC RX REV CODE- 250/636: Performed by: INTERNAL MEDICINE

## 2017-07-27 PROCEDURE — 74011000250 HC RX REV CODE- 250

## 2017-07-27 PROCEDURE — 74011250637 HC RX REV CODE- 250/637: Performed by: PHYSICIAN ASSISTANT

## 2017-07-27 PROCEDURE — 36415 COLL VENOUS BLD VENIPUNCTURE: CPT | Performed by: INTERNAL MEDICINE

## 2017-07-27 PROCEDURE — 74011250637 HC RX REV CODE- 250/637: Performed by: INTERNAL MEDICINE

## 2017-07-27 PROCEDURE — 74011250637 HC RX REV CODE- 250/637: Performed by: SPECIALIST

## 2017-07-27 PROCEDURE — 76060000031 HC ANESTHESIA FIRST 0.5 HR: Performed by: INTERNAL MEDICINE

## 2017-07-27 PROCEDURE — 82962 GLUCOSE BLOOD TEST: CPT

## 2017-07-27 PROCEDURE — 85027 COMPLETE CBC AUTOMATED: CPT | Performed by: INTERNAL MEDICINE

## 2017-07-27 PROCEDURE — 76040000019: Performed by: INTERNAL MEDICINE

## 2017-07-27 PROCEDURE — 97530 THERAPEUTIC ACTIVITIES: CPT

## 2017-07-27 PROCEDURE — 74011250636 HC RX REV CODE- 250/636

## 2017-07-27 PROCEDURE — 65270000029 HC RM PRIVATE

## 2017-07-27 PROCEDURE — 77030011256 HC DRSG MEPILEX <16IN NO BORD MOLN -A

## 2017-07-27 RX ORDER — SODIUM CHLORIDE 0.9 % (FLUSH) 0.9 %
5-10 SYRINGE (ML) INJECTION AS NEEDED
Status: ACTIVE | OUTPATIENT
Start: 2017-07-27 | End: 2017-07-27

## 2017-07-27 RX ORDER — DEXTROMETHORPHAN/PSEUDOEPHED 2.5-7.5/.8
1.2 DROPS ORAL
Status: DISCONTINUED | OUTPATIENT
Start: 2017-07-27 | End: 2017-07-27 | Stop reason: HOSPADM

## 2017-07-27 RX ORDER — LIDOCAINE HYDROCHLORIDE 20 MG/ML
INJECTION, SOLUTION EPIDURAL; INFILTRATION; INTRACAUDAL; PERINEURAL AS NEEDED
Status: DISCONTINUED | OUTPATIENT
Start: 2017-07-27 | End: 2017-07-27 | Stop reason: HOSPADM

## 2017-07-27 RX ORDER — SODIUM CHLORIDE 9 MG/ML
75 INJECTION, SOLUTION INTRAVENOUS CONTINUOUS
Status: DISPENSED | OUTPATIENT
Start: 2017-07-27 | End: 2017-07-27

## 2017-07-27 RX ORDER — SODIUM CHLORIDE 0.9 % (FLUSH) 0.9 %
5-10 SYRINGE (ML) INJECTION EVERY 8 HOURS
Status: COMPLETED | OUTPATIENT
Start: 2017-07-27 | End: 2017-07-27

## 2017-07-27 RX ORDER — METOPROLOL TARTRATE 25 MG/1
12.5 TABLET, FILM COATED ORAL 2 TIMES DAILY
Status: DISCONTINUED | OUTPATIENT
Start: 2017-07-27 | End: 2017-07-28

## 2017-07-27 RX ORDER — EPINEPHRINE 0.1 MG/ML
1 INJECTION INTRACARDIAC; INTRAVENOUS
Status: DISCONTINUED | OUTPATIENT
Start: 2017-07-27 | End: 2017-07-27 | Stop reason: HOSPADM

## 2017-07-27 RX ORDER — ATROPINE SULFATE 0.1 MG/ML
0.5 INJECTION INTRAVENOUS
Status: DISCONTINUED | OUTPATIENT
Start: 2017-07-27 | End: 2017-07-27 | Stop reason: HOSPADM

## 2017-07-27 RX ORDER — HYDROCHLOROTHIAZIDE 25 MG/1
25 TABLET ORAL DAILY
Status: DISCONTINUED | OUTPATIENT
Start: 2017-07-27 | End: 2017-07-31

## 2017-07-27 RX ORDER — MIDAZOLAM HYDROCHLORIDE 1 MG/ML
1-2 INJECTION, SOLUTION INTRAMUSCULAR; INTRAVENOUS
Status: DISCONTINUED | OUTPATIENT
Start: 2017-07-27 | End: 2017-07-27 | Stop reason: HOSPADM

## 2017-07-27 RX ORDER — POTASSIUM CHLORIDE 7.45 MG/ML
10 INJECTION INTRAVENOUS
Status: COMPLETED | OUTPATIENT
Start: 2017-07-27 | End: 2017-07-27

## 2017-07-27 RX ORDER — PROPOFOL 10 MG/ML
INJECTION, EMULSION INTRAVENOUS AS NEEDED
Status: DISCONTINUED | OUTPATIENT
Start: 2017-07-27 | End: 2017-07-27 | Stop reason: HOSPADM

## 2017-07-27 RX ORDER — FLUMAZENIL 0.1 MG/ML
0.2 INJECTION INTRAVENOUS
Status: DISCONTINUED | OUTPATIENT
Start: 2017-07-27 | End: 2017-07-27 | Stop reason: HOSPADM

## 2017-07-27 RX ORDER — SODIUM CHLORIDE 0.9 % (FLUSH) 0.9 %
5-10 SYRINGE (ML) INJECTION AS NEEDED
Status: DISPENSED | OUTPATIENT
Start: 2017-07-27 | End: 2017-07-27

## 2017-07-27 RX ORDER — NALOXONE HYDROCHLORIDE 0.4 MG/ML
0.4 INJECTION, SOLUTION INTRAMUSCULAR; INTRAVENOUS; SUBCUTANEOUS
Status: DISCONTINUED | OUTPATIENT
Start: 2017-07-27 | End: 2017-07-27 | Stop reason: HOSPADM

## 2017-07-27 RX ORDER — MIDAZOLAM HYDROCHLORIDE 1 MG/ML
.25-5 INJECTION, SOLUTION INTRAMUSCULAR; INTRAVENOUS
Status: DISCONTINUED | OUTPATIENT
Start: 2017-07-27 | End: 2017-07-27 | Stop reason: HOSPADM

## 2017-07-27 RX ADMIN — OXYCODONE HYDROCHLORIDE 5 MG: 5 TABLET ORAL at 03:07

## 2017-07-27 RX ADMIN — POTASSIUM CHLORIDE 10 MEQ: 10 INJECTION, SOLUTION INTRAVENOUS at 12:34

## 2017-07-27 RX ADMIN — HYDRALAZINE HYDROCHLORIDE 10 MG: 20 INJECTION INTRAMUSCULAR; INTRAVENOUS at 17:33

## 2017-07-27 RX ADMIN — Medication 10 ML: at 21:03

## 2017-07-27 RX ADMIN — ACETAMINOPHEN 650 MG: 325 TABLET, FILM COATED ORAL at 23:01

## 2017-07-27 RX ADMIN — Medication 10 ML: at 14:10

## 2017-07-27 RX ADMIN — Medication 10 ML: at 13:30

## 2017-07-27 RX ADMIN — SUCRALFATE 1 G: 1 SUSPENSION ORAL at 21:02

## 2017-07-27 RX ADMIN — SUCRALFATE 1 G: 1 SUSPENSION ORAL at 17:20

## 2017-07-27 RX ADMIN — SODIUM CHLORIDE 75 ML/HR: 900 INJECTION, SOLUTION INTRAVENOUS at 14:30

## 2017-07-27 RX ADMIN — METOPROLOL TARTRATE 12.5 MG: 25 TABLET ORAL at 17:21

## 2017-07-27 RX ADMIN — Medication 10 ML: at 06:00

## 2017-07-27 RX ADMIN — UMECLIDINIUM 1 PUFF: 62.5 AEROSOL, POWDER ORAL at 08:30

## 2017-07-27 RX ADMIN — LIDOCAINE HYDROCHLORIDE 100 MG: 20 INJECTION, SOLUTION EPIDURAL; INFILTRATION; INTRACAUDAL; PERINEURAL at 14:57

## 2017-07-27 RX ADMIN — Medication 10 ML: at 15:29

## 2017-07-27 RX ADMIN — FLUTICASONE FUROATE AND VILANTEROL TRIFENATATE 1 PUFF: 200; 25 POWDER RESPIRATORY (INHALATION) at 08:31

## 2017-07-27 RX ADMIN — POTASSIUM CHLORIDE 10 MEQ: 10 INJECTION, SOLUTION INTRAVENOUS at 09:49

## 2017-07-27 RX ADMIN — PROPOFOL 100 MG: 10 INJECTION, EMULSION INTRAVENOUS at 15:06

## 2017-07-27 RX ADMIN — PANTOPRAZOLE SODIUM 40 MG: 40 TABLET, DELAYED RELEASE ORAL at 17:21

## 2017-07-27 RX ADMIN — POTASSIUM CHLORIDE 10 MEQ: 10 INJECTION, SOLUTION INTRAVENOUS at 11:33

## 2017-07-27 RX ADMIN — POTASSIUM CHLORIDE 10 MEQ: 10 INJECTION, SOLUTION INTRAVENOUS at 08:38

## 2017-07-27 RX ADMIN — Medication 30 ML: at 21:13

## 2017-07-27 NOTE — PROGRESS NOTES
Patient presently off floor to endoscopy. Will follow back later today or tomorrow for continued OT intervention.

## 2017-07-27 NOTE — ANESTHESIA POSTPROCEDURE EVALUATION
Post-Anesthesia Evaluation and Assessment    Patient: Yulissa Morris MRN: [id number]  SSN: [id number]    YOB: 1945  Age: 81 y.o. Sex: female       Cardiovascular Function/Vital Signs  Visit Vitals    /65    Pulse 74    Temp 36.4 °C (97.6 °F)    Resp 18    Ht 5' 3\" (1.6 m)    Wt 90.4 kg (199 lb 4.7 oz)    SpO2 92%    Breastfeeding No    BMI 35.3 kg/m2       Patient is status post total IV anesthesia anesthesia for Procedure(s):  ESOPHAGOGASTRODUODENAL (EGD) BIOPSY  ESOPHAGOGASTRODUODENOSCOPY (EGD). Nausea/Vomiting: None    Postoperative hydration reviewed and adequate. Pain:  Pain Scale 1: Numeric (0 - 10) (07/27/17 1533)  Pain Intensity 1: 0 (07/27/17 1533)   Managed    Neurological Status:   Neuro (WDL): Exceptions to WDL (07/13/17 1615)  Neuro  Neurologic State: Alert (07/27/17 1106)  Orientation Level: Oriented X4 (07/27/17 0843)  Cognition: Follows commands (07/27/17 0843)  Speech: Clear (07/27/17 0843)  LUE Motor Response: Purposeful (07/27/17 0843)  LLE Motor Response: Purposeful (07/27/17 0843)  RUE Motor Response: Purposeful (07/27/17 0843)  RLE Motor Response: Weak (07/27/17 0843)   At baseline    Mental Status and Level of Consciousness: Arousable    Pulmonary Status:   O2 Device: Room air (07/27/17 1533)   Adequate oxygenation and airway patent    Complications related to anesthesia: None    Post-anesthesia assessment completed.  No concerns    Signed By: Yessenia Amaro DO     July 27, 2017

## 2017-07-27 NOTE — PERIOP NOTES
TRANSFER - IN REPORT:    Verbal report received from [facility]) on Nba Zurita  being received from endo(unit) for ordered procedure      Report consisted of patients Situation, Background, Assessment and   Recommendations(SBAR). Information from the following report(s) SBAR was reviewed with the receiving nurse. Opportunity for questions and clarification was provided. Assessment completed upon patients arrival to unit and care assumed.

## 2017-07-27 NOTE — PROGRESS NOTES
Cardiology Progress Note  7/27/2017     Admit Date: 7/12/2017  Admit Diagnosis: right ankle fracture  Respiratory failure (HCC)  GI Bleed  GI BLEED, ANEMIA  gi bleeding                          Assessment:                  1. Paroxysmal SVT, self-limited. 2. Sinus bradycardia due to beta-blocker. Adjusted. 3. Occasional PVC's including trigeminy. 4. S/p mechanical fall requiring orthopedic surgery of right leg, found to be hypoxic, likely COPD exacerbation, PSVT noted on telemetry, mild rhabo with increased troponin being managed medically, then with GI bleed s/p 3 units prbc.  5. In 2011 found to have hyponatremia and TWI on ECG with cath with moderate CAD. 6. Echo 6/2017 EF 55%, mild to mod MR, mild AI.  7. HTN with right renal artery stent. 8. DM type 2.  9. Dyslipidemia difficulty with daily statin. 10. Melena, GI bleed found to have PUD by EGD#1 but no active bleeding, transfused multiple units of prbc here, Dr. Melody Baumgarten following. EGD #2 with ulcers, esophagitis, but no active bleeding. 11. PVD with left fem-pop placed on Plavix for this. 15. , mild functional capacity, works at [employer]                        PGXB:                    1. Holding Plavix for as long as needed until bleeding has resolved. 2. Cont metoprolol 50 bid. Was increased to 75mg bid but reduced due to bradycardia. (Was on the ER formulation 100 daily as OP.)  3. Cont amlodipine, would not titrate due to edema in legs. 4. Back on ARB. 5. Cont lasix 40mg daily. 6. Cont statin, stopped niacin.      Stable from cardiac rhythm standpoint.       For other plans, see orders.   Hospital problem list   Active Hospital Problems    Diagnosis Date Noted    Respiratory failure ([facility] [address] 07/13/2017        Subjective: Carly Frye reports   Chest pain X none  consistent with:  Non-cardiac CP         Atypical CP     None now  On going  Anginal CP     Dyspnea X none  at rest  with exertion         improved unchanged  worse              PND X none  overnight       Orthopnea X none  improved  unchanged  worse   Presyncope X none  improved  unchanged  worse     Ambulated in hallway without symptoms   Yes   Ambulated in room without symptoms  Yes   Objective:    Physical Exam:  Overall VSSAF;    Visit Vitals    /41    Pulse 79    Temp 99.4 °F (37.4 °C)    Resp 16    Ht 5' 3\" (1.6 m)    Wt 90.4 kg (199 lb 4.7 oz)    SpO2 97%    Breastfeeding No    BMI 35.3 kg/m2     Temp (24hrs), Av.3 °F (36.8 °C), Min:97.6 °F (36.4 °C), Max:99.4 °F (37.4 °C)    Patient Vitals for the past 8 hrs:   Pulse   17 1731 79   17 1559 79   17 1533 74   17 1529 76   17 1527 60   17 1525 64   17 1523 71   17 1518 60   17 1511 (!) 59   17 1508 70   17 1208 79    Patient Vitals for the past 8 hrs:   Resp   17 1559 16   17 1533 18   17 1527 13   17 1525 17   17 1518 16   17 1511 16   17 1508 18   17 1208 18    Patient Vitals for the past 8 hrs:   BP   17 1731 163/41   17 1559 183/51   17 1533 176/65   17 1529 169/64   17 1527 172/70   17 1525 168/63   17 1523 157/56   17 1518 147/63   17 1511 159/50   17 1208 157/66          Intake/Output Summary (Last 24 hours) at 17 1820  Last data filed at 17 1508   Gross per 24 hour   Intake              250 ml   Output            86884 ml   Net           -71871 ml     General Appearance: Well developed, obsese, no acute distress. Ears/Nose/Mouth/Throat:   Normal MM; anicteric. JVP: WNL   Resp:   clear to auscultation bilaterally anteriorly. Nl resp effort. Cardiovascular:  RRR, S1, S2 normal, no new murmur. No gallop or rub. Abdomen:   Soft, non-tender, bowel sounds are present. Extremities: No edema bilaterally. Right foot in boot. Skin:  Neuro: Warm and dry.   A/O x3, grossly nonfocal     cath site intact w/o hematoma or new bruit; distal pulse unchanged  Yes   Data Review:     Telemetry independently reviewed x sinus  chronic afib  parox SVT  NSVT     ECG independently reviewed  NSR  afib  no significant changes  NSST-Tw chgs   x no new ECG provided for review   Lab results reviewed as noted below. Current medications reviewed as noted below. No results for input(s): PH, PCO2, PO2 in the last 72 hours. Recent Labs      07/25/17   0530   CPK  77     Recent Labs      07/27/17   0327  07/26/17   0513  07/25/17   0530   NA  133*  134*  135*   K  3.3*  3.2*  3.4*   CL  97  99  99   CO2  29  29  28   BUN  25*  23*  27*   CREA  0.68  0.76  0.79   GLU  111*  97  102*   CA  7.2*  7.4*  7.6*   ALB   --   1.9*   --    WBC  18.7*  22.3*  23.6*   HGB  9.5*  7.3*  9.7*   HCT  28.0*  21.5*  28.3*   PLT  210  245  244     Lab Results   Component Value Date/Time    Cholesterol, total 162 09/20/2011 01:17 PM    HDL Cholesterol 55 09/20/2011 01:17 PM    LDL, calculated 78.4 09/20/2011 01:17 PM    Triglyceride 143 09/20/2011 01:17 PM    CHOL/HDL Ratio 2.9 09/20/2011 01:17 PM     Recent Labs      07/26/17   0513   SGOT  15   AP  56   TP  4.6*   ALB  1.9*   GLOB  2.7     No results for input(s): INR, PTP, APTT in the last 72 hours.     No lab exists for component: INREXT, INREXT   No components found for: [place]    Current Facility-Administered Medications   Medication Dose Route Frequency    0.9% sodium chloride infusion  75 mL/hr IntraVENous CONTINUOUS    sodium chloride (NS) flush 5-10 mL  5-10 mL IntraVENous Q8H    metoprolol tartrate (LOPRESSOR) tablet 12.5 mg  12.5 mg Oral BID    hydroCHLOROthiazide (HYDRODIURIL) tablet 25 mg  25 mg Oral DAILY    sodium chloride (NS) flush 5-10 mL  5-10 mL IntraVENous Q8H    0.9% sodium chloride infusion 250 mL  250 mL IntraVENous PRN    umeclidinium (INCRUSE ELLIPTA) 62.5 mcg/actuation  1 Puff Inhalation DAILY    lactobac ac& pc-s.therm-b.anim (ROSANGELA Q/RISAQUAD)  1 Cap Oral DAILY    0.9% sodium chloride infusion 250 mL  250 mL IntraVENous PRN    pantoprazole (PROTONIX) tablet 40 mg  40 mg Oral ACB&D    irbesartan (AVAPRO) tablet 75 mg  75 mg Oral DAILY    amLODIPine (NORVASC) tablet 5 mg  5 mg Oral DAILY    ipratropium (ATROVENT) 0.02 % nebulizer solution 0.5 mg  0.5 mg Nebulization Q6H PRN    levalbuterol (XOPENEX) nebulizer soln 0.63 mg/3 mL  0.63 mg Nebulization Q6H PRN    0.9% sodium chloride infusion 250 mL  250 mL IntraVENous PRN    diphenhydrAMINE (BENADRYL) capsule 25 mg  25 mg Oral Q6H PRN    furosemide (LASIX) tablet 40 mg  40 mg Oral DAILY    fluticasone-vilanterol (BREO ELLIPTA) 200mcg-25mcg/puff  1 Puff Inhalation DAILY    acetaminophen (TYLENOL) tablet 650 mg  650 mg Oral Q6H PRN    sucralfate (CARAFATE) 100 mg/mL oral suspension 1 g  1 g Oral AC&HS    hydrALAZINE (APRESOLINE) 20 mg/mL injection 10 mg  10 mg IntraVENous Q6H PRN    bisacodyl (DULCOLAX) suppository 10 mg  10 mg Rectal DAILY PRN    sodium chloride (NS) flush 5-10 mL  5-10 mL IntraVENous Q8H    sodium chloride (NS) flush 5-10 mL  5-10 mL IntraVENous PRN    naloxone (NARCAN) injection 0.4 mg  0.4 mg IntraVENous PRN    ondansetron (ZOFRAN ODT) tablet 4 mg  4 mg Oral Q4H PRN    oxyCODONE IR (ROXICODONE) tablet 5 mg  5 mg Oral Q4H PRN    oxyCODONE IR (ROXICODONE) tablet 2.5 mg  2.5 mg Oral Q4H PRN        Ana Morrison MD

## 2017-07-27 NOTE — PROGRESS NOTES
Problem: Mobility Impaired (Adult and Pediatric)  Goal: *Acute Goals and Plan of Care (Insert Text)  Physical Therapy Goals  Goals reviewed and revised 7/27/17  1. Patient will move from supine to sit and sit to supine in bed with supervision/set-up within 7 day(s). 2. Patient will transfer from bed to chair and chair to bed with minimal assistance/contact guard assist using the least restrictive device within 7 day(s). 3. Patient will perform sit to stand with minimal assistance within 7 day(s). 4. Patient will ambulate with minimal assistance/contact guard assist for 25 feet with the least restrictive device within 7 day(s). 5. Patient will ascend/descend 1 stairs with bilateral handrail(s) with moderate assist within 7 day(s). Initiated 7/14/2017  1. Patient will move from supine to sit and sit to supine in bed with supervision/set-up within 7 day(s). 2. Patient will transfer from bed to chair and chair to bed with minimal assistance/contact guard assist using the least restrictive device within 7 day(s). 3. Patient will perform sit to stand with contact guard assist within 7 day(s). 4. Patient will ambulate with minimal assistance/contact guard assist for 50 feet with the least restrictive device within 7 day(s). 5. Patient will ascend/descend 1 stairs with bilateral handrail(s) with minimal assistance/contact guard assist within 7 day(s). PHYSICAL THERAPY TREATMENT: WEEKLY REASSESSMENT  Patient: Jereld Kayser (63 y.o. female)  Date: 7/27/2017  Diagnosis: right ankle fracture  Respiratory failure (HCC)  GI Bleed  GI BLEED, ANEMIA  gi bleeding <principal problem not specified>  Procedure(s) (LRB):  ESOPHAGOGASTRODUODENOSCOPY (EGD) (N/A) 9 Days Post-Op  Precautions: Fall, NWB      ASSESSMENT:  Pt received supine in bed and agreeable to PT intervention. Pt cleared by nursing for mobility.  Pt continues to demonstrate limited functional mobility secondary to RLE NWB precautions, generalized weakness, impaired balance, and poor activity tolerance. Pt without pain at rest. Pt performed bed mobility with min A x 1-2 overall, requiring additional time and cueing for proper technique. Pt with c/o dizziness while sitting EOB with /59. Pt reports that she is dizzy when her BP is elevated and agreeable to continue to mobilize. Pt needed multiple rest breaks during mobility due to poor activity tolerance. Pt attempted sit<>stand x 2, requiring max A x 2, however only able to achieve standing on second attempt. Pt needs increased VCs for proper positioning and techniques for transfers. Pt only able to maintain standing ~5 seconds prior to abruptly returning to sitting with uncontrolled descent. Pt with fair adherence to NWB precautions during mobility. Pt requesting to defer further mobility, therefore was returned supine in bed. Pt with small BM and assisted pt with cleaning (pt rolling R/L with min A). Pt was left with all needs met and RN aware. Pt will require SNF rehab at discharge. Will decrease frequency as pt with slow progress and limited mobility. Recommend santiago lift for bed<>chair transfers and that pt be up in bedside chair at least 1x/day. Patient's progression toward goals since last assessment: No progress; goals adjusted       PLAN:  Goals have been updated based on progression since last assessment. Patient continues to benefit from skilled intervention to address the above impairments. Continue to follow the patient 5 times a week to address goals.   Planned Interventions:  [X]              Bed Mobility Training             [ ]       Neuromuscular Re-Education  [X]              Transfer Training                   [ ]       Orthotic/Prosthetic Training  [X]              Gait Training                         [ ]       Modalities  [X]              Therapeutic Exercises           [ ]       Edema Management/Control  [X]              Therapeutic Activities            [X]       Patient and Family Training/Education  [ ]              Other (comment):  Discharge Recommendations: Dilip Syed  Further Equipment Recommendations for Discharge: TBD       SUBJECTIVE:   Patient stated I don't think I can do much more.       OBJECTIVE DATA SUMMARY:   Critical Behavior:  Neurologic State: Alert  Orientation Level: Oriented X4  Cognition: Follows commands  Safety/Judgement: Insight into deficits, Decreased awareness of need for safety (limited adherence to RLE NWB. )  Functional Mobility Training:  Bed Mobility:  Rolling: Minimum assistance;Assist x1;Additional time  Supine to Sit: Minimum assistance; Additional time;Assist x1  Sit to Supine: Minimum assistance;Assist x2  Scooting: Minimum assistance        Transfers:  Sit to Stand: Maximum assistance;Assist x2  Stand to Sit: Moderate assistance     Stand Pivot Transfers: Total assistance (pt declined to transfer to chair)                       Balance:  Sitting: Intact  Sitting - Static: Good (unsupported)  Sitting - Dynamic: Good (unsupported)  Standing: Impaired; With support  Standing - Static: Poor  Standing - Dynamic : Poor  Pain:  Pain Scale 1: Numeric (0 - 10)  Pain Intensity 1: 0              Activity Tolerance:   Poor - pt with significant dizziness with upright position with /59 sitting on EOB  Please refer to the flowsheet for vital signs taken during this treatment.   After treatment:   [ ]  Patient left in no apparent distress sitting up in chair  [X]  Patient left in no apparent distress in bed  [X]  Call bell left within reach  [X]  Nursing notified  [ ]  Caregiver present  [ ]  Bed alarm activated      COMMUNICATION/COLLABORATION:   The patients plan of care was discussed with: Physical Therapist and Registered Nurse     Danyel Price PT, DPT   Time Calculation: 27 mins

## 2017-07-27 NOTE — ROUTINE PROCESS
Bedside and Verbal shift change report given to FAY Zapata (oncoming nurse) by [facility], RN (offgoing nurse). Report included the following information SBAR, Kardex, Intake/Output, MAR, Recent Results and Med Rec Status.

## 2017-07-27 NOTE — PERIOP NOTES
Anesthesia reports 100mg Propofol, 100mg Lidocaine and 250mL NS given during procedure. Received report from anesthesia staff on vital signs and status of patient.

## 2017-07-27 NOTE — ROUTINE PROCESS
Jereld Kayser  1945  [id number]    Situation:  Verbal report received from: Barb APONTE  Procedure: Procedure(s):  ESOPHAGOGASTRODUODENAL (EGD) BIOPSY  ESOPHAGOGASTRODUODENOSCOPY (EGD)    Background:    Preoperative diagnosis: gi bleeding  Postoperative diagnosis: Hiatal Hernia, Gastritis    :  Dr. Fransico Cai  Assistant(s): Endoscopy RN-1: Leanne Briseno RN  Endoscopy RN-2: Pro Menard RN    Specimens: * No specimens in log *  H. Pylori  no    Assessment:  Intra-procedure medications       Anesthesia gave intra-procedure sedation and medications, see anesthesia flow sheet yes    Intravenous fluids: NS@ KVO     Vital signs stable       Abdominal assessment: round and soft       Recommendation:  Discharge patient per MD order  .   Return to floor yes  Family or Friend  Two sons and sisters upstairs in her room  Permission to share finding with family or friend yes

## 2017-07-27 NOTE — H&P
Pre-endoscopy H and P    The patient was seen and examined in the room/pre-op holding area. The airway was assessed and documented. The problem list, past medical history, and medications were reviewed. Patient Active Problem List   Diagnosis Code    Abnormal ECG R94.31    Respiratory failure, acute ([facility]) J96.00    Respiratory failure ([facility]) J96.90     Social History     Social History    Marital status:      Spouse name: N/A    Number of children: N/A    Years of education: N/A     Occupational History    Not on file. Social History Main Topics    Smoking status: Former Smoker     Packs/day: 1.50     Years: 40.00     Quit date: 12/20/2009    Smokeless tobacco: Never Used    Alcohol use No    Drug use: No    Sexual activity: Not on file     Other Topics Concern    Not on file     Social History Narrative     Past Medical History:   Diagnosis Date    Arthritis     CAD (coronary artery disease)     renal stent; Dr Ana Foss     Diabetes [facility])     oral    History of bronchitis     Hyperlipemia     Hypertension     Obesity          Prior to Admission Medications   Prescriptions Last Dose Informant Patient Reported? Taking?   acetaminophen (TYLENOL EX STR ARTHRITIS PAIN) 500 mg tablet   Yes No   Sig: Take 325 mg by mouth every six (6) hours as needed. amlodipine (NORVASC) 5 mg tablet 7/12/2017 at Unknown time  Yes Yes   Sig: Take 10 mg by mouth daily. clopidogrel (PLAVIX) 75 mg tablet 7/12/2017 at Unknown time  Yes Yes   Sig: Take 75 mg by mouth daily. irbesartan (AVAPRO) 300 mg tablet 7/12/2017 at Unknown time  Yes Yes   Sig: Take 300 mg by mouth nightly. lovastatin (MEVACOR) 10 mg tablet   Yes Yes   Sig: Take 10 mg by mouth nightly. methylcellulose, laxative, (CITRUCEL) Powd   Yes No   Sig: Take 1 Dose by mouth as needed. metoprolol succinate (TOPROL-XL) 100 mg XL tablet 7/12/2017 at Unknown time Other Yes Yes   Sig: Take 100 mg by mouth daily.    nicotinic acid (NIACIN) 500 mg tablet   Yes No   Sig: Take 500 mg by mouth Daily (before breakfast). Facility-Administered Medications: None           The review of systems is:  Negative  for shortness of breath or chest pain      The heart, lungs, and mental status were satisfactory for the administration of deep sedation and for the procedure. I discussed with the patient the objectives, risks, consequences and alternatives to the procedure.       Colette Adkins MD  7/27/2017  2:52 PM

## 2017-07-27 NOTE — PROGRESS NOTES
Hospitalist Progress Note    NAME: Herbert Gilbert   :  1945   MRN:  [id number]       Assessment / Plan:  Acute blood loss anemia due to GIB/duodenal ulcers and severe esophagitis: Hg improved this morning after transfusion yesterday, now 9.5. Pt reports feeling less fatigued. - Transfused 4u pRBCs this admission.  - EGD  with Dr. Isidro Green demonstrating diffuse ulceration with friability in entire lower 1/3 of the esophagus, nearly circumferential c/w severe erosive esophagitis.  No varices seen, no active bleeding. The gastric mucosa appeared normal. Duodenum with multiple clean based ulcers (3 in the duodenal bulb) and 3 additional ones in the second portion of the duodenum with black base but no visible vessel, no active bleeding.  The ulcers in the bulb had a chronic appearance. - appreciate GI consult, plan for repeat EGD today due to concern for ongoing bleeding  - con't BID PPI and carafate  - con't holding plavix on this for PVD, does not need to urgently be restarted  Minimally elevated troponin in setting of transient atrial fibrillation with RVR (), SVT (), 22 beat vtach (7/15) in setting of CAD and benign hypertension:  - echo  with EF 55-60 %. There were no regional wall motion abnormalities. - appreciate cardiology consult  - con't lopressor, avapro, lasix, norvasc  - off plavix due to GIB as above  Distal tibia and fibula fracture s/p mechanical fall:  s/p ORIF on , note ex-fix in place without the pin that was noted to be out on .  Partial splint in place to keep foot in alignment post pin removal.   - appreciate ortho assistance, con't NWB for now per their recommendations  - PT/OT, plan for [facility] discharge when medical issues stablized  Acute hypoxic respiratory failure (resolved) due to acute COPD exacerbation and ?ILD:  Pt with apparent interstitial lung disease that was suspected to be pulmonary edema on initial CXR  - CXR  with no significant change. Chronic left lower lobe atelectasis. Stable appearance of the right subclavian central venous catheter. - off prednisone and levaquin. Suspect leukocytosis is steroid-induced, improving.  - con't breo.  Incruse added this admission.  - prn ipratropium, xopenex nebs  - con't to mobilize as able  PAD:  s/p right renal artery stent, left fem-pop by Dr. Yves Rahman 3/2014  Tobacco use:  con't nicotine patch  ARF, resolved  Hyponatremia, resolved   Obesity (BMI 35.3)      Code Status: Full  Surrogate Decision Maker: daughter John Prajapati [phone], son Vlad Figueroa [phone] cell, home [phone]  DVT Prophylaxis: SCDs for now (lovenox if no plan procedure in the AM)     Subjective:     Chief Complaint / Reason for Physician Visit  \"I feel a little less weak today\". Discussed with RN events overnight. Review of Systems:  Symptom Y/N Comments  Symptom Y/N Comments   Fever/Chills n   Chest Pain n    Poor Appetite n   Edema n    Cough n   Abdominal Pain n    Sputum n   Joint Pain     SOB/ODOM n   Pruritis/Rash     Nausea/vomit    Tolerating PT/OT     Diarrhea    Tolerating Diet     Constipation    Other       Could NOT obtain due to:      Objective:     VITALS:   Last 24hrs VS reviewed since prior progress note.  Most recent are:  Patient Vitals for the past 24 hrs:   Temp Pulse Resp BP SpO2   07/27/17 0722 97.9 °F (36.6 °C) (!) 55 18 144/57 96 %   07/27/17 0315 - - - 158/47 -   07/27/17 0313 - 75 16 (!) 155/35 96 %   07/26/17 2336 - - - 149/42 -   07/26/17 2332 98.4 °F (36.9 °C) 81 16 170/48 97 %   07/26/17 1943 98.5 °F (36.9 °C) 65 16 158/42 97 %   07/26/17 1644 - 67 16 165/66 96 %   07/26/17 1615 - 71 16 161/70 95 %   07/26/17 1545 98.3 °F (36.8 °C) 62 18 161/68 92 %   07/26/17 1417 97.8 °F (36.6 °C) 65 20 154/66 94 %   07/26/17 1405 97.9 °F (36.6 °C) 85 16 167/72 94 %   07/26/17 1312 98.5 °F (36.9 °C) 63 18 142/44 96 %       Intake/Output Summary (Last 24 hours) at 07/27/17 7482  Last data filed at 07/26/17 2587 Gross per 24 hour   Intake              360 ml   Output            76844 ml   Net           -70592 ml        PHYSICAL EXAM:  General: WD, WN. Alert, cooperative, no acute distress    EENT:  EOMI. Anicteric sclerae. MMM  Resp:  CTA bilaterally, no wheezing or rales. No accessory muscle use  CV:  Regular  rhythm,  RLE in surgical dressing - ?edema  GI:  Soft, mildly distended, Non tender.  +Bowel sounds  Neurologic:  Alert and oriented X 3, normal speech,   Psych:   Fair insight. Not anxious nor agitated  Skin:  No rashes. No jaundice    Reviewed most current lab test results and cultures  YES  Reviewed most current radiology test results   YES  Review and summation of old records today    NO  Reviewed patient's current orders and MAR    YES  PMH/SH reviewed - no change compared to H&P  ________________________________________________________________________  Care Plan discussed with:    Comments   Patient x    Family      RN     Care Manager     Consultant                        Multidiciplinary team rounds were held today with , nursing, pharmacist and clinical coordinator. Patient's plan of care was discussed; medications were reviewed and discharge planning was addressed. ________________________________________________________________________  Total NON critical care TIME:  25 Minutes    Total CRITICAL CARE TIME Spent:   Minutes non procedure based      Comments   >50% of visit spent in counseling and coordination of care x    ________________________________________________________________________  Salma Viera MD     Procedures: see electronic medical records for all procedures/Xrays and details which were not copied into this note but were reviewed prior to creation of Plan. LABS:  I reviewed today's most current labs and imaging studies.   Pertinent labs include:  Recent Labs      07/27/17   0327  07/26/17   0513  07/25/17   0530   WBC  18.7*  22.3*  23.6*   HGB  9.5*  7.3*  9.7*   HCT 28.0*  21.5*  28.3*   PLT  210  245  244     Recent Labs      07/27/17   0327  07/26/17   0513  07/25/17   0530   NA  133*  134*  135*   K  3.3*  3.2*  3.4*   CL  97  99  99   CO2  29  29  28   GLU  111*  97  102*   BUN  25*  23*  27*   CREA  0.68  0.76  0.79   CA  7.2*  7.4*  7.6*   ALB   --   1.9*   --    TBILI   --   0.5   --    SGOT   --   15   --    ALT   --   50   --        Signed: Andres Palomino MD

## 2017-07-27 NOTE — ANESTHESIA PREPROCEDURE EVALUATION
Anesthetic History   No history of anesthetic complications            Review of Systems / Medical History  Patient summary reviewed, nursing notes reviewed and pertinent labs reviewed    Pulmonary  Within defined limits                 Neuro/Psych             Comments: Drowsy but answers appropriately Cardiovascular    Hypertension          CAD    Exercise tolerance: <4 METS  Comments: 7-17-17 EKG: SVT, 150---self limited per Cardiologist's note. Rate in 70-80s    7-15-17 EKG: SR with sinus arrhythmia    7-14-17 ECHO: 55-60% EF, no AS   GI/Hepatic/Renal               Comments: GI bleed  hgb 9.5  Denies N, V. Endo/Other    Diabetes    Obesity and arthritis     Other Findings            Physical Exam    Airway  Mallampati: II  TM Distance: 4 - 6 cm  Neck ROM: normal range of motion   Mouth opening: Normal    Comments: Sloping chin Cardiovascular    Rhythm: regular  Rate: normal         Dental      Comments: Some decayed teeth, none loose   Pulmonary      Decreased breath sounds: bibasilar          Comments: Decreased inspiratory effort Abdominal  GI exam deferred       Other Findings            Anesthetic Plan    ASA: 4  Anesthesia type: total IV anesthesia          Induction: Intravenous  Anesthetic plan and risks discussed with: Patient      Discussed case with Pt, Dr. Steven Dunne and Cardiologist.  The Cardiologist, GI Doctor and pt all agree the benefits of finding source of bleeding outweighs risks of proceeding. Pt expresses understanding and agrees with plan.

## 2017-07-28 LAB
ANION GAP BLD CALC-SCNC: 8 MMOL/L (ref 5–15)
BUN SERPL-MCNC: 22 MG/DL (ref 6–20)
BUN/CREAT SERPL: 36 (ref 12–20)
CALCIUM SERPL-MCNC: 7.5 MG/DL (ref 8.5–10.1)
CHLORIDE SERPL-SCNC: 100 MMOL/L (ref 97–108)
CO2 SERPL-SCNC: 25 MMOL/L (ref 21–32)
CREAT SERPL-MCNC: 0.61 MG/DL (ref 0.55–1.02)
ERYTHROCYTE [DISTWIDTH] IN BLOOD BY AUTOMATED COUNT: 14 % (ref 11.5–14.5)
GLUCOSE BLD STRIP.AUTO-MCNC: 130 MG/DL (ref 65–100)
GLUCOSE BLD STRIP.AUTO-MCNC: 189 MG/DL (ref 65–100)
GLUCOSE BLD STRIP.AUTO-MCNC: 201 MG/DL (ref 65–100)
GLUCOSE BLD STRIP.AUTO-MCNC: 93 MG/DL (ref 65–100)
GLUCOSE SERPL-MCNC: 115 MG/DL (ref 65–100)
HCT VFR BLD AUTO: 26.6 % (ref 35–47)
HGB BLD-MCNC: 9.3 G/DL (ref 11.5–16)
MCH RBC QN AUTO: 30.6 PG (ref 26–34)
MCHC RBC AUTO-ENTMCNC: 35 G/DL (ref 30–36.5)
MCV RBC AUTO: 87.5 FL (ref 80–99)
PLATELET # BLD AUTO: 188 K/UL (ref 150–400)
POTASSIUM SERPL-SCNC: 3.5 MMOL/L (ref 3.5–5.1)
RBC # BLD AUTO: 3.04 M/UL (ref 3.8–5.2)
SERVICE CMNT-IMP: ABNORMAL
SERVICE CMNT-IMP: NORMAL
SODIUM SERPL-SCNC: 133 MMOL/L (ref 136–145)
WBC # BLD AUTO: 14.6 K/UL (ref 3.6–11)

## 2017-07-28 PROCEDURE — 74011250636 HC RX REV CODE- 250/636: Performed by: INTERNAL MEDICINE

## 2017-07-28 PROCEDURE — 74011250637 HC RX REV CODE- 250/637: Performed by: INTERNAL MEDICINE

## 2017-07-28 PROCEDURE — 74011250637 HC RX REV CODE- 250/637: Performed by: SPECIALIST

## 2017-07-28 PROCEDURE — 80048 BASIC METABOLIC PNL TOTAL CA: CPT | Performed by: INTERNAL MEDICINE

## 2017-07-28 PROCEDURE — 88305 TISSUE EXAM BY PATHOLOGIST: CPT | Performed by: INTERNAL MEDICINE

## 2017-07-28 PROCEDURE — 36415 COLL VENOUS BLD VENIPUNCTURE: CPT | Performed by: INTERNAL MEDICINE

## 2017-07-28 PROCEDURE — 74011250637 HC RX REV CODE- 250/637: Performed by: PHYSICIAN ASSISTANT

## 2017-07-28 PROCEDURE — 88342 IMHCHEM/IMCYTCHM 1ST ANTB: CPT | Performed by: INTERNAL MEDICINE

## 2017-07-28 PROCEDURE — 97110 THERAPEUTIC EXERCISES: CPT | Performed by: OCCUPATIONAL THERAPIST

## 2017-07-28 PROCEDURE — 97530 THERAPEUTIC ACTIVITIES: CPT | Performed by: OCCUPATIONAL THERAPIST

## 2017-07-28 PROCEDURE — 82962 GLUCOSE BLOOD TEST: CPT

## 2017-07-28 PROCEDURE — 65270000029 HC RM PRIVATE

## 2017-07-28 PROCEDURE — 97535 SELF CARE MNGMENT TRAINING: CPT | Performed by: OCCUPATIONAL THERAPIST

## 2017-07-28 PROCEDURE — 97530 THERAPEUTIC ACTIVITIES: CPT

## 2017-07-28 PROCEDURE — 85027 COMPLETE CBC AUTOMATED: CPT | Performed by: INTERNAL MEDICINE

## 2017-07-28 RX ORDER — METOPROLOL TARTRATE 25 MG/1
25 TABLET, FILM COATED ORAL 2 TIMES DAILY
Status: DISCONTINUED | OUTPATIENT
Start: 2017-07-29 | End: 2017-07-31

## 2017-07-28 RX ADMIN — IRBESARTAN 75 MG: 75 TABLET ORAL at 08:38

## 2017-07-28 RX ADMIN — FUROSEMIDE 40 MG: 40 TABLET ORAL at 08:38

## 2017-07-28 RX ADMIN — Medication: at 12:16

## 2017-07-28 RX ADMIN — PANTOPRAZOLE SODIUM 40 MG: 40 TABLET, DELAYED RELEASE ORAL at 17:12

## 2017-07-28 RX ADMIN — METOPROLOL TARTRATE 12.5 MG: 25 TABLET ORAL at 17:12

## 2017-07-28 RX ADMIN — SUCRALFATE 1 G: 1 SUSPENSION ORAL at 22:00

## 2017-07-28 RX ADMIN — OXYCODONE HYDROCHLORIDE 5 MG: 5 TABLET ORAL at 00:12

## 2017-07-28 RX ADMIN — Medication 10 ML: at 22:01

## 2017-07-28 RX ADMIN — Medication 40 ML: at 05:54

## 2017-07-28 RX ADMIN — HYDROCHLOROTHIAZIDE 25 MG: 25 TABLET ORAL at 08:38

## 2017-07-28 RX ADMIN — UMECLIDINIUM 1 PUFF: 62.5 AEROSOL, POWDER ORAL at 08:41

## 2017-07-28 RX ADMIN — HYDRALAZINE HYDROCHLORIDE 10 MG: 20 INJECTION INTRAMUSCULAR; INTRAVENOUS at 20:23

## 2017-07-28 RX ADMIN — Medication 1 CAPSULE: at 08:38

## 2017-07-28 RX ADMIN — METOPROLOL TARTRATE 12.5 MG: 25 TABLET ORAL at 08:38

## 2017-07-28 RX ADMIN — Medication 10 ML: at 20:23

## 2017-07-28 RX ADMIN — AMLODIPINE BESYLATE 5 MG: 5 TABLET ORAL at 08:38

## 2017-07-28 RX ADMIN — SUCRALFATE 1 G: 1 SUSPENSION ORAL at 12:11

## 2017-07-28 RX ADMIN — SUCRALFATE 1 G: 1 SUSPENSION ORAL at 06:32

## 2017-07-28 RX ADMIN — PANTOPRAZOLE SODIUM 40 MG: 40 TABLET, DELAYED RELEASE ORAL at 06:33

## 2017-07-28 RX ADMIN — Medication 10 ML: at 12:11

## 2017-07-28 RX ADMIN — SUCRALFATE 1 G: 1 SUSPENSION ORAL at 17:12

## 2017-07-28 RX ADMIN — HYDRALAZINE HYDROCHLORIDE 10 MG: 20 INJECTION INTRAMUSCULAR; INTRAVENOUS at 14:09

## 2017-07-28 RX ADMIN — FLUTICASONE FUROATE AND VILANTEROL TRIFENATATE 1 PUFF: 200; 25 POWDER RESPIRATORY (INHALATION) at 08:40

## 2017-07-28 NOTE — ROUTINE PROCESS
Bedside and Verbal shift change report given to FAY Hernandez (oncoming nurse) by Saqib Souza RN (offgoing nurse). Report included the following information SBAR, Kardex, Intake/Output, MAR, Recent Results and Med Rec Status.

## 2017-07-28 NOTE — PROGRESS NOTES
Ortho NP Note:    S/p Right ankle ORIF on 7/13  Pt OOB to the chair. No complaints. Pain well controlled. Denies CP, SOB    Recent Labs      07/28/17   0437   HCT  26.6*   HGB  9.3*       Ex-fix in place without the pin that was noted to be out on Friday 7/21. Partial splint in place to keep foot in alignment post pin removal.   Mild-mod edema present  Calves soft and supple  Sensation and motor intact  Cap refill intact. PLAN:  1) Ankle ORIF - continue NWB on RLE and mobilize with PT. 2) GI bleed - EDG 7/18 - followed by gastroenterology; She is off anticoagulation due to current bleed . Hgb down again and Dr. Aura Huang repeat EGD on 7/27 with no new bleeds noted. Continue current plan. Hgb stable today. 3)  Ness- removal attempted on 7/23 with retention. Cath replaced. To be removed in 2-3 more days with voiding trial.  (about 7/31-8/1)  7) Stable from ortho standpoint.  D/c tomorrow to [facility] care if bed available    Che Shaw NP

## 2017-07-28 NOTE — PROGRESS NOTES
MILKA contacted [facility] to inform them of pt discharge planned for tomorrow. Olga with [facility] stated that due to 3 previous auth requests and pt not discharging, the auth was closed this morning and is no longer good through 7/30 as previously reported. Jason Lyle stated they would not be able to get auth on pt until Monday. MILKA informed [facility] of the report. Dora Tinsley discussed issue with admission director, Rakel Peace, who later indicated after several conversations they do not have a bed for the pt. Attending MD notified and CM will continue with discharge planning and will need to obtain another auth from a different facility.     Aiden Ryan, MICHELLE  Ext [phone]

## 2017-07-28 NOTE — PROGRESS NOTES
Bedside shift change report given to Josiah (oncoming nurse) by Jane Vallejo RN (offgoing nurse). Report included the following information SBAR, Kardex, ED Summary, OR Summary, Procedure Summary, Intake/Output, MAR and Recent Results.

## 2017-07-28 NOTE — INTERDISCIPLINARY ROUNDS
Bedside interdisciplinary rounds were held today to discuss patient plan of care and outcomes. The following members were present: Nurse Practitioner, Nurse, Clinical Care Leader, Pharmacy, Physical Therapy, and Case Management. Plan:  Plan for discharge tomorrow.   Continue neil catheter as patient previously failed voiding trial.

## 2017-07-28 NOTE — PROGRESS NOTES
Hospitalist Progress Note    NAME: Yulissa Morris   :  1945   MRN:  [id number]       Assessment / Plan:  Acute blood loss anemia due to GIB/duodenal ulcers and severe esophagitis: Hg stable today. Pt with poor sleep overnight but overall slowly improving.  - Transfused 4u pRBCs this admission  - EGD  with Dr. Sergio Valera demonstrating diffuse ulceration with friability in entire lower 1/3 of the esophagus, nearly circumferential c/w severe erosive esophagitis.  No varices seen, no active bleeding. The gastric mucosa appeared normal. Duodenum with multiple clean based ulcers (3 in the duodenal bulb) and 3 additional ones in the second portion of the duodenum with black base but no visible vessel, no active bleeding.  The ulcers in the bulb had a chronic appearance. - repeat EGD with Dr. Temple Hodgkins  with exudative LA grade C esophagitis noted with a hiatal hernia. Duodenum deep bulb and  bulbar sweep has 4, large, over 1 cm each, deeply excavated ulcers with clean base. No active bleeding.  - appreciate GI consult, plan for discharge tomorrow if Hg remains stable  - con't BID PPI and carafate  - con't holding plavix on this for PVD, does not need to urgently be restarted  Minimally elevated troponin in setting of transient atrial fibrillation with RVR (), SVT (), 22 beat vtach (7/15) in setting of CAD and benign hypertension:  - echo  with EF 55-60 %. There were no regional wall motion abnormalities. - appreciate cardiology consult  - con't lopressor, avapro, lasix, norvasc  - off plavix due to GIB as above  Distal tibia and fibula fracture s/p mechanical fall:  s/p ORIF on , note ex-fix in place without the pin that was noted to be out on .  Partial splint in place to keep foot in alignment post pin removal.   - appreciate ortho assistance, con't NWB per their recommendations  - PT/OT today - Pt says that she will try to do more this afternoon  - discussed with case mgmt that she will be ready for discharge tomorrow. They will verify with [facility] (no beds available today). Acute hypoxic respiratory failure (resolved) due to acute COPD exacerbation and ?ILD:  Pt with interstitial lung disease that was suspected to be pulmonary edema on initial CXR  - CXR 7/21 with no significant change. Chronic left lower lobe atelectasis. Stable appearance of the right subclavian central venous catheter. - off prednisone and levaquin, WBC continues to improve (presumed due to steroids)  - con't breo.  Incruse added this admission.  - prn ipratropium, xopenex nebs  - con't to mobilize as able  ARF, resolved  Hyponatremia, resolved   PAD:  s/p right renal artery stent, left fem-pop by Dr. Cyndie Ron 3/2014  Tobacco use:  con't nicotine patch  Obesity (BMI 35.3)      Code Status: Full  Surrogate Decision Maker: daughter [facility] [phone], son [facility] [phone] cell, home [phone]  DVT Prophylaxis: SCDs for now (lovenox if no plan procedure in the AM)     Subjective:     Chief Complaint / Reason for Physician Visit  \"I'm a little better today\". No e/o active bleeding. Discussed with RN events overnight. Review of Systems:  Symptom Y/N Comments  Symptom Y/N Comments   Fever/Chills n   Chest Pain n    Poor Appetite n   Edema n    Cough n   Abdominal Pain n    Sputum n   Joint Pain     SOB/ODOM n   Pruritis/Rash     Nausea/vomit    Tolerating PT/OT     Diarrhea    Tolerating Diet     Constipation    Other       Could NOT obtain due to:      Objective:     VITALS:   Last 24hrs VS reviewed since prior progress note.  Most recent are:  Patient Vitals for the past 24 hrs:   Temp Pulse Resp BP SpO2   07/28/17 0836 - 70 - (!) 149/39 -   07/28/17 0825 97.5 °F (36.4 °C) 70 16 (!) 149/39 94 %   07/28/17 0330 99.2 °F (37.3 °C) (!) 57 16 140/43 91 %   07/27/17 2333 98.2 °F (36.8 °C) 78 16 142/48 92 %   07/27/17 2109 98 °F (36.7 °C) (!) 58 18 158/44 95 %   07/27/17 1731 - 79 - 163/41 -   07/27/17 1559 99.4 °F (37.4 °C) 79 16 183/51 97 %   07/27/17 1533 - 74 18 176/65 92 %   07/27/17 1529 - 76 - 169/64 93 %   07/27/17 1527 - 60 13 172/70 94 %   07/27/17 1525 - 64 17 168/63 94 %   07/27/17 1523 - 71 - 157/56 93 %   07/27/17 1518 97.6 °F (36.4 °C) 60 16 147/63 92 %   07/27/17 1511 - (!) 59 16 159/50 98 %   07/27/17 1508 - 70 18 - 98 %   07/27/17 1208 98 °F (36.7 °C) 79 18 157/66 98 %       Intake/Output Summary (Last 24 hours) at 07/28/17 1137  Last data filed at 07/28/17 0609   Gross per 24 hour   Intake             1050 ml   Output             1450 ml   Net             -400 ml        PHYSICAL EXAM:  General: Color better today, WD, WN. Alert, cooperative, no acute distress    EENT:  EOMI. Anicteric sclerae. MMM  Resp:  CTA bilaterally, no wheezing or rales. No accessory muscle use  CV:  Regular  rhythm,  No edema  GI:  Soft, mildly distended, Non tender.  +Bowel sounds  Neurologic:  Alert and oriented X 3, normal speech,   Psych:   Good insight. Not anxious nor agitated  Skin:  No rashes. No jaundice. RLE in fixator. Reviewed most current lab test results and cultures  YES  Reviewed most current radiology test results   YES  Review and summation of old records today    NO  Reviewed patient's current orders and MAR    YES  PMH/ reviewed - no change compared to H&P  ________________________________________________________________________  Care Plan discussed with:    Comments   Patient x    Family      RN     Care Manager x    Consultant  x ortho                     Multidiciplinary team rounds were held today with , nursing, pharmacist and clinical coordinator. Patient's plan of care was discussed; medications were reviewed and discharge planning was addressed.      ________________________________________________________________________  Total NON critical care TIME:  25 Minutes    Total CRITICAL CARE TIME Spent:   Minutes non procedure based      Comments   >50% of visit spent in counseling and coordination of care x    ________________________________________________________________________  Wilber Pisano MD     Procedures: see electronic medical records for all procedures/Xrays and details which were not copied into this note but were reviewed prior to creation of Plan. LABS:  I reviewed today's most current labs and imaging studies.   Pertinent labs include:  Recent Labs      07/28/17 0437 07/27/17 0327 07/26/17   0513   WBC  14.6*  18.7*  22.3*   HGB  9.3*  9.5*  7.3*   HCT  26.6*  28.0*  21.5*   PLT  188  210  245     Recent Labs      07/28/17 0437 07/27/17 0327 07/26/17   0513   NA  133*  133*  134*   K  3.5  3.3*  3.2*   CL  100  97  99   CO2  25  29  29   GLU  115*  111*  97   BUN  22*  25*  23*   CREA  0.61  0.68  0.76   CA  7.5*  7.2*  7.4*   ALB   --    --   1.9*   TBILI   --    --   0.5   SGOT   --    --   15   ALT   --    --   50       Signed: Wilber Pisano MD

## 2017-07-28 NOTE — PROGRESS NOTES
Gastroenterology Daily Progress Note (Dr. Melody Baumgarten)    Admit Date: 7/12/2017       Subjective:       No new issues after EGD. Sister at bedside.  hgb is stable.       Current Facility-Administered Medications   Medication Dose Route Frequency    metoprolol tartrate (LOPRESSOR) tablet 12.5 mg  12.5 mg Oral BID    hydroCHLOROthiazide (HYDRODIURIL) tablet 25 mg  25 mg Oral DAILY    0.9% sodium chloride infusion 250 mL  250 mL IntraVENous PRN    umeclidinium (INCRUSE ELLIPTA) 62.5 mcg/actuation  1 Puff Inhalation DAILY    lactobac ac& pc-s.therm-b.anim (ROSANGELA Q/RISAQUAD)  1 Cap Oral DAILY    0.9% sodium chloride infusion 250 mL  250 mL IntraVENous PRN    pantoprazole (PROTONIX) tablet 40 mg  40 mg Oral ACB&D    irbesartan (AVAPRO) tablet 75 mg  75 mg Oral DAILY    amLODIPine (NORVASC) tablet 5 mg  5 mg Oral DAILY    ipratropium (ATROVENT) 0.02 % nebulizer solution 0.5 mg  0.5 mg Nebulization Q6H PRN    levalbuterol (XOPENEX) nebulizer soln 0.63 mg/3 mL  0.63 mg Nebulization Q6H PRN    0.9% sodium chloride infusion 250 mL  250 mL IntraVENous PRN    diphenhydrAMINE (BENADRYL) capsule 25 mg  25 mg Oral Q6H PRN    furosemide (LASIX) tablet 40 mg  40 mg Oral DAILY    fluticasone-vilanterol (BREO ELLIPTA) 200mcg-25mcg/puff  1 Puff Inhalation DAILY    acetaminophen (TYLENOL) tablet 650 mg  650 mg Oral Q6H PRN    sucralfate (CARAFATE) 100 mg/mL oral suspension 1 g  1 g Oral AC&HS    hydrALAZINE (APRESOLINE) 20 mg/mL injection 10 mg  10 mg IntraVENous Q6H PRN    bisacodyl (DULCOLAX) suppository 10 mg  10 mg Rectal DAILY PRN    sodium chloride (NS) flush 5-10 mL  5-10 mL IntraVENous Q8H    sodium chloride (NS) flush 5-10 mL  5-10 mL IntraVENous PRN    naloxone (NARCAN) injection 0.4 mg  0.4 mg IntraVENous PRN    ondansetron (ZOFRAN ODT) tablet 4 mg  4 mg Oral Q4H PRN    oxyCODONE IR (ROXICODONE) tablet 5 mg  5 mg Oral Q4H PRN    oxyCODONE IR (ROXICODONE) tablet 2.5 mg  2.5 mg Oral Q4H PRN Objective:     Visit Vitals    /43 (BP 1 Location: Left arm, BP Patient Position: At rest)    Pulse (!) 57    Temp 99.2 °F (37.3 °C)    Resp 16    Ht 5' 3\" (1.6 m)    Wt 90.4 kg (199 lb 4.7 oz)    SpO2 91%    Breastfeeding No    BMI 35.3 kg/m2   Blood pressure 140/43, pulse (!) 57, temperature 99.2 °F (37.3 °C), resp. rate 16, height 5' 3\" (1.6 m), weight 90.4 kg (199 lb 4.7 oz), SpO2 91 %, not currently breastfeeding.     07/26 1901 - 07/28 0700  In: 1050 [P.O.:800;  I.V.:250]  Out: 98303 [Urine:46999]      Intake/Output Summary (Last 24 hours) at 07/28/17 0754  Last data filed at 07/28/17 9830   Gross per 24 hour   Intake             1050 ml   Output             1450 ml   Net             -400 ml     Physical Exam:     General: awake and conversant WF in NAD  GI: Soft, NT, ND + bowel sounds    Labs:     Recent Results (from the past 24 hour(s))   GLUCOSE, POC    Collection Time: 07/27/17 12:09 PM   Result Value Ref Range    Glucose (POC) 94 65 - 100 mg/dL    Performed by Sandoval Mays    GLUCOSE, POC    Collection Time: 07/27/17  4:04 PM   Result Value Ref Range    Glucose (POC) 98 65 - 100 mg/dL    Performed by Sandoval Mays    GLUCOSE, POC    Collection Time: 07/27/17  9:24 PM   Result Value Ref Range    Glucose (POC) 174 (H) 65 - 100 mg/dL    Performed by Jaylyn Gerardo (PCT)    CBC W/O DIFF    Collection Time: 07/28/17  4:37 AM   Result Value Ref Range    WBC 14.6 (H) 3.6 - 11.0 K/uL    RBC 3.04 (L) 3.80 - 5.20 M/uL    HGB 9.3 (L) 11.5 - 16.0 g/dL    HCT 26.6 (L) 35.0 - 47.0 %    MCV 87.5 80.0 - 99.0 FL    MCH 30.6 26.0 - 34.0 PG    MCHC 35.0 30.0 - 36.5 g/dL    RDW 14.0 11.5 - 14.5 %    PLATELET 144 689 - 324 K/uL   METABOLIC PANEL, BASIC    Collection Time: 07/28/17  4:37 AM   Result Value Ref Range    Sodium 133 (L) 136 - 145 mmol/L    Potassium 3.5 3.5 - 5.1 mmol/L    Chloride 100 97 - 108 mmol/L    CO2 25 21 - 32 mmol/L    Anion gap 8 5 - 15 mmol/L    Glucose 115 (H) 65 - 100 mg/dL    BUN 22 (H) 6 - 20 MG/DL    Creatinine 0.61 0.55 - 1.02 MG/DL    BUN/Creatinine ratio 36 (H) 12 - 20      GFR est AA >60 >60 ml/min/1.73m2    GFR est non-AA >60 >60 ml/min/1.73m2    Calcium 7.5 (L) 8.5 - 10.1 MG/DL   GLUCOSE, POC    Collection Time: 07/28/17  7:42 AM   Result Value Ref Range    Glucose (POC) 93 65 - 100 mg/dL    Performed by Laura Erazo \"Sandy\"      Recent Labs      07/28/17   0437  07/27/17   0327  07/26/17   0513   NA  133*  133*  134*   K  3.5  3.3*  3.2*   CL  100  97  99   CO2  25  29  29   BUN  22*  25*  23*   CREA  0.61  0.68  0.76   GLU  115*  111*  97   CA  7.5*  7.2*  7.4*     Lab Results   Component Value Date/Time    WBC 14.6 07/28/2017 04:37 AM    HGB 9.3 07/28/2017 04:37 AM    HCT 26.6 07/28/2017 04:37 AM    PLATELET 113 63/96/9066 04:37 AM    MCV 87.5 07/28/2017 04:37 AM         Impression:    Acute GI blood loss anemia secondary to Duodenal ulcers and ulcerative esophagitis on EGD 7/18/17  COPD  CHF         Plan:    - Repeat EGD showed deep non bleeding DUs (see note). Biopsied. Hgb is stable. -Continue PPI BID (x 8 weeks, then once every day), and QID carafate for PUD(x 14 days). -Hold anticoagulants. High GI bleed risk,  -She can be discharged from a GI perspective.  -Will need f/u on biopsies and a repeat EGD in 6-8 weeks' time(I will arrange) as   an OP. Pt and family are aware.          Noelle Stubbs MD    7/28/2017  3500  [address] [place] [address], [address] [place] [address]  [phone]  [address] [place] [address] South: [phone]

## 2017-07-28 NOTE — PROGRESS NOTES
Cardiology Progress Note  7/28/2017     Admit Date: 7/12/2017  Admit Diagnosis: right ankle fracture  Respiratory failure (HCC)  GI Bleed  GI BLEED, ANEMIA  gi bleeding                          Assessment:                  1. Paroxysmal SVT, self-limited. 2. Sinus bradycardia due to beta-blocker. Adjusted. 3. Occasional PVC's including trigeminy. 4. S/p mechanical fall requiring orthopedic surgery of right leg, found to be hypoxic, likely COPD exacerbation, PSVT noted on telemetry, mild rhabo with increased troponin being managed medically, then with GI bleed s/p 3 units prbc.  5. In 2011 found to have hyponatremia and TWI on ECG with cath with moderate CAD. 6. Echo 6/2017 EF 55%, mild to mod MR, mild AI.  7. HTN with right renal artery stent. 8. DM type 2.  9. Dyslipidemia difficulty with daily statin. 10. Melena, GI bleed found to have PUD by EGD#1 but no active bleeding, transfused multiple units of prbc here, Dr. Zaki Zelaya following. EGD #2 with ulcers, esophagitis, but no active bleeding. 11. PVD with left fem-pop placed on Plavix for this. 15. , mild functional capacity, works at [employer]                        QIMU:                    1. Holding Plavix for as long as needed until bleeding has resolved. 2. Cont metoprolol but increase to 25 bid. Was increased to 75mg bid but reduced due to bradycardia on two occasions (to 50, then to 12.5 bid). 3. Cont amlodipine, would not titrate due to edema in legs. 4. Cont ARB. 5. Cont lasix 40mg daily. 6. Cont statin, stopped niacin.      Stable from cardiac rhythm standpoint.       For other plans, see orders.   Hospital problem list   Active Hospital Problems    Diagnosis Date Noted    Respiratory failure ([facility] [address] 07/13/2017        Subjective: Chino Kendrick reports   Chest pain X none  consistent with:  Non-cardiac CP         Atypical CP     None now  On going  Anginal CP     Dyspnea X none  at rest  with exertion improved  unchanged  worse              PND X none  overnight       Orthopnea X none  improved  unchanged  worse   Presyncope X none  improved  unchanged  worse     Ambulated in hallway without symptoms   Yes   Ambulated in room without symptoms  Yes   Objective:    Physical Exam:  Overall VSSAF;    Visit Vitals    /41 (BP 1 Location: Left arm, BP Patient Position: At rest)    Pulse (!) 105    Temp 98.2 °F (36.8 °C)    Resp 16    Ht 5' 3\" (1.6 m)    Wt 90.4 kg (199 lb 4.7 oz)    SpO2 97%    Breastfeeding No    BMI 35.3 kg/m2     Temp (24hrs), Av.2 °F (36.8 °C), Min:97.5 °F (36.4 °C), Max:99.2 °F (37.3 °C)    Patient Vitals for the past 8 hrs:   Pulse   17 1550 (!) 105   17 1409 81   17 1235 (!) 59    Patient Vitals for the past 8 hrs:   Resp   17 1550 16   17 1235 16    Patient Vitals for the past 8 hrs:   BP   17 1550 162/41   17 1430 129/63   17 1409 171/69   17 1235 (!) 177/31          Intake/Output Summary (Last 24 hours) at 17 1815  Last data filed at 17 1432   Gross per 24 hour   Intake              800 ml   Output             2150 ml   Net            -1350 ml     General Appearance: Well developed, obsese, no acute distress. Ears/Nose/Mouth/Throat:   Normal MM; anicteric. JVP: WNL   Resp:   clear to auscultation bilaterally anteriorly. Nl resp effort. Cardiovascular:  RRR, S1, S2 normal, no new murmur. No gallop or rub. Abdomen:   Soft, non-tender, bowel sounds are present. Extremities: No edema bilaterally. Right foot in boot. Skin:  Neuro: Warm and dry. A/O x3, grossly nonfocal     cath site intact w/o hematoma or new bruit; distal pulse unchanged  Yes   Data Review:     Telemetry independently reviewed x sinus  chronic afib  parox SVT  NSVT     ECG independently reviewed  NSR  afib  no significant changes  NSST-Tw chgs   x no new ECG provided for review   Lab results reviewed as noted below.   Current medications reviewed as noted below. No results for input(s): PH, PCO2, PO2 in the last 72 hours. No results for input(s): CPK, CKMB, CKNDX, TROIQ in the last 72 hours. Recent Labs      07/28/17   0437  07/27/17   0327  07/26/17   0513   NA  133*  133*  134*   K  3.5  3.3*  3.2*   CL  100  97  99   CO2  25  29  29   BUN  22*  25*  23*   CREA  0.61  0.68  0.76   GLU  115*  111*  97   CA  7.5*  7.2*  7.4*   ALB   --    --   1.9*   WBC  14.6*  18.7*  22.3*   HGB  9.3*  9.5*  7.3*   HCT  26.6*  28.0*  21.5*   PLT  188  210  245     Lab Results   Component Value Date/Time    Cholesterol, total 162 09/20/2011 01:17 PM    HDL Cholesterol 55 09/20/2011 01:17 PM    LDL, calculated 78.4 09/20/2011 01:17 PM    Triglyceride 143 09/20/2011 01:17 PM    CHOL/HDL Ratio 2.9 09/20/2011 01:17 PM     Recent Labs      07/26/17   0513   SGOT  15   AP  56   TP  4.6*   ALB  1.9*   GLOB  2.7     No results for input(s): INR, PTP, APTT in the last 72 hours.     No lab exists for component: INREXT, INREXT   No components found for: [place]    Current Facility-Administered Medications   Medication Dose Route Frequency    zinc oxide-cod liver oil (DESITIN) 40 % paste   Topical PRN    metoprolol tartrate (LOPRESSOR) tablet 12.5 mg  12.5 mg Oral BID    hydroCHLOROthiazide (HYDRODIURIL) tablet 25 mg  25 mg Oral DAILY    0.9% sodium chloride infusion 250 mL  250 mL IntraVENous PRN    umeclidinium (INCRUSE ELLIPTA) 62.5 mcg/actuation  1 Puff Inhalation DAILY    lactobac ac& pc-s.therm-b.anim (ROSANGELA Q/RISAQUAD)  1 Cap Oral DAILY    0.9% sodium chloride infusion 250 mL  250 mL IntraVENous PRN    pantoprazole (PROTONIX) tablet 40 mg  40 mg Oral ACB&D    irbesartan (AVAPRO) tablet 75 mg  75 mg Oral DAILY    amLODIPine (NORVASC) tablet 5 mg  5 mg Oral DAILY    ipratropium (ATROVENT) 0.02 % nebulizer solution 0.5 mg  0.5 mg Nebulization Q6H PRN    levalbuterol (XOPENEX) nebulizer soln 0.63 mg/3 mL  0.63 mg Nebulization Q6H PRN    0.9% sodium chloride infusion 250 mL  250 mL IntraVENous PRN    diphenhydrAMINE (BENADRYL) capsule 25 mg  25 mg Oral Q6H PRN    furosemide (LASIX) tablet 40 mg  40 mg Oral DAILY    fluticasone-vilanterol (BREO ELLIPTA) 200mcg-25mcg/puff  1 Puff Inhalation DAILY    acetaminophen (TYLENOL) tablet 650 mg  650 mg Oral Q6H PRN    sucralfate (CARAFATE) 100 mg/mL oral suspension 1 g  1 g Oral AC&HS    hydrALAZINE (APRESOLINE) 20 mg/mL injection 10 mg  10 mg IntraVENous Q6H PRN    bisacodyl (DULCOLAX) suppository 10 mg  10 mg Rectal DAILY PRN    sodium chloride (NS) flush 5-10 mL  5-10 mL IntraVENous Q8H    sodium chloride (NS) flush 5-10 mL  5-10 mL IntraVENous PRN    naloxone (NARCAN) injection 0.4 mg  0.4 mg IntraVENous PRN    ondansetron (ZOFRAN ODT) tablet 4 mg  4 mg Oral Q4H PRN    oxyCODONE IR (ROXICODONE) tablet 5 mg  5 mg Oral Q4H PRN    oxyCODONE IR (ROXICODONE) tablet 2.5 mg  2.5 mg Oral Q4H PRN        Devi Rodriguez MD

## 2017-07-28 NOTE — PROGRESS NOTES
Occupational Therapy Goals  Initiated 7/14/2017  1. Patient will perform sponge bathing with supervision/set-up within 7 day(s). 2.  Patient will perform lower body dressing with supervision/set-up within 7 day(s). 3.  Patient will perform BSC transfers, via stand pivot with RW, NWB on RLE with minimal assistance/contact guard assist within 7 day(s). 4.  Patient will perform all aspects of toileting with supervision/set-up within 7 day(s). Occupational Therapy TREATMENT  Patient: Yulissa Morris (8 y.o. female)  Date: 7/28/2017  Diagnosis: right ankle fracture  Respiratory failure (HCC)  GI Bleed  GI BLEED, ANEMIA  gi bleeding <principal problem not specified>  Procedure(s) (LRB):  ESOPHAGOGASTRODUODENAL (EGD) BIOPSY (N/A)  ESOPHAGOGASTRODUODENOSCOPY (EGD) (N/A) 1 Day Post-Op  Precautions: Fall, NWB    ASSESSMENT:  Patient generally motivated, cooperative and very receptive to all training and education provided. Upon completion of LB dressing training patient was able to perform donning of socks and underwear at a min A level, seated in chair. Patient performing weight shifts in chair to pull underwear to waist, due to being unable to stand from chair, but was unable to fully pull them to her waist. Patient working through some of her fatigue with encouragement, in order to improve her endurance for functional activity. She is making progress and will benefit from SNF rehab after discharge. Progression toward goals:  []       Improving appropriately and progressing toward goals  [x]       Improving slowly and progressing toward goals  []       Not making progress toward goals and plan of care will be adjusted     PLAN:  Patient continues to benefit from skilled intervention to address the above impairments. Continue treatment per established plan of care.   Discharge Recommendations:  Skilled Nursing Facility           OBJECTIVE DATA SUMMARY:   Cognitive/Behavioral Status:  Neurologic State: Alert  Orientation Level: Oriented X4  Cognition: Follows commands        Safety/Judgement: Awareness of environment; Insight into deficits  Functional Mobility and Transfers for ADLs:  Bed Mobility:  Presented up in chair  Scooting: Supervision; Additional time (in chair)  Transfers:  Sit to Stand:  (Patient unable to stand from chair)            Balance:  Sitting: Intact  Sitting - Static: Good (unsupported)  Sitting - Dynamic: Good (unsupported)  Standing: Impaired; With support  Standing - Static: Fair  Standing - Dynamic : Poor  ADL Intervention:  Lower Body Dressing Assistance  Underpants: Minimum assistance; Compensatory technique training (using reacher to don almost fully to waist, supervision doff). Patient weight shifting to attempt to pull to waist.   Socks: Minimum assistance; Compensatory technique training (to don L sock, supervision to doff )  Position Performed: Seated in chair  Cues: Tactile cues provided;Verbal cues provided;Visual cues provided  Adaptive Equipment Used: Reacher;Sock aid    Cognitive Retraining  Safety/Judgement: Awareness of environment; Insight into deficits    Activity Tolerance:   Improving, but still limited needing occasional rest breaks  Please refer to the flowsheet for vital signs taken during this treatment.   After treatment:   [x] Patient left in no apparent distress sitting up in chair  [] Patient left in no apparent distress in bed  [x] Call bell left within reach  [x] Nursing notified  [] Caregiver present  [] Bed alarm activated    Poncho Maldonado OTR/L  Time Calculation: 34 mins

## 2017-07-28 NOTE — PROGRESS NOTES
Telemetry center called and informed of patient 4.02 second burst atrial trace. Hospitalist paged. Dr. Sylwia Ruiz informed of situation. No orders given. Cardiac strip printed and in patient chart.

## 2017-07-28 NOTE — PROGRESS NOTES
Problem: Mobility Impaired (Adult and Pediatric)  Goal: *Acute Goals and Plan of Care (Insert Text)  Physical Therapy Goals  Goals reviewed and revised 7/27/17  1. Patient will move from supine to sit and sit to supine in bed with supervision/set-up within 7 day(s). 2. Patient will transfer from bed to chair and chair to bed with minimal assistance/contact guard assist using the least restrictive device within 7 day(s). 3. Patient will perform sit to stand with minimal assistance within 7 day(s). 4. Patient will ambulate with minimal assistance/contact guard assist for 25 feet with the least restrictive device within 7 day(s). 5. Patient will ascend/descend 1 stairs with bilateral handrail(s) with moderate assist within 7 day(s). Initiated 7/14/2017  1. Patient will move from supine to sit and sit to supine in bed with supervision/set-up within 7 day(s). 2. Patient will transfer from bed to chair and chair to bed with minimal assistance/contact guard assist using the least restrictive device within 7 day(s). 3. Patient will perform sit to stand with contact guard assist within 7 day(s). 4. Patient will ambulate with minimal assistance/contact guard assist for 50 feet with the least restrictive device within 7 day(s). 5. Patient will ascend/descend 1 stairs with bilateral handrail(s) with minimal assistance/contact guard assist within 7 day(s). PHYSICAL THERAPY TREATMENT  Patient: Yulissa Morris (89 y.o. female)  Date: 7/28/2017  Diagnosis: right ankle fracture  Respiratory failure (HCC)  GI Bleed  GI BLEED, ANEMIA  gi bleeding <principal problem not specified>  Procedure(s) (LRB):  ESOPHAGOGASTRODUODENAL (EGD) BIOPSY (N/A)  ESOPHAGOGASTRODUODENOSCOPY (EGD) (N/A) 1 Day Post-Op  Precautions: Fall, NWB      ASSESSMENT:  Pt received supine in bed and agreeable to PT intervention. Pt cleared by nursing for mobility. Pt did require some encouragement to mobilize this date.  Pt performed bed mobility with min-CGA x 1 and demonstrated intact sitting balance while sitting on EOB ~ 4 minutes. Pt performed sit<>stand transfers x 3 with mod A x 2 using RW, needing additional cueing for safe hand placement. Pt with BM, requiring total A for cleaning and to have brief changed. Pt transferred bed>chair with max A x 2 using RW by sliding L foot on floor as pt unsafe/unable to hop-step due to weakness. Pt with fair adherence to RLE NWB precautions during standing, however with poor adherence during bed>chair transfers especially when transitioning into chair (also with poor safety awareness). Pt was left sitting in bedside chair with santiago pad under her, all needs met, and nursing informed. Continue to recommend pt discharge to SNF rehab to improve overall functional mobility. Progression toward goals:  [ ]    Improving appropriately and progressing toward goals  [X]    Improving slowly and progressing toward goals  [ ]    Not making progress toward goals and plan of care will be adjusted       PLAN:  Patient continues to benefit from skilled intervention to address the above impairments. Continue treatment per established plan of care. Discharge Recommendations:  Skilled Nursing Facility  Further Equipment Recommendations for Discharge:  TBD by rehab       SUBJECTIVE:   Patient stated I feel better today.       OBJECTIVE DATA SUMMARY:   Critical Behavior:  Neurologic State: Alert  Orientation Level: Oriented X4  Cognition: Follows commands  Safety/Judgement: Insight into deficits, Decreased awareness of need for safety (limited adherence to RLE NWB. )  Functional Mobility Training:  Bed Mobility:  Rolling: Minimum assistance  Supine to Sit: Contact guard assistance     Scooting: Contact guard assistance        Transfers:  Sit to Stand: Moderate assistance;Assist x2  Stand to Sit: Moderate assistance        Bed to Chair: Maximum assistance;Assist x2; Additional time; Adaptive equipment (use of RW) Balance:  Sitting: Intact  Sitting - Static: Good (unsupported)  Sitting - Dynamic: Good (unsupported)  Standing: Impaired; With support  Standing - Static: Fair  Standing - Dynamic : Poor     Pain:  Pain Scale 1: Numeric (0 - 10)  Pain Intensity 1: 0              Activity Tolerance:   Fair - pt with improved participation this date; no pain complaints  Please refer to the flowsheet for vital signs taken during this treatment.   After treatment:   [X]    Patient left in no apparent distress sitting up in chair  [ ]    Patient left in no apparent distress in bed  [X]    Call bell left within reach  [X]    Nursing notified  [ ]    Caregiver present  [ ]    Bed alarm activated      COMMUNICATION/COLLABORATION:   The patients plan of care was discussed with: Physical Therapist and Registered Nurse     Lizeth Stewart, PT, DPT   Time Calculation: 26 mins

## 2017-07-28 NOTE — PROGRESS NOTES
Bedside shift change report given to Harmony Rosario (oncoming nurse) by Shannan Anna (offgoing nurse). Report included the following information SBAR, Kardex, Procedure Summary, Intake/Output and MAR.

## 2017-07-29 LAB
ANION GAP BLD CALC-SCNC: 9 MMOL/L (ref 5–15)
BUN SERPL-MCNC: 20 MG/DL (ref 6–20)
BUN/CREAT SERPL: 33 (ref 12–20)
CALCIUM SERPL-MCNC: 7.6 MG/DL (ref 8.5–10.1)
CHLORIDE SERPL-SCNC: 97 MMOL/L (ref 97–108)
CO2 SERPL-SCNC: 26 MMOL/L (ref 21–32)
CREAT SERPL-MCNC: 0.61 MG/DL (ref 0.55–1.02)
ERYTHROCYTE [DISTWIDTH] IN BLOOD BY AUTOMATED COUNT: 13.9 % (ref 11.5–14.5)
ERYTHROCYTE [DISTWIDTH] IN BLOOD BY AUTOMATED COUNT: 14.1 % (ref 11.5–14.5)
GLUCOSE BLD STRIP.AUTO-MCNC: 121 MG/DL (ref 65–100)
GLUCOSE BLD STRIP.AUTO-MCNC: 124 MG/DL (ref 65–100)
GLUCOSE BLD STRIP.AUTO-MCNC: 180 MG/DL (ref 65–100)
GLUCOSE BLD STRIP.AUTO-MCNC: 220 MG/DL (ref 65–100)
GLUCOSE SERPL-MCNC: 96 MG/DL (ref 65–100)
HCT VFR BLD AUTO: 25.5 % (ref 35–47)
HCT VFR BLD AUTO: 26.3 % (ref 35–47)
HGB BLD-MCNC: 8.4 G/DL (ref 11.5–16)
HGB BLD-MCNC: 9.1 G/DL (ref 11.5–16)
MCH RBC QN AUTO: 28.7 PG (ref 26–34)
MCH RBC QN AUTO: 30.1 PG (ref 26–34)
MCHC RBC AUTO-ENTMCNC: 32.9 G/DL (ref 30–36.5)
MCHC RBC AUTO-ENTMCNC: 34.6 G/DL (ref 30–36.5)
MCV RBC AUTO: 87 FL (ref 80–99)
MCV RBC AUTO: 87.1 FL (ref 80–99)
PLATELET # BLD AUTO: 178 K/UL (ref 150–400)
PLATELET # BLD AUTO: 187 K/UL (ref 150–400)
POTASSIUM SERPL-SCNC: 3.2 MMOL/L (ref 3.5–5.1)
RBC # BLD AUTO: 2.93 M/UL (ref 3.8–5.2)
RBC # BLD AUTO: 3.02 M/UL (ref 3.8–5.2)
SERVICE CMNT-IMP: ABNORMAL
SODIUM SERPL-SCNC: 132 MMOL/L (ref 136–145)
WBC # BLD AUTO: 12.3 K/UL (ref 3.6–11)
WBC # BLD AUTO: 13.5 K/UL (ref 3.6–11)

## 2017-07-29 PROCEDURE — 74011250637 HC RX REV CODE- 250/637: Performed by: SPECIALIST

## 2017-07-29 PROCEDURE — 74011250636 HC RX REV CODE- 250/636: Performed by: INTERNAL MEDICINE

## 2017-07-29 PROCEDURE — 80048 BASIC METABOLIC PNL TOTAL CA: CPT | Performed by: INTERNAL MEDICINE

## 2017-07-29 PROCEDURE — 82962 GLUCOSE BLOOD TEST: CPT

## 2017-07-29 PROCEDURE — 65270000029 HC RM PRIVATE

## 2017-07-29 PROCEDURE — 74011250637 HC RX REV CODE- 250/637: Performed by: INTERNAL MEDICINE

## 2017-07-29 PROCEDURE — 85027 COMPLETE CBC AUTOMATED: CPT | Performed by: INTERNAL MEDICINE

## 2017-07-29 PROCEDURE — 36415 COLL VENOUS BLD VENIPUNCTURE: CPT | Performed by: INTERNAL MEDICINE

## 2017-07-29 PROCEDURE — 74011250637 HC RX REV CODE- 250/637: Performed by: PHYSICIAN ASSISTANT

## 2017-07-29 RX ADMIN — IRBESARTAN 75 MG: 75 TABLET ORAL at 10:19

## 2017-07-29 RX ADMIN — SUCRALFATE 1 G: 1 SUSPENSION ORAL at 17:10

## 2017-07-29 RX ADMIN — METOPROLOL TARTRATE 25 MG: 25 TABLET ORAL at 17:10

## 2017-07-29 RX ADMIN — Medication 10 ML: at 22:27

## 2017-07-29 RX ADMIN — SUCRALFATE 1 G: 1 SUSPENSION ORAL at 11:44

## 2017-07-29 RX ADMIN — AMLODIPINE BESYLATE 5 MG: 5 TABLET ORAL at 08:29

## 2017-07-29 RX ADMIN — PANTOPRAZOLE SODIUM 40 MG: 40 TABLET, DELAYED RELEASE ORAL at 08:29

## 2017-07-29 RX ADMIN — Medication 10 ML: at 06:00

## 2017-07-29 RX ADMIN — PANTOPRAZOLE SODIUM 40 MG: 40 TABLET, DELAYED RELEASE ORAL at 17:10

## 2017-07-29 RX ADMIN — UMECLIDINIUM 1 PUFF: 62.5 AEROSOL, POWDER ORAL at 10:19

## 2017-07-29 RX ADMIN — HYDROCHLOROTHIAZIDE 25 MG: 25 TABLET ORAL at 08:29

## 2017-07-29 RX ADMIN — METOPROLOL TARTRATE 25 MG: 25 TABLET ORAL at 08:29

## 2017-07-29 RX ADMIN — OXYCODONE HYDROCHLORIDE 5 MG: 5 TABLET ORAL at 19:10

## 2017-07-29 RX ADMIN — Medication 10 ML: at 14:13

## 2017-07-29 RX ADMIN — FUROSEMIDE 40 MG: 40 TABLET ORAL at 08:29

## 2017-07-29 RX ADMIN — FLUTICASONE FUROATE AND VILANTEROL TRIFENATATE 1 PUFF: 200; 25 POWDER RESPIRATORY (INHALATION) at 10:19

## 2017-07-29 RX ADMIN — HYDRALAZINE HYDROCHLORIDE 10 MG: 20 INJECTION INTRAMUSCULAR; INTRAVENOUS at 19:09

## 2017-07-29 RX ADMIN — SUCRALFATE 1 G: 1 SUSPENSION ORAL at 08:28

## 2017-07-29 RX ADMIN — Medication 1 CAPSULE: at 08:30

## 2017-07-29 RX ADMIN — SUCRALFATE 1 G: 1 SUSPENSION ORAL at 22:27

## 2017-07-29 NOTE — PROGRESS NOTES
Ortho:     Doing well regarding ankle with no pain. Motor and sensation intact.  Splint and ex-fix intact and pin sites okay    hgb at 9.1 today, yesterday was 9.3    Cards rec holding plavix  Stable from ortho standpoint  [address] [facility] apparently does not have bed for patient, she may not discharge today    Mart Loyd PA-C

## 2017-07-29 NOTE — ROUTINE PROCESS
Bedside and Verbal shift change report given to Juan Coffman RN (oncoming nurse) by Dylan Jensen RN (offgoing nurse). Report included the following information SBAR, Kardex, Intake/Output, MAR, Recent Results and Med Rec Status.

## 2017-07-29 NOTE — PROGRESS NOTES
Bedside and Verbal shift change report given to Sierra Bowman RN (oncoming nurse) by Destiny Griggs RN (offgoing nurse). Report included the following information SBAR, Kardex, Procedure Summary, Intake/Output, MAR, Med Rec Status and Cardiac Rhythm SR/PVCs.

## 2017-07-29 NOTE — PROGRESS NOTES
Hospitalist Progress Note    NAME: Laury Goldmann   :  1945   MRN:  [id number]       Interim Hospital Summary: 81 y.o. female whom presented on 2017 with      Assessment / Plan:  Acute blood loss anemia due to GIB/duodenal ulcers and severe esophagitis: Hg stable today @9.1    - Transfused 4u pRBCs this admission  - EGD  with Dr. Maurilio Coles demonstrating diffuse ulceration with friability in entire lower 1/3 of the esophagus, nearly circumferential c/w severe erosive esophagitis.  No varices seen, no active bleeding. The gastric mucosa appeared normal. Duodenum with multiple clean based ulcers (3 in the duodenal bulb) and 3 additional ones in the second portion of the duodenum with black base but no visible vessel, no active bleeding.  The ulcers in the bulb had a chronic appearance. - repeat EGD with Dr. Jerris Sever  with exudative LA grade C esophagitis noted with a hiatal hernia. Duodenum deep bulb and  bulbar sweep has 4, large, over 1 cm each, deeply excavated ulcers with clean base. No active bleeding.  - appreciate GI consult, plan for discharge when bed is available in NH   - con't BID PPI and carafate  - con't holding plavix on this for PVD, does not need to urgently be restarted      Minimally elevated troponin in setting of transient atrial fibrillation with RVR (), SVT (), 22 beat vtach (7/15) in setting of CAD and benign hypertension:  - echo  with EF 55-60 %. There were no regional wall motion abnormalities. - appreciate cardiology consult  - con't lopressor, avapro, lasix, norvasc  - off plavix due to GIB as above    Distal tibia and fibula fracture s/p mechanical fall:  s/p ORIF on , note ex-fix in place without the pin that was noted to be out on . Partial splint in place to keep foot in alignment post pin removal.   - appreciate ortho assistance, con't NWB per their recommendations-stable   - PT/OT -   Awaiting placement CM in the case.     Acute hypoxic respiratory failure (resolved) due to acute COPD exacerbation and ?ILD: off steroids/abx- con't breo/Incruse  - prn ipratropium, xopenex nebs  - con't to mobilize as able  ARF, resolved  Hyponatremia, resolved   PAD:  s/p right renal artery stent, left fem-pop by Dr. Ponce Earing 3/2014  Tobacco use:  con't nicotine patch      Code Status: Full  Surrogate Decision Maker: nicanor Monroe [phone], son Justin Valero [phone] cell, home [phone]  DVT Prophylaxis: SCDs for now will d/w gi re:lovenox        Recommended Disposition: awaiting placement      Subjective:     Chief Complaint / Reason for Physician Visit Doing well regarding ankle with no pain. .  Discussed with RN events overnight. Review of Systems:  Symptom Y/N Comments  Symptom Y/N Comments   Fever/Chills    Chest Pain     Poor Appetite    Edema     Cough    Abdominal Pain     Sputum    Joint Pain     SOB/ODOM    Pruritis/Rash     Nausea/vomit    Tolerating PT/OT     Diarrhea    Tolerating Diet     Constipation    Other       Could NOT obtain due to:      Objective:     VITALS:   Last 24hrs VS reviewed since prior progress note. Most recent are:  Patient Vitals for the past 24 hrs:   Temp Pulse Resp BP SpO2   07/29/17 1030 98.2 °F (36.8 °C) 78 18 165/42 99 %   07/29/17 0819 98.4 °F (36.9 °C) 68 18 174/40 95 %   07/29/17 0418 99 °F (37.2 °C) 67 16 157/41 94 %   07/28/17 2305 98.3 °F (36.8 °C) 67 18 155/43 91 %   07/28/17 2022 - - - 172/44 -   07/28/17 1943 98.2 °F (36.8 °C) 66 18 173/46 -   07/28/17 1550 98.2 °F (36.8 °C) (!) 105 16 162/41 97 %       Intake/Output Summary (Last 24 hours) at 07/29/17 1455  Last data filed at 07/29/17 0907   Gross per 24 hour   Intake              240 ml   Output             1500 ml   Net            -1260 ml        PHYSICAL EXAM:  General: WD, WN. Alert, cooperative, no acute distress    EENT:  EOMI. Anicteric sclerae. MMM  Resp:  CTA bilaterally, no wheezing or rales.   No accessory muscle use  CV:  Regular  rhythm,  No edema  GI:  Soft, Non distended, Non tender.  +Bowel sounds  Neurologic:  Alert and oriented X 3, normal speech,   Psych:   Good insight. Not anxious nor agitated  Skin:  No rashes. No jaundice    Reviewed most current lab test results and cultures  YES  Reviewed most current radiology test results   YES  Review and summation of old records today    NO  Reviewed patient's current orders and MAR    YES  PMH/SH reviewed - no change compared to H&P  ________________________________________________________________________  Care Plan discussed with:    Comments   Patient x    Family      RN x    Care Manager     Consultant                        Multidiciplinary team rounds were held today with , nursing, pharmacist and clinical coordinator. Patient's plan of care was discussed; medications were reviewed and discharge planning was addressed. ________________________________________________________________________  Total NON critical care TIME:  30  Minutes    Total CRITICAL CARE TIME Spent:   Minutes non procedure based      Comments   >50% of visit spent in counseling and coordination of care x    ________________________________________________________________________  Ines Herbert MD     Procedures: see electronic medical records for all procedures/Xrays and details which were not copied into this note but were reviewed prior to creation of Plan. LABS:  I reviewed today's most current labs and imaging studies.   Pertinent labs include:  Recent Labs      07/29/17   0428  07/28/17   0437  07/27/17   0327   WBC  13.5*  14.6*  18.7*   HGB  9.1*  9.3*  9.5*   HCT  26.3*  26.6*  28.0*   PLT  187  188  210     Recent Labs      07/29/17   0428  07/28/17   0437  07/27/17   0327   NA  132*  133*  133*   K  3.2*  3.5  3.3*   CL  97  100  97   CO2  26  25  29   GLU  96  115*  111*   BUN  20  22*  25*   CREA  0.61  0.61  0.68   CA  7.6*  7.5*  7.2*       Signed: Ines Herbert MD

## 2017-07-29 NOTE — PROGRESS NOTES
Cardiology Progress Note  7/29/2017     Admit Date: 7/12/2017  Admit Diagnosis: right ankle fracture  Respiratory failure (HCC)  GI Bleed  GI BLEED, ANEMIA  gi bleeding                          Assessment:                  1. Paroxysmal SVT, self-limited. 2. Sinus bradycardia due to beta-blocker. Adjusted. 3. Occasional PVC's including trigeminy. 4. S/p mechanical fall requiring orthopedic surgery of right leg, found to be hypoxic, likely COPD exacerbation, PSVT noted on telemetry, mild rhabo with increased troponin being managed medically, then with GI bleed s/p 3 units prbc.  5. In 2011 found to have hyponatremia and TWI on ECG with cath with moderate CAD. 6. Echo 6/2017 EF 55%, mild to mod MR, mild AI.  7. HTN with right renal artery stent. 8. DM type 2.  9. Dyslipidemia difficulty with daily statin. 10. Melena, GI bleed found to have PUD by EGD#1 but no active bleeding, transfused multiple units of prbc here, Dr. Kimo Crow following. EGD #2 with ulcers, esophagitis, but no active bleeding. 11. PVD with left fem-pop placed on Plavix for this. 15. , mild functional capacity, works at [employer]                        [facility]:               HR stable overnight; in 50s     D/C soon? 1. Holding Plavix for as long as needed until bleeding has resolved. 2. Cont metoprolol 25 bid. Was increased to 75mg bid but reduced due to bradycardia on two occasions (to 50, then to 12.5 bid). 3. Cont amlodipine, would not titrate due to edema in legs. 4. Cont ARB. 5. Cont lasix 40mg daily. 6. Cont statin, stopped niacin.      Stable from cardiac rhythm standpoint.       For other plans, see orders.   Hospital problem list   Active Hospital Problems    Diagnosis Date Noted    Respiratory failure ([facility] [address] 07/13/2017        Subjective: Derrick Soto reports   Chest pain X none  consistent with:  Non-cardiac CP         Atypical CP     None now  On going  Anginal CP     Dyspnea X none  at rest with exertion         improved  unchanged  worse              PND X none  overnight       Orthopnea X none  improved  unchanged  worse   Presyncope X none  improved  unchanged  worse     Ambulated in hallway without symptoms   Yes   Ambulated in room without symptoms  Yes   Objective:    Physical Exam:  Overall VSSAF;    Visit Vitals    /42 (BP 1 Location: Right arm, BP Patient Position: At rest)    Pulse 78    Temp 98.2 °F (36.8 °C)    Resp 18    Ht 5' 3\" (1.6 m)    Wt 90.4 kg (199 lb 4.7 oz)    SpO2 99%    Breastfeeding No    BMI 35.3 kg/m2     Temp (24hrs), Av.3 °F (36.8 °C), Min:98.1 °F (36.7 °C), Max:99 °F (37.2 °C)    Patient Vitals for the past 8 hrs:   Pulse   17 1030 78   17 0819 68   17 0418 67    Patient Vitals for the past 8 hrs:   Resp   17 1030 18   17 0819 18   17 0418 16    Patient Vitals for the past 8 hrs:   BP   17 1030 165/42   17 0819 174/40   17 0418 157/41          Intake/Output Summary (Last 24 hours) at 17 1118  Last data filed at 17 0907   Gross per 24 hour   Intake              240 ml   Output             3100 ml   Net            -2860 ml     General Appearance: Well developed, obsese, no acute distress. Ears/Nose/Mouth/Throat:   Normal MM; anicteric. JVP: WNL   Resp:   clear to auscultation bilaterally anteriorly. Nl resp effort. Cardiovascular:  RRR, S1, S2 normal, no new murmur. No gallop or rub. Abdomen:   Soft, non-tender, bowel sounds are present. Extremities: No edema bilaterally. Right foot in boot. Skin:  Neuro: Warm and dry. A/O x3, grossly nonfocal     cath site intact w/o hematoma or new bruit; distal pulse unchanged  Yes   Data Review:     Telemetry independently reviewed x sinus  chronic afib  parox SVT  NSVT     ECG independently reviewed  NSR  afib  no significant changes  NSST-Tw chgs   x no new ECG provided for review   Lab results reviewed as noted below.   Current medications reviewed as noted below. No results for input(s): PH, PCO2, PO2 in the last 72 hours. No results for input(s): CPK, CKMB, CKNDX, TROIQ in the last 72 hours. Recent Labs      07/29/17   0428  07/28/17   0437  07/27/17   0327   NA  132*  133*  133*   K  3.2*  3.5  3.3*   CL  97  100  97   CO2  26  25  29   BUN  20  22*  25*   CREA  0.61  0.61  0.68   GLU  96  115*  111*   CA  7.6*  7.5*  7.2*   WBC  13.5*  14.6*  18.7*   HGB  9.1*  9.3*  9.5*   HCT  26.3*  26.6*  28.0*   PLT  187  188  210     Lab Results   Component Value Date/Time    Cholesterol, total 162 09/20/2011 01:17 PM    HDL Cholesterol 55 09/20/2011 01:17 PM    LDL, calculated 78.4 09/20/2011 01:17 PM    Triglyceride 143 09/20/2011 01:17 PM    CHOL/HDL Ratio 2.9 09/20/2011 01:17 PM     No results for input(s): SGOT, GPT, AP, TBIL, TP, ALB, GLOB, GGT, AML, LPSE in the last 72 hours. No lab exists for component: AMYP, HLPSE  No results for input(s): INR, PTP, APTT in the last 72 hours.     No lab exists for component: INREXT, INREXT   No components found for: [place]    Current Facility-Administered Medications   Medication Dose Route Frequency    zinc oxide-cod liver oil (DESITIN) 40 % paste   Topical PRN    metoprolol tartrate (LOPRESSOR) tablet 25 mg  25 mg Oral BID    hydroCHLOROthiazide (HYDRODIURIL) tablet 25 mg  25 mg Oral DAILY    0.9% sodium chloride infusion 250 mL  250 mL IntraVENous PRN    umeclidinium (INCRUSE ELLIPTA) 62.5 mcg/actuation  1 Puff Inhalation DAILY    lactobac ac& pc-s.therm-b.anim (ROSANGELA Q/RISAQUAD)  1 Cap Oral DAILY    0.9% sodium chloride infusion 250 mL  250 mL IntraVENous PRN    pantoprazole (PROTONIX) tablet 40 mg  40 mg Oral ACB&D    irbesartan (AVAPRO) tablet 75 mg  75 mg Oral DAILY    amLODIPine (NORVASC) tablet 5 mg  5 mg Oral DAILY    ipratropium (ATROVENT) 0.02 % nebulizer solution 0.5 mg  0.5 mg Nebulization Q6H PRN    levalbuterol (XOPENEX) nebulizer soln 0.63 mg/3 mL  0.63 mg Nebulization Q6H PRN    0.9% sodium chloride infusion 250 mL  250 mL IntraVENous PRN    diphenhydrAMINE (BENADRYL) capsule 25 mg  25 mg Oral Q6H PRN    furosemide (LASIX) tablet 40 mg  40 mg Oral DAILY    fluticasone-vilanterol (BREO ELLIPTA) 200mcg-25mcg/puff  1 Puff Inhalation DAILY    acetaminophen (TYLENOL) tablet 650 mg  650 mg Oral Q6H PRN    sucralfate (CARAFATE) 100 mg/mL oral suspension 1 g  1 g Oral AC&HS    hydrALAZINE (APRESOLINE) 20 mg/mL injection 10 mg  10 mg IntraVENous Q6H PRN    bisacodyl (DULCOLAX) suppository 10 mg  10 mg Rectal DAILY PRN    sodium chloride (NS) flush 5-10 mL  5-10 mL IntraVENous Q8H    sodium chloride (NS) flush 5-10 mL  5-10 mL IntraVENous PRN    naloxone (NARCAN) injection 0.4 mg  0.4 mg IntraVENous PRN    ondansetron (ZOFRAN ODT) tablet 4 mg  4 mg Oral Q4H PRN    oxyCODONE IR (ROXICODONE) tablet 5 mg  5 mg Oral Q4H PRN    oxyCODONE IR (ROXICODONE) tablet 2.5 mg  2.5 mg Oral Q4H PRN        Holly Archer III DO

## 2017-07-30 LAB
ANION GAP BLD CALC-SCNC: 6 MMOL/L (ref 5–15)
BUN SERPL-MCNC: 19 MG/DL (ref 6–20)
BUN/CREAT SERPL: 26 (ref 12–20)
CALCIUM SERPL-MCNC: 7.8 MG/DL (ref 8.5–10.1)
CHLORIDE SERPL-SCNC: 97 MMOL/L (ref 97–108)
CO2 SERPL-SCNC: 28 MMOL/L (ref 21–32)
CREAT SERPL-MCNC: 0.74 MG/DL (ref 0.55–1.02)
GLUCOSE BLD STRIP.AUTO-MCNC: 125 MG/DL (ref 65–100)
GLUCOSE BLD STRIP.AUTO-MCNC: 202 MG/DL (ref 65–100)
GLUCOSE BLD STRIP.AUTO-MCNC: 208 MG/DL (ref 65–100)
GLUCOSE BLD STRIP.AUTO-MCNC: 233 MG/DL (ref 65–100)
GLUCOSE SERPL-MCNC: 112 MG/DL (ref 65–100)
POTASSIUM SERPL-SCNC: 3.1 MMOL/L (ref 3.5–5.1)
SERVICE CMNT-IMP: ABNORMAL
SODIUM SERPL-SCNC: 131 MMOL/L (ref 136–145)

## 2017-07-30 PROCEDURE — 74011250637 HC RX REV CODE- 250/637: Performed by: INTERNAL MEDICINE

## 2017-07-30 PROCEDURE — 36415 COLL VENOUS BLD VENIPUNCTURE: CPT | Performed by: INTERNAL MEDICINE

## 2017-07-30 PROCEDURE — 80048 BASIC METABOLIC PNL TOTAL CA: CPT | Performed by: INTERNAL MEDICINE

## 2017-07-30 PROCEDURE — 65270000029 HC RM PRIVATE

## 2017-07-30 PROCEDURE — 82962 GLUCOSE BLOOD TEST: CPT

## 2017-07-30 PROCEDURE — 74011250637 HC RX REV CODE- 250/637: Performed by: SPECIALIST

## 2017-07-30 PROCEDURE — 74011250637 HC RX REV CODE- 250/637: Performed by: PHYSICIAN ASSISTANT

## 2017-07-30 RX ORDER — POTASSIUM CHLORIDE 750 MG/1
10 TABLET, FILM COATED, EXTENDED RELEASE ORAL
Status: COMPLETED | OUTPATIENT
Start: 2017-07-30 | End: 2017-07-30

## 2017-07-30 RX ADMIN — METOPROLOL TARTRATE 25 MG: 25 TABLET ORAL at 17:11

## 2017-07-30 RX ADMIN — Medication 10 ML: at 22:42

## 2017-07-30 RX ADMIN — SUCRALFATE 1 G: 1 SUSPENSION ORAL at 15:45

## 2017-07-30 RX ADMIN — FUROSEMIDE 40 MG: 40 TABLET ORAL at 08:42

## 2017-07-30 RX ADMIN — ACETAMINOPHEN 650 MG: 325 TABLET, FILM COATED ORAL at 15:45

## 2017-07-30 RX ADMIN — Medication 10 ML: at 13:14

## 2017-07-30 RX ADMIN — Medication 1 CAPSULE: at 08:42

## 2017-07-30 RX ADMIN — Medication 10 ML: at 06:00

## 2017-07-30 RX ADMIN — METOPROLOL TARTRATE 25 MG: 25 TABLET ORAL at 08:42

## 2017-07-30 RX ADMIN — POTASSIUM CHLORIDE 10 MEQ: 750 TABLET, FILM COATED, EXTENDED RELEASE ORAL at 15:45

## 2017-07-30 RX ADMIN — UMECLIDINIUM 1 PUFF: 62.5 AEROSOL, POWDER ORAL at 13:15

## 2017-07-30 RX ADMIN — HYDROCHLOROTHIAZIDE 25 MG: 25 TABLET ORAL at 08:42

## 2017-07-30 RX ADMIN — FLUTICASONE FUROATE AND VILANTEROL TRIFENATATE 1 PUFF: 200; 25 POWDER RESPIRATORY (INHALATION) at 13:15

## 2017-07-30 RX ADMIN — AMLODIPINE BESYLATE 5 MG: 5 TABLET ORAL at 08:42

## 2017-07-30 RX ADMIN — SUCRALFATE 1 G: 1 SUSPENSION ORAL at 13:13

## 2017-07-30 RX ADMIN — PANTOPRAZOLE SODIUM 40 MG: 40 TABLET, DELAYED RELEASE ORAL at 08:42

## 2017-07-30 RX ADMIN — SUCRALFATE 1 G: 1 SUSPENSION ORAL at 08:36

## 2017-07-30 RX ADMIN — PANTOPRAZOLE SODIUM 40 MG: 40 TABLET, DELAYED RELEASE ORAL at 15:45

## 2017-07-30 RX ADMIN — IRBESARTAN 75 MG: 75 TABLET ORAL at 08:36

## 2017-07-30 RX ADMIN — OXYCODONE HYDROCHLORIDE 5 MG: 5 TABLET ORAL at 03:24

## 2017-07-30 RX ADMIN — OXYCODONE HYDROCHLORIDE 5 MG: 5 TABLET ORAL at 17:11

## 2017-07-30 RX ADMIN — SUCRALFATE 1 G: 1 SUSPENSION ORAL at 22:42

## 2017-07-30 RX ADMIN — OXYCODONE HYDROCHLORIDE 5 MG: 5 TABLET ORAL at 13:13

## 2017-07-30 NOTE — PROGRESS NOTES
Cardiology Progress Note  7/30/2017     Admit Date: 7/12/2017  Admit Diagnosis: right ankle fracture  Respiratory failure (HCC)  GI Bleed  GI BLEED, ANEMIA  gi bleeding                          Assessment:                  1. Paroxysmal SVT, self-limited. 2. Sinus bradycardia due to beta-blocker. Adjusted. 3. Occasional PVC's including trigeminy. 4. S/p mechanical fall requiring orthopedic surgery of right leg, found to be hypoxic, likely COPD exacerbation, PSVT noted on telemetry, mild rhabo with increased troponin being managed medically, then with GI bleed s/p 3 units prbc.  5. In 2011 found to have hyponatremia and TWI on ECG with cath with moderate CAD. 6. Echo 6/2017 EF 55%, mild to mod MR, mild AI.  7. HTN with right renal artery stent. 8. DM type 2.  9. Dyslipidemia difficulty with daily statin. 10. Melena, GI bleed found to have PUD by EGD#1 but no active bleeding, transfused multiple units of prbc here, Dr. Rhona Zelaya following. EGD #2 with ulcers, esophagitis, but no active bleeding. 11. PVD with left fem-pop placed on Plavix for this. 15. , mild functional capacity, works at [employer]:               HR stable overnight; in 50-70s     D/C planned for tomorrow    1. Holding Plavix for as long as needed until bleeding has resolved. 2. Cont metoprolol 25 bid. Was increased to 75mg bid but reduced due to bradycardia on two occasions (to 50, then to 12.5 bid). 3. Cont amlodipine, would not titrate due to edema in legs. 4. Cont ARB. 5. Cont lasix 40mg daily. 6. Cont statin, stopped niacin.      Stable from cardiac rhythm standpoint. Please call with questions.       For other plans, see orders.   Hospital problem list   Active Hospital Problems    Diagnosis Date Noted    Respiratory failure ([facility] [address] 07/13/2017        Subjective: Nba Zurita reports   Chest pain X none  consistent with:  Non-cardiac CP         Atypical CP     None now  On going  Anginal CP     Dyspnea X none  at rest  with exertion         improved  unchanged  worse              PND X none  overnight       Orthopnea X none  improved  unchanged  worse   Presyncope X none  improved  unchanged  worse     Ambulated in hallway without symptoms   Yes   Ambulated in room without symptoms  Yes   Objective:    Physical Exam:  Overall VSSAF;    Visit Vitals    /87 (BP 1 Location: Right arm, BP Patient Position: At rest;Sitting)  Comment (BP Patient Position): in bed    Pulse 88    Temp 98.5 °F (36.9 °C)    Resp 18    Ht 5' 3\" (1.6 m)    Wt 90.4 kg (199 lb 4.7 oz)    SpO2 96%    Breastfeeding No    BMI 35.3 kg/m2     Temp (24hrs), Av.7 °F (37.1 °C), Min:98.1 °F (36.7 °C), Max:99.6 °F (37.6 °C)    Patient Vitals for the past 8 hrs:   Pulse   17 0836 88    Patient Vitals for the past 8 hrs:   Resp   17 0836 18    Patient Vitals for the past 8 hrs:   BP   17 0836 183/87          Intake/Output Summary (Last 24 hours) at 17 1620  Last data filed at 17 2258   Gross per 24 hour   Intake                0 ml   Output             1000 ml   Net            -1000 ml     General Appearance: Well developed, obsese, no acute distress. Ears/Nose/Mouth/Throat:   Normal MM; anicteric. JVP: WNL   Resp:   clear to auscultation bilaterally anteriorly. Nl resp effort. Cardiovascular:  RRR, S1, S2 normal, no new murmur. No gallop or rub. Abdomen:   Soft, non-tender, bowel sounds are present. Extremities: No edema bilaterally. Right foot in boot. Skin:  Neuro: Warm and dry. A/O x3, grossly nonfocal     cath site intact w/o hematoma or new bruit; distal pulse unchanged  Yes   Data Review:     Telemetry independently reviewed x sinus  chronic afib  parox SVT  NSVT     ECG independently reviewed  NSR  afib  no significant changes  NSST-Tw chgs   x no new ECG provided for review   Lab results reviewed as noted below.   Current medications reviewed as noted below. No results for input(s): PH, PCO2, PO2 in the last 72 hours. No results for input(s): CPK, CKMB, CKNDX, TROIQ in the last 72 hours. Recent Labs      07/30/17   0516  07/29/17   2252  07/29/17   0428  07/28/17   0437   NA  131*   --   132*  133*   K  3.1*   --   3.2*  3.5   CL  97   --   97  100   CO2  28   --   26  25   BUN  19   --   20  22*   CREA  0.74   --   0.61  0.61   GLU  112*   --   96  115*   CA  7.8*   --   7.6*  7.5*   WBC   --   12.3*  13.5*  14.6*   HGB   --   8.4*  9.1*  9.3*   HCT   --   25.5*  26.3*  26.6*   PLT   --   178  187  188     Lab Results   Component Value Date/Time    Cholesterol, total 162 09/20/2011 01:17 PM    HDL Cholesterol 55 09/20/2011 01:17 PM    LDL, calculated 78.4 09/20/2011 01:17 PM    Triglyceride 143 09/20/2011 01:17 PM    CHOL/HDL Ratio 2.9 09/20/2011 01:17 PM     No results for input(s): SGOT, GPT, AP, TBIL, TP, ALB, GLOB, GGT, AML, LPSE in the last 72 hours. No lab exists for component: AMYP, HLPSE  No results for input(s): INR, PTP, APTT in the last 72 hours.     No lab exists for component: INREXT, INREXT   No components found for: [place]    Current Facility-Administered Medications   Medication Dose Route Frequency    nitroglycerin (NITROBID) 2 % ointment 1 Inch  1 Inch Topical Q8H PRN    zinc oxide-cod liver oil (DESITIN) 40 % paste   Topical PRN    metoprolol tartrate (LOPRESSOR) tablet 25 mg  25 mg Oral BID    hydroCHLOROthiazide (HYDRODIURIL) tablet 25 mg  25 mg Oral DAILY    0.9% sodium chloride infusion 250 mL  250 mL IntraVENous PRN    umeclidinium (INCRUSE ELLIPTA) 62.5 mcg/actuation  1 Puff Inhalation DAILY    lactobac ac& pc-s.therm-b.anim (ROSANGELA Q/RISAQUAD)  1 Cap Oral DAILY    0.9% sodium chloride infusion 250 mL  250 mL IntraVENous PRN    pantoprazole (PROTONIX) tablet 40 mg  40 mg Oral ACB&D    irbesartan (AVAPRO) tablet 75 mg  75 mg Oral DAILY    amLODIPine (NORVASC) tablet 5 mg  5 mg Oral DAILY    ipratropium (ATROVENT) 0.02 % nebulizer solution 0.5 mg  0.5 mg Nebulization Q6H PRN    levalbuterol (XOPENEX) nebulizer soln 0.63 mg/3 mL  0.63 mg Nebulization Q6H PRN    0.9% sodium chloride infusion 250 mL  250 mL IntraVENous PRN    diphenhydrAMINE (BENADRYL) capsule 25 mg  25 mg Oral Q6H PRN    furosemide (LASIX) tablet 40 mg  40 mg Oral DAILY    fluticasone-vilanterol (BREO ELLIPTA) 200mcg-25mcg/puff  1 Puff Inhalation DAILY    acetaminophen (TYLENOL) tablet 650 mg  650 mg Oral Q6H PRN    sucralfate (CARAFATE) 100 mg/mL oral suspension 1 g  1 g Oral AC&HS    hydrALAZINE (APRESOLINE) 20 mg/mL injection 10 mg  10 mg IntraVENous Q6H PRN    bisacodyl (DULCOLAX) suppository 10 mg  10 mg Rectal DAILY PRN    sodium chloride (NS) flush 5-10 mL  5-10 mL IntraVENous Q8H    sodium chloride (NS) flush 5-10 mL  5-10 mL IntraVENous PRN    naloxone (NARCAN) injection 0.4 mg  0.4 mg IntraVENous PRN    ondansetron (ZOFRAN ODT) tablet 4 mg  4 mg Oral Q4H PRN    oxyCODONE IR (ROXICODONE) tablet 5 mg  5 mg Oral Q4H PRN    oxyCODONE IR (ROXICODONE) tablet 2.5 mg  2.5 mg Oral Q4H PRN        Emmer Carbon III, DO

## 2017-07-30 NOTE — PROGRESS NOTES
Orthopedic NP Progress Note  Post Op day: 3 Days Post-Op    July 30, 2017 10:09 AM     Nela Brewer    Attending Physician: Treatment Team: Attending Provider: Ines Herbert MD; Consulting Provider: CHARANJIT Leija; Consulting Provider: Edel Jeffers MD; Consulting Provider: Yana Lambert MD; Utilization Review: Oksana Ramírez RN; Care Manager: Thomas Morrison; Consulting Provider: Alaina Cool MD; Consulting Provider: Ines Herbert MD; Consulting Provider: Dominique Colunga MD; Utilization Review: Anita Barron RN     Vital Signs:    Patient Vitals for the past 8 hrs:   BP Temp Pulse Resp SpO2   07/30/17 0843 - 98.5 °F (36.9 °C) - - -   07/30/17 0836 183/87 98.5 °F (36.9 °C) 88 18 96 %     BMI (calculated): 35.3 (07/19/17 2000)    Intake/Output:     07/28 1901 - 07/30 0700  In: 240 [P.O.:240]  Out: 1195 [Urine:3925]    Pain Control:   Pain Assessment  Pain Scale 1: Numeric (0 - 10)  Pain Intensity 1: 8  Pain Onset 1: x this afternoon  Pain Location 1: Leg  Pain Orientation 1: Right  Pain Description 1: Aching  Pain Intervention(s) 1: Medication (see MAR), Ice    LAB:    Recent Labs      07/29/17   2252   HCT  25.5*   HGB  8.4*     Lab Results   Component Value Date/Time    Sodium 131 07/30/2017 05:16 AM    Potassium 3.1 07/30/2017 05:16 AM    Chloride 97 07/30/2017 05:16 AM    CO2 28 07/30/2017 05:16 AM    Glucose 112 07/30/2017 05:16 AM    BUN 19 07/30/2017 05:16 AM    Creatinine 0.74 07/30/2017 05:16 AM    Calcium 7.8 07/30/2017 05:16 AM       Subjective:  Nela Brewer is a 81 y.o. female s/p a  Procedure(s):  ESOPHAGOGASTRODUODENAL (EGD) BIOPSY  ESOPHAGOGASTRODUODENOSCOPY (EGD)   Procedure(s):  ESOPHAGOGASTRODUODENAL (EGD) BIOPSY  ESOPHAGOGASTRODUODENOSCOPY (EGD). Tolerating diet. Pain well controlled this AM. Chart reviewed      Objective: General: alert, cooperative, no distress. Neuro/Vascular: CNS Intact. Sensation stable.  Brisk cap refill, 2+ pulses UE/LE  Musculoskeletal:  FROM UE/LE with ex fix to RLE. Anibal's sign negative in bilateral lower extremities. Calves soft, supple, non-tender upon palpation or with passive stretch. Skin: Incision - clean, dry and intact. No significant erythema or swelling. Dressing: clean, dry, and intact    Ness - y  Drain - n       PT/OT:   Gait:  Gait  Step Length: Left shortened  Stance: Left increased  Gait Abnormalities: Decreased step clearance, Other (hop-step)  Ambulation - Level of Assistance: Minimal assistance, Moderate assistance, Assist x2  Distance (ft): 3 Feet (ft)  Assistive Device: Walker, rolling, Gait belt                   Assessment:    s/p Procedure(s):  ESOPHAGOGASTRODUODENAL (EGD) BIOPSY  ESOPHAGOGASTRODUODENOSCOPY (EGD)    Active Problems:    Respiratory failure ([address] (7/13/2017)         Plan:     -  Continue PT/OT - NWB RLE  -  Continue established methods of pain control  -  VTE Prophylaxes - TEDS &/or SCDs, holding plavix secodnary to GI bleed  -  Pin care   -  urinary retention - unsuccessful attempt to remove on 7/23, discussed with nursing to consider voiding trial prior to discharge, could consider starting flomax       Discharge To:  SNF when accepted and medically cleared     Dr. Susana Manjarrez aware and agree with plan.      Signed By: Emanuel Estevez NP    Orthopedic Nurse Practitioner

## 2017-07-30 NOTE — PROGRESS NOTES
Placing Consult to [facility] at [phone], Dr. Alfa Danielle on call. Provided callback number [phone]. Orthos note: Ness- removal attempted on 7/23 with retention. Cath replaced. To be removed in 2-3 more days with voiding trial.  (about 7/31-8/1)   Hospitalist: recommended placing urology consult for urinary retention. Dr. Alfa Danielle states he has emergency cases and is unable to see the patient today. He asks that we place the urology consult again tomorrow morning.

## 2017-07-30 NOTE — PROGRESS NOTES
Bedside and Verbal shift change report given to Destinee Ivory RN (oncoming nurse) by Nelly Dawkins RN (offgoing nurse). Report included the following information SBAR, Kardex, Intake/Output, MAR, Recent Results and Cardiac Rhythm SR/PVCs.

## 2017-07-31 LAB
ANION GAP BLD CALC-SCNC: 9 MMOL/L (ref 5–15)
BUN SERPL-MCNC: 20 MG/DL (ref 6–20)
BUN/CREAT SERPL: 28 (ref 12–20)
CALCIUM SERPL-MCNC: 7.9 MG/DL (ref 8.5–10.1)
CHLORIDE SERPL-SCNC: 93 MMOL/L (ref 97–108)
CO2 SERPL-SCNC: 27 MMOL/L (ref 21–32)
CREAT SERPL-MCNC: 0.71 MG/DL (ref 0.55–1.02)
GLUCOSE BLD STRIP.AUTO-MCNC: 118 MG/DL (ref 65–100)
GLUCOSE BLD STRIP.AUTO-MCNC: 177 MG/DL (ref 65–100)
GLUCOSE BLD STRIP.AUTO-MCNC: 276 MG/DL (ref 65–100)
GLUCOSE SERPL-MCNC: 89 MG/DL (ref 65–100)
HCT VFR BLD AUTO: 25.1 % (ref 35–47)
HGB BLD-MCNC: 8.5 G/DL (ref 11.5–16)
MAGNESIUM SERPL-MCNC: 1.6 MG/DL (ref 1.6–2.4)
POTASSIUM SERPL-SCNC: 3 MMOL/L (ref 3.5–5.1)
SERVICE CMNT-IMP: ABNORMAL
SODIUM SERPL-SCNC: 129 MMOL/L (ref 136–145)

## 2017-07-31 PROCEDURE — 74011250637 HC RX REV CODE- 250/637: Performed by: INTERNAL MEDICINE

## 2017-07-31 PROCEDURE — 83735 ASSAY OF MAGNESIUM: CPT | Performed by: INTERNAL MEDICINE

## 2017-07-31 PROCEDURE — 97530 THERAPEUTIC ACTIVITIES: CPT

## 2017-07-31 PROCEDURE — 85018 HEMOGLOBIN: CPT | Performed by: INTERNAL MEDICINE

## 2017-07-31 PROCEDURE — 65270000029 HC RM PRIVATE

## 2017-07-31 PROCEDURE — 74011250637 HC RX REV CODE- 250/637: Performed by: SPECIALIST

## 2017-07-31 PROCEDURE — 74011250636 HC RX REV CODE- 250/636: Performed by: INTERNAL MEDICINE

## 2017-07-31 PROCEDURE — 36415 COLL VENOUS BLD VENIPUNCTURE: CPT | Performed by: INTERNAL MEDICINE

## 2017-07-31 PROCEDURE — 82962 GLUCOSE BLOOD TEST: CPT

## 2017-07-31 PROCEDURE — 80048 BASIC METABOLIC PNL TOTAL CA: CPT | Performed by: INTERNAL MEDICINE

## 2017-07-31 PROCEDURE — 74011250637 HC RX REV CODE- 250/637: Performed by: PHYSICIAN ASSISTANT

## 2017-07-31 RX ORDER — POTASSIUM CHLORIDE 750 MG/1
10 TABLET, FILM COATED, EXTENDED RELEASE ORAL 2 TIMES DAILY
Status: DISCONTINUED | OUTPATIENT
Start: 2017-08-01 | End: 2017-08-01 | Stop reason: HOSPADM

## 2017-07-31 RX ORDER — METOPROLOL SUCCINATE 50 MG/1
50 TABLET, EXTENDED RELEASE ORAL DAILY
Status: DISCONTINUED | OUTPATIENT
Start: 2017-07-31 | End: 2017-08-01 | Stop reason: HOSPADM

## 2017-07-31 RX ORDER — POTASSIUM CHLORIDE 750 MG/1
40 TABLET, FILM COATED, EXTENDED RELEASE ORAL
Status: COMPLETED | OUTPATIENT
Start: 2017-07-31 | End: 2017-07-31

## 2017-07-31 RX ORDER — MAGNESIUM SULFATE 1 G/100ML
1 INJECTION INTRAVENOUS ONCE
Status: COMPLETED | OUTPATIENT
Start: 2017-07-31 | End: 2017-07-31

## 2017-07-31 RX ORDER — IRBESARTAN 150 MG/1
150 TABLET ORAL DAILY
Status: DISCONTINUED | OUTPATIENT
Start: 2017-07-31 | End: 2017-08-01 | Stop reason: HOSPADM

## 2017-07-31 RX ADMIN — IRBESARTAN 150 MG: 75 TABLET ORAL at 10:05

## 2017-07-31 RX ADMIN — PANTOPRAZOLE SODIUM 40 MG: 40 TABLET, DELAYED RELEASE ORAL at 16:42

## 2017-07-31 RX ADMIN — Medication 10 ML: at 21:27

## 2017-07-31 RX ADMIN — SUCRALFATE 1 G: 1 SUSPENSION ORAL at 16:42

## 2017-07-31 RX ADMIN — Medication 1 CAPSULE: at 09:59

## 2017-07-31 RX ADMIN — FUROSEMIDE 40 MG: 40 TABLET ORAL at 09:59

## 2017-07-31 RX ADMIN — PANTOPRAZOLE SODIUM 40 MG: 40 TABLET, DELAYED RELEASE ORAL at 07:48

## 2017-07-31 RX ADMIN — FLUTICASONE FUROATE AND VILANTEROL TRIFENATATE 1 PUFF: 200; 25 POWDER RESPIRATORY (INHALATION) at 10:03

## 2017-07-31 RX ADMIN — UMECLIDINIUM 1 PUFF: 62.5 AEROSOL, POWDER ORAL at 10:05

## 2017-07-31 RX ADMIN — SUCRALFATE 1 G: 1 SUSPENSION ORAL at 12:10

## 2017-07-31 RX ADMIN — Medication 10 ML: at 14:43

## 2017-07-31 RX ADMIN — SUCRALFATE 1 G: 1 SUSPENSION ORAL at 07:48

## 2017-07-31 RX ADMIN — OXYCODONE HYDROCHLORIDE 5 MG: 5 TABLET ORAL at 06:39

## 2017-07-31 RX ADMIN — METOPROLOL SUCCINATE 50 MG: 50 TABLET, EXTENDED RELEASE ORAL at 09:59

## 2017-07-31 RX ADMIN — POTASSIUM CHLORIDE 40 MEQ: 750 TABLET, FILM COATED, EXTENDED RELEASE ORAL at 10:03

## 2017-07-31 RX ADMIN — SUCRALFATE 1 G: 1 SUSPENSION ORAL at 21:27

## 2017-07-31 RX ADMIN — NITROGLYCERIN 1 INCH: 20 OINTMENT TOPICAL at 06:39

## 2017-07-31 RX ADMIN — AMLODIPINE BESYLATE 5 MG: 5 TABLET ORAL at 10:05

## 2017-07-31 RX ADMIN — MAGNESIUM SULFATE IN DEXTROSE 1 G: 10 INJECTION, SOLUTION INTRAVENOUS at 10:06

## 2017-07-31 NOTE — DIABETES MGMT
DTC Progress Note    Recommendations/ Comments: If appropriate, please consider adding diabetic restrictions to diet. Fasting BG within target range, but continues to rise throughout the day. Chart reviewed on Ingrid Score for hyperglycemia. Patient is a 81 y.o. female with known history of Type 2 Diabetes on none at home. A1c:   Lab Results   Component Value Date/Time    Hemoglobin A1c 5.7 07/14/2017 03:22 AM    Hemoglobin A1c 5.5 11/14/2014 03:45 AM       Recent Glucose Results: Lab Results   Component Value Date/Time    GLU 89 07/31/2017 05:19 AM    GLUCPOC 118 (H) 07/31/2017 07:58 AM    GLUCPOC 208 (H) 07/30/2017 10:01 PM    GLUCPOC 233 (H) 07/30/2017 03:41 PM        Lab Results   Component Value Date/Time    Creatinine 0.71 07/31/2017 05:19 AM     Estimated Creatinine Clearance: 78.7 mL/min (based on Cr of 0.71). Active Orders   Diet    DIET REGULAR        PO intake: Patient Vitals for the past 72 hrs:   % Diet Eaten   07/31/17 0834 75 %   07/29/17 0907 90 %       Current hospital DM medication: none    Will continue to follow as needed.     Thank you    Yesy Kimball, [facility]  Office: [phone]

## 2017-07-31 NOTE — PROGRESS NOTES
Problem: Self Care Deficits Care Plan (Adult)  Goal: *Acute Goals and Plan of Care (Insert Text)  Occupational Therapy Goals  OT weekly reassessment 7/31/17, goals modified as below  1. Patient will perform sponge bathing seated with minimum A within 7 day(s). 2. Patient will perform lower body dressing seated with adaptive dressing aides with minimum A within 7 day(s). 3. Patient will perform transfers to drop arm BSC/BSC, via squat pivot with RW, NWB on RLE with moderate assistance within 7 day(s). 4. Patient will perform all aspects of toileting seated with supervision/set-up within 7 day(s). Initiated 7/14/2017  1. Patient will perform sponge bathing with supervision/set-up within 7 day(s). 2. Patient will perform lower body dressing with supervision/set-up within 7 day(s). 3. Patient will perform BSC transfers, via stand pivot with RW, NWB on RLE with minimal assistance/contact guard assist within 7 day(s). 4. Patient will perform all aspects of toileting with supervision/set-up within 7 day(s). OCCUPATIONAL THERAPY TREATMENT: WEEKLY REASSESSMENT  Patient: Randa Sánchez (87 y.o. female)  Date: 7/31/2017  Diagnosis: right ankle fracture  Respiratory failure (HCC)  GI Bleed  GI BLEED, ANEMIA  gi bleeding <principal problem not specified>  Procedure(s) (LRB):  ESOPHAGOGASTRODUODENAL (EGD) BIOPSY (N/A)  ESOPHAGOGASTRODUODENOSCOPY (EGD) (N/A) 4 Days Post-Op  Precautions: Fall, NWB      ASSESSMENT:  Patient demonstrated slow progression towards OT goals, goals modified to reflect current status. Pt continues to be limited by impaired standing balance, limited by NWB status and inability to adhere to NWB while in standing position. Pt received in bedside chair after santiago lifted by PMT. Pt completed 3 trials of sit to stand with max A x 2, able to progress her standing each trial up to 45 seconds. However, pt fatigues quickly and requires encouragement to push through fatigue.  She is performing ADLs from seated level, up to mod A for LB dressing with use of dressing aides. Will continue to work on static standing in prep for standing clothing management, however, pt requires strong use of B UEs. Will issue theraband to assist with B UE strengthening. Pt remains motivated and is appropriate for SNF rehab. Progression toward goals:  [ ]            Improving appropriately and progressing toward goals  [X]            Improving slowly and progressing toward goals  [ ]            Not making progress toward goals and plan of care will be adjusted       PLAN:  Goals have been updated based on progression since last assessment. Patient continues to benefit from skilled intervention to address the above impairments. Continue to follow patient 4 times a week to address goals. Planned Interventions:  [X]                    Self Care Training                  [X]             Therapeutic Activities  [X]                    Functional Mobility Training    [ ]             Cognitive Retraining  [X]                    Therapeutic Exercises           [ ]             Endurance Activities  [X]                    Balance Training                   [ ]             Neuromuscular Re-Education  [ ]                    Visual/Perceptual Training     [X]        Home Safety Training  [X]                    Patient Education                 [X]             Family Training/Education  [ ]                    Other (comment):  Discharge Recommendations: Skilled Nursing Facility  Further Equipment Recommendations for Discharge: TBD       SUBJECTIVE:   Patient stated I did something! Jerel Landin      OBJECTIVE DATA SUMMARY:   Cognitive/Behavioral Status:                       Functional Mobility and Transfers for ADLs:  Bed Mobility:  Scooting: Stand-by asssistance (constant VC)     Transfers:  Sit to Stand: Maximum assistance;Assist x2 , assist for weight shifting in standing.  In standing verbal cues to reach back with L UE first to weight shift to L and avoid WB through R foot while descending to chair. Balance:  Sitting: Intact  Standing: Impaired  Standing - Static: Poor;Fair;Constant support  Standing - Dynamic : Poor     ADL Intervention:         educated pt on importance of practicing sit to stands in prep for functional mobility, standing ADLs, etc. Pt completed 3 trials of sit to Stand: Maximum assistance;Assist x2 , assist for weight shifting in standing in order to reach for RW. In standing verbal cues to reach back with L UE first to weight shift to L and avoid WB through R foot while descending to chair. Pt completed static standing x 25, 30 and 45 seconds respectively per stand, min A x 2 to assist with controlled descent back to chair. Pain:  Pain Scale 1: Numeric (0 - 10)  Pain Intensity 1: 3  Pain Location 1: Leg  Pain Orientation 1: Right  Pain Description 1: Sore  Pain Intervention(s) 1: Medication (see MAR)  Activity Tolerance:   VSS  Please refer to the flowsheet for vital signs taken during this treatment.   After treatment:   [X] Patient left in no apparent distress sitting up in chair  [ ] Patient left in no apparent distress in bed  [X] Call bell left within reach  [X] Nursing notified  [ ] Caregiver present  [ ] Bed alarm activated      COMMUNICATION/COLLABORATION:   The patients plan of care was discussed with: Physical Therapist and Registered Nurse     Carmelita Jain OT  Time Calculation: 17 mins

## 2017-07-31 NOTE — ROUTINE PROCESS
Bedside and Verbal shift change report given to Constantino Ya RN (oncoming nurse) by Vee Martinez RN (offgoing nurse). Report included the following information SBAR, Kardex, Intake/Output, MAR, Recent Results and Med Rec Status.

## 2017-07-31 NOTE — OP NOTES
[address], [facility]   OP NOTE       Name:  Kipp Phoenix   MR#:  [id number]   :  1945   Account #:  [de-identified]    Surgery Date:  2017   Date of Adm:  2017       PREOPERATIVE DIAGNOSES    1. Right ankle pilon fracture. 2. fibular fracture. 3. Dislocation, right ankle. POSTOPERATIVE DIAGNOSES    1. Right ankle pilon fracture. 2. fibular fracture. 3. Dislocation, right ankle. PROCEDURES PERFORMED    1. Closed reduction, right ankle fracture/dislocation  2. Open reduction and internal fixation, right fibula. 3. Cannulated screw fixation of medial malleolar fracture with external   fixation for pilon fracture of the right tibia. SURGEON: Aroldo Allen MD    ASSISTANT: Gaston Collado PA-C    ANESTHESIA: General.    ESTIMATED BLOOD LOSS: Minimal.    DRAINS: None. SPECIMENS: None. COMPLICATIONS: None. CONDITION: Stable to PACU. INDICATIONS: A 46-year-old female who suffered a fall and   fracture/dislocation of her right ankle. Attempted closed reduction was   performed in the emergency department with minimal success. Risks,   benefits and alternatives to operative fixation were explained to the   patient, and she wished to proceed. She understood no guarantees   could be given about the outcome and given the severity of her injury,   she would likely require ankle fusion at some point. She understood no   guarantees could be given about the outcome and wished to proceed. DESCRIPTION OF PROCEDURE: After being identified in the   preoperative holding area and having her operative site marked, the   patient was brought back to the operating room, placed supine on   standard OR table. General anesthesia was induced without difficulty. Thigh tourniquet was applied to the right lower extremity, which was   prepped and draped in the usual fashion using sterile technique.    Appropriate time-out was performed. The limb was exsanguinated and   tourniquet inflated. Lateral approach to the fibula was utilized. Fracture   site was identified and debrided. We performed a reduction maneuver   of both the ankle and the fibula. This was held in a reduced position. We selected an appropriate plate from the [employer] set and it was   secured proximally and distally with multiple nonlocking screws. We   then placed multiple locking screws in the remainder of the plate. We   then turned our attention to the pilon fracture. She was noted to have   significant impaction and destruction of the joint with proximal   migration of the articular surface. We made a medial incision to allow   fixation of the medial malleolus, but also to allow palpation of the   posterior aspect of the tibia. We gently massaged the articular   fragments back into a more anatomic position. We then placed a   cannulated screw in the medial malleolus and assembled an A-frame   construct on the tibia to the calcaneus to allow the foot to appropriately   dorsiflex to neutral. We added in extension and the first and fifth rays. Hardware positioned for both the internal hardware and external fixator   were confirmed on orthogonal views with fluoroscopy. Thorough   irrigation was performed of her surgical wounds which were closed in   routine fashion. Sterile compressive dressings were applied. The   patient was awakened, moved stretcher and taken to recovery room in   stable condition. At the conclusion of the procedure, all counts were correct. There were   no immediate complications.         Todd Ramos MD      [address] [place] [address] / [facility]   D:  07/31/2017   11:54   T:  07/31/2017   13:58   Job #:  [id number]

## 2017-07-31 NOTE — PROGRESS NOTES
Bedside and Verbal shift change report given to Ashutosh Porras (oncoming nurse) by Jose M Guzman (offgoing nurse). Report included the following information SBAR, Kardex, ED Summary, OR Summary, Procedure Summary, Intake/Output, MAR, Accordion, Recent Results and Med Rec Status.

## 2017-07-31 NOTE — PROGRESS NOTES
Bedside and Verbal shift change report given to Jose Martin Lindsey RN (oncoming nurse) by Chaim Yusuf RN (offgoing nurse).  Report included the following information SBAR, Kardex, Intake/Output, MAR and Cardiac Rhythm SR.

## 2017-07-31 NOTE — PROGRESS NOTES
Progress Note      7/31/2017 8:14 AM  NAME: Zenaida Mercado   MRN:  [id number]   Admit Diagnosis: right ankle fracture  Respiratory failure (HCC)  GI Bleed  GI BLEED, ANEMIA  gi bleeding                          Assessment:                 1. S/p mechanical fall requiring orthopedic surgery of right leg, found to be hypoxic, likely COPD exacerbation, PSVT noted on telemetry, mild rhabo with increased troponin being managed medically, then with GI bleed s/p 3 units, recurrent bleed s/p 1 unit, with ulcers  2. In 2011 found to have hyponatremia and TWI on ECG with cath with moderate CAD  3. Echo 6/2017 EF 55%, mild to mod MR, mild AI  4. Sinus with PSVT  ? AVNRT on telemetry  5. HTN with right renal artery stent  6. DM  7. Dyslipidemia difficulty with daily statin  8. Hx of GI bleed found to have PUD Dr. Rain Scott  9. PVD with left fem-pop placed on plavix for this  10. , mild functional capacity, works at [employer]:                  Mechanical fall, currently not weight bearing on that leg, will need future surgery. PSVT with HR of 150, looks like AVNRT does not appear to be afib, self terminated, 20 beats of tachycardia on 7/15 not recorded on tele monitor or printed, but likely same. Mildly increased troponin, would manage CAD medically. Increased cr after diuresis, doubt significant CHF initially. Low sodium improving. Gi bleed s/p 4 U PRBC, 2 EGD's      1. Holding plavix, was on this for PVD, resume when ok with GI  2. Change to toprol xl 50mg daily, was on 100mg prior to admission  3. cont amlodipine at 5mg, would not titrate due to edema in legs  4. Increase avapro to 150mg daily, was on 300mg daily as outpt, titrate as needed  5. ?on Doxazosin 1mg per my records  6. Cont lasix 40mg daily, stop HCTZ, replete K, Mg, add standing K  7.  Resume mevacor, would stop niacin    Stable for rehab from cadiac perspective.          [x]        High complexity decision making was performed         Subjective:     Herrera Slider denies chest pain, dyspnea. Discussed with RN events overnight. Review of Systems:    Symptom Y/N Comments  Symptom Y/N Comments   Fever/Chills N   Chest Pain N    Poor Appetite N   Edema N    Cough N   Abdominal Pain N    Sputum N   Joint Pain N    SOB/ODOM N   Pruritis/Rash N    Nausea/vomit N   Tolerating PT/OT Y    Diarrhea N   Tolerating Diet Y    Constipation N   Other       Could NOT obtain due to:      Objective:      Physical Exam:    Last 24hrs VS reviewed since prior progress note. Most recent are:    Visit Vitals    /69 (BP 1 Location: Left arm, BP Patient Position: At rest)    Pulse 76    Temp 98.3 °F (36.8 °C)    Resp 16    Ht 5' 3\" (1.6 m)    Wt 90.4 kg (199 lb 4.7 oz)    SpO2 97%    Breastfeeding No    BMI 35.3 kg/m2       Intake/Output Summary (Last 24 hours) at 07/31/17 0905  Last data filed at 07/31/17 6231   Gross per 24 hour   Intake              240 ml   Output             3000 ml   Net            -2760 ml        General Appearance: Well developed, well nourished, alert & oriented x 3,    no acute distress. Ears/Nose/Mouth/Throat: Hearing grossly normal.  Neck: Supple. Chest: Lungs clear to auscultation bilaterally. Cardiovascular: Regular rate and rhythm, S1S2 normal, no murmur. Abdomen: Soft, non-tender, bowel sounds are active. Extremities: No edema bilaterally. Skin: Warm and dry. PMH/SH reviewed - no change compared to H&P    Data Review    Telemetry: normal sinus rhythm     Lab Data Personally Reviewed:    Recent Labs      07/31/17 0519 07/29/17   2252  07/29/17   0428   WBC   --   12.3*  13.5*   HGB  8.5*  8.4*  9.1*   HCT  25.1*  25.5*  26.3*   PLT   --   178  187     No results for input(s): INR, PTP, APTT in the last 72 hours.     No lab exists for component: Isidro Barrioswell   Recent Labs      07/31/17   0519  07/30/17   0516  07/29/17   0428   NA  129*  131*  132*   K  3.0*  3.1*  3.2*   CL  93*  97  97   CO2 27  28  26   BUN  20  19  20   CREA  0.71  0.74  0.61   GLU  89  112*  96   CA  7.9*  7.8*  7.6*   MG  1.6   --    --      No results for input(s): CPK, CKNDX, TROIQ in the last 72 hours. No lab exists for component: CPKMB  Lab Results   Component Value Date/Time    Cholesterol, total 162 09/20/2011 01:17 PM    HDL Cholesterol 55 09/20/2011 01:17 PM    LDL, calculated 78.4 09/20/2011 01:17 PM    Triglyceride 143 09/20/2011 01:17 PM    CHOL/HDL Ratio 2.9 09/20/2011 01:17 PM       No results for input(s): SGOT, GPT, AP, TBIL, TP, ALB, GLOB, GGT, AML, LPSE in the last 72 hours. No lab exists for component: AMYP, HLPSE  No results for input(s): PH, PCO2, PO2 in the last 72 hours.     Medications Personally Reviewed:    Current Facility-Administered Medications   Medication Dose Route Frequency    irbesartan (AVAPRO) tablet 150 mg  150 mg Oral DAILY    metoprolol succinate (TOPROL-XL) XL tablet 50 mg  50 mg Oral DAILY    potassium chloride SR (KLOR-CON 10) tablet 40 mEq  40 mEq Oral NOW    magnesium sulfate 1 g/100 ml IVPB (premix or compounded)  1 g IntraVENous ONCE    [START ON 8/1/2017] potassium chloride SR (KLOR-CON 10) tablet 10 mEq  10 mEq Oral BID    nitroglycerin (NITROBID) 2 % ointment 1 Inch  1 Inch Topical Q8H PRN    zinc oxide-cod liver oil (DESITIN) 40 % paste   Topical PRN    0.9% sodium chloride infusion 250 mL  250 mL IntraVENous PRN    umeclidinium (INCRUSE ELLIPTA) 62.5 mcg/actuation  1 Puff Inhalation DAILY    lactobac ac& pc-s.therm-b.anim (ROSANGELA Q/RISAQUAD)  1 Cap Oral DAILY    0.9% sodium chloride infusion 250 mL  250 mL IntraVENous PRN    pantoprazole (PROTONIX) tablet 40 mg  40 mg Oral ACB&D    amLODIPine (NORVASC) tablet 5 mg  5 mg Oral DAILY    ipratropium (ATROVENT) 0.02 % nebulizer solution 0.5 mg  0.5 mg Nebulization Q6H PRN    levalbuterol (XOPENEX) nebulizer soln 0.63 mg/3 mL  0.63 mg Nebulization Q6H PRN    0.9% sodium chloride infusion 250 mL  250 mL IntraVENous PRN  diphenhydrAMINE (BENADRYL) capsule 25 mg  25 mg Oral Q6H PRN    furosemide (LASIX) tablet 40 mg  40 mg Oral DAILY    fluticasone-vilanterol (BREO ELLIPTA) 200mcg-25mcg/puff  1 Puff Inhalation DAILY    acetaminophen (TYLENOL) tablet 650 mg  650 mg Oral Q6H PRN    sucralfate (CARAFATE) 100 mg/mL oral suspension 1 g  1 g Oral AC&HS    hydrALAZINE (APRESOLINE) 20 mg/mL injection 10 mg  10 mg IntraVENous Q6H PRN    bisacodyl (DULCOLAX) suppository 10 mg  10 mg Rectal DAILY PRN    sodium chloride (NS) flush 5-10 mL  5-10 mL IntraVENous Q8H    sodium chloride (NS) flush 5-10 mL  5-10 mL IntraVENous PRN    naloxone (NARCAN) injection 0.4 mg  0.4 mg IntraVENous PRN    ondansetron (ZOFRAN ODT) tablet 4 mg  4 mg Oral Q4H PRN    oxyCODONE IR (ROXICODONE) tablet 5 mg  5 mg Oral Q4H PRN    oxyCODONE IR (ROXICODONE) tablet 2.5 mg  2.5 mg Oral Q4H PRN         Saturnino Cheng MD

## 2017-07-31 NOTE — PROGRESS NOTES
Problem: Mobility Impaired (Adult and Pediatric)  Goal: *Acute Goals and Plan of Care (Insert Text)  Physical Therapy Goals  Goals reviewed and revised 7/27/17  1. Patient will move from supine to sit and sit to supine in bed with supervision/set-up within 7 day(s). 2. Patient will transfer from bed to chair and chair to bed with minimal assistance/contact guard assist using the least restrictive device within 7 day(s). 3. Patient will perform sit to stand with minimal assistance within 7 day(s). 4. Patient will ambulate with minimal assistance/contact guard assist for 25 feet with the least restrictive device within 7 day(s). 5. Patient will ascend/descend 1 stairs with bilateral handrail(s) with moderate assist within 7 day(s). Initiated 7/14/2017  1. Patient will move from supine to sit and sit to supine in bed with supervision/set-up within 7 day(s). 2. Patient will transfer from bed to chair and chair to bed with minimal assistance/contact guard assist using the least restrictive device within 7 day(s). 3. Patient will perform sit to stand with contact guard assist within 7 day(s). 4. Patient will ambulate with minimal assistance/contact guard assist for 50 feet with the least restrictive device within 7 day(s). 5. Patient will ascend/descend 1 stairs with bilateral handrail(s) with minimal assistance/contact guard assist within 7 day(s). PHYSICAL THERAPY TREATMENT  Patient: Zheng Herzog (54 y.o. female)  Date: 7/31/2017  Diagnosis: right ankle fracture  Respiratory failure (HCC)  GI Bleed  GI BLEED, ANEMIA  gi bleeding <principal problem not specified>  Procedure(s) (LRB):  ESOPHAGOGASTRODUODENAL (EGD) BIOPSY (N/A)  ESOPHAGOGASTRODUODENOSCOPY (EGD) (N/A) 4 Days Post-Op  Precautions: Fall, NWB on R       ASSESSMENT:  Pt chart reviewed and cleared by nursing.  Pt was received sitting on santiago bed in bed side chair and R LE elevated via 4 pillows along with a pillow behind back, and pt was agreeable to therapy. Pt subjectively reported having moved to bed side chair just recently. Pt was seen with PT/OT in order to maximize functional outcomes. Pt also subjectively reported that she wanted to be d/c to SNF soon and did not understand why she hadn't already. Upon removal of pillows, pt was able to scoot to edge of chair and complete sit<>stand and stand<>sit transfer with maxAx2 for proper activity execution secondary to pt exhibiting decreased UE/LE strength, endurance, and core control. Pt was able to complete sit<>stand transfer in bed side chair x3 with standing balance times linearly increasing (20, 30, and 45 seconds). It was also noted that pt was unable to sustain and maintain NWB precautions on RLE as she constantly attempted to place both feet on the ground to assist with transfer, which prompted additional assist to prevent WB attempts. Upon the last transfer pt began to noticeably fatigue as her LLE began to shake. Pt was advised by OT that they would be working on UE strength to help facilitate improved transfer abilities. PT was left sitting in bed side chair with LLE elevated on 4 pillows and a pillow behind pt back additionally all other needs were met. Pt would benefit from continued PT in order to address functional deficits listed above and for continued progress on previously set PT goals. PT recommends d/c to SNF upon completion of acute care PT. Progression toward goals:  [ ]    Improving appropriately and progressing toward goals  [X]    Improving slowly and progressing toward goals  [ ]    Not making progress toward goals and plan of care will be adjusted       PLAN:  Patient continues to benefit from skilled intervention to address the above impairments. Continue treatment per established plan of care.   Discharge Recommendations:  Skilled Nursing Facility  Further Equipment Recommendations for Discharge:  TBD by rehab facility        SUBJECTIVE:   Patient stated I heard [facility] accepted 9 people today.       OBJECTIVE DATA SUMMARY:   Critical Behavior:  Neurologic State: Alert  Orientation Level: Oriented X4  Cognition: Appropriate decision making, Appropriate for age attention/concentration, Appropriate safety awareness, Follows commands  Safety/Judgement: Awareness of environment, Insight into deficits  Functional Mobility Training:  Bed Mobility:  Scooting: Stand-by asssistance (constant VC) - demonstrated in bed side chair         Transfers:  Sit to Stand: Maximum assistance;Assist x2  Stand to Sit: Maximum assistance;Assist x2        Balance:  Sitting: Intact  Standing: Impaired  Standing - Static: Poor;Fair;Constant support  Standing - Dynamic : Poor  Ambulation/Gait Training:  Right Side Weight Bearing: Non-weight bearing     Pain:  Pain Scale 1: Numeric (0 - 10)  Pain Intensity 1: 3  Pain Location 1: Leg  Pain Orientation 1: Right  Pain Description 1: Sore  Pain Intervention(s) 1: Medication (see MAR)  Activity Tolerance:   Poor due to inability to adhere to WB precautions, increased fatigue levels, and decreased strength/balance  Please refer to the flowsheet for vital signs taken during this treatment. After treatment:   [X]    Patient left in no apparent distress sitting up in chair  [ ]    Patient left in no apparent distress in bed  [X]    Call bell left within reach  [X]    Nursing notified  [ ]    Caregiver present  [ ]    Bed alarm activated      COMMUNICATION/COLLABORATION:   The patients plan of care was discussed with: Occupational Therapist and Registered Nurse     [facility]   Time Calculation: 17 mins        Regarding student involvement in patient care:  A student participated in this treatment session. Per CMS Medicare statements and APTA guidelines I certify that the following was true:  1. I was present and directly observed the entire session. 2. I made all skilled judgments and clinical decisions regarding care.   3. I am the practitioner responsible for assessment, treatment, and documentation.

## 2017-07-31 NOTE — PROGRESS NOTES
Patient in retention with [facility]-failed voiding trials. Please discharge with [facility] to see us as OP for further management of her urinary retention.      Thank you    Brady Davis M.D.  [phone]

## 2017-07-31 NOTE — PROGRESS NOTES
Hospitalist Progress Note    NAME: Tony Guillaume   :  1945   MRN:  [id number]       Interim Hospital Summary: 81 y.o. female whom presented on 2017 with      Assessment / Plan:  Acute blood loss anemia due to GIB/duodenal ulcers and severe esophagitis: Hg stable today @8.5 recheck in am  - Transfused 4u pRBCs this admission  - EGD  with Dr. Luis Antonio Grover demonstrating diffuse ulceration with friability in entire lower 1/3 of the esophagus, nearly circumferential c/w severe erosive esophagitis.  No varices seen, no active bleeding. The gastric mucosa appeared normal. Duodenum with multiple clean based ulcers (3 in the duodenal bulb) and 3 additional ones in the second portion of the duodenum with black base but no visible vessel, no active bleeding.  The ulcers in the bulb had a chronic appearance. - repeat EGD with Dr. Luis Armando Callahan  with exudative LA grade C esophagitis noted with a hiatal hernia.  Duodenum deep bulb and  bulbar sweep has 4, large, over 1 cm each, deeply excavated ulcers with clean base. No active bleeding.  - appreciate GI consult, plan for discharge when bed is available in NH   - con't BID PPI and carafate  - con't holding plavix on this for PVD, does not need to urgently be restarted        Minimally elevated troponin in setting of transient atrial fibrillation with RVR (), SVT (), 22 beat vtach (7/15) in setting of CAD and benign hypertension:  - echo  with EF 55-60 %. There were no regional wall motion abnormalities. - appreciate cardiology consult  - con't lopressor, avapro, lasix, norvasc  - off plavix due to GIB as above     Distal tibia and fibula fracture s/p mechanical fall:  s/p ORIF on , note ex-fix in place without the pin that was noted to be out on .  Partial splint in place to keep foot in alignment post pin removal.   - appreciate ortho assistance, con't NWB per their recommendations-stable   - PT/OT -   Awaiting placement CM in the case.     Acute hypoxic respiratory failure (resolved) due to acute COPD exacerbation and ?ILD: off steroids/abx- con't breo/Incruse  - prn ipratropium, xopenex nebs  - con't to mobilize as able  ARF, resolved  Hyponatremia, on lasix po  na 129  PAD:  s/p right renal artery stent, left fem-pop by Dr. Jarvis Deter 3/2014  Tobacco use:  con't nicotine patch    E: replace prn  Code Status: Full  Surrogate Decision Maker: nicanor Malagon [phone], bacilio Mcmanus [phone] cell, home [phone]  DVT Prophylaxis: SCDs for now will d/w gi re:lovenox prior to dc           Recommended Disposition: awaiting placement   SNF when accepted         Subjective:     Chief Complaint / Reason for Physician Visit no c/o. Tolerating diet. Pain well controlled this AM  .  Discussed with RN events overnight. Review of Systems:  Symptom Y/N Comments  Symptom Y/N Comments   Fever/Chills n   Chest Pain n    Poor Appetite    Edema     Cough n   Abdominal Pain n    Sputum    Joint Pain     SOB/ODOM n   Pruritis/Rash     Nausea/vomit n   Tolerating PT/OT     Diarrhea n   Tolerating Diet     Constipation    Other       Could NOT obtain due to:      Objective:     VITALS:   Last 24hrs VS reviewed since prior progress note. Most recent are:  Patient Vitals for the past 24 hrs:   Temp Pulse Resp BP SpO2   07/31/17 0831 98.3 °F (36.8 °C) 76 16 173/69 97 %   07/31/17 0509 98.1 °F (36.7 °C) (!) 58 16 (!) 181/35 97 %   07/30/17 1945 98.4 °F (36.9 °C) (!) 58 18 154/69 96 %       Intake/Output Summary (Last 24 hours) at 07/31/17 1654  Last data filed at 07/31/17 0834   Gross per 24 hour   Intake              240 ml   Output             3000 ml   Net            -2760 ml        PHYSICAL EXAM:  General: WD, WN. Alert, cooperative, no acute distress    EENT:  EOMI. Anicteric sclerae. MMM  Resp:  CTA bilaterally, no wheezing or rales.   No accessory muscle use  CV:  Regular  rhythm,  No edema  GI:  Soft, Non distended, Non tender.  +Bowel sounds  Neurologic:  Alert and oriented X 3, normal speech,   Psych:   Good insight. Not anxious nor agitated  Skin:  No rashes. No jaundice ext:Calves soft, supple, non-tender upon palpation or with passive stretch. Reviewed most current lab test results and cultures  YES  Reviewed most current radiology test results   YES  Review and summation of old records today    NO  Reviewed patient's current orders and MAR    YES  PMH/SH reviewed - no change compared to H&P  ________________________________________________________________________  Care Plan discussed with:    Comments   Patient x    Family      RN x    Care Manager     Consultant                        Multidiciplinary team rounds were held today with , nursing, pharmacist and clinical coordinator. Patient's plan of care was discussed; medications were reviewed and discharge planning was addressed. ________________________________________________________________________  Total NON critical care TIME:  25  Minutes    Total CRITICAL CARE TIME Spent:   Minutes non procedure based      Comments   >50% of visit spent in counseling and coordination of care x    ________________________________________________________________________  Vito Hernandez MD     Procedures: see electronic medical records for all procedures/Xrays and details which were not copied into this note but were reviewed prior to creation of Plan. LABS:  I reviewed today's most current labs and imaging studies.   Pertinent labs include:  Recent Labs      07/31/17   0519  07/29/17   2252  07/29/17   0428   WBC   --   12.3*  13.5*   HGB  8.5*  8.4*  9.1*   HCT  25.1*  25.5*  26.3*   PLT   --   178  187     Recent Labs      07/31/17   0519  07/30/17   0516  07/29/17   0428   NA  129*  131*  132*   K  3.0*  3.1*  3.2*   CL  93*  97  97   CO2  27  28  26   GLU  89  112*  96   BUN  20  19  20   CREA  0.71  0.74  0.61   CA  7.9*  7.8*  7.6*   MG  1.6   --    --        Signed: Elisabeth Guevara MD

## 2017-07-31 NOTE — INTERDISCIPLINARY ROUNDS
Bedside interdisciplinary rounds were held today to discuss patient plan of care and outcomes. The following members were present: Nurse Practitioner, Nurse, Clinical Care Leader, Pharmacy, Physical Therapy, and Case Management. Plan:  Failed voiding trial again - urology consulted over the weekend. Previous authorization  - work to complete new authorization.

## 2017-07-31 NOTE — PROGRESS NOTES
[facility] denied pt. CM will follow up with [facility] tomorrow to find their reasons behind deniel.     [facility]  Ext [phone]

## 2017-08-01 VITALS
BODY MASS INDEX: 35.31 KG/M2 | WEIGHT: 199.3 LBS | HEART RATE: 89 BPM | DIASTOLIC BLOOD PRESSURE: 45 MMHG | SYSTOLIC BLOOD PRESSURE: 176 MMHG | OXYGEN SATURATION: 95 % | HEIGHT: 63 IN | RESPIRATION RATE: 18 BRPM | TEMPERATURE: 97.4 F

## 2017-08-01 LAB
GLUCOSE BLD STRIP.AUTO-MCNC: 111 MG/DL (ref 65–100)
GLUCOSE BLD STRIP.AUTO-MCNC: 277 MG/DL (ref 65–100)
SERVICE CMNT-IMP: ABNORMAL
SERVICE CMNT-IMP: ABNORMAL

## 2017-08-01 PROCEDURE — 82962 GLUCOSE BLOOD TEST: CPT

## 2017-08-01 PROCEDURE — 97530 THERAPEUTIC ACTIVITIES: CPT

## 2017-08-01 PROCEDURE — 74011250637 HC RX REV CODE- 250/637: Performed by: INTERNAL MEDICINE

## 2017-08-01 PROCEDURE — 74011250637 HC RX REV CODE- 250/637: Performed by: SPECIALIST

## 2017-08-01 PROCEDURE — 97530 THERAPEUTIC ACTIVITIES: CPT | Performed by: PHYSICAL THERAPIST

## 2017-08-01 RX ORDER — FUROSEMIDE 40 MG/1
TABLET ORAL
Qty: 30 TAB | Refills: 0 | Status: SHIPPED | OUTPATIENT
Start: 2017-08-01

## 2017-08-01 RX ORDER — METOPROLOL SUCCINATE 50 MG/1
50 TABLET, EXTENDED RELEASE ORAL DAILY
Qty: 30 TAB | Refills: 0 | Status: SHIPPED | OUTPATIENT
Start: 2017-08-01

## 2017-08-01 RX ORDER — PANTOPRAZOLE SODIUM 40 MG/1
40 TABLET, DELAYED RELEASE ORAL
Qty: 60 TAB | Refills: 1 | Status: SHIPPED | OUTPATIENT
Start: 2017-08-01 | End: 2018-03-22

## 2017-08-01 RX ORDER — IRBESARTAN 150 MG/1
150 TABLET ORAL DAILY
Qty: 30 TAB | Refills: 0 | Status: SHIPPED | OUTPATIENT
Start: 2017-08-01

## 2017-08-01 RX ORDER — POTASSIUM CHLORIDE 750 MG/1
10 TABLET, FILM COATED, EXTENDED RELEASE ORAL 2 TIMES DAILY
Qty: 30 TAB | Refills: 0 | Status: SHIPPED | OUTPATIENT
Start: 2017-08-01 | End: 2017-09-18

## 2017-08-01 RX ORDER — OXYCODONE HYDROCHLORIDE 5 MG/1
2.5-5 TABLET ORAL
Qty: 30 TAB | Refills: 0 | Status: SHIPPED | OUTPATIENT
Start: 2017-08-01 | End: 2017-09-18

## 2017-08-01 RX ORDER — AMLODIPINE BESYLATE 5 MG/1
5 TABLET ORAL DAILY
Qty: 30 TAB | Refills: 0 | Status: SHIPPED | OUTPATIENT
Start: 2017-08-01

## 2017-08-01 RX ORDER — SUCRALFATE 1 G/10ML
1 SUSPENSION ORAL
Qty: 100 ML | Refills: 0 | Status: SHIPPED | OUTPATIENT
Start: 2017-08-01 | End: 2017-09-18

## 2017-08-01 RX ORDER — FLUTICASONE FUROATE AND VILANTEROL 200; 25 UG/1; UG/1
1 POWDER RESPIRATORY (INHALATION) DAILY
Qty: 1 INHALER | Refills: 0 | Status: SHIPPED | OUTPATIENT
Start: 2017-08-01

## 2017-08-01 RX ADMIN — IRBESARTAN 150 MG: 75 TABLET ORAL at 08:49

## 2017-08-01 RX ADMIN — Medication 1 CAPSULE: at 08:42

## 2017-08-01 RX ADMIN — FUROSEMIDE 40 MG: 40 TABLET ORAL at 08:42

## 2017-08-01 RX ADMIN — Medication 10 ML: at 06:25

## 2017-08-01 RX ADMIN — AMLODIPINE BESYLATE 5 MG: 5 TABLET ORAL at 08:41

## 2017-08-01 RX ADMIN — UMECLIDINIUM 1 PUFF: 62.5 AEROSOL, POWDER ORAL at 08:44

## 2017-08-01 RX ADMIN — PANTOPRAZOLE SODIUM 40 MG: 40 TABLET, DELAYED RELEASE ORAL at 08:42

## 2017-08-01 RX ADMIN — Medication 10 ML: at 15:10

## 2017-08-01 RX ADMIN — PANTOPRAZOLE SODIUM 40 MG: 40 TABLET, DELAYED RELEASE ORAL at 17:43

## 2017-08-01 RX ADMIN — POTASSIUM CHLORIDE 10 MEQ: 750 TABLET, FILM COATED, EXTENDED RELEASE ORAL at 08:41

## 2017-08-01 RX ADMIN — FLUTICASONE FUROATE AND VILANTEROL TRIFENATATE 1 PUFF: 200; 25 POWDER RESPIRATORY (INHALATION) at 08:44

## 2017-08-01 RX ADMIN — SUCRALFATE 1 G: 1 SUSPENSION ORAL at 08:42

## 2017-08-01 RX ADMIN — METOPROLOL SUCCINATE 50 MG: 50 TABLET, EXTENDED RELEASE ORAL at 08:42

## 2017-08-01 RX ADMIN — POTASSIUM CHLORIDE 10 MEQ: 750 TABLET, FILM COATED, EXTENDED RELEASE ORAL at 17:43

## 2017-08-01 RX ADMIN — SUCRALFATE 1 G: 1 SUSPENSION ORAL at 17:43

## 2017-08-01 RX ADMIN — SUCRALFATE 1 G: 1 SUSPENSION ORAL at 12:23

## 2017-08-01 NOTE — PROGRESS NOTES
Bedside and Verbal shift change report given to Lamar Duncan (oncoming nurse) by Lawanda Yost (offgoing nurse). Report included the following information SBAR, Kardex, Procedure Summary, Intake/Output, MAR, Accordion, Recent Results and Med Rec Status.

## 2017-08-01 NOTE — PROGRESS NOTES
Progress Note      8/1/2017 8:14 AM  NAME: Nba Zurita   MRN:  [id number]   Admit Diagnosis: right ankle fracture  Respiratory failure (HCC)  GI Bleed  GI BLEED, ANEMIA  gi bleeding                          Assessment:                 1. S/p mechanical fall requiring orthopedic surgery of right leg, found to be hypoxic, likely COPD exacerbation, PSVT noted on telemetry, mild rhabo with increased troponin being managed medically, then with GI bleed s/p 3 units, recurrent bleed s/p 1 unit, with ulcers  2. In 2011 found to have hyponatremia and TWI on ECG with cath with moderate CAD  3. Echo 6/2017 EF 55%, mild to mod MR, mild AI  4. Sinus with PSVT  ? AVNRT on telemetry  5. HTN with right renal artery stent  6. DM  7. Dyslipidemia difficulty with daily statin  8. Hx of GI bleed found to have PUD Dr. Rhona Zelaya  9. PVD with left fem-pop placed on plavix for this  10. , mild functional capacity, works at [employer]:                  Mechanical fall, currently not weight bearing on that leg, will need future surgery. PSVT with HR of 150, looks like AVNRT does not appear to be afib, self terminated, 20 beats of tachycardia on 7/15 not recorded on tele monitor or printed, but likely same. Mildly increased troponin, would manage CAD medically. Increased cr after diuresis, doubt significant CHF initially. Low sodium improving. Gi bleed s/p 4 U PRBC, 2 EGD's      1. Holding plavix, was on this for PVD, resume when ok with GI  2. Cont toprol xl 50mg daily, was on 100mg prior to admission  3. cont amlodipine at 5mg, would not titrate due to edema in legs  4. Cont avapro to 150mg daily, was on 300mg daily as outpt, titrate as needed  5. ?on Doxazosin 1mg per my records  6. Cont lasix 40mg daily, add standing K  7.  Resume mevacor, would stop niacin    Stable for rehab from cadiac perspective.          [x]        High complexity decision making was performed         Subjective: Derrick Soto denies chest pain, dyspnea. Discussed with RN events overnight. Review of Systems:    Symptom Y/N Comments  Symptom Y/N Comments   Fever/Chills N   Chest Pain N    Poor Appetite N   Edema N    Cough N   Abdominal Pain N    Sputum N   Joint Pain N    SOB/ODOM N   Pruritis/Rash N    Nausea/vomit N   Tolerating PT/OT Y    Diarrhea N   Tolerating Diet Y    Constipation N   Other       Could NOT obtain due to:      Objective:      Physical Exam:    Last 24hrs VS reviewed since prior progress note. Most recent are:    Visit Vitals    /45 (BP 1 Location: Left arm, BP Patient Position: Head of bed elevated (Comment degrees))  Comment (BP Patient Position): 30    Pulse 73    Temp 98 °F (36.7 °C)    Resp 18    Ht 5' 3\" (1.6 m)    Wt 90.4 kg (199 lb 4.7 oz)    SpO2 96%    Breastfeeding No    BMI 35.3 kg/m2       Intake/Output Summary (Last 24 hours) at 08/01/17 1412  Last data filed at 08/01/17 1349   Gross per 24 hour   Intake              390 ml   Output             3150 ml   Net            -2760 ml        General Appearance: Well developed, well nourished, alert & oriented x 3,    no acute distress. Ears/Nose/Mouth/Throat: Hearing grossly normal.  Neck: Supple. Chest: Lungs clear to auscultation bilaterally. Cardiovascular: Regular rate and rhythm, S1S2 normal, no murmur. Abdomen: Soft, non-tender, bowel sounds are active. Extremities: No edema bilaterally. Skin: Warm and dry. PMH/SH reviewed - no change compared to H&P    Data Review    Telemetry: normal sinus rhythm     Lab Data Personally Reviewed:    Recent Labs      07/31/17 0519  07/29/17   2252   WBC   --   12.3*   HGB  8.5*  8.4*   HCT  25.1*  25.5*   PLT   --   178     No results for input(s): INR, PTP, APTT in the last 72 hours.     No lab exists for component: Beba Been   Recent Labs      07/31/17 0519  07/30/17   0516   NA  129*  131*   K  3.0*  3.1*   CL  93*  97   CO2  27  28   BUN  20  19   CREA  0.71  0.74 GLU  89  112*   CA  7.9*  7.8*   MG  1.6   --      No results for input(s): CPK, CKNDX, TROIQ in the last 72 hours. No lab exists for component: CPKMB  Lab Results   Component Value Date/Time    Cholesterol, total 162 09/20/2011 01:17 PM    HDL Cholesterol 55 09/20/2011 01:17 PM    LDL, calculated 78.4 09/20/2011 01:17 PM    Triglyceride 143 09/20/2011 01:17 PM    CHOL/HDL Ratio 2.9 09/20/2011 01:17 PM       No results for input(s): SGOT, GPT, AP, TBIL, TP, ALB, GLOB, GGT, AML, LPSE in the last 72 hours. No lab exists for component: AMYP, HLPSE  No results for input(s): PH, PCO2, PO2 in the last 72 hours.     Medications Personally Reviewed:    Current Facility-Administered Medications   Medication Dose Route Frequency    irbesartan (AVAPRO) tablet 150 mg  150 mg Oral DAILY    metoprolol succinate (TOPROL-XL) XL tablet 50 mg  50 mg Oral DAILY    potassium chloride SR (KLOR-CON 10) tablet 10 mEq  10 mEq Oral BID    nitroglycerin (NITROBID) 2 % ointment 1 Inch  1 Inch Topical Q8H PRN    zinc oxide-cod liver oil (DESITIN) 40 % paste   Topical PRN    umeclidinium (INCRUSE ELLIPTA) 62.5 mcg/actuation  1 Puff Inhalation DAILY    lactobac ac& pc-s.therm-b.anim (ROSANGELA Q/RISAQUAD)  1 Cap Oral DAILY    pantoprazole (PROTONIX) tablet 40 mg  40 mg Oral ACB&D    amLODIPine (NORVASC) tablet 5 mg  5 mg Oral DAILY    ipratropium (ATROVENT) 0.02 % nebulizer solution 0.5 mg  0.5 mg Nebulization Q6H PRN    levalbuterol (XOPENEX) nebulizer soln 0.63 mg/3 mL  0.63 mg Nebulization Q6H PRN    diphenhydrAMINE (BENADRYL) capsule 25 mg  25 mg Oral Q6H PRN    furosemide (LASIX) tablet 40 mg  40 mg Oral DAILY    fluticasone-vilanterol (BREO ELLIPTA) 200mcg-25mcg/puff  1 Puff Inhalation DAILY    acetaminophen (TYLENOL) tablet 650 mg  650 mg Oral Q6H PRN    sucralfate (CARAFATE) 100 mg/mL oral suspension 1 g  1 g Oral AC&HS    hydrALAZINE (APRESOLINE) 20 mg/mL injection 10 mg  10 mg IntraVENous Q6H PRN    bisacodyl (DULCOLAX) suppository 10 mg  10 mg Rectal DAILY PRN    sodium chloride (NS) flush 5-10 mL  5-10 mL IntraVENous Q8H    sodium chloride (NS) flush 5-10 mL  5-10 mL IntraVENous PRN    naloxone (NARCAN) injection 0.4 mg  0.4 mg IntraVENous PRN    ondansetron (ZOFRAN ODT) tablet 4 mg  4 mg Oral Q4H PRN    oxyCODONE IR (ROXICODONE) tablet 5 mg  5 mg Oral Q4H PRN    oxyCODONE IR (ROXICODONE) tablet 2.5 mg  2.5 mg Oral Q4H PRN         Brandon Ely MD

## 2017-08-01 NOTE — PROGRESS NOTES
After [facility] denied pt CM met pt and discussed pt preferred facilities to send referral. Pt selected [facility]. CM send referral to [facility] and they accepted pt. Provider info updated to pt AVS.Pt is going to [address]. As per pt request transport has been arranged with a  time of 6.00 PM by [facility]. Floor nurse can call report to [facility] at [phone], please send most recent MAR, copy of d/c instructions, SNF hand off report, and any prescriptions that need to go with the pt. Care Management Interventions  PCP Verified by CM:  Yes  Mode of Transport at Discharge: BLS  Transition of Care Consult (CM Consult): SNF (Pt never had SNF placements.)  Partner SNF: Yes  Discharge Durable Medical Equipment: No  Health Maintenance Reviewed: Yes  Physical Therapy Consult: Yes  Occupational Therapy Consult: Yes  Speech Therapy Consult: No  Current Support Network: Lives Alone  Confirm Follow Up Transport: Family  Plan discussed with Pt/Family/Caregiver: Yes  Freedom of Choice Offered: Yes  Discharge Location  Discharge Placement: Skilled nursing facility    Armando Cohen  Ext [phone]

## 2017-08-01 NOTE — PROGRESS NOTES
Problem: Mobility Impaired (Adult and Pediatric)  Goal: *Acute Goals and Plan of Care (Insert Text)  Physical Therapy Goals  Goals reviewed and revised 7/27/17  1. Patient will move from supine to sit and sit to supine in bed with supervision/set-up within 7 day(s). 2. Patient will transfer from bed to chair and chair to bed with minimal assistance/contact guard assist using the least restrictive device within 7 day(s). 3. Patient will perform sit to stand with minimal assistance within 7 day(s). 4. Patient will ambulate with minimal assistance/contact guard assist for 25 feet with the least restrictive device within 7 day(s). 5. Patient will ascend/descend 1 stairs with bilateral handrail(s) with moderate assist within 7 day(s). Initiated 7/14/2017  1. Patient will move from supine to sit and sit to supine in bed with supervision/set-up within 7 day(s). 2. Patient will transfer from bed to chair and chair to bed with minimal assistance/contact guard assist using the least restrictive device within 7 day(s). 3. Patient will perform sit to stand with contact guard assist within 7 day(s). 4. Patient will ambulate with minimal assistance/contact guard assist for 50 feet with the least restrictive device within 7 day(s). 5. Patient will ascend/descend 1 stairs with bilateral handrail(s) with minimal assistance/contact guard assist within 7 day(s). PHYSICAL THERAPY TREATMENT  Patient: Herbert Gilbert (57 y.o. female)  Date: 8/1/2017  Diagnosis: right ankle fracture  Respiratory failure (HCC)  GI Bleed  GI BLEED, ANEMIA  gi bleeding <principal problem not specified>  Procedure(s) (LRB):  ESOPHAGOGASTRODUODENAL (EGD) BIOPSY (N/A)  ESOPHAGOGASTRODUODENOSCOPY (EGD) (N/A) 5 Days Post-Op  Precautions: Fall, NWB      ASSESSMENT:  Patient making slow progress toward goals. Received supine in bed and seen with OT to maximize functional outcome. Supine to sit with CGA and extra time to complete task.   Sit to stand with maxA x 2 for 3 trials with improved transfer each session. Some difficulty keeping R LE off the floor and needs manual assist to keep foot off the floor during transfer. Returned to bed with Franc and needs in reach. Overall progressing slowly but making progress. Progression toward goals:  [ ]    Improving appropriately and progressing toward goals  [X]    Improving slowly and progressing toward goals  [ ]    Not making progress toward goals and plan of care will be adjusted       PLAN:  Patient continues to benefit from skilled intervention to address the above impairments. Continue treatment per established plan of care. Discharge Recommendations:  Skilled Nursing Facility  Further Equipment Recommendations for Discharge:  TBD       SUBJECTIVE:   Patient stated I guess I have to do what I can.       OBJECTIVE DATA SUMMARY:   Critical Behavior:  Neurologic State: Alert  Orientation Level: Oriented X4  Cognition: Follows commands  Safety/Judgement: Awareness of environment, Insight into deficits  Functional Mobility Training:  Bed Mobility:     Supine to Sit: Contact guard assistance; Additional time  Sit to Supine: Stand-by asssistance           Transfers:  Sit to Stand: Maximum assistance;Assist x2; Additional time (X 3 trials from EOB)  Stand to Sit: Maximum assistance;Assist x2                             Balance:  Sitting: Impaired; Without support  Sitting - Static: Good (unsupported)  Standing: Impaired; With support  Standing - Static: Constant support; Fair  Ambulation/Gait Training:           Pain:  Pain Scale 1: Numeric (0 - 10)  Pain Intensity 1: 0              Activity Tolerance:   VSS  Please refer to the flowsheet for vital signs taken during this treatment.   After treatment:   [ ]    Patient left in no apparent distress sitting up in chair  [X]    Patient left in no apparent distress in bed  [X]    Call bell left within reach  [X]    Nursing notified  [X]    Caregiver present  [ ]    Bed alarm activated      COMMUNICATION/COLLABORATION:   The patients plan of care was discussed with: Physical Therapist, Occupational Therapist and Registered Nurse     Dragan Garcia PT, DPT   Time Calculation: 16 mins

## 2017-08-01 NOTE — PROGRESS NOTES
Ortho NP Note:    S/p Right ankle ORIF on   Pt OOB to the chair. No complaints. Pain well controlled. Denies CP, SOB    Recent Labs      17   0519   HCT  25.1*   HGB  8.5*       Ex-fix in place without the pin that was noted to be out on . Partial splint in place to keep foot in alignment post pin removal.   Mild-mod edema present  Calves soft and supple  Sensation and motor intact; today noticing that great toe without DF.  + sensations and + PF. Dr. Garima Larose notified and aware. Cap refill intact. PLAN:  1) Ankle ORIF - continue NWB on RLE and mobilize with PT. 2) GI bleed - EDG  - followed by gastroenterology; She is off anticoagulation due to current bleed . Hgb down again and Dr. Alyson Alonso repeat EGD on  with no new bleeds noted. Continue current plan. Hgb stable today. 3)  Ness- removal attempted on  with retention. Cath replaced. To be removed soon for voiding trial.  (about -) - TODAY? 7) Discharge plans - Stable from ortho standpoint. Auth from [facility]  and not [address] [facility] denied acceptance per CM. Will seek next option for placement and have that facility seek new Authorization.       Juice Meeks, RODY

## 2017-08-01 NOTE — PROGRESS NOTES
Problem: Self Care Deficits Care Plan (Adult)  Goal: *Acute Goals and Plan of Care (Insert Text)  Occupational Therapy Goals  OT weekly reassessment 7/31/17, goals modified as below  1. Patient will perform sponge bathing seated with minimum A within 7 day(s). 2. Patient will perform lower body dressing seated with adaptive dressing aides with minimum A within 7 day(s). 3. Patient will perform transfers to drop arm BSC/BSC, via squat pivot with RW, NWB on RLE with moderate assistance within 7 day(s). 4. Patient will perform all aspects of toileting seated with supervision/set-up within 7 day(s). Initiated 7/14/2017  1. Patient will perform sponge bathing with supervision/set-up within 7 day(s). 2. Patient will perform lower body dressing with supervision/set-up within 7 day(s). 3. Patient will perform BSC transfers, via stand pivot with RW, NWB on RLE with minimal assistance/contact guard assist within 7 day(s). 4. Patient will perform all aspects of toileting with supervision/set-up within 7 day(s). OCCUPATIONAL THERAPY TREATMENT  Patient: Nimisha Colunga (89 y.o. female)  Date: 8/1/2017  Diagnosis: right ankle fracture  Respiratory failure (HCC)  GI Bleed  GI BLEED, ANEMIA  gi bleeding <principal problem not specified>  Procedure(s) (LRB):  ESOPHAGOGASTRODUODENAL (EGD) BIOPSY (N/A)  ESOPHAGOGASTRODUODENOSCOPY (EGD) (N/A) 5 Days Post-Op  Precautions: Fall, NWB      ASSESSMENT:  Patient remains extremely motivated to work with therapy, completed bed mobility this date with SBA, additional time and use of bed rails. Completed 2 trials of sit to stand from EOB, with max A x 2, pt unable to clear bottom from bed. Increased bed height and instructed to alternate hand placement on RW, pt completed sit to stand with max A x2 and fully cleared bottom from bed. Pt stood x 55 seconds on fourth stand with constant support, fair static standing balance. Pt with improved ability to maintain NWB this date.  Pt returned to supine with SBA and issued green theraband. Instructed pt in exercises to perform while supine or in chair to increase BUE strength in prep for transfers and ADLs. Recommend SNF rehab. Progression toward goals:  [ ]       Improving appropriately and progressing toward goals  [X]       Improving slowly and progressing toward goals  [ ]       Not making progress toward goals and plan of care will be adjusted       PLAN:  Patient continues to benefit from skilled intervention to address the above impairments. Continue treatment per established plan of care. Discharge Recommendations:  Skilled Nursing Facility  Further Equipment Recommendations for Discharge:  TBD       SUBJECTIVE:   Patient stated I need to do something.       OBJECTIVE DATA SUMMARY:   Cognitive/Behavioral Status:  Neurologic State: Alert  Orientation Level: Oriented X4  Cognition: Follows commands              Functional Mobility and Transfers for ADLs:  Bed Mobility:  Supine to Sit: Contact guard assistance; Additional time  Sit to Supine: Stand-by asssistance     Transfers:  Sit to Stand: Maximum assistance;Assist x2; Additional time (X 4 trials from EOB)        Balance:  Sitting: Impaired; Without support  Sitting - Static: Good (unsupported)  Standing: Impaired; With support  Standing - Static: Constant support; Fair     ADL Intervention:         pt educated on proper positioning of L LE, NWB status on R and cues for \"nose over toes. \"            Therapeutic Exercises:   Issued green theraband to pt, reviewed chest pulls, overhead chest pulls, shoulder adduction, shoulder flexion, and ER/IR exercises. Encouraged pt to complete 2 sets, 10 reps twice daily. Pain:  Pain Scale 1: Numeric (0 - 10)  Pain Intensity 1: 0              Activity Tolerance:   VSS  Please refer to the flowsheet for vital signs taken during this treatment.   After treatment:   [ ] Patient left in no apparent distress sitting up in chair  [X] Patient left in no apparent distress in bed  [X] Call bell left within reach  [X] Nursing notified  [ ] Caregiver present  [ ] Bed alarm activated      COMMUNICATION/COLLABORATION:   The patients plan of care was discussed with: Physical Therapist, Registered Nurse and      Justus Sanchez OT  Time Calculation: 23 mins

## 2017-08-04 PROBLEM — S93.04XA: Status: ACTIVE | Noted: 2017-08-04

## 2017-08-04 PROBLEM — S82.871A CLOSED PILON FRACTURE OF RIGHT TIBIA: Status: ACTIVE | Noted: 2017-08-04

## 2017-08-04 PROBLEM — S82.61XD CLOSED FRACTURE OF LATERAL MALLEOLUS OF RIGHT FIBULA WITH ROUTINE HEALING: Status: ACTIVE | Noted: 2017-08-04

## 2017-09-18 ENCOUNTER — HOSPITAL ENCOUNTER (OUTPATIENT)
Dept: PREADMISSION TESTING | Age: 72
Discharge: HOME | End: 2017-09-18
Attending: ORTHOPAEDIC SURGERY
Payer: MEDICARE

## 2017-09-18 VITALS
OXYGEN SATURATION: 98 % | TEMPERATURE: 98.5 F | BODY MASS INDEX: 28.51 KG/M2 | HEIGHT: 64 IN | RESPIRATION RATE: 18 BRPM | SYSTOLIC BLOOD PRESSURE: 178 MMHG | DIASTOLIC BLOOD PRESSURE: 42 MMHG | HEART RATE: 53 BPM | WEIGHT: 167 LBS

## 2017-09-18 LAB
ANION GAP SERPL CALC-SCNC: 7 MMOL/L (ref 5–15)
BUN SERPL-MCNC: 31 MG/DL (ref 6–20)
BUN/CREAT SERPL: 28 (ref 12–20)
CALCIUM SERPL-MCNC: 9 MG/DL (ref 8.5–10.1)
CHLORIDE SERPL-SCNC: 102 MMOL/L (ref 97–108)
CO2 SERPL-SCNC: 28 MMOL/L (ref 21–32)
CREAT SERPL-MCNC: 1.11 MG/DL (ref 0.55–1.02)
ERYTHROCYTE [DISTWIDTH] IN BLOOD BY AUTOMATED COUNT: 14.6 % (ref 11.5–14.5)
GLUCOSE SERPL-MCNC: 88 MG/DL (ref 65–100)
HCT VFR BLD AUTO: 33.7 % (ref 35–47)
HGB BLD-MCNC: 10.8 G/DL (ref 11.5–16)
MCH RBC QN AUTO: 28.6 PG (ref 26–34)
MCHC RBC AUTO-ENTMCNC: 32 G/DL (ref 30–36.5)
MCV RBC AUTO: 89.2 FL (ref 80–99)
PLATELET # BLD AUTO: 367 K/UL (ref 150–400)
POTASSIUM SERPL-SCNC: 4.4 MMOL/L (ref 3.5–5.1)
RBC # BLD AUTO: 3.78 M/UL (ref 3.8–5.2)
SODIUM SERPL-SCNC: 137 MMOL/L (ref 136–145)
WBC # BLD AUTO: 10.1 K/UL (ref 3.6–11)

## 2017-09-18 PROCEDURE — 80048 BASIC METABOLIC PNL TOTAL CA: CPT | Performed by: ORTHOPAEDIC SURGERY

## 2017-09-18 PROCEDURE — 36415 COLL VENOUS BLD VENIPUNCTURE: CPT | Performed by: ORTHOPAEDIC SURGERY

## 2017-09-18 PROCEDURE — 85027 COMPLETE CBC AUTOMATED: CPT | Performed by: ORTHOPAEDIC SURGERY

## 2017-09-18 PROCEDURE — 93005 ELECTROCARDIOGRAM TRACING: CPT

## 2017-09-18 RX ORDER — SODIUM CHLORIDE, SODIUM LACTATE, POTASSIUM CHLORIDE, CALCIUM CHLORIDE 600; 310; 30; 20 MG/100ML; MG/100ML; MG/100ML; MG/100ML
25 INJECTION, SOLUTION INTRAVENOUS CONTINUOUS
Status: CANCELLED | OUTPATIENT
Start: 2017-09-22

## 2017-09-18 NOTE — ADVANCED PRACTICE NURSE
Pt in for PAT visit. SERGEY score 3. Pt states she was advised while in the hospital previously that she may have sleep apnea and would benefit from evaluation but has not pursued this at this point. Has limited ROM in extension of cervical spine but no decrease in flexion. States she does snore but has never been advised that she stops breathing in her sleep.  + fatigue at times. Has COPD for which she uses maintenance inhaler as well as nebulizer. Denies cough, fever, SOB or ODOM.

## 2017-09-18 NOTE — ADVANCED PRACTICE NURSE
Preop labs reviewed from 9/18/17. Abnormal results faxed to Dr. Temo Medina' office. Confirmation received.

## 2017-09-18 NOTE — PERIOP NOTES
[facility]  Preoperative Instructions        Surgery Date 9/22/2017          Time of Arrival To Be Called    1. On the day of your surgery, please report to the Surgical Services Registration Desk and sign in at your designated time. The Surgery Center is located to the right of the Emergency Room. 2. You must have someone with you to drive you home. You should not drive a car for 24 hours following surgery. Please make arrangements for a friend or family member to stay with you for the first 24 hours after your surgery. 3. Do not have anything to eat or drink (including water, gum, mints, coffee, juice) after midnight 9/21/2017?? Sarai Loose ? This may not apply to medications prescribed by your physician. ?(Please note below the special instructions with medications to take the morning of your procedure.)    4. We recommend you do not drink any alcoholic beverages for 24 hours before and after your surgery. 5. Contact your surgeons office for instructions on the following medications: non-steroidal anti-inflammatory drugs (i.e. Advil, Aleve), vitamins, and supplements. (Some surgeons will want you to stop these medications prior to surgery and others may allow you to take them)  **If you are currently taking Plavix, Coumadin, Aspirin and/or other blood-thinning agents, contact your surgeon for instructions. ** Your surgeon will partner with the physician prescribing these medications to determine if it is safe to stop or if you need to continue taking. Please do not stop taking these medications without instructions from your surgeon    6. Wear comfortable clothes. Wear glasses instead of contacts. Do not bring any money or jewelry. Please bring picture ID, insurance card, and any prearranged co-payment or hospital payment. Do not wear make-up, particularly mascara the morning of your surgery. Do not wear nail polish, particularly if you are having foot /hand surgery.   Wear your hair loose or down, no ponytails, buns, chetan pins or clips. All body piercings must be removed. Please shower with antibacterial soap for three consecutive days before and on the morning of surgery, but do not apply any lotions, powders or deodorants after the shower on the day of surgery. Please use a fresh towels after each shower. Please sleep in clean clothes and change bed linens the night before surgery. Please do not shave for 48 hours prior to surgery. Shaving of the face is acceptable. 7. You should understand that if you do not follow these instructions your surgery may be cancelled. If your physical condition changes (I.e. fever, cold or flu) please contact your surgeon as soon as possible. 8. It is important that you be on time. If a situation occurs where you may be late, please call [phone] (OR Holding Area). 9. If you have any questions and or problems, please call [phone] (Pre-admission Testing). 10. Your surgery time may be subject to change. You will receive a phone call the evening prior if your time changes. 11.  If having outpatient surgery, you must have someone to drive you here, stay with you during the duration of your stay, and to drive you home at time of discharge. 12.   In an effort to improve the efficiency, privacy, and safety for all of our Pre-op patients visitors are not allowed in the Holding area. Once you arrive and are registered your family/visitors will be asked to remain in the waiting room. The Pre-op staff will get you from the Surgical Waiting Area and will explain to you and your family/visitors that the Pre-op phase is beginning. The staff will answer any questions and provide instructions for tracking of the patient, by use of the existing tracking number and color-coded status board in the waiting room.   At this time the staff will also ask for your designated spokesperson information in the event that the physician or staff need to provide an update or obtain any pertinent information. The designated spokesperson will be notified if the physician needs to speak to family during the pre-operative phase. If at any time your family/visitors has questions or concerns they may approach the volunteer desk in the waiting area for assistance. Special Instructions:Bring Inhalers with you the day of surgery and use them wether you need them or not before coming. MEDICATIONS TO TAKE THE MORNING OF SURGERY WITH A SIP OF WATER:Amlodipine, Breo Ellipta Inhaler, Metoprolol, Protonix, Nebulizer treatment if needed    I understand a pre-operative phone call will be made to verify my surgery time. In the event that I am not available, I give permission for a message to be left on my answering service and/or with another person?  Yes [phone]         ___________________      __________   _________    (Signature of Patient)             (Witness)                (Date and Time)

## 2017-09-19 LAB
ATRIAL RATE: 79 BPM
CALCULATED R AXIS, ECG10: -2 DEGREES
CALCULATED T AXIS, ECG11: 57 DEGREES
DIAGNOSIS, 93000: NORMAL
P-R INTERVAL, ECG05: 182 MS
Q-T INTERVAL, ECG07: 404 MS
QRS DURATION, ECG06: 82 MS
QTC CALCULATION (BEZET), ECG08: 463 MS
VENTRICULAR RATE, ECG03: 79 BPM

## 2017-09-20 NOTE — H&P
Subjective:   Patient ID: Marty Yun is a 81 y.o. female.     Chief Complaint: R ankle fracture s/p ex-fix and ORIF.     Overall, the pt is doing well 6 weeks out from surgery. She has been NWB. She has been working with PT at [facility].          Objective:   Pt is cooperative and is in no acute distress. Well nourished. Well developed. Mild to moderate swelling. Motor & sensory exam is intact. No signs or symptoms of DVT or infection. Neurovascularly intact BLE. Pin sites are benign. Medial ankle incision is benign. Staples removed.     Radiographs:        X-ray Ankle Right 2 Views     Result Date: 8/25/2017  Sitting. AP, Lat. Notes  Indication(s): R ankle fracture s/p ex-fix and ORIF. Impression: Films today show the fracture in good clinical alignment and healing appropriately without complicating features. Ex-fix and ORIF hardware in place. Overall acceptable preservation of the mortis.           Assessment:   Pt is doing well s/p external fixation and ORIF for the R ankle fracture. I removed her splint in the office.         Plan: At this time, I recommended she remains NWB for the next 3 weeks. She does not need to be in a splint at this time. We will plan to remove the external fixation in about 3 weeks. She will likely transition to a tall orthopedic boot at that time.  All questions were answered to her satisfaction.             Scribed for Dr. Cyrus Valenzuela by Gregg Rea

## 2017-09-21 ENCOUNTER — ANESTHESIA EVENT (OUTPATIENT)
Dept: SURGERY | Age: 72
End: 2017-09-21
Payer: MEDICARE

## 2017-09-22 ENCOUNTER — ANESTHESIA (OUTPATIENT)
Dept: SURGERY | Age: 72
End: 2017-09-22
Payer: MEDICARE

## 2017-09-22 ENCOUNTER — HOSPITAL ENCOUNTER (OUTPATIENT)
Age: 72
Setting detail: OUTPATIENT SURGERY
Discharge: HOME | End: 2017-09-22
Attending: ORTHOPAEDIC SURGERY | Admitting: ORTHOPAEDIC SURGERY
Payer: MEDICARE

## 2017-09-22 VITALS
HEIGHT: 64 IN | HEART RATE: 61 BPM | RESPIRATION RATE: 16 BRPM | BODY MASS INDEX: 29.17 KG/M2 | OXYGEN SATURATION: 95 % | DIASTOLIC BLOOD PRESSURE: 49 MMHG | SYSTOLIC BLOOD PRESSURE: 168 MMHG | TEMPERATURE: 97.8 F | WEIGHT: 170.86 LBS

## 2017-09-22 LAB
GLUCOSE BLD STRIP.AUTO-MCNC: 127 MG/DL (ref 65–100)
GLUCOSE BLD STRIP.AUTO-MCNC: 93 MG/DL (ref 65–100)
SERVICE CMNT-IMP: ABNORMAL
SERVICE CMNT-IMP: NORMAL

## 2017-09-22 PROCEDURE — 77030013079 HC BLNKT BAIR HGGR 3M -A: Performed by: NURSE ANESTHETIST, CERTIFIED REGISTERED

## 2017-09-22 PROCEDURE — 76060000031 HC ANESTHESIA FIRST 0.5 HR: Performed by: ORTHOPAEDIC SURGERY

## 2017-09-22 PROCEDURE — 76210000021 HC REC RM PH II 0.5 TO 1 HR: Performed by: ORTHOPAEDIC SURGERY

## 2017-09-22 PROCEDURE — 77030011640 HC PAD GRND REM COVD -A: Performed by: ORTHOPAEDIC SURGERY

## 2017-09-22 PROCEDURE — 77030028907 HC WRP KNEE WO BGS SOLM -B

## 2017-09-22 PROCEDURE — 74011000250 HC RX REV CODE- 250

## 2017-09-22 PROCEDURE — 74011250636 HC RX REV CODE- 250/636: Performed by: ORTHOPAEDIC SURGERY

## 2017-09-22 PROCEDURE — 74011250636 HC RX REV CODE- 250/636: Performed by: ANESTHESIOLOGY

## 2017-09-22 PROCEDURE — 77030020143 HC AIRWY LARYN INTUB CGAS -A: Performed by: NURSE ANESTHETIST, CERTIFIED REGISTERED

## 2017-09-22 PROCEDURE — 77030031139 HC SUT VCRL2 J&J -A: Performed by: ORTHOPAEDIC SURGERY

## 2017-09-22 PROCEDURE — 82962 GLUCOSE BLOOD TEST: CPT

## 2017-09-22 PROCEDURE — 76210000016 HC OR PH I REC 1 TO 1.5 HR: Performed by: ORTHOPAEDIC SURGERY

## 2017-09-22 PROCEDURE — 74011250636 HC RX REV CODE- 250/636

## 2017-09-22 PROCEDURE — 76010000154 HC OR TIME FIRST 0.5 HR: Performed by: ORTHOPAEDIC SURGERY

## 2017-09-22 PROCEDURE — 74011000250 HC RX REV CODE- 250: Performed by: ORTHOPAEDIC SURGERY

## 2017-09-22 PROCEDURE — 77030020782 HC GWN BAIR PAWS FLX 3M -B

## 2017-09-22 PROCEDURE — 74011000272 HC RX REV CODE- 272: Performed by: ORTHOPAEDIC SURGERY

## 2017-09-22 RX ORDER — OXYCODONE AND ACETAMINOPHEN 5; 325 MG/1; MG/1
1 TABLET ORAL AS NEEDED
Status: DISCONTINUED | OUTPATIENT
Start: 2017-09-22 | End: 2017-09-22 | Stop reason: HOSPADM

## 2017-09-22 RX ORDER — MIDAZOLAM HYDROCHLORIDE 1 MG/ML
1 INJECTION, SOLUTION INTRAMUSCULAR; INTRAVENOUS AS NEEDED
Status: DISCONTINUED | OUTPATIENT
Start: 2017-09-22 | End: 2017-09-22 | Stop reason: HOSPADM

## 2017-09-22 RX ORDER — MORPHINE SULFATE 10 MG/ML
2 INJECTION, SOLUTION INTRAMUSCULAR; INTRAVENOUS
Status: DISCONTINUED | OUTPATIENT
Start: 2017-09-22 | End: 2017-09-22 | Stop reason: HOSPADM

## 2017-09-22 RX ORDER — SODIUM CHLORIDE 0.9 % (FLUSH) 0.9 %
5-10 SYRINGE (ML) INJECTION AS NEEDED
Status: DISCONTINUED | OUTPATIENT
Start: 2017-09-22 | End: 2017-09-22 | Stop reason: HOSPADM

## 2017-09-22 RX ORDER — SODIUM CHLORIDE, SODIUM LACTATE, POTASSIUM CHLORIDE, CALCIUM CHLORIDE 600; 310; 30; 20 MG/100ML; MG/100ML; MG/100ML; MG/100ML
25 INJECTION, SOLUTION INTRAVENOUS CONTINUOUS
Status: DISCONTINUED | OUTPATIENT
Start: 2017-09-22 | End: 2017-09-22 | Stop reason: HOSPADM

## 2017-09-22 RX ORDER — MIDAZOLAM HYDROCHLORIDE 1 MG/ML
INJECTION, SOLUTION INTRAMUSCULAR; INTRAVENOUS AS NEEDED
Status: DISCONTINUED | OUTPATIENT
Start: 2017-09-22 | End: 2017-09-22 | Stop reason: HOSPADM

## 2017-09-22 RX ORDER — OXYCODONE AND ACETAMINOPHEN 5; 325 MG/1; MG/1
2 TABLET ORAL
Qty: 60 TAB | Refills: 0 | Status: SHIPPED | OUTPATIENT
Start: 2017-09-22 | End: 2018-03-22

## 2017-09-22 RX ORDER — FENTANYL CITRATE 50 UG/ML
INJECTION, SOLUTION INTRAMUSCULAR; INTRAVENOUS AS NEEDED
Status: DISCONTINUED | OUTPATIENT
Start: 2017-09-22 | End: 2017-09-22 | Stop reason: HOSPADM

## 2017-09-22 RX ORDER — SODIUM CHLORIDE, SODIUM LACTATE, POTASSIUM CHLORIDE, CALCIUM CHLORIDE 600; 310; 30; 20 MG/100ML; MG/100ML; MG/100ML; MG/100ML
75 INJECTION, SOLUTION INTRAVENOUS CONTINUOUS
Status: DISCONTINUED | OUTPATIENT
Start: 2017-09-22 | End: 2017-09-22 | Stop reason: HOSPADM

## 2017-09-22 RX ORDER — SODIUM CHLORIDE 0.9 % (FLUSH) 0.9 %
5-10 SYRINGE (ML) INJECTION EVERY 8 HOURS
Status: DISCONTINUED | OUTPATIENT
Start: 2017-09-22 | End: 2017-09-22 | Stop reason: HOSPADM

## 2017-09-22 RX ORDER — SODIUM CHLORIDE 9 MG/ML
50 INJECTION, SOLUTION INTRAVENOUS CONTINUOUS
Status: DISCONTINUED | OUTPATIENT
Start: 2017-09-22 | End: 2017-09-22 | Stop reason: HOSPADM

## 2017-09-22 RX ORDER — HYDROMORPHONE HYDROCHLORIDE 1 MG/ML
0.2 INJECTION, SOLUTION INTRAMUSCULAR; INTRAVENOUS; SUBCUTANEOUS
Status: DISCONTINUED | OUTPATIENT
Start: 2017-09-22 | End: 2017-09-22 | Stop reason: HOSPADM

## 2017-09-22 RX ORDER — FENTANYL CITRATE 50 UG/ML
50 INJECTION, SOLUTION INTRAMUSCULAR; INTRAVENOUS AS NEEDED
Status: DISCONTINUED | OUTPATIENT
Start: 2017-09-22 | End: 2017-09-22 | Stop reason: HOSPADM

## 2017-09-22 RX ORDER — ONDANSETRON 2 MG/ML
4 INJECTION INTRAMUSCULAR; INTRAVENOUS AS NEEDED
Status: DISCONTINUED | OUTPATIENT
Start: 2017-09-22 | End: 2017-09-22 | Stop reason: HOSPADM

## 2017-09-22 RX ORDER — LIDOCAINE HYDROCHLORIDE 10 MG/ML
0.1 INJECTION, SOLUTION EPIDURAL; INFILTRATION; INTRACAUDAL; PERINEURAL AS NEEDED
Status: DISCONTINUED | OUTPATIENT
Start: 2017-09-22 | End: 2017-09-22 | Stop reason: HOSPADM

## 2017-09-22 RX ORDER — ONDANSETRON 2 MG/ML
INJECTION INTRAMUSCULAR; INTRAVENOUS AS NEEDED
Status: DISCONTINUED | OUTPATIENT
Start: 2017-09-22 | End: 2017-09-22 | Stop reason: HOSPADM

## 2017-09-22 RX ORDER — DIPHENHYDRAMINE HYDROCHLORIDE 50 MG/ML
12.5 INJECTION, SOLUTION INTRAMUSCULAR; INTRAVENOUS AS NEEDED
Status: DISCONTINUED | OUTPATIENT
Start: 2017-09-22 | End: 2017-09-22 | Stop reason: HOSPADM

## 2017-09-22 RX ORDER — PROPOFOL 10 MG/ML
INJECTION, EMULSION INTRAVENOUS AS NEEDED
Status: DISCONTINUED | OUTPATIENT
Start: 2017-09-22 | End: 2017-09-22 | Stop reason: HOSPADM

## 2017-09-22 RX ORDER — MIDAZOLAM HYDROCHLORIDE 1 MG/ML
0.5 INJECTION, SOLUTION INTRAMUSCULAR; INTRAVENOUS
Status: DISCONTINUED | OUTPATIENT
Start: 2017-09-22 | End: 2017-09-22 | Stop reason: HOSPADM

## 2017-09-22 RX ORDER — LIDOCAINE HYDROCHLORIDE 20 MG/ML
INJECTION, SOLUTION EPIDURAL; INFILTRATION; INTRACAUDAL; PERINEURAL AS NEEDED
Status: DISCONTINUED | OUTPATIENT
Start: 2017-09-22 | End: 2017-09-22 | Stop reason: HOSPADM

## 2017-09-22 RX ORDER — FENTANYL CITRATE 50 UG/ML
25 INJECTION, SOLUTION INTRAMUSCULAR; INTRAVENOUS
Status: DISCONTINUED | OUTPATIENT
Start: 2017-09-22 | End: 2017-09-22 | Stop reason: HOSPADM

## 2017-09-22 RX ORDER — ACETAMINOPHEN 325 MG/1
650 TABLET ORAL
COMMUNITY

## 2017-09-22 RX ADMIN — LIDOCAINE HYDROCHLORIDE 40 MG: 20 INJECTION, SOLUTION EPIDURAL; INFILTRATION; INTRACAUDAL; PERINEURAL at 07:39

## 2017-09-22 RX ADMIN — ONDANSETRON 4 MG: 2 INJECTION INTRAMUSCULAR; INTRAVENOUS at 07:51

## 2017-09-22 RX ADMIN — CEFAZOLIN 2 G: 10 INJECTION, POWDER, FOR SOLUTION INTRAVENOUS; PARENTERAL at 07:40

## 2017-09-22 RX ADMIN — MIDAZOLAM HYDROCHLORIDE 1 MG: 1 INJECTION, SOLUTION INTRAMUSCULAR; INTRAVENOUS at 07:32

## 2017-09-22 RX ADMIN — FENTANYL CITRATE 50 MCG: 50 INJECTION, SOLUTION INTRAMUSCULAR; INTRAVENOUS at 07:39

## 2017-09-22 RX ADMIN — SODIUM CHLORIDE, SODIUM LACTATE, POTASSIUM CHLORIDE, AND CALCIUM CHLORIDE 25 ML/HR: 600; 310; 30; 20 INJECTION, SOLUTION INTRAVENOUS at 07:13

## 2017-09-22 RX ADMIN — PROPOFOL 120 MG: 10 INJECTION, EMULSION INTRAVENOUS at 07:39

## 2017-09-22 NOTE — ANESTHESIA PREPROCEDURE EVALUATION
Anesthetic History   No history of anesthetic complications            Review of Systems / Medical History  Patient summary reviewed, nursing notes reviewed and pertinent labs reviewed    Pulmonary    COPD      Undiagnosed apnea         Neuro/Psych   Within defined limits           Cardiovascular    Hypertension          CAD, PAD and hyperlipidemia    Exercise tolerance: <4 METS  Comments: 7-17-17 EKG: SVT, 150---self limited per Cardiologist's note.   Rate in 70-80s    7-15-17 EKG: SR with sinus arrhythmia    7-14-17 ECHO: 55-60% EF, no AS    LEFT FEMORAL-POPLITEAL BYPASS WITH PTFE GORTEX GRAFT    GI/Hepatic/Renal           PUD    Comments: GI bleed  hgb 9.5  Denies N, V. Endo/Other    Diabetes    Obesity, arthritis and anemia     Other Findings   Comments:   Closed pilon fracture of right tibia      Dislocation of ankle, right, closed      Closed fracture of lateral malleolus of right fibula with routine healing              Physical Exam    Airway  Mallampati: III  TM Distance: 4 - 6 cm  Neck ROM: normal range of motion, short neck   Mouth opening: Normal    Comments: Sloping chin Cardiovascular    Rhythm: regular  Rate: normal         Dental      Comments: Some decayed teeth, none loose   Pulmonary            Prolonged expiration    Comments: Decreased inspiratory effort Abdominal  GI exam deferred       Other Findings            Anesthetic Plan    ASA: 3  Anesthesia type: general          Induction: Intravenous  Anesthetic plan and risks discussed with: Patient

## 2017-09-22 NOTE — PERIOP NOTES
Handoff Report from Operating Room to PACU    Report received from ALYSA Sidhu and MIKE Patrick CRNA regarding Jhon Guerra. Surgeon(s):  Alessio Soto MD  And Procedure(s) (LRB):  REMOVAL RIGHT ANKLE EXTERNAL FIXATOR (CHOICE ANESTH) (Right)  confirmed   with allergies and dressings discussed. Anesthesia type, drugs, patient history, complications, estimated blood loss, vital signs, intake and output, and last pain medication, lines and temperature were reviewed.

## 2017-09-22 NOTE — IP AVS SNAPSHOT
[address] [facility] 
[phone] Patient: Jasvir Menjivar MRN: [id number] :1945 You are allergic to the following Allergen Reactions Codeine Rash Other (comments)  
 hyper Ibuprofen (Ibuprofen) Other (comments) Stomach pains Simvastatin Itching Recent Documentation Height Weight BMI OB Status Smoking Status 1.619 m 77.5 kg 29.56 kg/m2 Hysterectomy Former Smoker Emergency Contacts Name Discharge Info Relation Home Work Mobile Renee Greenwood  Other Relative [6] [phone] [address] CAREGIVER [3] Child [2]  [phone] About your hospitalization You were admitted on:  2017 You last received care in the:  [facility] PACU You were discharged on:  2017 Unit phone number:  [phone] Why you were hospitalized Your primary diagnosis was:  Not on File Providers Seen During Your Hospitalizations Provider Role Specialty Primary office phone Meagan Ho MD Attending Provider Orthopedic Surgery [phone] Your Primary Care Physician (PCP) Primary Care Physician Office Phone Office Fax [facility] [phone] Follow-up Information Follow up With Details Comments [place]   [address] [address] 
[phone] Meagan Ho MD Schedule an appointment as soon as possible for a visit today  [address] 
[phone] Current Discharge Medication List  
  
START taking these medications Dose & Instructions Dispensing Information Comments Morning Noon Evening Bedtime  
 oxyCODONE-acetaminophen 5-325 mg per tablet Commonly known as:  PERCOCET Your last dose was: Your next dose is: Dose:  2 Tab Take 2 Tabs by mouth every four (4) hours as needed for Pain. Max Daily Amount: 12 Tabs. Quantity:  60 Tab Refills:  0 CONTINUE these medications which have NOT CHANGED Dose & Instructions Dispensing Information Comments Morning Noon Evening Bedtime  
 acetaminophen 325 mg tablet Commonly known as:  TYLENOL Your last dose was: Your next dose is:    
   
   
 Dose:  650 mg Take 650 mg by mouth every four (4) hours as needed for Pain. Indications: Pain Refills:  0  
     
   
   
   
  
 amLODIPine 5 mg tablet Commonly known as:  Marjan Pace Your last dose was: Your next dose is:    
   
   
 Dose:  5 mg Take 1 Tab by mouth daily. Quantity:  30 Tab Refills:  0  
     
   
   
   
  
 fluticasone-vilanterol 200-25 mcg/dose inhaler Commonly known as:  BREO ELLIPTA Your last dose was: Your next dose is:    
   
   
 Dose:  1 Puff Take 1 Puff by inhalation daily. Quantity:  1 Inhaler Refills:  0  
     
   
   
   
  
 furosemide 40 mg tablet Commonly known as:  LASIX Your last dose was: Your next dose is:    
   
   
 take 1 tab daily Quantity:  30 Tab Refills:  0  
     
   
   
   
  
 ipratropium 0.02 % Soln Commonly known as:  ATROVENT Your last dose was: Your next dose is:    
   
   
 Dose:  0.5 mg  
2.5 mL by Nebulization route every six (6) hours as needed. Quantity:  10 mL Refills:  0  
     
   
   
   
  
 irbesartan 150 mg tablet Commonly known as:  AVAPRO Your last dose was: Your next dose is:    
   
   
 Dose:  150 mg Take 1 Tab by mouth daily. Quantity:  30 Tab Refills:  0  
     
   
   
   
  
 levalbuterol 0.63 mg/3 mL Nebu Commonly known as:  Paul Manning Your last dose was: Your next dose is:    
   
   
 Dose:  0.63 mg  
3 mL by Nebulization route every six (6) hours as needed. Quantity:  10 Package Refills:  1  
     
   
   
   
  
 metoprolol succinate 50 mg XL tablet Commonly known as:  TOPROL-XL Your last dose was: Your next dose is:    
   
   
 Dose:  50 mg Take 1 Tab by mouth daily. Quantity:  30 Tab Refills:  0  
     
   
   
   
  
 pantoprazole 40 mg tablet Commonly known as:  PROTONIX Your last dose was: Your next dose is:    
   
   
 Dose:  40 mg Take 1 Tab by mouth Before breakfast and dinner. Quantity:  60 Tab Refills:  1 Where to Get Your Medications Information on where to get these meds will be given to you by the nurse or doctor. ! Ask your nurse or doctor about these medications  
  oxyCODONE-acetaminophen 5-325 mg per tablet Discharge Instructions Leave dressing for two days. Thereafter, you may shower, but do not soak or scrub your wound. Apply a dry dressing as needed. Do not bear weight on operative limb. Rx as written. Follow-up in ~10days, # [phone] - call for appointment and with any questions. DISCHARGE SUMMARY from Nurse The following personal items are in your possession at time of discharge: 
 
Dental Appliances: None Visual Aid: Glasses, At home Home Medications: None Jewelry: None Clothing: Other (comment) (clothes and shoe) Other Valuables: Wheelchair Personal Items Sent to Safe: declined PATIENT INSTRUCTIONS: 
 
After general anesthesia or intravenous sedation, for 24 hours or while taking prescription Narcotics: · Limit your activities · Do not drive and operate hazardous machinery · Do not make important personal or business decisions · Do  not drink alcoholic beverages · If you have not urinated within 8 hours after discharge, please contact your surgeon on call. Report the following to your surgeon: 
· Excessive pain, swelling, redness or odor of or around the surgical area · Temperature over 100.5 · Nausea and vomiting lasting longer than 4 hours or if unable to take medications · Any signs of decreased circulation or nerve impairment to extremity: change in color, persistent  numbness, tingling, coldness or increase pain · Any questions What to do at Home:A common side effect of anesthesia following surgery is nausea and/or vomiting. In order to decrease symptoms, it is wise to avoid foods that are high in fat, greasy foods, milk products, and spicy foods for the first 24 hours. Acceptable foods for the first 24 hours following surgery include but are not limited to: 
 
? soup 
? broth 
?  toast  
? crackers ? applesauce 
? bananas  
? mashed potatoes, 
? soft or scrambled eggs 
? oatmeal 
?  jello It is important to eat when taking your pain medication. This will help to prevent nausea. If possible, please try to time your meals with your medications. It is very important to stay hydrated following surgery. Sip fluids frequently while awake. Avoid acidic drinks such as citrus juices and soda for 24 hours. Carbonated beverages may cause bloating and gas. Acceptable fluids include: 
 
? water (flavor packets may add variety) ? coffee or tea (in moderation) ? Gatorade ? Huma Fish ? apple juice 
? cranberry juice You are encouraged to cough and deep breathe every hour when awake. This will help to prevent respiratory complications following anesthesia. You may want to hug a pillow when coughing and sneezing to add additional support to the surgical area and to decrease discomfort if you had abdominal or chest surgery. If you are discharged home with support stockings, you may remove them after 24 hours. Support stockings are used to help prevent blood clots in the legs following surgery. Please take time to review all of your Home Care Instructions and Medication Information sheets provided in your discharge packet.  If you have any questions, please contact your surgeons office. Thank you. Narcotic-Analgesic/Acetaminophen (Percocet, Norco, Lorcet HD, Lortab 10/325) - (By mouth) Why this medicine is used:  
Relieves pain. Contact a nurse or doctor right away if you have: 
· Extreme weakness, shallow breathing, slow heartbeat · Severe confusion, lightheadedness, dizziness, fainting · Yellow skin or eyes, dark urine or pale stools · Severe constipation, severe stomach pain, nausea, vomiting, loss of appetite · Sweating or cold, clammy skin Common side effects: · Mild constipation, nausea, vomiting · Sleepiness, tiredness · Itching, rash © 2017 [employer] [address]  Information is for End User's use only and may not be sold, redistributed or otherwise used for commercial purposes. Please update this list whenever your medications are discontinued, doses are Please carry medication information at all times in case of emergency situations. These are general instructions for a healthy lifestyle: No smoking/ No tobacco products/ Avoid exposure to second hand smoke Surgeon General's Warning:  Quitting smoking now greatly reduces serious risk to your health. Obesity, smoking, and sedentary lifestyle greatly increases your risk for illness A healthy diet, regular physical exercise & weight monitoring are important for maintaining a healthy lifestyle You may be retaining fluid if you have a history of heart failure or if you experience any of the following symptoms:  Weight gain of 3 pounds or more overnight or 5 pounds in a week, increased swelling in our hands or feet or shortness of breath while lying flat in bed. Please call your doctor as soon as you notice any of these symptoms; do not wait until your next office visit. Recognize signs and symptoms of STROKE: 
 
F-face looks uneven A-arms unable to move or move unevenly S-speech slurred or non-existent T-time-call 911 as soon as signs and symptoms begin-DO NOT go Back to bed or wait to see if you get better-TIME IS BRAIN. Warning Signs of HEART ATTACK Call 911 if you have these symptoms: 
? Chest discomfort. Most heart attacks involve discomfort in the center of the chest that lasts more than a few minutes, or that goes away and comes back. It can feel like uncomfortable pressure, squeezing, fullness, or pain. ? Discomfort in other areas of the upper body. Symptoms can include pain or discomfort in one or both arms, the back, neck, jaw, or stomach. ? Shortness of breath with or without chest discomfort. ? Other signs may include breaking out in a cold sweat, nausea, or lightheadedness. Don't wait more than five minutes to call [address]! Fast action can save your life. Calling 911 is almost always the fastest way to get lifesaving treatment. Emergency Medical Services staff can begin treatment when they arrive  up to an hour sooner than if someone gets to the hospital by car. The discharge information has been reviewed with the {PATIENT PARENT GUARDIAN:44961}. The {PATIENT PARENT GUARDIAN:59488} verbalized understanding. Discharge medications reviewed with the {Dishcarge meds reviewed EELY:59334} and appropriate educational materials and side effects teaching were provided. Discharge Orders None Introducing [facility]! [facility] introduces [employer] patient portal. Now you can access parts of your medical record, email your doctor's office, and request medication refills online. 1. In your internet browser, go to [email or web address] 2. Click on the First Time User? Click Here link in the Sign In box. You will see the New Member Sign Up page. 3. Enter your [employer] Access Code exactly as it appears below. You will not need to use this code after youve completed the sign-up process.  If you do not sign up before the expiration date, you must request a new code. · [employer] Access Code: [email or web address] Expires: 11/25/2017  1:54 PM 
 
4. Enter the last four digits of your Social Security Number (xxxx) and Date of Birth (mm/dd/yyyy) as indicated and click Submit. You will be taken to the next sign-up page. 5. Create a [employer] ID. This will be your [employer] login ID and cannot be changed, so think of one that is secure and easy to remember. 6. Create a [employer] password. You can change your password at any time. 7. Enter your Password Reset Question and Answer. This can be used at a later time if you forget your password. 8. Enter your e-mail address. You will receive e-mail notification when new information is available in [address] 9. Click Sign Up. You can now view and download portions of your medical record. 10. Click the Download Summary menu link to download a portable copy of your medical information. If you have questions, please visit the Frequently Asked Questions section of the [employer] website. Remember, [employer] is NOT to be used for urgent needs. For medical emergencies, dial 911. Now available from your iPhone and Android! General Information Please provide this summary of care documentation to your next provider. Patient Signature:  ____________________________________________________________ Date:  ____________________________________________________________  
  
Bertram Sport Provider Signature:  ____________________________________________________________ Date:  ____________________________________________________________

## 2017-09-22 NOTE — OP NOTES
[facility]   [facility], [address]   OP NOTE       Name:  Little Mcgarry   MR#:  [id number]   :  1945   Account #:  [de-identified]    Surgery Date:  2017   Date of Adm:  2017       PREOPERATIVE DIAGNOSIS: Status post right ankle external fixator   placement. POSTOPERATIVE DIAGNOSIS: Status post right ankle external   fixator placement. PROCEDURES PERFORMED:   1. Removal of right ankle external fixator under anesthesia. 2. Posterior splint immobilization. ANESTHESIA: General.    SURGEON: Meagan Ho MD    ESTIMATED BLOOD LOSS: Minimal.    DRAINS: None. SPECIMENS REMOVED: None. COMPLICATIONS: None. CONDITION: Stable to PACU. INDICATIONS: A 70-year-old female who had previously undergone   external fixation for a significant distal tib-fib fracture. The risks,   benefits, and alternatives to removal of the frame were explained to the   patient. She understood she may require further procedures and   wished to proceed. DESCRIPTION OF PROCEDURE: After being identified in the   preoperative holding area and having her operative site marked, the   patient was brought back to the operating room, placed supine on a   standard OR table. General anesthesia was induced without difficulty. The right lower extremity was then prepped and draped in the usual   fashion using sterile technique. An appropriate time-out was   performed. We used the box wrench to remove the bars and clamps   from the fixator pins. A drill was then used to readily remove the pins   from the bone without difficulty. Pin sites were cleaned and sterile   compressive dressings were applied. She was then placed in a well-  molded posterior splint with the ankle in a neutral position. She was   awakened, moved to stretcher, taken to the recovery room in stable   condition. At the conclusion of the procedure, all counts were correct.    There were no immediate complications.         Sophia Myers MD      [email or web address]   D:  09/22/2017   13:41   T:  09/22/2017   14:01   Job #:  [id number]

## 2017-09-22 NOTE — DISCHARGE INSTRUCTIONS
Leave dressing for two days. Thereafter, you may shower, but do not soak or scrub your wound. Apply a dry dressing as needed. Do not bear weight on operative limb. Rx as written. Follow-up in ~10days, # [phone] - call for appointment and with any questions. DISCHARGE SUMMARY from Nurse    The following personal items are in your possession at time of discharge:    Dental Appliances: None  Visual Aid: Glasses, At home     Home Medications: None  Jewelry: None  Clothing: Other (comment) (clothes and shoe)  Other Valuables: Wheelchair  Personal Items Sent to Safe: declined          PATIENT INSTRUCTIONS:    After general anesthesia or intravenous sedation, for 24 hours or while taking prescription Narcotics:  · Limit your activities  · Do not drive and operate hazardous machinery  · Do not make important personal or business decisions  · Do  not drink alcoholic beverages  · If you have not urinated within 8 hours after discharge, please contact your surgeon on call. Report the following to your surgeon:  · Excessive pain, swelling, redness or odor of or around the surgical area  · Temperature over 100.5  · Nausea and vomiting lasting longer than 4 hours or if unable to take medications  · Any signs of decreased circulation or nerve impairment to extremity: change in color, persistent  numbness, tingling, coldness or increase pain  · Any questions        What to do at Home:A common side effect of anesthesia following surgery is nausea and/or vomiting. In order to decrease symptoms, it is wise to avoid foods that are high in fat, greasy foods, milk products, and spicy foods for the first 24 hours. Acceptable foods for the first 24 hours following surgery include but are not limited to:     soup   broth    toast    crackers    applesauce    bananas    mashed potatoes,   soft or scrambled eggs   oatmeal    jello    It is important to eat when taking your pain medication.  This will help to prevent nausea. If possible, please try to time your meals with your medications. It is very important to stay hydrated following surgery. Sip fluids frequently while awake. Avoid acidic drinks such as citrus juices and soda for 24 hours. Carbonated beverages may cause bloating and gas. Acceptable fluids include:    - water (flavor packets may add variety)  - coffee or tea (in moderation)  - Gatorade  - Naun-aid  - apple juice  - cranberry juice    You are encouraged to cough and deep breathe every hour when awake. This will help to prevent respiratory complications following anesthesia. You may want to hug a pillow when coughing and sneezing to add additional support to the surgical area and to decrease discomfort if you had abdominal or chest surgery. If you are discharged home with support stockings, you may remove them after 24 hours. Support stockings are used to help prevent blood clots in the legs following surgery. Please take time to review all of your Home Care Instructions and Medication Information sheets provided in your discharge packet. If you have any questions, please contact your surgeons office. Thank you. Narcotic-Analgesic/Acetaminophen (Percocet, Norco, Lorcet HD, Lortab 10/325) - (By mouth)   Why this medicine is used:   Relieves pain. Contact a nurse or doctor right away if you have:  · Extreme weakness, shallow breathing, slow heartbeat  · Severe confusion, lightheadedness, dizziness, fainting  · Yellow skin or eyes, dark urine or pale stools  · Severe constipation, severe stomach pain, nausea, vomiting, loss of appetite  · Sweating or cold, clammy skin     Common side effects:  · Mild constipation, nausea, vomiting  · Sleepiness, tiredness  · Itching, rash  © 2017 [address] Information is for End User's use only and may not be sold, redistributed or otherwise used for commercial purposes.         Please update this list whenever your medications are discontinued, doses are      Please carry medication information at all times in case of emergency situations. These are general instructions for a healthy lifestyle:    No smoking/ No tobacco products/ Avoid exposure to second hand smoke    Surgeon General's Warning:  Quitting smoking now greatly reduces serious risk to your health. Obesity, smoking, and sedentary lifestyle greatly increases your risk for illness    A healthy diet, regular physical exercise & weight monitoring are important for maintaining a healthy lifestyle    You may be retaining fluid if you have a history of heart failure or if you experience any of the following symptoms:  Weight gain of 3 pounds or more overnight or 5 pounds in a week, increased swelling in our hands or feet or shortness of breath while lying flat in bed. Please call your doctor as soon as you notice any of these symptoms; do not wait until your next office visit. Recognize signs and symptoms of STROKE:    F-face looks uneven    A-arms unable to move or move unevenly    S-speech slurred or non-existent    T-time-call 911 as soon as signs and symptoms begin-DO NOT go       Back to bed or wait to see if you get better-TIME IS BRAIN. Warning Signs of HEART ATTACK     Call 911 if you have these symptoms:   Chest discomfort. Most heart attacks involve discomfort in the center of the chest that lasts more than a few minutes, or that goes away and comes back. It can feel like uncomfortable pressure, squeezing, fullness, or pain.  Discomfort in other areas of the upper body. Symptoms can include pain or discomfort in one or both arms, the back, neck, jaw, or stomach.  Shortness of breath with or without chest discomfort.  Other signs may include breaking out in a cold sweat, nausea, or lightheadedness. Don't wait more than five minutes to call 911 - MINUTES MATTER! Fast action can save your life.  Calling 911 is almost always the fastest way to get lifesaving treatment. Emergency Medical Services staff can begin treatment when they arrive -- up to an hour sooner than if someone gets to the hospital by car. The discharge information has been reviewed with the patient and caregiver. The patient and caregiver verbalized understanding. Discharge medications reviewed with the patient and caregiver and appropriate educational materials and side effects teaching were provided.

## 2017-09-22 NOTE — ROUTINE PROCESS
Patient: Torito Cohn MRN: [id number]  SSN: [id number]   YOB: 1945  Age: 81 y.o. Sex: female     Patient is status post Procedure(s):  REMOVAL RIGHT ANKLE EXTERNAL FIXATOR (CHOICE ANESTH). Surgeon(s) and Role:     * Jennifer Leiva MD - Primary                Quad Lumen central line 07/17/17 Right Subclavian (Active)        Peripheral IV 09/22/17 Left Forearm (Active)   Site Assessment Clean, dry, & intact 9/22/2017  7:13 AM   Phlebitis Assessment 0 9/22/2017  7:13 AM   Infiltration Assessment 0 9/22/2017  7:13 AM   Dressing Status Clean, dry, & intact 9/22/2017  7:13 AM   Dressing Type Tape;Transparent 9/22/2017  7:13 AM   Hub Color/Line Status Green; Infusing;Patent 9/22/2017  7:13 AM   Alcohol Cap Used Yes 9/22/2017  7:13 AM            Airway - Endotracheal Tube 09/22/17 Oral (Active)                   Dressing/Packing:  Wound Foot Right-DRESSING TYPE: Gauze; Other (Comment) (coban, iodine ointment ) (09/22/17 5216)  Splint/Cast:  ]

## 2017-09-22 NOTE — H&P
Marvel Atkinson MD - Adult Reconstruction and Total Joint Replacement     Orthopaedic History and Physical        NAME: Harmony Cat       :  1945       MRN:  [id number]      S/p Right ankle ex-fix - present for removal.      Patient Active Problem List    Diagnosis Date Noted    Closed pilon fracture of right tibia 2017    Dislocation of ankle, right, closed 2017    Closed fracture of lateral malleolus of right fibula with routine healing 2017    Respiratory failure ([address] 2017    Respiratory failure, acute ([address] 2014    Abnormal ECG 2011     Past Medical History:   Diagnosis Date    Arthritis     CAD (coronary artery disease)     renal stent; Dr Carmelo Ponce     [facility]     Diabetes [facility]     diet controlled    History of bronchitis     Hyperlipemia     Hypertension     PUD (peptic ulcer disease)       Past Surgical History:   Procedure Laterality Date    CENTRAL VENOUS LINE INSERTION  2017         HX APPENDECTOMY      HX CATARACT REMOVAL Bilateral     HX CHOLECYSTECTOMY      HX HYSTERECTOMY      HX ORTHOPAEDIC      R hand middle finger bone spur    HX ORTHOPAEDIC  2017    Right fractured Ankle    VASCULAR SURGERY PROCEDURE UNLIST      renal stent    VASCULAR SURGERY PROCEDURE UNLIST  3/5/14    LEFT FEMORAL-POPLITEAL BYPASS WITH PTFE GORTEX GRAFT       Prior to Admission medications    Medication Sig Start Date End Date Taking? Authorizing Provider   acetaminophen (TYLENOL) 325 mg tablet Take 650 mg by mouth every four (4) hours as needed for Pain. Indications: Pain   Yes Historical Provider   amLODIPine (NORVASC) 5 mg tablet Take 1 Tab by mouth daily. 17  Yes Betsy Ferrer MD   fluticasone-vilanterol (BREO ELLIPTA) 576-60 mcg/dose inhaler Take 1 Puff by inhalation daily. 17  Yes Betsy Ferrer MD   irbesartan (AVAPRO) 150 mg tablet Take 1 Tab by mouth daily.  17  Yes Betsy Ferrer MD furosemide (LASIX) 40 mg tablet take 1 tab daily 8/1/17  Yes Kayla Saenz MD   pantoprazole (PROTONIX) 40 mg tablet Take 1 Tab by mouth Before breakfast and dinner. 8/1/17  Yes Kayla Saenz MD   metoprolol succinate (TOPROL-XL) 50 mg XL tablet Take 1 Tab by mouth daily. 8/1/17  Yes Kayla Saenz MD   levalbuterol (XOPENEX) 0.63 mg/3 mL nebu 3 mL by Nebulization route every six (6) hours as needed. 7/24/17  Yes Kayla Saenz MD   ipratropium (ATROVENT) 0.02 % nebulizer solution 2.5 mL by Nebulization route every six (6) hours as needed. 7/24/17  Yes Kayla Saenz MD     Allergies   Allergen Reactions    Codeine Rash and Other (comments)     hyper    Ibuprofen [Ibuprofen] Other (comments)     Stomach pains    Simvastatin Itching      Social History   Substance Use Topics    Smoking status: Former Smoker     Packs/day: 1.50     Years: 40.00     Quit date: 12/20/2009    Smokeless tobacco: Never Used    Alcohol use No      Family History   Problem Relation Age of Onset    Heart Disease Mother     Seizures Mother     Diabetes Father     Heart Disease Father     Diabetes Sister     Cancer Sister     Breast Cancer Sister 61    MS Brother         REVIEW OF SYSTEMS: A comprehensive review of systems was negative except for that written in the HPI. PE: No respiratory difficulty, reg HR. RLE ex-fix in place. No signs of infection. Good clinical alignment. NVID. A/P: RLE ex-fix. Removal in OR, f/u in office.       Aixa Leroy MD

## 2017-09-22 NOTE — ANESTHESIA POSTPROCEDURE EVALUATION
Post-Anesthesia Evaluation and Assessment    Patient: Huong Sykes MRN: [id number]  SSN: [id number]    YOB: 1945  Age: 81 y.o. Sex: female       Cardiovascular Function/Vital Signs  Visit Vitals    /49 (BP 1 Location: Right arm, BP Patient Position: At rest)    Pulse (!) 58    Temp 36.4 °C (97.6 °F)    Resp 11    Ht 5' 3.75\" (1.619 m)    Wt 77.5 kg (170 lb 13.7 oz)    SpO2 95%    BMI 29.56 kg/m2       Patient is status post general anesthesia for Procedure(s):  REMOVAL RIGHT ANKLE EXTERNAL FIXATOR (CHOICE ANESTH). Nausea/Vomiting: None    Postoperative hydration reviewed and adequate. Pain:  Pain Scale 1: Numeric (0 - 10) (09/22/17 0915)  Pain Intensity 1: 0 (09/22/17 0915)   Managed    Neurological Status:   Neuro (WDL): Exceptions to WDL (09/22/17 0809)  Neuro  Neurologic State: Drowsy (09/22/17 4629)  Orientation Level: Oriented X4 (09/22/17 0809)  Cognition: Decreased attention/concentration; Follows commands (09/22/17 0809)  Speech: Clear (09/22/17 0809)  LUE Motor Response: Purposeful (09/22/17 0809)  LLE Motor Response: Purposeful (09/22/17 0809)  RUE Motor Response: Purposeful (09/22/17 0809)  RLE Motor Response: Purposeful;Weak (09/22/17 0809)   At baseline    Mental Status and Level of Consciousness: Alert and oriented     Pulmonary Status:   O2 Device: Nasal cannula (09/22/17 0830)   Adequate oxygenation and airway patent    Complications related to anesthesia: None    Post-anesthesia assessment completed.  No concerns    Signed By: Lizzeth Villalta DO     September 22, 2017

## 2017-09-22 NOTE — PERIOP NOTES
TRANSFER - OUT REPORT:    Verbal report given to Paulo Dee RN on Andres Chaudhry  being transferred to [place] [address] for routine post - op       Report consisted of patients Situation, Background, Assessment and   Recommendations(SBAR). Information from the following report(s) SBAR, OR Summary, Intake/Output and MAR was reviewed with the receiving nurse. Opportunity for questions and clarification was provided.       Patient transported with:  Registered nurse

## 2017-09-22 NOTE — BRIEF OP NOTE
BRIEF OPERATIVE NOTE    Date of Procedure: 9/22/2017   Preoperative Diagnosis: RIGHT ANKLE FRACTURE  Postoperative Diagnosis: RIGHT ANKLE FRACTURE    Procedure(s):  REMOVAL RIGHT ANKLE EXTERNAL FIXATOR (CHOICE ANESTH)  Surgeon(s) and Role:     * Sary Lees MD - Primary         Assistant Staff:       Surgical Staff:  Circ-1: Lauri Silvestre RN  Scrub Tech-1: Koki Clarke  Surg Asst-1: Melina Olszewski  Event Time In   Incision Start 6246   Incision Close 0750     Anesthesia: Other   Estimated Blood Loss: n/a  Specimens: * No specimens in log *   Findings: n/a   Complications: npne  Implants: * No implants in log *

## 2018-03-22 NOTE — PERIOP NOTES
[facility]  Ambulatory Surgery Unit  Pre-operative Instructions for Endo Procedures    Procedure Date  03/26/2018            Tentative Arrival Time 0615      1. On the day of your procedure, please report to the Ambulatory Surgery Unit Registration Desk and sign in at your designated time. The Ambulatory Surgery Unit is located in [facility] on the [facility] side of the Hospitals in [place] across from the [address] [place] [address]. Please have all of your health insurance cards and a photo ID. 2. You must have someone with you to drive you home, as you should not drive a car for 24 hours following anesthesia. Please make arrangements for a responsible adult friend or family member to stay with you for at least the first 24 hours after your procedure. 3. Do not have anything to eat or drink (including water, gum, mints, coffee, juice) after midnight   03/25/2018. This may not apply to medications prescribed by your physician. (Please note below the special instructions with medications to take the morning of your procedure.)    4. If applicable, follow the clear liquid diet and bowel prep instructions provided by your physician's office. If you do not have this information, or have any questions, please contact your physician's office. 5. We recommend you do not drink any alcoholic beverages for 24 hours before and after your procedure. 6. Contact your surgeons office for instructions on the following medications: non-steroidal anti-inflammatory drugs (i.e. Advil, Aleve), vitamins, and supplements. (Some surgeons will want you to stop these medications prior to surgery and others may allow you to take them)   **If you are currently taking Plavix, Coumadin, Aspirin and/or other blood-thinning agents, contact your surgeon for instructions. ** Your surgeon will partner with the physician prescribing these medications to determine if it is safe to stop or if you need to continue taking.  Please do not stop taking these medications without instructions from your surgeon. 7. In an effort to help prevent surgical site infection, we ask that you shower with an anti-bacterial soap (i.e. Dial or Safeguard) on the morning of your procedure. Do not apply any lotions, powders, or deodorants after showering. 8. Wear comfortable clothes. Wear glasses instead of contacts. Do not bring any jewelry or money (other than copays or fees as instructed). Do not wear make-up, particularly mascara, the morning of your procedure. Wear your hair loose or down, no ponytails, buns, chetan pins or clips. All body piercings must be removed. 9. You should understand that if you do not follow these instructions your procedure may be cancelled. If your physical condition changes (i.e. fever, cold or flu) please contact your surgeon as soon as possible. 10. It is important that you be on time. If a situation occurs where you may be late, or if you have any questions or problems, please call [phone]. 11. Your procedure time may be subject to change. You will receive a phone call the day prior to confirm your arrival time. Special Instructions: Take all medications and inhalers, as prescribed, on the morning of surgery with a sip of water EXCEPT: blood thinners, diabetic meds, or vitamins. I understand a pre-operative phone call will be made to verify my procedure time. In the event that I am not available, I give permission for a message to be left on my answering service and/or with another person? yes         ___________________      ___________________      ___________________  Pt verbalized understanding of preop instructions via telephone.   (Signature of Patient)          (Witness)                   (Date and Time)

## 2018-03-23 ENCOUNTER — ANESTHESIA EVENT (OUTPATIENT)
Dept: SURGERY | Age: 73
End: 2018-03-23
Payer: MEDICARE

## 2018-03-26 ENCOUNTER — ANESTHESIA (OUTPATIENT)
Dept: SURGERY | Age: 73
End: 2018-03-26
Payer: MEDICARE

## 2018-03-26 ENCOUNTER — HOSPITAL ENCOUNTER (OUTPATIENT)
Age: 73
Setting detail: OUTPATIENT SURGERY
Discharge: HOME | End: 2018-03-26
Attending: INTERNAL MEDICINE | Admitting: INTERNAL MEDICINE
Payer: MEDICARE

## 2018-03-26 VITALS
HEIGHT: 63 IN | RESPIRATION RATE: 20 BRPM | TEMPERATURE: 97.8 F | OXYGEN SATURATION: 100 % | DIASTOLIC BLOOD PRESSURE: 58 MMHG | WEIGHT: 168 LBS | SYSTOLIC BLOOD PRESSURE: 150 MMHG | HEART RATE: 75 BPM | BODY MASS INDEX: 29.77 KG/M2

## 2018-03-26 PROCEDURE — 76030000000 HC AMB SURG OR TIME 0.5 TO 1: Performed by: INTERNAL MEDICINE

## 2018-03-26 PROCEDURE — 76210000040 HC AMBSU PH I REC FIRST 0.5 HR: Performed by: INTERNAL MEDICINE

## 2018-03-26 PROCEDURE — 76060000061 HC AMB SURG ANES 0.5 TO 1 HR: Performed by: INTERNAL MEDICINE

## 2018-03-26 PROCEDURE — 74011250636 HC RX REV CODE- 250/636

## 2018-03-26 PROCEDURE — 77030013992 HC SNR POLYP ENDOSC BSC -B: Performed by: INTERNAL MEDICINE

## 2018-03-26 PROCEDURE — 88305 TISSUE EXAM BY PATHOLOGIST: CPT | Performed by: INTERNAL MEDICINE

## 2018-03-26 PROCEDURE — 77030020255 HC SOL INJ LR 1000ML BG: Performed by: INTERNAL MEDICINE

## 2018-03-26 PROCEDURE — 74011000250 HC RX REV CODE- 250

## 2018-03-26 PROCEDURE — 74011250636 HC RX REV CODE- 250/636: Performed by: ANESTHESIOLOGY

## 2018-03-26 PROCEDURE — 74011250637 HC RX REV CODE- 250/637: Performed by: INTERNAL MEDICINE

## 2018-03-26 PROCEDURE — 76210000046 HC AMBSU PH II REC FIRST 0.5 HR: Performed by: INTERNAL MEDICINE

## 2018-03-26 PROCEDURE — 77030009426 HC FCPS BIOP ENDOSC BSC -B: Performed by: INTERNAL MEDICINE

## 2018-03-26 PROCEDURE — 77030021352 HC CBL LD SYS DISP COVD -B: Performed by: INTERNAL MEDICINE

## 2018-03-26 RX ORDER — ONDANSETRON 2 MG/ML
4 INJECTION INTRAMUSCULAR; INTRAVENOUS AS NEEDED
Status: DISCONTINUED | OUTPATIENT
Start: 2018-03-26 | End: 2018-03-26 | Stop reason: HOSPADM

## 2018-03-26 RX ORDER — LIDOCAINE HYDROCHLORIDE 10 MG/ML
0.1 INJECTION, SOLUTION EPIDURAL; INFILTRATION; INTRACAUDAL; PERINEURAL AS NEEDED
Status: DISCONTINUED | OUTPATIENT
Start: 2018-03-26 | End: 2018-03-26 | Stop reason: HOSPADM

## 2018-03-26 RX ORDER — PANTOPRAZOLE SODIUM 40 MG/1
40 TABLET, DELAYED RELEASE ORAL DAILY
COMMUNITY

## 2018-03-26 RX ORDER — GLYCOPYRROLATE 0.2 MG/ML
INJECTION INTRAMUSCULAR; INTRAVENOUS AS NEEDED
Status: DISCONTINUED | OUTPATIENT
Start: 2018-03-26 | End: 2018-03-26 | Stop reason: HOSPADM

## 2018-03-26 RX ORDER — SODIUM CHLORIDE, SODIUM LACTATE, POTASSIUM CHLORIDE, CALCIUM CHLORIDE 600; 310; 30; 20 MG/100ML; MG/100ML; MG/100ML; MG/100ML
25 INJECTION, SOLUTION INTRAVENOUS CONTINUOUS
Status: DISCONTINUED | OUTPATIENT
Start: 2018-03-26 | End: 2018-03-26 | Stop reason: HOSPADM

## 2018-03-26 RX ORDER — SODIUM CHLORIDE 0.9 % (FLUSH) 0.9 %
5-10 SYRINGE (ML) INJECTION AS NEEDED
Status: DISCONTINUED | OUTPATIENT
Start: 2018-03-26 | End: 2018-03-26 | Stop reason: HOSPADM

## 2018-03-26 RX ORDER — FENTANYL CITRATE 50 UG/ML
25 INJECTION, SOLUTION INTRAMUSCULAR; INTRAVENOUS
Status: DISCONTINUED | OUTPATIENT
Start: 2018-03-26 | End: 2018-03-26 | Stop reason: HOSPADM

## 2018-03-26 RX ORDER — DEXTROMETHORPHAN/PSEUDOEPHED 2.5-7.5/.8
DROPS ORAL AS NEEDED
Status: DISCONTINUED | OUTPATIENT
Start: 2018-03-26 | End: 2018-03-26 | Stop reason: HOSPADM

## 2018-03-26 RX ORDER — OLMESARTAN MEDOXOMIL 40 MG/1
40 TABLET ORAL DAILY
COMMUNITY

## 2018-03-26 RX ORDER — DIPHENHYDRAMINE HYDROCHLORIDE 50 MG/ML
12.5 INJECTION, SOLUTION INTRAMUSCULAR; INTRAVENOUS AS NEEDED
Status: DISCONTINUED | OUTPATIENT
Start: 2018-03-26 | End: 2018-03-26 | Stop reason: HOSPADM

## 2018-03-26 RX ORDER — SODIUM CHLORIDE 0.9 % (FLUSH) 0.9 %
5-10 SYRINGE (ML) INJECTION EVERY 8 HOURS
Status: DISCONTINUED | OUTPATIENT
Start: 2018-03-26 | End: 2018-03-26 | Stop reason: HOSPADM

## 2018-03-26 RX ORDER — PROPOFOL 10 MG/ML
INJECTION, EMULSION INTRAVENOUS AS NEEDED
Status: DISCONTINUED | OUTPATIENT
Start: 2018-03-26 | End: 2018-03-26 | Stop reason: HOSPADM

## 2018-03-26 RX ORDER — LIDOCAINE HYDROCHLORIDE 20 MG/ML
INJECTION, SOLUTION EPIDURAL; INFILTRATION; INTRACAUDAL; PERINEURAL AS NEEDED
Status: DISCONTINUED | OUTPATIENT
Start: 2018-03-26 | End: 2018-03-26 | Stop reason: HOSPADM

## 2018-03-26 RX ADMIN — PROPOFOL 50 MG: 10 INJECTION, EMULSION INTRAVENOUS at 07:40

## 2018-03-26 RX ADMIN — PROPOFOL 50 MG: 10 INJECTION, EMULSION INTRAVENOUS at 08:01

## 2018-03-26 RX ADMIN — SODIUM CHLORIDE, POTASSIUM CHLORIDE, SODIUM LACTATE AND CALCIUM CHLORIDE: 600; 310; 30; 20 INJECTION, SOLUTION INTRAVENOUS at 07:32

## 2018-03-26 RX ADMIN — LIDOCAINE HYDROCHLORIDE 100 MG: 20 INJECTION, SOLUTION EPIDURAL; INFILTRATION; INTRACAUDAL; PERINEURAL at 07:40

## 2018-03-26 RX ADMIN — PROPOFOL 50 MG: 10 INJECTION, EMULSION INTRAVENOUS at 07:51

## 2018-03-26 RX ADMIN — GLYCOPYRROLATE 0.2 MG: 0.2 INJECTION INTRAMUSCULAR; INTRAVENOUS at 07:54

## 2018-03-26 RX ADMIN — PROPOFOL 50 MG: 10 INJECTION, EMULSION INTRAVENOUS at 07:57

## 2018-03-26 RX ADMIN — PROPOFOL 50 MG: 10 INJECTION, EMULSION INTRAVENOUS at 07:44

## 2018-03-26 RX ADMIN — PROPOFOL 50 MG: 10 INJECTION, EMULSION INTRAVENOUS at 08:05

## 2018-03-26 RX ADMIN — PROPOFOL 50 MG: 10 INJECTION, EMULSION INTRAVENOUS at 07:54

## 2018-03-26 RX ADMIN — PROPOFOL 50 MG: 10 INJECTION, EMULSION INTRAVENOUS at 07:48

## 2018-03-26 NOTE — DISCHARGE INSTRUCTIONS
[place] Office: [phone]    Iglesia Altamirano  [id number]  1945    EGD/COLONOSCOPY DISCHARGE INSTRUCTIONS  Discomfort:  Sore throat- throat lozenges or warm salt water gargle  redness at IV site- apply warm compress to area; if redness or soreness persist- contact your physician  Gaseous discomfort- walking, belching will help relieve any discomfort  You may not operate a vehicle for 12 hours  You may not engage in an occupation involving machinery or appliances for rest of today. You may not drink alcoholic beverages for at least 12 hours  Avoid making any critical decisions for at least 24 hour  DIET  You may resume your regular diet - however -  remember your colon is empty and a heavy meal will produce gas. Avoid these foods:  fried / greasy foods, excessive carbonated drinks or too much caffeine  MEDICATIONS   Regarding Aspirin or Nonsteroidal medications specifically, please see below. ACTIVITY  You may resume your normal daily activities. Spend the remainder of the day resting -  avoid any strenuous activity. CALL M.D. ANY SIGN OF   Increasing pain, nausea, vomiting  Abdominal distension (swelling)  New increased bleeding (oral or rectal)  Fever (chills)  Pain in chest area  Bloody discharge from nose or mouth  Shortness of breath    You may not take any Advil, Aspirin, Ibuprofen, Motrin, Aleve, or Goodys for 7 days, ONLY  Tylenol as needed for pain. Follow-up Instructions:   Call  Evens Azar MD for any questions or concerns  Results of procedure / biopsy in 7 days   Telephone # [phone]      Follow-up Information     None         DO NOT TAKE TYLENOL/ACETAMINOPHEN WITH PERCOCET, [address], [address] [address] [facility]. TAKE NARCOTIC PAIN MEDICATIONS WITH FOOD     If given 2 pain narcotics do NOT take together! Narcotics tend to be constipating, we suggest taking a stool softener such as Colace or Miralax (follow package instructions).     DO NOT DRIVE WHILE TAKING NARCOTIC PAIN MEDICATIONS. DO NOT TAKE SLEEPING MEDICATIONS OR ANTIANXIETY MEDICATIONS WHILE TAKING NARCOTIC PAIN MEDICATIONS,  ESPECIALLY THE NIGHT OF ANESTHESIA. CPAP PATIENTS BE SURE TO WEAR MACHINE WHENEVER NAPPING OR SLEEPING. DISCHARGE SUMMARY from Nurse    The following personal items collected during your admission are returned to you:   Dental Appliance: Dental Appliances: None  Vision: Visual Aid: Glasses  Hearing Aid:    Jewelry:    Clothing:    Other Valuables:    Valuables sent to safe:        PATIENT INSTRUCTIONS:    After General Anesthesia or Intravenous Sedation, for 24 hours or while taking prescription Narcotics:        Someone should be with you for the next 24 hours. For your own safety, a responsible adult must drive you home. · Limit your activities  · Recommended activity: Rest today, up with assistance today. Do not climb stairs or shower unattended for the next 24 hours. · Please start with a soft bland diet and advance as tolerated (no nausea) to regular diet. · If you have a sore throat you should try the following: fluids, warm salt water gargles, or throat lozenges. If it does not improve after several days please follow up with your primary physician. · Do not drive and operate hazardous machinery  · Do not make important personal or business decisions  · Do  not drink alcoholic beverages  · If you have not urinated within 8 hours after discharge, please contact your surgeon on call. Report the following to your surgeon:  · Excessive pain, swelling, redness or odor of or around the surgical area  · Temperature over 100.5  · Nausea and vomiting lasting longer than 4 hours or if unable to take medications  · Any signs of decreased circulation or nerve impairment to extremity: change in color, persistent  numbness, tingling, coldness or increase pain      · You will receive a Post Operative Call from one of the Recovery Room Nurses on the day after your surgery to check on you. It is very important for us to know how you are recovering after your surgery. If you have an issue or need to speak with someone, please call your surgeon, do not wait for the post operative call. · You may receive an e-mail or letter in the mail from [employer] regarding your experience with us in the Ambulatory Surgery Unit. Your feedback is valuable to us and we appreciate your participation in the survey. · If the above instructions are not adequate, please contact Sridevi Marie RN, [place] anesthesia Nurse Manager or our Anesthesiologist, at [phone]. If you are having problems after your surgery, call the physician at their office number. · We wish you a speedy recovery ? What to do at Home:      *  Please give a list of your current medications to your Primary Care Provider. *  Please update this list whenever your medications are discontinued, doses are      changed, or new medications (including over-the-counter products) are added. *  Please carry medication information at all times in case of emergency situations. These are general instructions for a healthy lifestyle:    No smoking/ No tobacco products/ Avoid exposure to second hand smoke    Surgeon General's Warning:  Quitting smoking now greatly reduces serious risk to your health. Obesity, smoking, and sedentary lifestyle greatly increases your risk for illness    A healthy diet, regular physical exercise & weight monitoring are important for maintaining a healthy lifestyle    You may be retaining fluid if you have a history of heart failure or if you experience any of the following symptoms:  Weight gain of 3 pounds or more overnight or 5 pounds in a week, increased swelling in our hands or feet or shortness of breath while lying flat in bed. Please call your doctor as soon as you notice any of these symptoms; do not wait until your next office visit.     Recognize signs and symptoms of STROKE:    B - Balance  E - Eyes    F-  Face looks uneven    A-  Arms unable to move or move even    S-  Speech slurred or non-existent    T-  Time-call 911 as soon as signs and symptoms begin-DO NOT go       Back to bed or wait to see if you get better-TIME IS BRAIN. If you have not received your influenza and/or pneumococcal vaccine, please follow up with your primary care physician. The discharge information has been reviewed with the patient and caregiver. The patient and caregiver verbalized understanding.

## 2018-03-26 NOTE — PERIOP NOTES
Permission received to review discharge instructions and discuss private health information with sister

## 2018-03-26 NOTE — ANESTHESIA POSTPROCEDURE EVALUATION
Post-Anesthesia Evaluation and Assessment    Patient: Zach Cevallos MRN: [id number]  SSN: [id number]    YOB: 1945  Age: 81 y.o. Sex: female       Cardiovascular Function/Vital Signs  Visit Vitals    /58 (BP 1 Location: Left arm, BP Patient Position: At rest)    Pulse 75    Temp 36.6 °C (97.8 °F)    Resp 20    Ht 5' 3\" (1.6 m)    Wt 76.2 kg (168 lb)    SpO2 100%    BMI 29.76 kg/m2       Patient is status post total IV anesthesia anesthesia for Procedure(s):  COLONOSCOPY  ESOPHAGOGASTRODUODENOSCOPY (EGD)  ESOPHAGOGASTRODUODENAL (EGD) BIOPSY  ENDOSCOPIC POLYPECTOMY. Nausea/Vomiting: None    Postoperative hydration reviewed and adequate. Pain:  Pain Scale 1: Numeric (0 - 10) (03/26/18 0845)  Pain Intensity 1: 0 (03/26/18 0845)   Managed    Neurological Status:   Neuro (WDL): Exceptions to WDL (03/26/18 0815)  Neuro  Neurologic State: Alert (03/26/18 0845)   At baseline    Mental Status and Level of Consciousness: Arousable    Pulmonary Status:   O2 Device: Room air (03/26/18 0845)   Adequate oxygenation and airway patent    Complications related to anesthesia: None    Post-anesthesia assessment completed.  No concerns    Signed By: Belgica Briseno MD     March 26, 2018

## 2018-03-26 NOTE — PROCEDURES
[place] Office: [phone]      Esophagogastroduodenoscopy Procedure Note      Tylor Albright  1945  [id number]    Indication: melena, hx of duodenal ulcer    : Jules Zepeda MD    Referring Provider:  Manan Cox MD    Sedation:  MAC anesthesia Propofol    Procedure Details:  After detailed informed consent was obtained for the procedure, with all risks and benefits of procedure explained the patient was taken to the endoscopy suite and placed in the left lateral decubitus position. Following sequential administration of sedation as per above, the endoscope was inserted into the mouth and advanced under direct vision to second portion of the duodenum. A careful inspection was made as the gastroscope was withdrawn, including a retroflexed view of the proximal stomach; findings and interventions are described below. Findings:     Esophagus: The esophageal mucosa in the proximal and mid esophagus is normal.   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 Biopsies are taken  The squamo-columnar junction is at 35 cm where the Z-line was noted. There is a 2 cm hiatal hernia. Stomach: The gastric mucosa has erythema in the body and antrum- biopsies are taken. There is gastric bile seen. The fundus was found to be normal with no lesions noted on retroflexion. The angularis is normal as well. Duodenum:   The bulb has Brunner's gland hypertrophy. Ulcers have healed but there is a D1/D2 junction mild narrowing. Post bulbar mucosa shows 2 raised erosions-biopsies . The duodenal folds are somewhat flattened. Therapies:  biopsy of esophagus  biopsy of stomach body, antrum  biopsy of duodenal distal bulb, second portion    Specimen:  Specimens were collected as described and send to the laboratory. Complications:   None were encountered during the procedure. EBL:  None. Recommendations:     -Continue acid suppression. ,   -Await pathology. ,   -Follow symptoms. ,   -Follow up with primary care physician      Mubashir A. Terald Romberg, MD  3/26/2018  8:12 AM

## 2018-03-26 NOTE — PROCEDURES
Colonoscopy Procedure Note    Coleman Smith  1945  [id number]        Pre-operative Diagnosis: ANEMIA, hx of colonoscopy polyps    Post-operative Diagnosis: Sigmoid polyp, rectal polyps, hemorrhoids, redundent colon      : Mubashir A. Marylee Lisle, MD    Referring Provider: Emeka Buckley MD    Sedation:  MAC anesthesia Propofol        Procedure Details:    After detailed informed consent was obtained with all risks and benefits of procedure explained and preoperative exam completed, the patient was taken to the endoscopy suite and placed in the left lateral decubitus position. Upon sequential sedation as per above, a digital rectal exam was performed  And was normal.  The Olympus videocolonoscope  was inserted in the rectum and carefully advanced to the cecum, which was identified by the ileocecal valve and appendiceal orifice. The quality of preparation was good. The colonoscope was slowly withdrawn with careful evaluation between folds. Retroflexion in the rectum was performed. Findings:     · The colon is redundant and torturous. She probably has adhesions as well as the scope has a resistance to easy passage. · A sessile 8 mm sigmoid polyp was removed with a hot snare'  · 2-3 small hyperplastic appearing rectal polyps were snared off cold. · There are small internal hemorrhoids. · Mild rectal mucosal prolapse as she hacks and coughs. Therapies:  3 complete polypectomy were performed using cold snare  and the polyps were  retrieved    Specimen:  Specimens were collected as described above and sent to pathology. Complications: None were encountered during the procedure. EBL:  None. Recommendations:     -Await pathology. -If adenoma is present, repeat colonoscopy in 3 years.  -High fiber diet. Naturally, for new bleeding, unexplained weight loss,change in bowel habits and anemia, an earlier colonoscopy should be considered. Mubashir A. Marylee Lisle, MD  3/26/2018  8:20 AM

## 2018-03-26 NOTE — ANESTHESIA PREPROCEDURE EVALUATION
Anesthetic History   No history of anesthetic complications            Review of Systems / Medical History  Patient summary reviewed, nursing notes reviewed and pertinent labs reviewed    Pulmonary    COPD: mild               Neuro/Psych   Within defined limits           Cardiovascular    Hypertension          CAD, PAD and hyperlipidemia    Exercise tolerance: >4 METS  Comments: 7-17-17 EKG: SVT, 150---self limited per Cardiologist's note.   Rate in 70-80s    7-15-17 EKG: SR with PACs    7-14-17 ECHO: 55-60% EF, no AS    LEFT FEMORAL-POPLITEAL BYPASS WITH PTFE GORTEX GRAFT 2014   GI/Hepatic/Renal           PUD     Endo/Other    Diabetes (diet-controlled)    Obesity, arthritis and anemia     Other Findings   Comments:  H/o fractured right ankle         Physical Exam    Airway  Mallampati: III  TM Distance: < 4 cm  Neck ROM: normal range of motion, short neck   Mouth opening: Normal    Comments: Sloping chin Cardiovascular    Rhythm: regular  Rate: normal         Dental  No notable dental hx    Comments: Some decayed teeth, none loose   Pulmonary            Prolonged expiration    Comments: Decreased inspiratory effort Abdominal  GI exam deferred       Other Findings            Anesthetic Plan    ASA: 3  Anesthesia type: total IV anesthesia          Induction: Intravenous  Anesthetic plan and risks discussed with: Patient      Took at 445 am

## 2018-03-26 NOTE — IP AVS SNAPSHOT
[address] 
[phone] Patient: Erin Swenson MRN: [id number] :1945 About your hospitalization You were admitted on:  2018 You last received care in the:  [facility]\"" [facility] PACU You were discharged on:  2018 Why you were hospitalized Your primary diagnosis was:  Not on File Follow-up Information None Discharge Orders None A check felicity indicates which time of day the medication should be taken. My Medications CONTINUE taking these medications Instructions Each Dose to Equal  
 Morning Noon Evening Bedtime  
 acetaminophen 325 mg tablet Commonly known as:  TYLENOL Your last dose was: Your next dose is: Take 650 mg by mouth every four (4) hours as needed for Pain. Indications: Pain 650 mg  
    
   
   
   
  
 amLODIPine 5 mg tablet Commonly known as:  Yue Rivera Your last dose was: Your next dose is: Take 1 Tab by mouth daily. 5 mg BENICAR 40 mg tablet Generic drug:  olmesartan Your last dose was: Your next dose is: Take 40 mg by mouth daily. 40 mg  
    
   
   
   
  
 fluticasone-vilanterol 200-25 mcg/dose inhaler Commonly known as:  BREO ELLIPTA Your last dose was: Your next dose is: Take 1 Puff by inhalation daily. 1 Puff  
    
   
   
   
  
 furosemide 40 mg tablet Commonly known as:  LASIX Your last dose was: Your next dose is:    
   
   
 take 1 tab daily  
     
   
   
   
  
 ipratropium 0.02 % Soln Commonly known as:  ATROVENT Your last dose was: Your next dose is:    
   
   
 2.5 mL by Nebulization route every six (6) hours as needed. 0.5 mg  
    
   
   
   
  
 irbesartan 150 mg tablet Commonly known as:  AVAPRO Your last dose was: Your next dose is: Take 1 Tab by mouth daily. 150 mg  
    
   
   
   
  
 levalbuterol 0.63 mg/3 mL Nebu Commonly known as:  Bailey Orovada Your last dose was: Your next dose is:    
   
   
 3 mL by Nebulization route every six (6) hours as needed. 0.63 mg  
    
   
   
   
  
 metoprolol succinate 50 mg XL tablet Commonly known as:  TOPROL-XL Your last dose was: Your next dose is: Take 1 Tab by mouth daily. 50 mg  
    
   
   
   
  
 pantoprazole 40 mg tablet Commonly known as:  PROTONIX Your last dose was: Your next dose is: Take 40 mg by mouth daily. 40 mg Discharge Instructions [place] Office: [phone] Tylor Albright [id number] 1945 EGD/COLONOSCOPY DISCHARGE INSTRUCTIONS Discomfort: 
Sore throat- throat lozenges or warm salt water gargle 
redness at IV site- apply warm compress to area; if redness or soreness persist- contact your physician Gaseous discomfort- walking, belching will help relieve any discomfort You may not operate a vehicle for 12 hours You may not engage in an occupation involving machinery or appliances for rest of today. You may not drink alcoholic beverages for at least 12 hours Avoid making any critical decisions for at least 24 hour DIET You may resume your regular diet  however -  remember your colon is empty and a heavy meal will produce gas. Avoid these foods:  fried / greasy foods, excessive carbonated drinks or too much caffeine MEDICATIONS Regarding Aspirin or Nonsteroidal medications specifically, please see below. ACTIVITY You may resume your normal daily activities. Spend the remainder of the day resting -  avoid any strenuous activity. CALL M.D. ANY SIGN OF Increasing pain, nausea, vomiting Abdominal distension (swelling) New increased bleeding (oral or rectal) Fever (chills) Pain in chest area Bloody discharge from nose or mouth Shortness of breath You may not take any Advil, Aspirin, Ibuprofen, Motrin, Aleve, or Goodys for 7 days, ONLY  Tylenol as needed for pain. Follow-up Instructions: 
 Call  Evens Mcconnell MD for any questions or concerns Results of procedure / biopsy in 7 days Telephone # [phone] Follow-up Information None DO NOT TAKE TYLENOL/ACETAMINOPHEN WITH PERCOCET, LORTAB, [address] TAKE NARCOTIC PAIN MEDICATIONS WITH FOOD If given 2 pain narcotics do NOT take together! Narcotics tend to be constipating, we suggest taking a stool softener such as Colace or Miralax (follow package instructions). DO NOT DRIVE WHILE TAKING NARCOTIC PAIN MEDICATIONS. DO NOT TAKE SLEEPING MEDICATIONS OR ANTIANXIETY MEDICATIONS WHILE TAKING NARCOTIC PAIN MEDICATIONS,  ESPECIALLY THE NIGHT OF ANESTHESIA. CPAP PATIENTS BE SURE TO WEAR MACHINE WHENEVER NAPPING OR SLEEPING. DISCHARGE SUMMARY from Nurse The following personal items collected during your admission are returned to you:  
Dental Appliance: Dental Appliances: None Vision: Visual Aid: Glasses Hearing Aid:   
Jewelry:   
Clothing:   
Other Valuables:   
Valuables sent to safe:   
 
 
PATIENT INSTRUCTIONS: 
 
 
B - Balance E - Eyes F-  Face looks uneven A-  Arms unable to move or move even S-  Speech slurred or non-existent T-  Time-call 911 as soon as signs and symptoms begin-DO NOT go Back to bed or wait to see if you get better-TIME IS BRAIN. If you have not received your influenza and/or pneumococcal vaccine, please follow up with your primary care physician. The discharge information has been reviewed with the patient and caregiver. The patient and caregiver verbalized understanding. Introducing [facility]! Dear Joyce Rodriguez: Thank you for requesting a [employer] account. Our records indicate that you already have an active [employer] account. You can access your account anytime at [email or web address] Did you know that you can access your hospital and ER discharge instructions at any time in [employer]? You can also review all of your test results from your hospital stay or ER visit. Additional Information If you have questions, please visit the Frequently Asked Questions section of the [employer] website at [email or web address] Remember, [employer] is NOT to be used for urgent needs. For medical emergencies, dial 911. Now available from your iPhone and Android! Providers Seen During Your Hospitalization Provider Specialty Primary office phone Enzo Castillo MD Gastroenterology [phone] Your Primary Care Physician (PCP) Primary Care Physician Office Phone Office Fax Chiquita Middleton [phone] You are allergic to the following Allergen Reactions Codeine Rash Other (comments)  
 hyper Ibuprofen (Ibuprofen) Other (comments) Stomach pains Simvastatin Itching Recent Documentation Height Weight BMI OB Status Smoking Status 1.6 m 76.2 kg 29.76 kg/m2 Hysterectomy Former Smoker Emergency Contacts Name Discharge Info Relation Home Work Mobile [employer] [facility] DISCHARGE CAREGIVER [3] Other Relative [6] [phone] [address] CAREGIVER [3] Child [2]  [phone] Patient Belongings The following personal items are in your possession at time of discharge: 
  Dental Appliances: None  Visual Aid: Glasses Please provide this summary of care documentation to your next provider. Signatures-by signing, you are acknowledging that this After Visit Summary has been reviewed with you and you have received a copy. Patient Signature:  ____________________________________________________________ Date:  ____________________________________________________________  
  
[facility] Provider Signature:  ____________________________________________________________ Date:  ____________________________________________________________

## 2018-03-26 NOTE — PERIOP NOTES
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 Christiano Rosales    Assistant(s): Circ-1: Anjana Stapleton RN  Circ-2: Matthew Heredia RN  Scrub Tech-1: Karsten Renee    Specimens:   ID Type Source Tests Collected by Time Destination   1 : duodenal erosion biospy Preservative Duodenum  Anupama Chan MD 3/26/2018 9031 Pathology   2 : gastric biopsy Preservative Gastric  Evens Russell MD 3/26/2018 9172 Pathology   3 : GE junction biopsy Preservative GE Jossie Hood MD 3/26/2018 0745 Pathology   4 : sigmoid polyp Preservative Sigmoid  Anupama Chan MD 3/26/2018 0801 Pathology   5 : rectal polyp Preservative Rectal  Anupama Chan MD 3/26/2018 0805 Pathology       Assessment:  Intra-procedure medications         Anesthesia gave intra-procedure sedation and medications, see anesthesia flow sheet     Intravenous fluids: LR@ KVO     Vital signs stable       Recommendation:    Permission to share finding with sister per preop note

## 2018-03-26 NOTE — H&P
Pre-endoscopy H and P    The patient was seen and examined in the room/pre-op holding area. The airway was assessed and documented. The problem list, past medical history, and medications were reviewed. Patient Active Problem List   Diagnosis Code    Abnormal ECG R94.31    Respiratory failure, acute ([facility]) J96.00    Respiratory failure ([facility] J96.90    Closed pilon fracture of right tibia S82.871A    Dislocation of ankle, right, closed S93. 04XA    Closed fracture of lateral malleolus of right fibula with routine healing S82. 61XD     Social History     Social History    Marital status:      Spouse name: N/A    Number of children: N/A    Years of education: N/A     Occupational History    Not on file. Social History Main Topics    Smoking status: Former Smoker     Packs/day: 1.50     Years: 40.00     Quit date: 12/20/2009    Smokeless tobacco: Never Used    Alcohol use No    Drug use: No    Sexual activity: Not on file     Other Topics Concern    Not on file     Social History Narrative     Past Medical History:   Diagnosis Date    Arthritis     CAD (coronary artery disease)     renal stent; Dr tSiven Aranda     Chronic obstructive pulmonary disease ([facility]     Diabetes ([facility]     diet controlled    History of bronchitis     Hyperlipemia     Hypertension     PUD (peptic ulcer disease)          Prior to Admission Medications   Prescriptions Last Dose Informant Patient Reported? Taking?   acetaminophen (TYLENOL) 325 mg tablet 3/25/2018 at Unknown time  Yes Yes   Sig: Take 650 mg by mouth every four (4) hours as needed for Pain. Indications: Pain   amLODIPine (NORVASC) 5 mg tablet 3/26/2018 at Unknown time  No Yes   Sig: Take 1 Tab by mouth daily. fluticasone-vilanterol (BREO ELLIPTA) 200-25 mcg/dose inhaler 3/26/2018 at Unknown time  No Yes   Sig: Take 1 Puff by inhalation daily.    furosemide (LASIX) 40 mg tablet 3/25/2018 at Unknown time  No Yes   Sig: take 1 tab daily   ipratropium (ATROVENT) 0.02 % nebulizer solution Not Taking at Unknown time  No No   Si.5 mL by Nebulization route every six (6) hours as needed. irbesartan (AVAPRO) 150 mg tablet 3/26/2018 at Unknown time  No Yes   Sig: Take 1 Tab by mouth daily. levalbuterol (XOPENEX) 0.63 mg/3 mL nebu Not Taking at Unknown time  No No   Sig: 3 mL by Nebulization route every six (6) hours as needed. metoprolol succinate (TOPROL-XL) 50 mg XL tablet 3/26/2018 at 0445  No Yes   Sig: Take 1 Tab by mouth daily. olmesartan (BENICAR) 40 mg tablet 3/26/2018 at Unknown time  Yes Yes   Sig: Take 40 mg by mouth daily. pantoprazole (PROTONIX) 40 mg tablet 3/26/2018 at Unknown time  Yes Yes   Sig: Take 40 mg by mouth daily. Facility-Administered Medications: None           The review of systems is:  Negative  for shortness of breath or chest pain      The heart, lungs, and mental status were satisfactory for the administration of deep sedation and for the procedure. I discussed with the patient the objectives, risks, consequences and alternatives to the procedure.       Francis Richardson MD  3/26/2018  7:34 AM

## 2018-07-23 ENCOUNTER — HOSPITAL ENCOUNTER (OUTPATIENT)
Dept: MAMMOGRAPHY | Age: 73
Discharge: HOME | End: 2018-07-23
Attending: FAMILY MEDICINE
Payer: MEDICARE

## 2018-07-23 DIAGNOSIS — Z12.39 SCREENING BREAST EXAMINATION: ICD-10-CM

## 2018-07-23 PROCEDURE — 77067 SCR MAMMO BI INCL CAD: CPT

## 2022-09-22 NOTE — PROCEDURES
[place] Office: [phone]      Esophagogastroduodenoscopy Procedure Note      Nimisha Colunga  1945  [id number]    Indication:  Melena/hematochezia, Anemia     : Sandra Clark MD    Referring Provider:  Toshia Mobley MD    Sedation:  MAC anesthesia    Procedure Details:  After detailed informed consent was obtained for the procedure, with all risks and benefits of procedure explained the patient was taken to the endoscopy suite and placed in the left lateral decubitus position. Following sequential administration of sedation as per above, the endoscope was inserted into the mouth and advanced under direct vision to second portion of the duodenum. A careful inspection was made as the gastroscope was withdrawn, including a retroflexed view of the proximal stomach; findings and interventions are described below. Findings:     Esophagus: The esophageal mucosa in the proximal and mid esophagus is normal.  Exudative LA grade C esophagitis is noted with a hiatal hernia. A 3 cm hiatal hernia is noted. Stomach: The gastric mucosa has erythema in the body. The fundus was found to be normal with no lesions noted on retroflexion. The angularis is normal as well. Duodenum:   The  deep bulb and  bulbar sweep has 4, large, over 1 cm each, deeply excavated ulcers with clean base. There is no active bleeding. I took biopsies of the ulcer edges(will also send for CMV staining). Therapies:  biopsy of duodenal ulcer as above    Specimen:  Specimens were collected as described and send to the laboratory. Complications:   None were encountered during the procedure. EBL:  None. Recommendations:     -Acid suppression with a proton pump inhibitor. And carafate,   -Await pathology. ,   -Follow hgb        Evens Mazariegos MD  7/27/2017  3:04 PM Helical Rim Advancement Flap Text: The defect edges were debeveled with a #15 blade scalpel.  Given the location of the defect and the proximity to free margins (helical rim) a double helical rim advancement flap was deemed most appropriate.  Using a sterile surgical marker, the appropriate advancement flaps were drawn incorporating the defect and placing the expected incisions between the helical rim and antihelix where possible.  The area thus outlined was incised through and through with a #15 scalpel blade.  With a skin hook and iris scissors, the flaps were gently and sharply undermined and freed up.

## 2023-07-15 NOTE — PROGRESS NOTES
Hospitalist Progress Note    NAME: Herbert Gilbert   :  1945   MRN:  [id number]       Interim Hospital Summary: 81 y.o. female whom presented on 2017 with      Assessment / Plan:  Acute blood loss anemia due to GIB/duodenal ulcers and severe esophagitis: Hg stable today @8.4 recheck in am  - Transfused 4u pRBCs this admission  - EGD  with Dr. Isidro Green demonstrating diffuse ulceration with friability in entire lower 1/3 of the esophagus, nearly circumferential c/w severe erosive esophagitis.  No varices seen, no active bleeding. The gastric mucosa appeared normal. Duodenum with multiple clean based ulcers (3 in the duodenal bulb) and 3 additional ones in the second portion of the duodenum with black base but no visible vessel, no active bleeding.  The ulcers in the bulb had a chronic appearance. - repeat EGD with Dr. Louie Chu  with exudative LA grade C esophagitis noted with a hiatal hernia.  Duodenum deep bulb and  bulbar sweep has 4, large, over 1 cm each, deeply excavated ulcers with clean base. No active bleeding.  - appreciate GI consult, plan for discharge when bed is available in [facility]   - con't BID PPI and carafate  - con't holding plavix on this for PVD, does not need to urgently be restarted        Minimally elevated troponin in setting of transient atrial fibrillation with RVR (), SVT (), 22 beat vtach (7/15) in setting of CAD and benign hypertension:  - echo  with EF 55-60 %. There were no regional wall motion abnormalities. - appreciate cardiology consult  - con't lopressor, avapro, lasix, norvasc  - off plavix due to GIB as above     Distal tibia and fibula fracture s/p mechanical fall:  s/p ORIF on , note ex-fix in place without the pin that was noted to be out on .  Partial splint in place to keep foot in alignment post pin removal.   - appreciate ortho assistance, con't [facility] per their recommendations-stable   - PT/OT -   Awaiting placement CM in the Internal Medicine Progress Note      SUBJECTIVE     Denies F, C, CP, SOB, cough.  Took tramadol yesterday for leg pain.  Took Flexeril yesterday for muscle cramps.      OBJECTIVE     VITAL SIGNS:     Vital Last Value 24 Hour Range   Temperature 97.5 °F (36.4 °C) (07/15/23 0426) Temp  Min: 97.5 °F (36.4 °C)  Max: 98.2 °F (36.8 °C)   Pulse 72 (07/15/23 0426) Pulse  Min: 69  Max: 75   Respiratory 16 (07/15/23 0426) Resp  Min: 15  Max: 18   Non-Invasive  Blood Pressure 98/63 (07/15/23 0426) BP  Min: 98/63  Max: 112/75   Pulse Oximetry 96 % (07/15/23 0426) SpO2  Min: 95 %  Max: 100 %       IINTAKE/OUTPUT:  I/O last 3 completed shifts:  In: 3918.2 [P.O.:720; I.V.:3198.2]  Out: 2705 [Urine:2705]  I/O this shift:  In: -   Out: 775 [Urine:775]    Intake/Output Summary (Last 24 hours) at 7/15/2023 1135  Last data filed at 7/15/2023 0800  Gross per 24 hour   Intake 3295.3 ml   Output 3480 ml   Net -184.7 ml       PHYSICAL EXAM    Physical Exam  Pertinent Physical Exam At Time of Discharge  Physical Exam  Constitutional:       Appearance: Normal appearance.   HENT:      Head: Normocephalic and atraumatic.      Right Ear: External ear normal.      Left Ear: External ear normal.   Eyes:      Extraocular Movements: Extraocular movements intact.   Cardiovascular:      Rate and Rhythm: Normal rate and regular rhythm.      Heart sounds: No murmur heard.     No friction rub. No gallop.   Pulmonary:      Effort: Pulmonary effort is normal. No respiratory distress.      Breath sounds: Normal breath sounds.   Abdominal:      General: Bowel sounds are normal.      Palpations: Abdomen is soft.      Tenderness: There is no abdominal tenderness.      Comments: Slight abdominal distension.   Musculoskeletal:         General: No swelling or tenderness. Normal range of motion.      Right lower leg: No edema.      Left lower leg: No edema.   Skin:     General: Skin is warm and dry.   Neurological:      Mental Status: He is alert.   Psychiatric:          Mood and Affect: Mood normal.         Thought Content: Thought content normal.      LABS    Recent Labs   Lab 07/15/23  0508 07/14/23  0518 07/13/23  0511 07/12/23  0926   SODIUM 140 141 138 136   POTASSIUM 3.7 3.2* 3.9 4.0   CHLORIDE 106 108 106 108   CO2 30 30 28 28   ANIONGAP 8 6* 8 4*   BUN 8 11 15 12   CREATININE 0.85 0.80 0.81 1.01   CALCIUM 8.1* 8.8 9.6 9.4   GLUCOSE 94 131* 165* 87         Recent Labs   Lab 07/15/23  0508 07/14/23  0518 07/13/23  0511 07/12/23  0926   HGB 11.0* 11.0* 12.7* 11.8*   HCT 32.5* 32.3* 35.4* 32.9*    309 372 337   WBC 2.6* 8.1 19.5* 3.1*   MCV 97.6 97.0 93.4 94.3         Recent Labs   Lab 07/15/23  0508 07/14/23  0518 07/13/23  0511 07/12/23  0926   MG 1.9 2.0 2.0 2.0   PHOS 3.6 2.9 1.5* 3.4         No results found      Recent Labs   Lab 07/15/23  0508 07/14/23  0518 07/13/23  0511 07/12/23  0926   ALBUMIN 2.6* 2.9* 3.3* 3.3*   * 62* 37 29       UA  Lab Results   Component Value Date    UWBC Negative 10/28/2021    UWBC NEGATIVE 10/25/2019    URBC Negative 10/28/2021    URBC NEGATIVE 10/25/2019        IMAGING  US LIVER GALLBLADDER PANCREAS (RUQ)    (Results Pending)           ASSESSMENT AND PLAN     Quinn Nieto is a 41 year old male with diffuse large B cell lymphoma germinal center subtype, CD30+, FISH MYC negative of paraspinal thoracic lesion s/p surgical resection/biopsy and bone lesions admitted for methotrexate cycle 1 out of 2 after 6 cycles of R-CHOP. Since LFTs have increased since yesterday, repeat LFTs are ordered for 12pm and RUQ US has been completed. If LFTs are > 2x 5am labs -> f/u w/ attending.      Diffuse Large B Cell Lymphoma  -Cycle 1 of HD methotrexate 3.5 g/m2: chemo received 7/12  -Leucovorin per protocol  -Methotrexate level 24,48 hours after  -Holding home omeprazole, norco  -Alkalinizing urine with bicarb drip   -Continue flexeril 5 mg PRN      Hypophosphatemia  -K phos tablet 250 mg BID     Hypokalemia  -Kcl tablet 40 mEq given  case.     Acute hypoxic respiratory failure (resolved) due to acute COPD exacerbation and ?ILD: off steroids/abx- con't breo/Incruse  - prn ipratropium, xopenex nebs  - con't to mobilize as able  ARF, resolved  Hyponatremia, ivd na 132  PAD:  s/p right renal artery stent, left fem-pop by Dr. Yana Webster 3/2014  Tobacco use:  con't nicotine patch    E: replace prn  Code Status: Full  Surrogate Decision Maker: daughter Estrellita Joe [phone], son Jayy Lester [phone] cell, home [phone]  DVT Prophylaxis: SCDs for now will d/w gi re:lovenox prior to dc           Recommended Disposition: awaiting placement   SNF when accepted         Subjective:     Chief Complaint / Reason for Physician Visit no c/o. Tolerating diet. Pain well controlled this AM  .  Discussed with RN events overnight. Review of Systems:  Symptom Y/N Comments  Symptom Y/N Comments   Fever/Chills    Chest Pain     Poor Appetite    Edema     Cough    Abdominal Pain     Sputum    Joint Pain     SOB/ODOM    Pruritis/Rash     Nausea/vomit    Tolerating PT/OT     Diarrhea    Tolerating Diet     Constipation    Other       Could NOT obtain due to:      Objective:     VITALS:   Last 24hrs VS reviewed since prior progress note. Most recent are:  Patient Vitals for the past 24 hrs:   Temp Pulse Resp BP SpO2   07/30/17 0843 98.5 °F (36.9 °C) - - - -   07/30/17 0836 98.5 °F (36.9 °C) 88 18 183/87 96 %   07/29/17 2258 99.6 °F (37.6 °C) 79 18 (!) 166/37 95 %   07/29/17 2026 98.1 °F (36.7 °C) 76 20 (!) 164/39 98 %   07/29/17 1520 99 °F (37.2 °C) 72 18 (!) 174/35 96 %       Intake/Output Summary (Last 24 hours) at 07/30/17 1042  Last data filed at 07/29/17 2258   Gross per 24 hour   Intake                0 ml   Output             2425 ml   Net            -2425 ml        PHYSICAL EXAM:  General: WD, WN. Alert, cooperative, no acute distress    EENT:  EOMI. Anicteric sclerae. MMM  Resp:  CTA bilaterally, no wheezing or rales.   No accessory muscle use  CV:  Regular  rhythm,  7/14  -Recheck on AM labs     Restless Leg Syndrome  -Continue ferrous sulfate every other day  -Gabapentin 300 mg 3 hours before bedtime     Anxiety  -Continue home med klonopin 1 mg nightly     Headache  -Patient prescribed Tylenol 650 q 4 hours PRN  -Discontinued for slight elevation in LFTs        FEN  Sodium bicarb drip  Replete electrolytes as needed  Regular diet    DVT Prophylaxis  enoxaparin - 40 MG/0.4ML      GI prophylaxis  Not indicated    Code Status    Code Status: Full Resuscitation    Discussed with attending. Please see attending physician note for final recommendations.     Jaqui Henry MD, MPH  Internal Medicine PGY-1         No edema  GI:  Soft, Non distended, Non tender.  +Bowel sounds  Neurologic:  Alert and oriented X 3, normal speech,   Psych:   Good insight. Not anxious nor agitated  Skin:  No rashes. No jaundice ext:Calves soft, supple, non-tender upon palpation or with passive stretch. Reviewed most current lab test results and cultures  YES  Reviewed most current radiology test results   YES  Review and summation of old records today    NO  Reviewed patient's current orders and MAR    YES  PMH/SH reviewed - no change compared to H&P  ________________________________________________________________________  Care Plan discussed with:    Comments   Patient x    Family      RN x    Care Manager     Consultant                        Multidiciplinary team rounds were held today with , nursing, pharmacist and clinical coordinator. Patient's plan of care was discussed; medications were reviewed and discharge planning was addressed. ________________________________________________________________________  Total NON critical care TIME:  25  Minutes    Total CRITICAL CARE TIME Spent:   Minutes non procedure based      Comments   >50% of visit spent in counseling and coordination of care x    ________________________________________________________________________  Fadi Bruce MD     Procedures: see electronic medical records for all procedures/Xrays and details which were not copied into this note but were reviewed prior to creation of Plan. LABS:  I reviewed today's most current labs and imaging studies.   Pertinent labs include:  Recent Labs      07/29/17   2252  07/29/17   0428  07/28/17   0437   WBC  12.3*  13.5*  14.6*   HGB  8.4*  9.1*  9.3*   HCT  25.5*  26.3*  26.6*   PLT  178  187  188     Recent Labs      07/30/17   0516  07/29/17   0428  07/28/17   0437   NA  131*  132*  133*   K  3.1*  3.2*  3.5   CL  97  97  100   CO2  28  26  25   GLU  112*  96  115*   BUN  19  20  22*   CREA  0.74  0.61 0.61   CA  7.8*  7.6*  7.5*       Signed: Lola Hameed MD

## (undated) DEVICE — ZIMMER® STERILE DISPOSABLE TOURNIQUET CUFF WITH PROTECTIVE SLEEVE AND PLC, DUAL PORT, SINGLE BLADDER, 34 IN. (86 CM)

## (undated) DEVICE — BAG SPEC BIOHZRD 10 X 10 IN --

## (undated) DEVICE — SUTURE ABSORBABLE MONOFILAMENT 2-0 WND CLOSURE GRN V-LOC 180 VLOCL0315

## (undated) DEVICE — BLOCK BITE ENDOSCP AD 21 MM W/ DIL BLU LF DISP

## (undated) DEVICE — SOLUTION IV 1000ML 0.9% SOD CHL

## (undated) DEVICE — SYR 3ML LL TIP 1/10ML GRAD --

## (undated) DEVICE — CLAMP EXT FIX L TI ALLY COMB CLP ON SELF HLD

## (undated) DEVICE — STRAINER URIN CALC RNL MSH -- CONVERT TO ITEM 357634

## (undated) DEVICE — ROD EXT FIX L350MM DIA11MM C FBR MR CONDITIONAL

## (undated) DEVICE — CONTINU-FLO SOLUTION SET, 2 INJECTION SITES, MALE LUER LOCK ADAPTER WITH RETRACTABLE COLLAR, LARGE BORE STOPCOCK WITH ROTATING MALE LUER LOCK EXTENSION SET, 2 INJECTION SITES, MALE LUER LOCK ADAPTER WITH RETRACTABLE COLLAR: Brand: INTERLINK/CONTINU-FLO

## (undated) DEVICE — FORCEPS BX L160CM DIA8MM GRSP DISECT CUP TIP NONLOCKING ROT

## (undated) DEVICE — BANDAGE COMPR 9 FTX4 IN SMOOTH COMFORTABLE SYNTH ESMRK LF

## (undated) DEVICE — (D)PREP SKN CHLRAPRP APPL 26ML -- CONVERT TO ITEM 371833

## (undated) DEVICE — STERILE POLYISOPRENE POWDER-FREE SURGICAL GLOVES WITH EMOLLIENT COATING: Brand: PROTEXIS

## (undated) DEVICE — REM POLYHESIVE ADULT PATIENT RETURN ELECTRODE: Brand: VALLEYLAB

## (undated) DEVICE — Device

## (undated) DEVICE — SOLIDIFIER MEDC 1200ML -- CONVERT TO 356117

## (undated) DEVICE — ROCKER SWITCH PENCIL BLADE ELECTRODE, HOLSTER: Brand: EDGE

## (undated) DEVICE — PADDING CAST SPEC 6INX4YD COT --

## (undated) DEVICE — BANDAGE COMPR SELF ADH 5 YDX4 IN TAN STRL PREMIERPRO LF

## (undated) DEVICE — IMPLANTABLE DEVICE
Type: IMPLANTABLE DEVICE | Site: ANKLE | Status: NON-FUNCTIONAL
Removed: 2017-07-13

## (undated) DEVICE — INFECTION CONTROL KIT SYS

## (undated) DEVICE — KENDALL RADIOLUCENT FOAM MONITORING ELECTRODE RECTANGULAR SHAPE: Brand: KENDALL

## (undated) DEVICE — NEEDLE HYPO 18GA L1.5IN PNK S STL HUB POLYPR SHLD REG BVL

## (undated) DEVICE — SPONGE LAP 18X18IN STRL -- 5/PK

## (undated) DEVICE — BASIN EMESIS 500CC ROSE 250/CS 60/PLT: Brand: MEDEGEN MEDICAL PRODUCTS, LLC

## (undated) DEVICE — NON-REM POLYHESIVE PATIENT RETURN ELECTRODE: Brand: VALLEYLAB

## (undated) DEVICE — SCREW BNE L16MM DIA2.7MM ANK S STL ST VAR ANG LOK FULL THRD
Type: IMPLANTABLE DEVICE | Site: ANKLE | Status: NON-FUNCTIONAL
Removed: 2017-07-13

## (undated) DEVICE — ROD EXT FIX L300MM DIA11MM C FBR MR CONDITIONAL

## (undated) DEVICE — MEDI-VAC YANK SUCT HNDL W/TPRD BULBOUS TIP: Brand: CARDINAL HEALTH

## (undated) DEVICE — SNARE ENDOSCP M L240CM W27MM SHTH DIA2.4MM CHN 2.8MM OVL

## (undated) DEVICE — TOWEL SURG W17XL27IN STD BLU COT NONFENESTRATED PREWASHED

## (undated) DEVICE — ROD EXT FIX L150MM DIA11MM C FBR MR CONDITIONAL

## (undated) DEVICE — KENDALL DL ECG CABLE AND LEAD WIRE SYSTEM, 3-LEAD, SINGLE PATIENT USE: Brand: KENDALL

## (undated) DEVICE — Device: Brand: MEDEX

## (undated) DEVICE — (D)SYR 10ML 1/5ML GRAD NSAF -- PKGING CHANGE USE ITEM 338027

## (undated) DEVICE — TOWEL 4 PLY TISS 19X30 SUE WHT

## (undated) DEVICE — 1200 GUARD II KIT W/5MM TUBE W/O VAC TUBE: Brand: GUARDIAN

## (undated) DEVICE — LARGE EX-FIX MULTI-PIN CLAMP MR-CONDITIONAL / 6-POSITION

## (undated) DEVICE — Z DISCONTINUED PER MEDLINE (LOW STOCK)  USE 2422770 DRAPE C ARM W54XL78IN FOR FLROSCN

## (undated) DEVICE — Z DISCONTINUED PER MEDLINE LINE GAS SAMPLING O2/CO2 LNG AD 13 FT NSL W/ TBNG FILTERLINE

## (undated) DEVICE — COVER,TABLE,60X90,STERILE: Brand: MEDLINE

## (undated) DEVICE — DRAPE,EXTREMITY,89X128,STERILE: Brand: MEDLINE

## (undated) DEVICE — DEVON™ KNEE AND BODY STRAP 60" X 3" (1.5 M X 7.6 CM): Brand: DEVON

## (undated) DEVICE — DRAPE,REIN 53X77,STERILE: Brand: MEDLINE

## (undated) DEVICE — STERILE POLYISOPRENE POWDER-FREE SURGICAL GLOVES: Brand: PROTEXIS

## (undated) DEVICE — FORCEPS BX L240CM JAW DIA2.8MM L CAP W/ NDL MIC MESH TOOTH

## (undated) DEVICE — GOWN,SIRUS,NONRNF,SETINSLV,XL,20/CS: Brand: MEDLINE

## (undated) DEVICE — 2.5MM DRILL BIT/QC/GOLD/110MM

## (undated) DEVICE — SUTURE ABSORBABLE BRAIDED 2-0 CT-1 27 IN UD VICRYL J259H

## (undated) DEVICE — 2.0MM DRILL BIT WITH DEPTH MARK/QC/140MM

## (undated) DEVICE — FRAZIER SUCTION INSTRUMENT 12 FR W/CONTROL VENT & OBTURATOR: Brand: FRAZIER

## (undated) DEVICE — SURGICAL PROCEDURE PACK BASIN MAJ SET CUST NO CAUT

## (undated) DEVICE — GAUZE SPONGES,12 PLY: Brand: CURITY

## (undated) DEVICE — SLIM BODY SKIN STAPLER: Brand: APPOSE ULC

## (undated) DEVICE — DRESSING PETRO GZ XRFRM CURAD ST OVERWRAP 5 X 9 IN

## (undated) DEVICE — CATHETER IV 22GA L1IN TEF FEP STR HUB INTROCAN SFTY

## (undated) DEVICE — NEONATAL-ADULT SPO2 SENSOR: Brand: NELLCOR

## (undated) DEVICE — KERLIX BANDAGE ROLL: Brand: KERLIX

## (undated) DEVICE — CATH IV AUTOGRD BC PNK 20GA 25 -- INSYTE

## (undated) DEVICE — BASIN EMSIS 16OZ GRAPHITE PLAS KID SHP MOLD GRAD FOR ORAL

## (undated) DEVICE — HANDLE LT SNAP ON ULT DURABLE LENS FOR TRUMPF ALC DISPOSABLE

## (undated) DEVICE — CONVERTORS STOCKINETTE: Brand: CONVERTORS

## (undated) DEVICE — TRAP SUC MUCOUS 70ML -- MEDICHOICE MEDLINE

## (undated) DEVICE — SOLUTION LACTATED RINGERS INJECTION USP

## (undated) DEVICE — 3M™ TEGADERM™ TRANSPARENT FILM DRESSING FRAME STYLE, 1624W, 2-3/8 IN X 2-3/4 IN (6 CM X 7 CM), 100/CT 4CT/CASE: Brand: 3M™ TEGADERM™

## (undated) DEVICE — 3000CC GUARDIAN II: Brand: GUARDIAN

## (undated) DEVICE — CONTAINER SPEC 20 ML LID NEUT BUFF FORMALIN 10 % POLYPR STS

## (undated) DEVICE — ENDO CARRY-ON PROCEDURE KIT INCLUDES ENZYMATIC SPONGE, GAUZE, BIOHAZARD LABEL, TRAY, LUBRICANT, DIRTY SCOPE LABEL, WATER LABEL, TRAY, DRAWSTRING PAD, AND DEFENDO 4-PIECE KIT.: Brand: ENDO CARRY-ON PROCEDURE KIT

## (undated) DEVICE — BIT DRL L180MM DIA2.5MM G QUIK CPL W/O STP REUSE

## (undated) DEVICE — SET ADMIN 16ML TBNG L100IN 2 Y INJ SITE IV PIGGY BK DISP